# Patient Record
Sex: MALE | Race: WHITE | Employment: OTHER | URBAN - METROPOLITAN AREA
[De-identification: names, ages, dates, MRNs, and addresses within clinical notes are randomized per-mention and may not be internally consistent; named-entity substitution may affect disease eponyms.]

---

## 2017-02-03 RX ORDER — ISOSORBIDE MONONITRATE 30 MG/1
TABLET, EXTENDED RELEASE ORAL
Qty: 90 TABLET | Refills: 0 | Status: SHIPPED | OUTPATIENT
Start: 2017-02-03 | End: 2017-04-03 | Stop reason: SDUPTHER

## 2017-02-28 ENCOUNTER — OFFICE VISIT (OUTPATIENT)
Dept: FAMILY MEDICINE CLINIC | Age: 82
End: 2017-02-28

## 2017-02-28 VITALS
RESPIRATION RATE: 18 BRPM | WEIGHT: 270.2 LBS | OXYGEN SATURATION: 91 % | HEIGHT: 69 IN | SYSTOLIC BLOOD PRESSURE: 130 MMHG | BODY MASS INDEX: 40.02 KG/M2 | TEMPERATURE: 98.3 F | HEART RATE: 115 BPM | DIASTOLIC BLOOD PRESSURE: 80 MMHG

## 2017-02-28 DIAGNOSIS — E11.42 DM TYPE 2 WITH DIABETIC PERIPHERAL NEUROPATHY (HCC): Primary | ICD-10-CM

## 2017-02-28 LAB
ANION GAP SERPL CALCULATED.3IONS-SCNC: 15 MMOL/L (ref 3–16)
BUN BLDV-MCNC: 15 MG/DL (ref 7–20)
CALCIUM SERPL-MCNC: 9.5 MG/DL (ref 8.3–10.6)
CHLORIDE BLD-SCNC: 96 MMOL/L (ref 99–110)
CO2: 29 MMOL/L (ref 21–32)
CREAT SERPL-MCNC: 1.3 MG/DL (ref 0.8–1.3)
GFR AFRICAN AMERICAN: >60
GFR NON-AFRICAN AMERICAN: 53
GLUCOSE BLD-MCNC: 138 MG/DL (ref 70–99)
HBA1C MFR BLD: 7.7 %
POTASSIUM SERPL-SCNC: 4.4 MMOL/L (ref 3.5–5.1)
SODIUM BLD-SCNC: 140 MMOL/L (ref 136–145)

## 2017-02-28 PROCEDURE — 83036 HEMOGLOBIN GLYCOSYLATED A1C: CPT | Performed by: NURSE PRACTITIONER

## 2017-02-28 PROCEDURE — 36415 COLL VENOUS BLD VENIPUNCTURE: CPT | Performed by: NURSE PRACTITIONER

## 2017-02-28 PROCEDURE — 99213 OFFICE O/P EST LOW 20 MIN: CPT | Performed by: NURSE PRACTITIONER

## 2017-02-28 RX ORDER — LABETALOL 300 MG/1
TABLET, FILM COATED ORAL
Qty: 180 TABLET | Refills: 2 | Status: ON HOLD | OUTPATIENT
Start: 2017-02-28 | End: 2018-06-18 | Stop reason: HOSPADM

## 2017-02-28 ASSESSMENT — ENCOUNTER SYMPTOMS
GASTROINTESTINAL NEGATIVE: 1
EYES NEGATIVE: 1
RESPIRATORY NEGATIVE: 1
ALLERGIC/IMMUNOLOGIC NEGATIVE: 1

## 2017-04-04 ENCOUNTER — OFFICE VISIT (OUTPATIENT)
Dept: PULMONOLOGY | Age: 82
End: 2017-04-04

## 2017-04-04 VITALS
OXYGEN SATURATION: 95 % | DIASTOLIC BLOOD PRESSURE: 72 MMHG | SYSTOLIC BLOOD PRESSURE: 128 MMHG | WEIGHT: 271.2 LBS | HEART RATE: 69 BPM | RESPIRATION RATE: 18 BRPM | BODY MASS INDEX: 40.17 KG/M2 | TEMPERATURE: 98.2 F | HEIGHT: 69 IN

## 2017-04-04 DIAGNOSIS — G47.10 HYPERSOMNIA: ICD-10-CM

## 2017-04-04 DIAGNOSIS — J96.11 CHRONIC RESPIRATORY FAILURE WITH HYPOXIA (HCC): ICD-10-CM

## 2017-04-04 DIAGNOSIS — J44.9 COPD, SEVERE (HCC): ICD-10-CM

## 2017-04-04 DIAGNOSIS — R06.83 SNORING: ICD-10-CM

## 2017-04-04 DIAGNOSIS — J98.4 RESTRICTIVE LUNG DISEASE: ICD-10-CM

## 2017-04-04 DIAGNOSIS — G47.33 OSA (OBSTRUCTIVE SLEEP APNEA): ICD-10-CM

## 2017-04-04 DIAGNOSIS — R91.1 PULMONARY NODULE: Primary | ICD-10-CM

## 2017-04-04 PROCEDURE — 99214 OFFICE O/P EST MOD 30 MIN: CPT | Performed by: NURSE PRACTITIONER

## 2017-04-04 ASSESSMENT — SLEEP AND FATIGUE QUESTIONNAIRES
ESS TOTAL SCORE: 6
HOW LIKELY ARE YOU TO NOD OFF OR FALL ASLEEP WHILE SITTING AND READING: 1
HOW LIKELY ARE YOU TO NOD OFF OR FALL ASLEEP IN A CAR, WHILE STOPPED FOR A FEW MINUTES IN TRAFFIC: 0
HOW LIKELY ARE YOU TO NOD OFF OR FALL ASLEEP WHILE SITTING AND TALKING TO SOMEONE: 0
HOW LIKELY ARE YOU TO NOD OFF OR FALL ASLEEP WHILE SITTING INACTIVE IN A PUBLIC PLACE: 0
HOW LIKELY ARE YOU TO NOD OFF OR FALL ASLEEP WHEN YOU ARE A PASSENGER IN A CAR FOR AN HOUR WITHOUT A BREAK: 0
HOW LIKELY ARE YOU TO NOD OFF OR FALL ASLEEP WHILE WATCHING TV: 3
HOW LIKELY ARE YOU TO NOD OFF OR FALL ASLEEP WHILE SITTING QUIETLY AFTER LUNCH WITHOUT ALCOHOL: 1
NECK CIRCUMFERENCE (INCHES): 19.25
HOW LIKELY ARE YOU TO NOD OFF OR FALL ASLEEP WHILE LYING DOWN TO REST IN THE AFTERNOON WHEN CIRCUMSTANCES PERMIT: 1

## 2017-04-05 RX ORDER — ISOSORBIDE MONONITRATE 30 MG/1
TABLET, EXTENDED RELEASE ORAL
Qty: 90 TABLET | Refills: 0 | Status: SHIPPED | OUTPATIENT
Start: 2017-04-05 | End: 2017-06-01 | Stop reason: SDUPTHER

## 2017-05-03 ENCOUNTER — HOSPITAL ENCOUNTER (OUTPATIENT)
Dept: OTHER | Age: 82
Discharge: HOME | End: 2017-05-05
Attending: NURSE PRACTITIONER | Admitting: NURSE PRACTITIONER

## 2017-05-03 DIAGNOSIS — R06.83 SNORING: ICD-10-CM

## 2017-05-03 DIAGNOSIS — G47.10 HYPERSOMNIA: ICD-10-CM

## 2017-05-05 ENCOUNTER — TELEPHONE (OUTPATIENT)
Dept: PULMONOLOGY | Age: 82
End: 2017-05-05

## 2017-05-09 ENCOUNTER — TELEPHONE (OUTPATIENT)
Dept: PULMONOLOGY | Age: 82
End: 2017-05-09

## 2017-05-09 DIAGNOSIS — G47.30 SLEEP APNEA, UNSPECIFIED TYPE: Primary | ICD-10-CM

## 2017-05-17 ENCOUNTER — HOSPITAL ENCOUNTER (OUTPATIENT)
Dept: OTHER | Age: 82
Discharge: HOME | End: 2017-05-19

## 2017-05-17 DIAGNOSIS — G47.30 SLEEP APNEA, UNSPECIFIED TYPE: ICD-10-CM

## 2017-05-22 ENCOUNTER — TELEPHONE (OUTPATIENT)
Dept: PULMONOLOGY | Age: 82
End: 2017-05-22

## 2017-05-30 ENCOUNTER — OFFICE VISIT (OUTPATIENT)
Dept: FAMILY MEDICINE CLINIC | Age: 82
End: 2017-05-30

## 2017-05-30 DIAGNOSIS — L85.3 DRY SKIN DERMATITIS: ICD-10-CM

## 2017-05-30 DIAGNOSIS — E13.9 DIABETES 1.5, MANAGED AS TYPE 2 (HCC): ICD-10-CM

## 2017-05-30 DIAGNOSIS — E11.42 DM TYPE 2 WITH DIABETIC PERIPHERAL NEUROPATHY (HCC): Primary | ICD-10-CM

## 2017-05-30 DIAGNOSIS — Z91.81 RISK FOR FALLS: ICD-10-CM

## 2017-05-30 DIAGNOSIS — I10 ESSENTIAL HYPERTENSION: ICD-10-CM

## 2017-05-30 LAB — HBA1C MFR BLD: 8.8 %

## 2017-05-30 PROCEDURE — 83036 HEMOGLOBIN GLYCOSYLATED A1C: CPT | Performed by: NURSE PRACTITIONER

## 2017-05-30 PROCEDURE — 36415 COLL VENOUS BLD VENIPUNCTURE: CPT | Performed by: NURSE PRACTITIONER

## 2017-05-30 PROCEDURE — G8510 SCR DEP NEG, NO PLAN REQD: HCPCS | Performed by: NURSE PRACTITIONER

## 2017-05-30 PROCEDURE — 99214 OFFICE O/P EST MOD 30 MIN: CPT | Performed by: NURSE PRACTITIONER

## 2017-05-30 PROCEDURE — 3288F FALL RISK ASSESSMENT DOCD: CPT | Performed by: NURSE PRACTITIONER

## 2017-05-30 RX ORDER — AMMONIUM LACTATE 12 G/100G
CREAM TOPICAL
Qty: 385 G | Refills: 1 | Status: SHIPPED | OUTPATIENT
Start: 2017-05-30 | End: 2017-06-29

## 2017-05-30 ASSESSMENT — PATIENT HEALTH QUESTIONNAIRE - PHQ9
SUM OF ALL RESPONSES TO PHQ9 QUESTIONS 1 & 2: 0
1. LITTLE INTEREST OR PLEASURE IN DOING THINGS: 0
SUM OF ALL RESPONSES TO PHQ QUESTIONS 1-9: 0
2. FEELING DOWN, DEPRESSED OR HOPELESS: 0

## 2017-05-31 VITALS
OXYGEN SATURATION: 85 % | RESPIRATION RATE: 14 BRPM | HEIGHT: 69 IN | BODY MASS INDEX: 38.69 KG/M2 | HEART RATE: 91 BPM | WEIGHT: 261.2 LBS | SYSTOLIC BLOOD PRESSURE: 128 MMHG | DIASTOLIC BLOOD PRESSURE: 76 MMHG

## 2017-05-31 LAB
ANION GAP SERPL CALCULATED.3IONS-SCNC: 15 MMOL/L (ref 3–16)
BUN BLDV-MCNC: 16 MG/DL (ref 7–20)
CALCIUM SERPL-MCNC: 9.4 MG/DL (ref 8.3–10.6)
CHLORIDE BLD-SCNC: 94 MMOL/L (ref 99–110)
CO2: 33 MMOL/L (ref 21–32)
CREAT SERPL-MCNC: 1.6 MG/DL (ref 0.8–1.3)
GFR AFRICAN AMERICAN: 50
GFR NON-AFRICAN AMERICAN: 42
GLUCOSE BLD-MCNC: 271 MG/DL (ref 70–99)
POTASSIUM SERPL-SCNC: 4.9 MMOL/L (ref 3.5–5.1)
SODIUM BLD-SCNC: 142 MMOL/L (ref 136–145)

## 2017-05-31 RX ORDER — PIOGLITAZONEHYDROCHLORIDE 30 MG/1
30 TABLET ORAL DAILY
Qty: 30 TABLET | Refills: 3 | Status: SHIPPED | OUTPATIENT
Start: 2017-05-31 | End: 2018-04-26 | Stop reason: SDUPTHER

## 2017-06-01 RX ORDER — ISOSORBIDE MONONITRATE 30 MG/1
TABLET, EXTENDED RELEASE ORAL
Qty: 180 TABLET | Refills: 0 | Status: SHIPPED | OUTPATIENT
Start: 2017-06-01 | End: 2017-08-28 | Stop reason: SDUPTHER

## 2017-06-07 RX ORDER — DOXAZOSIN 8 MG/1
TABLET ORAL
Qty: 90 TABLET | Refills: 3 | Status: SHIPPED | OUTPATIENT
Start: 2017-06-07 | End: 2018-09-01 | Stop reason: SDUPTHER

## 2017-06-19 ENCOUNTER — TELEPHONE (OUTPATIENT)
Dept: FAMILY MEDICINE CLINIC | Age: 82
End: 2017-06-19

## 2017-08-03 ENCOUNTER — TELEPHONE (OUTPATIENT)
Dept: PULMONOLOGY | Age: 82
End: 2017-08-03

## 2017-08-07 ENCOUNTER — CARE COORDINATION (OUTPATIENT)
Dept: CARE COORDINATION | Age: 82
End: 2017-08-07

## 2017-08-07 ENCOUNTER — HOSPITAL ENCOUNTER (OUTPATIENT)
Dept: CT IMAGING | Age: 82
Discharge: HOME | End: 2017-08-07
Attending: NURSE PRACTITIONER | Admitting: NURSE PRACTITIONER

## 2017-08-07 DIAGNOSIS — R91.1 SOLITARY PULMONARY NODULE: ICD-10-CM

## 2017-08-07 DIAGNOSIS — R91.1 PULMONARY NODULE: ICD-10-CM

## 2017-08-14 ENCOUNTER — HOSPITAL ENCOUNTER (OUTPATIENT)
Dept: SURGERY | Age: 82
Discharge: HOME | End: 2017-08-14
Attending: OPHTHALMOLOGY | Admitting: OPHTHALMOLOGY

## 2017-08-14 VITALS — DIASTOLIC BLOOD PRESSURE: 60 MMHG | SYSTOLIC BLOOD PRESSURE: 125 MMHG

## 2017-08-14 RX ORDER — TROPICAMIDE 10 MG/ML
1 SOLUTION/ DROPS OPHTHALMIC ONCE
Status: COMPLETED | OUTPATIENT
Start: 2017-08-14 | End: 2017-08-14

## 2017-08-14 RX ORDER — PHENYLEPHRINE HCL 2.5 %
1 DROPS OPHTHALMIC (EYE) ONCE
Status: COMPLETED | OUTPATIENT
Start: 2017-08-14 | End: 2017-08-14

## 2017-08-14 RX ORDER — SODIUM CHLORIDE 0.9 % (FLUSH) 0.9 %
10 SYRINGE (ML) INJECTION EVERY 12 HOURS SCHEDULED
Status: DISCONTINUED | OUTPATIENT
Start: 2017-08-14 | End: 2017-08-15 | Stop reason: HOSPADM

## 2017-08-14 RX ORDER — SODIUM CHLORIDE 0.9 % (FLUSH) 0.9 %
10 SYRINGE (ML) INJECTION PRN
Status: DISCONTINUED | OUTPATIENT
Start: 2017-08-14 | End: 2017-08-15 | Stop reason: HOSPADM

## 2017-08-14 RX ADMIN — Medication 1 DROP: at 12:18

## 2017-08-14 RX ADMIN — TROPICAMIDE 1 DROP: 10 SOLUTION/ DROPS OPHTHALMIC at 12:18

## 2017-08-16 ENCOUNTER — OFFICE VISIT (OUTPATIENT)
Dept: PULMONOLOGY | Age: 82
End: 2017-08-16

## 2017-08-16 VITALS
HEART RATE: 80 BPM | DIASTOLIC BLOOD PRESSURE: 70 MMHG | HEIGHT: 69 IN | SYSTOLIC BLOOD PRESSURE: 146 MMHG | BODY MASS INDEX: 39.69 KG/M2 | WEIGHT: 268 LBS | RESPIRATION RATE: 16 BRPM | TEMPERATURE: 99.2 F | OXYGEN SATURATION: 93 %

## 2017-08-16 DIAGNOSIS — G47.10 HYPERSOMNIA: ICD-10-CM

## 2017-08-16 DIAGNOSIS — G47.33 OSA (OBSTRUCTIVE SLEEP APNEA): Primary | ICD-10-CM

## 2017-08-16 DIAGNOSIS — J44.9 COPD, SEVERE (HCC): ICD-10-CM

## 2017-08-16 DIAGNOSIS — J96.11 CHRONIC RESPIRATORY FAILURE WITH HYPOXIA (HCC): ICD-10-CM

## 2017-08-16 PROCEDURE — 99214 OFFICE O/P EST MOD 30 MIN: CPT | Performed by: INTERNAL MEDICINE

## 2017-08-16 ASSESSMENT — SLEEP AND FATIGUE QUESTIONNAIRES
ESS TOTAL SCORE: 4
HOW LIKELY ARE YOU TO NOD OFF OR FALL ASLEEP WHILE SITTING AND READING: 0
NECK CIRCUMFERENCE (INCHES): 19.25
HOW LIKELY ARE YOU TO NOD OFF OR FALL ASLEEP WHILE SITTING AND TALKING TO SOMEONE: 0
HOW LIKELY ARE YOU TO NOD OFF OR FALL ASLEEP WHILE SITTING QUIETLY AFTER LUNCH WITHOUT ALCOHOL: 0
HOW LIKELY ARE YOU TO NOD OFF OR FALL ASLEEP WHILE WATCHING TV: 1
HOW LIKELY ARE YOU TO NOD OFF OR FALL ASLEEP WHILE SITTING INACTIVE IN A PUBLIC PLACE: 0
HOW LIKELY ARE YOU TO NOD OFF OR FALL ASLEEP WHEN YOU ARE A PASSENGER IN A CAR FOR AN HOUR WITHOUT A BREAK: 1
HOW LIKELY ARE YOU TO NOD OFF OR FALL ASLEEP IN A CAR, WHILE STOPPED FOR A FEW MINUTES IN TRAFFIC: 0
HOW LIKELY ARE YOU TO NOD OFF OR FALL ASLEEP WHILE LYING DOWN TO REST IN THE AFTERNOON WHEN CIRCUMSTANCES PERMIT: 2

## 2017-08-28 RX ORDER — ISOSORBIDE MONONITRATE 30 MG/1
TABLET, EXTENDED RELEASE ORAL
Qty: 180 TABLET | Refills: 0 | Status: SHIPPED | OUTPATIENT
Start: 2017-08-28 | End: 2017-12-28 | Stop reason: SDUPTHER

## 2017-08-30 ENCOUNTER — OFFICE VISIT (OUTPATIENT)
Dept: FAMILY MEDICINE CLINIC | Age: 82
End: 2017-08-30

## 2017-08-30 VITALS
BODY MASS INDEX: 39.28 KG/M2 | SYSTOLIC BLOOD PRESSURE: 120 MMHG | WEIGHT: 265.2 LBS | DIASTOLIC BLOOD PRESSURE: 74 MMHG | HEIGHT: 69 IN | RESPIRATION RATE: 16 BRPM | OXYGEN SATURATION: 90 % | TEMPERATURE: 98.7 F | HEART RATE: 87 BPM

## 2017-08-30 DIAGNOSIS — E11.42 DM TYPE 2 WITH DIABETIC PERIPHERAL NEUROPATHY (HCC): Primary | ICD-10-CM

## 2017-08-30 DIAGNOSIS — Z23 NEED FOR DIPHTHERIA-TETANUS-PERTUSSIS (TDAP) VACCINE, ADULT/ADOLESCENT: ICD-10-CM

## 2017-08-30 LAB — HBA1C MFR BLD: 7.7 %

## 2017-08-30 PROCEDURE — 83036 HEMOGLOBIN GLYCOSYLATED A1C: CPT | Performed by: NURSE PRACTITIONER

## 2017-08-30 PROCEDURE — 99214 OFFICE O/P EST MOD 30 MIN: CPT | Performed by: NURSE PRACTITIONER

## 2017-08-30 ASSESSMENT — ENCOUNTER SYMPTOMS
ALLERGIC/IMMUNOLOGIC NEGATIVE: 1
EYES NEGATIVE: 1
GASTROINTESTINAL NEGATIVE: 1
RESPIRATORY NEGATIVE: 1

## 2017-08-31 ENCOUNTER — NURSE ONLY (OUTPATIENT)
Dept: FAMILY MEDICINE CLINIC | Age: 82
End: 2017-08-31

## 2017-08-31 DIAGNOSIS — E78.2 MIXED HYPERLIPIDEMIA: Primary | ICD-10-CM

## 2017-08-31 LAB
CHOLESTEROL, TOTAL: 160 MG/DL (ref 0–199)
HDLC SERPL-MCNC: 46 MG/DL (ref 40–60)
LDL CHOLESTEROL CALCULATED: 88 MG/DL
TRIGL SERPL-MCNC: 128 MG/DL (ref 0–150)
VLDLC SERPL CALC-MCNC: 26 MG/DL

## 2017-08-31 PROCEDURE — 36415 COLL VENOUS BLD VENIPUNCTURE: CPT | Performed by: NURSE PRACTITIONER

## 2017-09-08 RX ORDER — SIMVASTATIN 40 MG
TABLET ORAL
Qty: 90 TABLET | Refills: 3 | Status: SHIPPED | OUTPATIENT
Start: 2017-09-08 | End: 2018-03-28 | Stop reason: SDUPTHER

## 2017-09-18 ENCOUNTER — TELEPHONE (OUTPATIENT)
Dept: FAMILY MEDICINE CLINIC | Age: 82
End: 2017-09-18

## 2017-09-25 DIAGNOSIS — J44.9 COPD, SEVERE (HCC): ICD-10-CM

## 2017-10-02 ENCOUNTER — TELEPHONE (OUTPATIENT)
Dept: PULMONOLOGY | Age: 82
End: 2017-10-02

## 2017-10-02 DIAGNOSIS — J44.9 COPD, MODERATE (HCC): Primary | ICD-10-CM

## 2017-10-02 RX ORDER — ALBUTEROL SULFATE 90 UG/1
2 AEROSOL, METERED RESPIRATORY (INHALATION) EVERY 6 HOURS PRN
Qty: 1 INHALER | Refills: 3 | Status: SHIPPED | OUTPATIENT
Start: 2017-10-02 | End: 2018-03-06 | Stop reason: SDUPTHER

## 2017-10-10 ENCOUNTER — TELEPHONE (OUTPATIENT)
Dept: FAMILY MEDICINE CLINIC | Age: 82
End: 2017-10-10

## 2017-10-10 NOTE — TELEPHONE ENCOUNTER
Patient wife walked in and she needs a referral done for her . Dr. Rina Rasmussen    Appointment is this Thursday.

## 2017-11-07 ENCOUNTER — CARE COORDINATION (OUTPATIENT)
Dept: CARE COORDINATION | Age: 82
End: 2017-11-07

## 2017-11-17 NOTE — TELEPHONE ENCOUNTER
CLINICAL PHARMACY: [facility]    Identified care gap 90 day conversion per [employer]: Pioglitazone 30mg    Reached patient to review. Spoke with patients wife and she is interested in switching to 90 day supply for Pioglitazone 30mg. She stated she is going to the pharmacy almost daily to  his prescriptions and she said it would be more convenient to get a 90 day supply        Kateryna SMartina  [address]  Direct: [phone]  Department, toll free: [phone], option 7

## 2017-12-01 ENCOUNTER — OFFICE VISIT (OUTPATIENT)
Dept: FAMILY MEDICINE CLINIC | Age: 82
End: 2017-12-01

## 2017-12-01 VITALS
WEIGHT: 273 LBS | HEIGHT: 69 IN | SYSTOLIC BLOOD PRESSURE: 128 MMHG | BODY MASS INDEX: 40.43 KG/M2 | DIASTOLIC BLOOD PRESSURE: 74 MMHG

## 2017-12-01 DIAGNOSIS — E11.42 DM TYPE 2 WITH DIABETIC PERIPHERAL NEUROPATHY (HCC): Primary | ICD-10-CM

## 2017-12-01 DIAGNOSIS — I70.0 ATHEROSCLEROSIS OF AORTA (HCC): ICD-10-CM

## 2017-12-01 DIAGNOSIS — Z23 NEED FOR INFLUENZA VACCINATION: ICD-10-CM

## 2017-12-01 DIAGNOSIS — E66.01 MORBID OBESITY WITH BMI OF 40.0-44.9, ADULT (HCC): ICD-10-CM

## 2017-12-01 DIAGNOSIS — N18.30 CKD (CHRONIC KIDNEY DISEASE) STAGE 3, GFR 30-59 ML/MIN (HCC): ICD-10-CM

## 2017-12-01 DIAGNOSIS — I50.32 CHRONIC DIASTOLIC HEART FAILURE (HCC): ICD-10-CM

## 2017-12-01 DIAGNOSIS — H92.02 ACUTE OTALGIA, LEFT: ICD-10-CM

## 2017-12-01 LAB — HBA1C MFR BLD: 6 %

## 2017-12-01 PROCEDURE — G0008 ADMIN INFLUENZA VIRUS VAC: HCPCS | Performed by: FAMILY MEDICINE

## 2017-12-01 PROCEDURE — 1036F TOBACCO NON-USER: CPT | Performed by: FAMILY MEDICINE

## 2017-12-01 PROCEDURE — 90662 IIV NO PRSV INCREASED AG IM: CPT | Performed by: FAMILY MEDICINE

## 2017-12-01 PROCEDURE — G8598 ASA/ANTIPLAT THER USED: HCPCS | Performed by: FAMILY MEDICINE

## 2017-12-01 PROCEDURE — G8484 FLU IMMUNIZE NO ADMIN: HCPCS | Performed by: FAMILY MEDICINE

## 2017-12-01 PROCEDURE — 1123F ACP DISCUSS/DSCN MKR DOCD: CPT | Performed by: FAMILY MEDICINE

## 2017-12-01 PROCEDURE — 83036 HEMOGLOBIN GLYCOSYLATED A1C: CPT | Performed by: FAMILY MEDICINE

## 2017-12-01 PROCEDURE — 99214 OFFICE O/P EST MOD 30 MIN: CPT | Performed by: FAMILY MEDICINE

## 2017-12-01 PROCEDURE — 4040F PNEUMOC VAC/ADMIN/RCVD: CPT | Performed by: FAMILY MEDICINE

## 2017-12-01 PROCEDURE — G8417 CALC BMI ABV UP PARAM F/U: HCPCS | Performed by: FAMILY MEDICINE

## 2017-12-01 PROCEDURE — G8427 DOCREV CUR MEDS BY ELIG CLIN: HCPCS | Performed by: FAMILY MEDICINE

## 2017-12-01 NOTE — PROGRESS NOTES
There is no tenderness. Musculoskeletal: He exhibits edema (trace ankles). Neurological: He is alert. Skin: Skin is dry and intact. No rash noted. No erythema. Psychiatric: He has a normal mood and affect. Assessment:      1. DM type 2 with diabetic peripheral neuropathy (HCC)  POCT glycosylated hemoglobin (Hb A1C)   2. Need for influenza vaccination     3. Acute otalgia, left     4. Chronic diastolic heart failure ([facility]     5. CKD (chronic kidney disease) stage 3, GFR 30-59 ml/min     6. Atherosclerosis of aorta (HCC)             Plan:      Flu shot today  Cont actos 30/d  Cut back on levemir to 75 units hs from 80 hs  Cut back on victoza 1.8 to 1.2mg as having low bs and a1c down from 7.7 to 6.0%.   Feet care dw/ pt- f/u podiatry routinely  F/u optho    F/u kidney specialist every 3 months - microalbumin monitoring there  F/u cardio/ pulm routinely  cpm  F/u 6 months/ prn w/ a1c

## 2017-12-07 ENCOUNTER — TELEPHONE (OUTPATIENT)
Dept: FAMILY MEDICINE CLINIC | Age: 82
End: 2017-12-07

## 2017-12-07 NOTE — TELEPHONE ENCOUNTER
[facility] is trying [place]   They have additional questions to complete this. It is for Victoza.   Ref number [id number]

## 2017-12-11 NOTE — TELEPHONE ENCOUNTER
CLINICAL PHARMACY: [facility]    Sarah Crouch MD - Of note - Pioglitazone is contraindicated with NYHA Class III/IV heart failure. It appears this medication was started in May 2017. Please comment if you would like medication to be discontinued and I can alert the patient. Patient is interested in switching to a 90-day supply of Pioglitazone 30 mg in order to increase adherence to medications. I have pended a prescription for your convenience if you choose to continue the medication.       Thank you,    Indigo Mcclendon, PharmD  [address] [facility] Pharmacist  [phone] or [phone] (Option 7)

## 2017-12-19 RX ORDER — PIOGLITAZONEHYDROCHLORIDE 30 MG/1
30 TABLET ORAL DAILY
Qty: 90 TABLET | Refills: 0 | Status: SHIPPED | OUTPATIENT
Start: 2017-12-19 | End: 2018-03-06 | Stop reason: SDUPTHER

## 2017-12-20 NOTE — TELEPHONE ENCOUNTER
Dr. Sudhakar Meneses reordered 90-ds with no refills in order to reassess patient using Actos while having CHF. Will sign off for now.      Aamir Bedolla, PharmD  [address] [place] [address] Pharmacist  [phone] or [phone] (Option 7)

## 2018-01-01 RX ORDER — GABAPENTIN 300 MG/1
CAPSULE ORAL
Qty: 270 CAPSULE | Refills: 1 | Status: SHIPPED | OUTPATIENT
Start: 2018-01-01 | End: 2018-06-26 | Stop reason: SDUPTHER

## 2018-01-01 RX ORDER — ISOSORBIDE MONONITRATE 30 MG/1
TABLET, EXTENDED RELEASE ORAL
Qty: 180 TABLET | Refills: 3 | Status: SHIPPED | OUTPATIENT
Start: 2018-01-01 | End: 2019-03-14 | Stop reason: SDUPTHER

## 2018-01-15 DIAGNOSIS — J44.9 COPD, SEVERE (HCC): ICD-10-CM

## 2018-01-15 RX ORDER — FLUTICASONE FUROATE AND VILANTEROL 200; 25 UG/1; UG/1
1 POWDER RESPIRATORY (INHALATION) DAILY
Qty: 1 EACH | Refills: 5 | Status: SHIPPED | OUTPATIENT
Start: 2018-01-15 | End: 2019-02-02 | Stop reason: SDUPTHER

## 2018-03-06 PROBLEM — R79.89 ELEVATED TROPONIN: Status: ACTIVE | Noted: 2018-03-06

## 2018-03-06 PROBLEM — R07.9 CHEST PAIN: Status: ACTIVE | Noted: 2018-03-06

## 2018-03-06 PROBLEM — R77.8 ELEVATED TROPONIN: Status: ACTIVE | Noted: 2018-03-06

## 2018-03-07 PROBLEM — J96.11 CHRONIC RESPIRATORY FAILURE WITH HYPOXIA (HCC): Status: ACTIVE | Noted: 2018-03-07

## 2018-03-07 PROBLEM — R93.89 ABNORMAL CXR: Status: ACTIVE | Noted: 2018-03-07

## 2018-03-07 PROBLEM — R06.01 ORTHOPNEA: Status: ACTIVE | Noted: 2018-03-07

## 2018-03-09 PROBLEM — R07.9 CHEST PAIN: Status: ACTIVE | Noted: 2018-03-09

## 2018-03-10 ENCOUNTER — CARE COORDINATION (OUTPATIENT)
Dept: CASE MANAGEMENT | Age: 83
End: 2018-03-10

## 2018-03-10 DIAGNOSIS — I50.42 CHRONIC COMBINED SYSTOLIC AND DIASTOLIC CONGESTIVE HEART FAILURE (HCC): Primary | ICD-10-CM

## 2018-03-10 PROCEDURE — 1111F DSCHRG MED/CURRENT MED MERGE: CPT | Performed by: FAMILY MEDICINE

## 2018-03-16 ENCOUNTER — OFFICE VISIT (OUTPATIENT)
Dept: FAMILY MEDICINE CLINIC | Age: 83
End: 2018-03-16

## 2018-03-16 VITALS
BODY MASS INDEX: 38.66 KG/M2 | RESPIRATION RATE: 16 BRPM | WEIGHT: 261 LBS | OXYGEN SATURATION: 86 % | HEIGHT: 69 IN | DIASTOLIC BLOOD PRESSURE: 84 MMHG | HEART RATE: 101 BPM | SYSTOLIC BLOOD PRESSURE: 134 MMHG

## 2018-03-16 DIAGNOSIS — J96.11 HYPOXEMIC RESPIRATORY FAILURE, CHRONIC (HCC): ICD-10-CM

## 2018-03-16 DIAGNOSIS — J44.9 CHRONIC OBSTRUCTIVE PULMONARY DISEASE, UNSPECIFIED COPD TYPE (HCC): ICD-10-CM

## 2018-03-16 DIAGNOSIS — I70.0 ATHEROSCLEROSIS OF AORTA (HCC): ICD-10-CM

## 2018-03-16 DIAGNOSIS — N18.2 STAGE 2 CHRONIC KIDNEY DISEASE: ICD-10-CM

## 2018-03-16 DIAGNOSIS — E11.42 DIABETIC POLYNEUROPATHY ASSOCIATED WITH TYPE 2 DIABETES MELLITUS (HCC): Primary | ICD-10-CM

## 2018-03-16 DIAGNOSIS — I50.32 HEART FAILURE, DIASTOLIC, CHRONIC (HCC): ICD-10-CM

## 2018-03-16 PROCEDURE — G8417 CALC BMI ABV UP PARAM F/U: HCPCS | Performed by: NURSE PRACTITIONER

## 2018-03-16 PROCEDURE — G8598 ASA/ANTIPLAT THER USED: HCPCS | Performed by: NURSE PRACTITIONER

## 2018-03-16 PROCEDURE — G8926 SPIRO NO PERF OR DOC: HCPCS | Performed by: NURSE PRACTITIONER

## 2018-03-16 PROCEDURE — 99214 OFFICE O/P EST MOD 30 MIN: CPT | Performed by: NURSE PRACTITIONER

## 2018-03-16 PROCEDURE — 4040F PNEUMOC VAC/ADMIN/RCVD: CPT | Performed by: NURSE PRACTITIONER

## 2018-03-16 PROCEDURE — 3023F SPIROM DOC REV: CPT | Performed by: NURSE PRACTITIONER

## 2018-03-16 PROCEDURE — 1036F TOBACCO NON-USER: CPT | Performed by: NURSE PRACTITIONER

## 2018-03-16 PROCEDURE — G8482 FLU IMMUNIZE ORDER/ADMIN: HCPCS | Performed by: NURSE PRACTITIONER

## 2018-03-16 PROCEDURE — 1123F ACP DISCUSS/DSCN MKR DOCD: CPT | Performed by: NURSE PRACTITIONER

## 2018-03-16 PROCEDURE — 1111F DSCHRG MED/CURRENT MED MERGE: CPT | Performed by: NURSE PRACTITIONER

## 2018-03-16 PROCEDURE — G8427 DOCREV CUR MEDS BY ELIG CLIN: HCPCS | Performed by: NURSE PRACTITIONER

## 2018-03-16 RX ORDER — AZITHROMYCIN 250 MG/1
TABLET, FILM COATED ORAL
Qty: 1 PACKET | Refills: 0 | Status: SHIPPED | OUTPATIENT
Start: 2018-03-16 | End: 2018-03-26

## 2018-03-16 ASSESSMENT — ENCOUNTER SYMPTOMS
SINUS PRESSURE: 1
SHORTNESS OF BREATH: 1

## 2018-03-16 NOTE — PROGRESS NOTES
well visualized.   Trivial tricuspid regurgitation.      Right Atrium   Right atrium is not well visualized.      Pulmonic Valve   The pulmonic valve is not well visualized.   No evidence of pulmonic valve regurgitation.      Pericardial Effusion   No pericardial effusion noted.      Pleural Effusion   No pleural effusion.      Miscellaneous   Inferior vena cava appears normal.     M-Mode/2D Measurements (cm)      LV Diastolic Dimension: 8.67 cm LV Systolic Dimension: 1.66 cm   LV Septum Diastolic: 6.30 cm   LV PW Diastolic: 7.53 cm        AO Root Dimension: 3.2 cm   RV Diastolic Dimension: 7.10 cm AV Cusp Separation: 1.9 cm                                   LA Dimension: 4.8 cm   LVOT: 2 cm     Doppler Measurements      AV Peak Velocity: 141 cm/s    MV Peak E-Wave: 101 cm/s   AV Peak Gradient: 7.95 mmHg   MV Peak A-Wave: 109 cm/s   AV Mean Gradient: 5 mmHg      MV E/A Ratio: 0.93   LVOT Peak Velocity: 110 cm/s  MV P1/2t: 68 msec                                 MV Mean Gradient: 4 mmHg   TR Velocity:153 cm/s          MV Max P mmHg   TR Gradient:9.36 mmHg         MV Vmax:136 cm/s   Estimated RAP:5 mmHg          MV VTI:43.3 cm/s   Estimated RVSP: 17 mmHg   E' Septal Velocity: 4.06 cm/s   E' Lateral Velocity: 6.8 cm/s MV Area (PHT): 3.24 cm2      Aortic Valve      Peak Velocity: 141 cm/s  Mean Velocity: 107 cm/s   Peak Gradient: 7.95 mmHg Mean Gradient: 5 mmHg   AV VTI: 32.5 cm      Cusp Separation: 1.9 cm     Aorta      Aortic Root: 3.2 cm   LVOT Diameter: 2 cm            Specimen Collected: 18 09:56   Last Resulted: 18 15:58   Order Details View Encounter Lab and Collection Details Routing Result History                      Order Details     Coronary artery calcification  CONTINUE ZOCOR    Hypoxemic respiratory failure, chronic (HCC)

## 2018-03-19 ENCOUNTER — CARE COORDINATION (OUTPATIENT)
Dept: CASE MANAGEMENT | Age: 83
End: 2018-03-19

## 2018-03-19 NOTE — CARE COORDINATION
[facility] Follow Up Call    3/19/2018    Patient: Era Nail  Patient : 1935   MRN: [id number]  Reason for Admission: There are no discharge diagnoses documented for the most recent discharge. Discharge Date: 3/9/18 RARS: Risk Score: 12.25       Spoke with: Terese Steinberg - wife    Care Transitions Subsequent and Final Call    Subsequent and Final Calls  Do you have any ongoing symptoms?:  No  Have your medications changed?:  No  Do you have any questions related to your medications?:  No  Do you currently have any active services?:  No  Do you have any needs or concerns that I can assist you with?:  No  Identified Barriers:  None  Care Transitions Interventions  Other Interventions: Follow Up: Spoke with Carmina Lisa. She states Sriram Schreiber is resting. She states he does not weigh himself daily. Carmina Gonzalez denies Sriram cShreiber having any chest pain. He has chronic edema in his feet and ankles - he sees a podiatrist - Carmina Gonzalez feels this is nothing more than his normal. Sriram Schreiber does not check his b/p at home. Carmina Lisa states Sriram Schreiber is chronically SOB, but she feels he is at his baseline. He is using his home oxygen continuously at 4lpm per nc. Carmina Lisa states he usually does check his blood glucose level daily but has not done so today. Carmina Gonzalez denies any needs or concerns today. Carmina Gonzalez took writer's contact info. Carmina Gonzalez is agreeable to follow up from [address] [place] [address] for Sriram Schreiber.   Future Appointments  Date Time Provider Heriberto Franco   2018 1:00 PM Larry Quiles MD [facility] [id number] [facility]

## 2018-03-28 RX ORDER — AMLODIPINE BESYLATE 5 MG/1
TABLET ORAL
Qty: 90 TABLET | Refills: 2 | Status: ON HOLD | OUTPATIENT
Start: 2018-03-28 | End: 2018-06-18 | Stop reason: HOSPADM

## 2018-03-28 RX ORDER — SIMVASTATIN 40 MG
TABLET ORAL
Qty: 90 TABLET | Refills: 2 | Status: SHIPPED | OUTPATIENT
Start: 2018-03-28 | End: 2019-04-09 | Stop reason: SDUPTHER

## 2018-04-11 PROBLEM — R79.89 ELEVATED TROPONIN: Status: RESOLVED | Noted: 2018-03-06 | Resolved: 2018-04-11

## 2018-04-11 PROBLEM — R77.8 ELEVATED TROPONIN: Status: RESOLVED | Noted: 2018-03-06 | Resolved: 2018-04-11

## 2018-06-01 ENCOUNTER — HOSPITAL ENCOUNTER (OUTPATIENT)
Dept: OTHER | Age: 83
Discharge: HOME | End: 2018-06-30
Attending: INTERNAL MEDICINE | Admitting: INTERNAL MEDICINE

## 2018-06-01 ENCOUNTER — OFFICE VISIT (OUTPATIENT)
Dept: FAMILY MEDICINE CLINIC | Age: 83
End: 2018-06-01

## 2018-06-01 VITALS
SYSTOLIC BLOOD PRESSURE: 122 MMHG | DIASTOLIC BLOOD PRESSURE: 70 MMHG | WEIGHT: 265 LBS | HEIGHT: 69 IN | BODY MASS INDEX: 39.25 KG/M2 | RESPIRATION RATE: 20 BRPM | HEART RATE: 84 BPM

## 2018-06-01 DIAGNOSIS — N18.30 CKD (CHRONIC KIDNEY DISEASE) STAGE 3, GFR 30-59 ML/MIN (HCC): ICD-10-CM

## 2018-06-01 DIAGNOSIS — I50.42 CHRONIC COMBINED SYSTOLIC AND DIASTOLIC CONGESTIVE HEART FAILURE (HCC): ICD-10-CM

## 2018-06-01 DIAGNOSIS — E11.42 DIABETIC POLYNEUROPATHY ASSOCIATED WITH TYPE 2 DIABETES MELLITUS (HCC): Primary | ICD-10-CM

## 2018-06-01 DIAGNOSIS — I70.0 ATHEROSCLEROSIS OF AORTA (HCC): ICD-10-CM

## 2018-06-01 DIAGNOSIS — J44.9 COPD, MODERATE (HCC): ICD-10-CM

## 2018-06-01 DIAGNOSIS — M79.89 LEG SWELLING: ICD-10-CM

## 2018-06-01 PROCEDURE — G8417 CALC BMI ABV UP PARAM F/U: HCPCS | Performed by: FAMILY MEDICINE

## 2018-06-01 PROCEDURE — G8926 SPIRO NO PERF OR DOC: HCPCS | Performed by: FAMILY MEDICINE

## 2018-06-01 PROCEDURE — 1123F ACP DISCUSS/DSCN MKR DOCD: CPT | Performed by: FAMILY MEDICINE

## 2018-06-01 PROCEDURE — 1036F TOBACCO NON-USER: CPT | Performed by: FAMILY MEDICINE

## 2018-06-01 PROCEDURE — G8427 DOCREV CUR MEDS BY ELIG CLIN: HCPCS | Performed by: FAMILY MEDICINE

## 2018-06-01 PROCEDURE — G8598 ASA/ANTIPLAT THER USED: HCPCS | Performed by: FAMILY MEDICINE

## 2018-06-01 PROCEDURE — 3023F SPIROM DOC REV: CPT | Performed by: FAMILY MEDICINE

## 2018-06-01 PROCEDURE — 4040F PNEUMOC VAC/ADMIN/RCVD: CPT | Performed by: FAMILY MEDICINE

## 2018-06-01 PROCEDURE — 99214 OFFICE O/P EST MOD 30 MIN: CPT | Performed by: FAMILY MEDICINE

## 2018-06-01 ASSESSMENT — PATIENT HEALTH QUESTIONNAIRE - PHQ9
1. LITTLE INTEREST OR PLEASURE IN DOING THINGS: 0
SUM OF ALL RESPONSES TO PHQ9 QUESTIONS 1 & 2: 0
SUM OF ALL RESPONSES TO PHQ QUESTIONS 1-9: 0
2. FEELING DOWN, DEPRESSED OR HOPELESS: 0

## 2018-06-08 ENCOUNTER — NURSE ONLY (OUTPATIENT)
Dept: FAMILY MEDICINE CLINIC | Age: 83
End: 2018-06-08

## 2018-06-08 DIAGNOSIS — E11.42 DIABETIC POLYNEUROPATHY ASSOCIATED WITH TYPE 2 DIABETES MELLITUS (HCC): ICD-10-CM

## 2018-06-08 DIAGNOSIS — N18.30 CKD (CHRONIC KIDNEY DISEASE) STAGE 3, GFR 30-59 ML/MIN (HCC): ICD-10-CM

## 2018-06-08 PROCEDURE — 36415 COLL VENOUS BLD VENIPUNCTURE: CPT | Performed by: FAMILY MEDICINE

## 2018-06-09 LAB
ANION GAP SERPL CALCULATED.3IONS-SCNC: 14 MMOL/L (ref 3–16)
BUN BLDV-MCNC: 19 MG/DL (ref 7–20)
CALCIUM SERPL-MCNC: 9.6 MG/DL (ref 8.3–10.6)
CHLORIDE BLD-SCNC: 95 MMOL/L (ref 99–110)
CO2: 34 MMOL/L (ref 21–32)
CREAT SERPL-MCNC: 1.7 MG/DL (ref 0.8–1.3)
ESTIMATED AVERAGE GLUCOSE: 148.5 MG/DL
GFR AFRICAN AMERICAN: 47
GFR NON-AFRICAN AMERICAN: 39
GLUCOSE BLD-MCNC: 119 MG/DL (ref 70–99)
HBA1C MFR BLD: 6.8 %
POTASSIUM SERPL-SCNC: 4.8 MMOL/L (ref 3.5–5.1)
SODIUM BLD-SCNC: 143 MMOL/L (ref 136–145)

## 2018-06-13 ENCOUNTER — TELEPHONE (OUTPATIENT)
Dept: FAMILY MEDICINE CLINIC | Age: 83
End: 2018-06-13

## 2018-06-19 ENCOUNTER — CARE COORDINATION (OUTPATIENT)
Dept: CASE MANAGEMENT | Age: 83
End: 2018-06-19

## 2018-06-20 PROBLEM — G47.33 OSA (OBSTRUCTIVE SLEEP APNEA): Status: ACTIVE | Noted: 2018-06-20

## 2018-06-21 ENCOUNTER — OFFICE VISIT (OUTPATIENT)
Dept: PULMONOLOGY | Age: 83
End: 2018-06-21

## 2018-06-21 VITALS
HEIGHT: 69 IN | RESPIRATION RATE: 16 BRPM | SYSTOLIC BLOOD PRESSURE: 120 MMHG | HEART RATE: 68 BPM | BODY MASS INDEX: 40.29 KG/M2 | OXYGEN SATURATION: 92 % | DIASTOLIC BLOOD PRESSURE: 64 MMHG | TEMPERATURE: 98.5 F | WEIGHT: 272 LBS

## 2018-06-21 DIAGNOSIS — J44.9 COPD, MODERATE (HCC): ICD-10-CM

## 2018-06-21 DIAGNOSIS — J96.11 CHRONIC RESPIRATORY FAILURE WITH HYPOXIA (HCC): ICD-10-CM

## 2018-06-21 DIAGNOSIS — J30.0 CHRONIC VASOMOTOR RHINITIS: ICD-10-CM

## 2018-06-21 DIAGNOSIS — G47.33 OSA (OBSTRUCTIVE SLEEP APNEA): ICD-10-CM

## 2018-06-21 PROCEDURE — G8427 DOCREV CUR MEDS BY ELIG CLIN: HCPCS | Performed by: INTERNAL MEDICINE

## 2018-06-21 PROCEDURE — 99214 OFFICE O/P EST MOD 30 MIN: CPT | Performed by: INTERNAL MEDICINE

## 2018-06-21 PROCEDURE — G8926 SPIRO NO PERF OR DOC: HCPCS | Performed by: INTERNAL MEDICINE

## 2018-06-21 PROCEDURE — 3023F SPIROM DOC REV: CPT | Performed by: INTERNAL MEDICINE

## 2018-06-21 PROCEDURE — 4040F PNEUMOC VAC/ADMIN/RCVD: CPT | Performed by: INTERNAL MEDICINE

## 2018-06-21 PROCEDURE — 1123F ACP DISCUSS/DSCN MKR DOCD: CPT | Performed by: INTERNAL MEDICINE

## 2018-06-21 PROCEDURE — G8417 CALC BMI ABV UP PARAM F/U: HCPCS | Performed by: INTERNAL MEDICINE

## 2018-06-21 PROCEDURE — 1036F TOBACCO NON-USER: CPT | Performed by: INTERNAL MEDICINE

## 2018-06-21 PROCEDURE — G8598 ASA/ANTIPLAT THER USED: HCPCS | Performed by: INTERNAL MEDICINE

## 2018-06-21 PROCEDURE — 1111F DSCHRG MED/CURRENT MED MERGE: CPT | Performed by: INTERNAL MEDICINE

## 2018-06-21 RX ORDER — NITROGLYCERIN 0.4 MG/1
0.4 TABLET SUBLINGUAL EVERY 5 MIN PRN
Qty: 25 TABLET | Refills: 3 | Status: CANCELLED | OUTPATIENT
Start: 2018-06-21

## 2018-06-22 ENCOUNTER — OFFICE VISIT (OUTPATIENT)
Dept: FAMILY MEDICINE CLINIC | Age: 83
End: 2018-06-22

## 2018-06-22 VITALS
HEART RATE: 64 BPM | BODY MASS INDEX: 40.58 KG/M2 | SYSTOLIC BLOOD PRESSURE: 120 MMHG | HEIGHT: 69 IN | WEIGHT: 274 LBS | OXYGEN SATURATION: 89 % | DIASTOLIC BLOOD PRESSURE: 72 MMHG | RESPIRATION RATE: 22 BRPM

## 2018-06-22 DIAGNOSIS — E66.01 MORBID OBESITY (HCC): ICD-10-CM

## 2018-06-22 DIAGNOSIS — J44.9 COPD, MODERATE (HCC): ICD-10-CM

## 2018-06-22 DIAGNOSIS — I50.32 CHRONIC DIASTOLIC CONGESTIVE HEART FAILURE (HCC): ICD-10-CM

## 2018-06-22 DIAGNOSIS — D64.9 CHRONIC ANEMIA: ICD-10-CM

## 2018-06-22 DIAGNOSIS — I25.10 CORONARY ARTERY DISEASE INVOLVING NATIVE CORONARY ARTERY OF NATIVE HEART WITHOUT ANGINA PECTORIS: Primary | ICD-10-CM

## 2018-06-22 DIAGNOSIS — E66.01 MORBID OBESITY WITH BMI OF 40.0-44.9, ADULT (HCC): ICD-10-CM

## 2018-06-22 PROCEDURE — 99495 TRANSJ CARE MGMT MOD F2F 14D: CPT | Performed by: FAMILY MEDICINE

## 2018-06-22 PROCEDURE — 1111F DSCHRG MED/CURRENT MED MERGE: CPT | Performed by: FAMILY MEDICINE

## 2018-06-22 RX ORDER — NITROGLYCERIN 0.4 MG/1
0.4 TABLET SUBLINGUAL EVERY 5 MIN PRN
Qty: 25 TABLET | Refills: 3 | Status: SHIPPED | OUTPATIENT
Start: 2018-06-22

## 2018-06-26 ENCOUNTER — OFFICE VISIT (OUTPATIENT)
Dept: PHARMACY | Facility: CLINIC | Age: 83
End: 2018-06-26

## 2018-06-26 DIAGNOSIS — J44.9 COPD, MODERATE (HCC): Primary | ICD-10-CM

## 2018-06-26 DIAGNOSIS — I25.10 CORONARY ARTERY DISEASE INVOLVING NATIVE CORONARY ARTERY OF NATIVE HEART WITHOUT ANGINA PECTORIS: ICD-10-CM

## 2018-06-26 DIAGNOSIS — D64.9 CHRONIC ANEMIA: ICD-10-CM

## 2018-06-26 DIAGNOSIS — I50.32 CHRONIC DIASTOLIC CONGESTIVE HEART FAILURE (HCC): ICD-10-CM

## 2018-06-26 LAB
ANION GAP SERPL CALCULATED.3IONS-SCNC: 17 MMOL/L (ref 3–16)
BASOPHILS ABSOLUTE: 0 K/UL (ref 0–0.2)
BASOPHILS RELATIVE PERCENT: 0.4 %
BUN BLDV-MCNC: 26 MG/DL (ref 7–20)
CALCIUM SERPL-MCNC: 9.4 MG/DL (ref 8.3–10.6)
CHLORIDE BLD-SCNC: 96 MMOL/L (ref 99–110)
CO2: 30 MMOL/L (ref 21–32)
CREAT SERPL-MCNC: 1.9 MG/DL (ref 0.8–1.3)
EOSINOPHILS ABSOLUTE: 0.2 K/UL (ref 0–0.6)
EOSINOPHILS RELATIVE PERCENT: 3.6 %
GFR AFRICAN AMERICAN: 41
GFR NON-AFRICAN AMERICAN: 34
GLUCOSE BLD-MCNC: 100 MG/DL (ref 70–99)
HCT VFR BLD CALC: 37.6 % (ref 40.5–52.5)
HEMOGLOBIN: 12.6 G/DL (ref 13.5–17.5)
LYMPHOCYTES ABSOLUTE: 1 K/UL (ref 1–5.1)
LYMPHOCYTES RELATIVE PERCENT: 18.3 %
MCH RBC QN AUTO: 29.4 PG (ref 26–34)
MCHC RBC AUTO-ENTMCNC: 33.5 G/DL (ref 31–36)
MCV RBC AUTO: 87.7 FL (ref 80–100)
MONOCYTES ABSOLUTE: 0.5 K/UL (ref 0–1.3)
MONOCYTES RELATIVE PERCENT: 9.3 %
NEUTROPHILS ABSOLUTE: 3.9 K/UL (ref 1.7–7.7)
NEUTROPHILS RELATIVE PERCENT: 68.4 %
PDW BLD-RTO: 14.5 % (ref 12.4–15.4)
PLATELET # BLD: 291 K/UL (ref 135–450)
PMV BLD AUTO: 8.6 FL (ref 5–10.5)
POTASSIUM SERPL-SCNC: 4.5 MMOL/L (ref 3.5–5.1)
PRO-BNP: 933 PG/ML (ref 0–449)
RBC # BLD: 4.29 M/UL (ref 4.2–5.9)
SODIUM BLD-SCNC: 143 MMOL/L (ref 136–145)
WBC # BLD: 5.7 K/UL (ref 4–11)

## 2018-06-26 RX ORDER — GABAPENTIN 300 MG/1
300 CAPSULE ORAL 3 TIMES DAILY
COMMUNITY
End: 2018-08-07 | Stop reason: SDUPTHER

## 2018-06-27 ENCOUNTER — TELEPHONE (OUTPATIENT)
Dept: FAMILY MEDICINE CLINIC | Age: 83
End: 2018-06-27

## 2018-06-27 DIAGNOSIS — E11.42 DM TYPE 2 WITH DIABETIC PERIPHERAL NEUROPATHY (HCC): Primary | ICD-10-CM

## 2018-07-01 ENCOUNTER — HOSPITAL ENCOUNTER (OUTPATIENT)
Dept: OTHER | Age: 83
Discharge: HOME | End: 2018-07-31
Attending: INTERNAL MEDICINE | Admitting: INTERNAL MEDICINE

## 2018-07-05 ENCOUNTER — CARE COORDINATION (OUTPATIENT)
Dept: CASE MANAGEMENT | Age: 83
End: 2018-07-05

## 2018-07-06 DIAGNOSIS — E11.42 DM TYPE 2 WITH DIABETIC PERIPHERAL NEUROPATHY (HCC): ICD-10-CM

## 2018-07-06 LAB
ANION GAP SERPL CALCULATED.3IONS-SCNC: 14 MMOL/L (ref 3–16)
BUN BLDV-MCNC: 18 MG/DL (ref 7–20)
CALCIUM SERPL-MCNC: 9.7 MG/DL (ref 8.3–10.6)
CHLORIDE BLD-SCNC: 95 MMOL/L (ref 99–110)
CO2: 34 MMOL/L (ref 21–32)
CREAT SERPL-MCNC: 1.5 MG/DL (ref 0.8–1.3)
GFR AFRICAN AMERICAN: 54
GFR NON-AFRICAN AMERICAN: 45
GLUCOSE BLD-MCNC: 116 MG/DL (ref 70–99)
POTASSIUM SERPL-SCNC: 4.6 MMOL/L (ref 3.5–5.1)
SODIUM BLD-SCNC: 143 MMOL/L (ref 136–145)

## 2018-07-09 ENCOUNTER — TELEPHONE (OUTPATIENT)
Dept: PHARMACY | Facility: CLINIC | Age: 83
End: 2018-07-09

## 2018-07-09 NOTE — TELEPHONE ENCOUNTER
Mr. Jass Staples was referred to our [facility] for services to include COPD and HF education and medication review, he met with our pharmacist on 6/26 for COPD diagnosis. I discussed scheduling an appointment with his wife for HF review she said he is not wanting to at this time he is following up with many physicians and feels he has a good understanding of his HF. Patient and his wife will call if they would like to schedule any f/u appointments they are familiar with our staff and services and have our number to call.

## 2018-08-07 RX ORDER — GABAPENTIN 300 MG/1
CAPSULE ORAL
Qty: 270 CAPSULE | Refills: 0 | Status: SHIPPED | OUTPATIENT
Start: 2018-08-07 | End: 2018-11-14 | Stop reason: SDUPTHER

## 2018-10-22 ENCOUNTER — TELEPHONE (OUTPATIENT)
Dept: FAMILY MEDICINE CLINIC | Age: 83
End: 2018-10-22

## 2018-10-22 NOTE — TELEPHONE ENCOUNTER
Harlan Rodriges is working with the patient to keep him out of the hospital, patient needs a new glucose - meter - can Dr. Wayne Lux order him a meter , he is not taking his medication VICTOZA and he has + 2 pitting ademia  to his lower legs, he also has increase SOB and weight is up - she would like to  increase water pill for next 2 days to 100 mg - and Dr. Wayne Lux should need to see him. Please give her a call back.

## 2018-10-29 ENCOUNTER — CARE COORDINATION (OUTPATIENT)
Dept: CARE COORDINATION | Age: 83
End: 2018-10-29

## 2018-10-29 ASSESSMENT — ENCOUNTER SYMPTOMS: DYSPNEA ASSOCIATED WITH: EXERTION

## 2018-11-15 RX ORDER — GABAPENTIN 300 MG/1
CAPSULE ORAL
Qty: 270 CAPSULE | Refills: 0 | Status: SHIPPED | OUTPATIENT
Start: 2018-11-15 | End: 2019-02-26 | Stop reason: SDUPTHER

## 2018-11-19 ENCOUNTER — TELEPHONE (OUTPATIENT)
Dept: FAMILY MEDICINE CLINIC | Age: 83
End: 2018-11-19

## 2018-11-19 ENCOUNTER — TELEPHONE (OUTPATIENT)
Dept: PULMONOLOGY | Age: 83
End: 2018-11-19

## 2018-11-19 RX ORDER — ALBUTEROL SULFATE 2.5 MG/3ML
2.5 SOLUTION RESPIRATORY (INHALATION) EVERY 6 HOURS PRN
Qty: 120 VIAL | Refills: 3 | Status: SHIPPED | OUTPATIENT
Start: 2018-11-19

## 2018-11-19 RX ORDER — NEBULIZER ACCESSORIES
1 KIT MISCELLANEOUS DAILY PRN
Qty: 1 KIT | Refills: 0 | Status: SHIPPED | OUTPATIENT
Start: 2018-11-19

## 2018-11-20 ENCOUNTER — CARE COORDINATION (OUTPATIENT)
Dept: CARE COORDINATION | Age: 83
End: 2018-11-20

## 2018-11-20 NOTE — CARE COORDINATION
Complex Care  Referral from Primary Care Provider:  No  Suggested Interventions and Community Resources  Diabetes Education: In Process  Fall Risk Prevention: In Process  Disease Specific Clinic:  In Process (Comment: [facility])  Home Health Services: In Process (Comment: [facility])  Medication Assistance Program:  Declined  Medi Set or Pill Pack:  Declined  Physical Therapy: In Process  Zone Management Tools: In Process         Goals Addressed             Most Recent       Patient Stated     Patient Stated (pt-stated)   On track (11/20/2018)             I will get new batteries for my blood glucose meter to test my blood sugars    Barriers: impairment:  physical: will have to have someone get the batteries for me  Plan for overcoming my barriers: I will request that someone get batteries or notify my CC if a new meter needs to be obtained. Confidence: 7/10  Anticipated Goal Completion Date: 1/31/19         Other     Reduce Falls    On track (11/20/2018)             I will reduce my risk of falls by the following: Use walking aids like cane or walker    Barriers: none  Plan for overcoming my barriers: To aid myself and to prevent falls I will use DME  Confidence: 8/10  Anticipated Goal Completion Date: ongoing            Prior to Admission medications    Medication Sig Start Date End Date Taking?  Authorizing Provider   Respiratory Therapy Supplies (NEBULIZER/TUBING/MOUTHPIECE) KIT 1 kit by Does not apply route daily as needed (sob) 11/19/18  Yes Lilian Cox MD   albuterol (PROVENTIL) (2.5 MG/3ML) 0.083% nebulizer solution Take 3 mLs by nebulization every 6 hours as needed for Wheezing 11/19/18  Yes Lilian Cox MD   gabapentin (NEURONTIN) 300 MG capsule TAKE 1 CAPSULE BY MOUTH THREE TIMES DAILY 11/15/18 2/13/19 Yes Arian Iron, APRN - CNP   labetalol (NORMODYNE) 300 MG tablet TAKE 1 TABLET BY MOUTH TWICE DAILY AS NEEDED FOR BLOOD PRESSURE 9/4/18  Yes Odessa Benson MD   doxazosin

## 2018-11-27 ENCOUNTER — OFFICE VISIT (OUTPATIENT)
Dept: FAMILY MEDICINE CLINIC | Age: 83
End: 2018-11-27
Payer: MEDICARE

## 2018-11-27 VITALS
RESPIRATION RATE: 26 BRPM | SYSTOLIC BLOOD PRESSURE: 126 MMHG | DIASTOLIC BLOOD PRESSURE: 72 MMHG | BODY MASS INDEX: 41.92 KG/M2 | HEART RATE: 70 BPM | WEIGHT: 283 LBS | HEIGHT: 69 IN

## 2018-11-27 DIAGNOSIS — J44.9 COPD, MODERATE (HCC): Primary | ICD-10-CM

## 2018-11-27 DIAGNOSIS — Z23 NEEDS FLU SHOT: ICD-10-CM

## 2018-11-27 DIAGNOSIS — R60.0 BILATERAL LEG EDEMA: ICD-10-CM

## 2018-11-27 DIAGNOSIS — E11.42 DM TYPE 2 WITH DIABETIC PERIPHERAL NEUROPATHY (HCC): ICD-10-CM

## 2018-11-27 DIAGNOSIS — Z23 NEED FOR SHINGLES VACCINE: ICD-10-CM

## 2018-11-27 DIAGNOSIS — I50.9 CONGESTIVE HEART FAILURE, UNSPECIFIED HF CHRONICITY, UNSPECIFIED HEART FAILURE TYPE (HCC): ICD-10-CM

## 2018-11-27 LAB
A/G RATIO: 1.7 (ref 1.1–2.2)
ALBUMIN SERPL-MCNC: 4.3 G/DL (ref 3.4–5)
ALP BLD-CCNC: 56 U/L (ref 40–129)
ALT SERPL-CCNC: 10 U/L (ref 10–40)
ANION GAP SERPL CALCULATED.3IONS-SCNC: 14 MMOL/L (ref 3–16)
AST SERPL-CCNC: 11 U/L (ref 15–37)
BILIRUB SERPL-MCNC: 0.3 MG/DL (ref 0–1)
BUN BLDV-MCNC: 22 MG/DL (ref 7–20)
CALCIUM SERPL-MCNC: 9.7 MG/DL (ref 8.3–10.6)
CHLORIDE BLD-SCNC: 94 MMOL/L (ref 99–110)
CO2: 33 MMOL/L (ref 21–32)
CREAT SERPL-MCNC: 1.5 MG/DL (ref 0.8–1.3)
GFR AFRICAN AMERICAN: 54
GFR NON-AFRICAN AMERICAN: 45
GLOBULIN: 2.5 G/DL
GLUCOSE BLD-MCNC: 125 MG/DL (ref 70–99)
HBA1C MFR BLD: 5.9 %
POTASSIUM SERPL-SCNC: 4.7 MMOL/L (ref 3.5–5.1)
PRO-BNP: 1328 PG/ML (ref 0–449)
SODIUM BLD-SCNC: 141 MMOL/L (ref 136–145)
TOTAL PROTEIN: 6.8 G/DL (ref 6.4–8.2)

## 2018-11-27 PROCEDURE — 1101F PT FALLS ASSESS-DOCD LE1/YR: CPT | Performed by: REGISTERED NURSE

## 2018-11-27 PROCEDURE — 1123F ACP DISCUSS/DSCN MKR DOCD: CPT | Performed by: REGISTERED NURSE

## 2018-11-27 PROCEDURE — G8926 SPIRO NO PERF OR DOC: HCPCS | Performed by: REGISTERED NURSE

## 2018-11-27 PROCEDURE — 90662 IIV NO PRSV INCREASED AG IM: CPT | Performed by: REGISTERED NURSE

## 2018-11-27 PROCEDURE — G0008 ADMIN INFLUENZA VIRUS VAC: HCPCS | Performed by: REGISTERED NURSE

## 2018-11-27 PROCEDURE — 1036F TOBACCO NON-USER: CPT | Performed by: REGISTERED NURSE

## 2018-11-27 PROCEDURE — 4040F PNEUMOC VAC/ADMIN/RCVD: CPT | Performed by: REGISTERED NURSE

## 2018-11-27 PROCEDURE — 36415 COLL VENOUS BLD VENIPUNCTURE: CPT | Performed by: REGISTERED NURSE

## 2018-11-27 PROCEDURE — G8417 CALC BMI ABV UP PARAM F/U: HCPCS | Performed by: REGISTERED NURSE

## 2018-11-27 PROCEDURE — G8482 FLU IMMUNIZE ORDER/ADMIN: HCPCS | Performed by: REGISTERED NURSE

## 2018-11-27 PROCEDURE — G8427 DOCREV CUR MEDS BY ELIG CLIN: HCPCS | Performed by: REGISTERED NURSE

## 2018-11-27 PROCEDURE — 3023F SPIROM DOC REV: CPT | Performed by: REGISTERED NURSE

## 2018-11-27 PROCEDURE — 83036 HEMOGLOBIN GLYCOSYLATED A1C: CPT | Performed by: REGISTERED NURSE

## 2018-11-27 PROCEDURE — G8598 ASA/ANTIPLAT THER USED: HCPCS | Performed by: REGISTERED NURSE

## 2018-11-27 PROCEDURE — 99214 OFFICE O/P EST MOD 30 MIN: CPT | Performed by: REGISTERED NURSE

## 2018-11-27 RX ORDER — PEAK FLOW METER
EACH MISCELLANEOUS
Refills: 0 | COMMUNITY
Start: 2018-11-21

## 2018-11-27 RX ORDER — FLUTICASONE PROPIONATE 50 MCG
SPRAY, SUSPENSION (ML) NASAL
Qty: 3 BOTTLE | Refills: 3 | Status: SHIPPED | OUTPATIENT
Start: 2018-11-27 | End: 2019-05-17

## 2018-11-27 RX ORDER — AMMONIUM LACTATE 12 G/100G
CREAM TOPICAL
Qty: 1 TUBE | Refills: 3 | Status: SHIPPED | OUTPATIENT
Start: 2018-11-27 | End: 2018-12-27

## 2018-11-27 NOTE — PROGRESS NOTES
Priority: Low    Chronic vasomotor rhinitis 06/21/2018    ROSENDO (obstructive sleep apnea) 06/20/2018    Chest pain 03/09/2018    Chronic respiratory failure with hypoxia (HCC) 03/07/2018    Orthopnea 03/07/2018    Abnormal CXR 03/07/2018    SOB (shortness of breath)        Past Medical History:   Diagnosis Date    Atherosclerosis of aorta (HCC)     CHF (congestive heart failure) (HCC)     CKD (chronic kidney disease) stage 3, GFR 30-59 ml/min (HCC)     COPD (chronic obstructive pulmonary disease) ([address]     CVA, old, ataxia 2007    DM type 2 with diabetic peripheral neuropathy ([address] 10/23/2014    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 2008    Fall 9/2015    Fatty liver     Hypertension     Mixed hyperlipidemia     Morbid obesity ([address]     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 ([address] 2008    Obesity, diabetes, and hypertension syndrome ([address] August, 2015    Psoriasis     Risk for falls     Type 2 diabetes mellitus with microalbuminuria or microproteinuria     Vitamin D deficiency 2011       Past Surgical History:   Procedure Laterality Date    CATARACT REMOVAL Right 7/3/13    [place]    TURP  8/14/12    [place]       Orders Only on 11/05/2018   Component Date Value Ref Range Status    Sodium 11/05/2018 143  136 - 145 mmol/L Final    Potassium 11/05/2018 4.4  3.5 - 5.1 mmol/L Final    Chloride 11/05/2018 96* 99 - 110 mmol/L Final    CO2 11/05/2018 33* 21 - 32 mmol/L Final    Anion Gap 11/05/2018 14  3 - 16 Final    Glucose 11/05/2018 118* 70 - 99 mg/dL Final    BUN 11/05/2018 19  7 - 20 mg/dL Final    CREATININE 11/05/2018 1.6* 0.8 - 1.3 mg/dL Final    GFR Non- 11/05/2018 42* >60 Final    Comment: >60 mL/min/1.73m2 EGFR, calc. for ages 25 and older using the  MDRD formula (not corrected for weight), is valid for stable  renal function.  GFR  11/05/2018 50* >60 Final    Comment: Chronic Kidney Disease: less than 60 ml/min/1.73 sq.m.           Kidney Failure: less than 15 ml/min/1.73 sq.m. Results valid for patients 18 years and older.  Calcium 11/05/2018 9.4  8.3 - 10.6 mg/dL Final    Phosphorus 11/05/2018 3.6  2.5 - 4.9 mg/dL Final    Alb 11/05/2018 3.9  3.4 - 5.0 g/dL Final       No family history on file.     Current Outpatient Prescriptions   Medication Sig Dispense Refill    Nebulizers (VIOS LC SPRINT) MISC USE UTD  0    Respiratory Therapy Supplies (NEBULIZER/TUBING/MOUTHPIECE) KIT 1 kit by Does not apply route daily as needed (sob) 1 kit 0    albuterol (PROVENTIL) (2.5 MG/3ML) 0.083% nebulizer solution Take 3 mLs by nebulization every 6 hours as needed for Wheezing 120 vial 3    gabapentin (NEURONTIN) 300 MG capsule TAKE 1 CAPSULE BY MOUTH THREE TIMES DAILY 270 capsule 0    labetalol (NORMODYNE) 300 MG tablet TAKE 1 TABLET BY MOUTH TWICE DAILY AS NEEDED FOR BLOOD PRESSURE 180 tablet 0    doxazosin (CARDURA) 8 MG tablet TAKE 1 TABLET BY MOUTH EVERY NIGHT AT BEDTIME FOR BLOOD PRESSURE AND TO EASE VOIDING 90 tablet 0    nitroGLYCERIN (NITROSTAT) 0.4 MG SL tablet Place 1 tablet under the tongue every 5 minutes as needed for Chest pain (chest pain) 25 tablet 3    tiotropium (SPIRIVA) 18 MCG inhalation capsule One inhalation daily for breathing 90 capsule 5    labetalol (NORMODYNE) 100 MG tablet Take 1 tablet by mouth every 12 hours 60 tablet 0    pioglitazone (ACTOS) 30 MG tablet TAKE 1 TABLET BY MOUTH DAILY FOR DIABETES 90 tablet 1    simvastatin (ZOCOR) 40 MG tablet TAKE 1 TABLET BY MOUTH EVERY EVENING 90 tablet 2    Fluticasone Furoate-Vilanterol (BREO ELLIPTA) 200-25 MCG/INH AEPB Inhale 1 puff into the lungs daily 1 each 5    isosorbide mononitrate (IMDUR) 30 MG extended release tablet TAKE 1 TABLET BY MOUTH TWICE DAILY FOR BLOOD PRESSURE 180 tablet 3    albuterol sulfate HFA (PROAIR HFA) 108 (90 BASE) MCG/ACT inhaler Inhale 2 puffs into the lungs every 6 hours as needed for Wheezing or Shortness of Breath 1 Inhaler 3    Spacer/Aero-Holding pulses. Pulmonary/Chest: Effort normal and breath sounds normal.   Musculoskeletal:        Right ankle: He exhibits swelling. He exhibits normal pulse. Left ankle: He exhibits swelling. He exhibits normal pulse. Neurological: He is alert and oriented to person, place, and time. Skin: Skin is warm, dry and intact. Capillary refill takes less than 2 seconds. Bilateral lower legs dry skin- rough   Psychiatric: He has a normal mood and affect. His behavior is normal. Judgment and thought content normal.   Nursing note and vitals reviewed. Assessment/Plan     1. COPD, moderate ([address]  Symptoms are stable. Continue current regimen. 2. Congestive heart failure, unspecified HF chronicity, unspecified heart failure type (HCC)  Check BNP. Keep follow up with cardiology.  - Brain Natriuretic Peptide; Future  - Brain Natriuretic Peptide    3. Bilateral leg edema  Check BNP and CMP. Given Rx for compression stockings and Ammonium Lactate cream. Educated to elevate and compress.  - Comprehensive Metabolic Panel; Future  - Brain Natriuretic Peptide; Future  - Compression Stockings MISC; by Does not apply route 15-20 mmHg knee high  Dispense: 1 each; Refill: 0  - Comprehensive Metabolic Panel  - Brain Natriuretic Peptide  - ammonium lactate (LAC-HYDRIN) 12 % cream; Apply twice daily to both legs  Dispense: 1 Tube; Refill: 3    4. DM type 2 with diabetic peripheral neuropathy (HCC)  A1c 5.9- stable. - POCT glycosylated hemoglobin (Hb A1C)    5. Need for shingles vaccine  - zoster recombinant adjuvanted vaccine [facility]) 50 MCG/0.5ML SUSR injection; 1 dose now and repeat in 2-6 months  Dispense: 0.5 mL; Refill: 0    6.  Needs flu shot  - INFLUENZA, HIGH DOSE, 65 YRS +, IM, PF, PREFILL SYR, 0.5ML (FLUZONE HD)      Orders Placed This Encounter   Procedures    INFLUENZA, HIGH DOSE, 65 YRS +, IM, PF, PREFILL SYR, 0.5ML (FLUZONE HD)    Comprehensive Metabolic Panel     Standing Status:   Future     Number of Occurrences:   1     Standing Expiration Date:   11/27/2019    Brain Natriuretic Peptide     Standing Status:   Future     Number of Occurrences:   1     Standing Expiration Date:   11/27/2019    POCT glycosylated hemoglobin (Hb A1C)       Return in about 3 months (around 2/27/2019) for COPD, DM, With Dr. Gris Oliveira, HTN, Hypyerlipidemia.     Aaliyah Clayton NP    11/27/2018  12:13 PM

## 2018-12-13 DIAGNOSIS — E13.9 DIABETES 1.5, MANAGED AS TYPE 2 (HCC): ICD-10-CM

## 2018-12-13 RX ORDER — PIOGLITAZONEHYDROCHLORIDE 30 MG/1
TABLET ORAL
Qty: 90 TABLET | Refills: 0 | Status: SHIPPED | OUTPATIENT
Start: 2018-12-13 | End: 2019-04-04 | Stop reason: ALTCHOICE

## 2018-12-13 RX ORDER — DOXAZOSIN 8 MG/1
TABLET ORAL
Qty: 90 TABLET | Refills: 0 | Status: SHIPPED | OUTPATIENT
Start: 2018-12-13 | End: 2019-04-18 | Stop reason: SDUPTHER

## 2018-12-16 ASSESSMENT — ENCOUNTER SYMPTOMS
ROS SKIN COMMENTS: DRY SKIN
COUGH: 0
SHORTNESS OF BREATH: 0
CHEST TIGHTNESS: 0
WHEEZING: 0

## 2018-12-19 ENCOUNTER — OFFICE VISIT (OUTPATIENT)
Dept: PULMONOLOGY | Age: 83
End: 2018-12-19
Payer: MEDICARE

## 2018-12-19 VITALS
BODY MASS INDEX: 42.06 KG/M2 | HEART RATE: 74 BPM | HEIGHT: 69 IN | RESPIRATION RATE: 16 BRPM | WEIGHT: 284 LBS | SYSTOLIC BLOOD PRESSURE: 130 MMHG | DIASTOLIC BLOOD PRESSURE: 62 MMHG | TEMPERATURE: 98.1 F | OXYGEN SATURATION: 94 %

## 2018-12-19 DIAGNOSIS — J96.11 CHRONIC RESPIRATORY FAILURE WITH HYPOXIA (HCC): ICD-10-CM

## 2018-12-19 DIAGNOSIS — J44.9 COPD, MODERATE (HCC): ICD-10-CM

## 2018-12-19 PROCEDURE — 4040F PNEUMOC VAC/ADMIN/RCVD: CPT | Performed by: INTERNAL MEDICINE

## 2018-12-19 PROCEDURE — 1036F TOBACCO NON-USER: CPT | Performed by: INTERNAL MEDICINE

## 2018-12-19 PROCEDURE — G8926 SPIRO NO PERF OR DOC: HCPCS | Performed by: INTERNAL MEDICINE

## 2018-12-19 PROCEDURE — G8417 CALC BMI ABV UP PARAM F/U: HCPCS | Performed by: INTERNAL MEDICINE

## 2018-12-19 PROCEDURE — 1101F PT FALLS ASSESS-DOCD LE1/YR: CPT | Performed by: INTERNAL MEDICINE

## 2018-12-19 PROCEDURE — G8598 ASA/ANTIPLAT THER USED: HCPCS | Performed by: INTERNAL MEDICINE

## 2018-12-19 PROCEDURE — G8427 DOCREV CUR MEDS BY ELIG CLIN: HCPCS | Performed by: INTERNAL MEDICINE

## 2018-12-19 PROCEDURE — 3023F SPIROM DOC REV: CPT | Performed by: INTERNAL MEDICINE

## 2018-12-19 PROCEDURE — 1123F ACP DISCUSS/DSCN MKR DOCD: CPT | Performed by: INTERNAL MEDICINE

## 2018-12-19 PROCEDURE — 99213 OFFICE O/P EST LOW 20 MIN: CPT | Performed by: INTERNAL MEDICINE

## 2018-12-19 PROCEDURE — G8482 FLU IMMUNIZE ORDER/ADMIN: HCPCS | Performed by: INTERNAL MEDICINE

## 2018-12-19 NOTE — PROGRESS NOTES
REASON FOR CONSULTATION/CC: rosendo         PCP: Swetha Jennings MD    HISTORY OF PRESENT ILLNESS: Primo Abad is a 81y.o. year old male with a history of ROSENDO who presents :             Copd  Breo and Spiriva . No issues cost or side effects     chronic hypoxemia    Continues ton 3L nC. 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 SOCIAL HISTORY:   reports that he quit smoking about 12 years ago. His smoking use included Cigarettes. He started smoking about 67 years ago. He has a 100.00 pack-year smoking history. He has never used smokeless tobacco.      ALLERGIES:  Patient has No Known Allergies.     REVIEW OF SYSTEMS:  Constitutional: Negative for fever    HENT: Negative for sore throat  Eyes: Negative for redness   Respiratory: Negative for dyspnea, cough  Cardiovascular: Negative for chest pain  Gastrointestinal: Negative for vomiting, diarrhea   Genitourinary: Negative for hematuria   Musculoskeletal: Negative for arthralgias   Skin: Negative for rash  Neurological: Negative for syncope  Hematological: Negative for adenopathy  Psychiatric/Behavorial: Negative for anxiety    Objective:   PHYSICAL EXAM:  Blood pressure 130/62, pulse 74, temperature 98.1 °F (36.7 °C), temperature source Oral, resp. rate 16, height 5' 9\" (1.753 m), weight 284 lb (128.8 kg), SpO2 94 %.'  Gen: No distress. Neck: Trachea midline. No obvious mass. Resp: No accessory muscle use. No crackles. No wheezes. No rhonchi. CV: Regular rate. Regular rhythm. No murmur or rub. No edema. Skin: Warm, dry, normal texture and turgor. No nodule on exposed extremities. M/S: No cyanosis. No clubbing. No joint deformity. Neuro: Moves all four extremities. Psych: Oriented x 3. No anxiety. Awake. Alert. Intact judgement and insight.     Current Outpatient Prescriptions   Medication Sig Dispense Refill    doxazosin (CARDURA) 8 MG tablet TAKE 1 TABLET BY MOUTH EVERY NIGHT AT BEDTIME FOR BLOOD PRESSURE TO EASE VOIDING 90 tablet 0    pioglitazone (ACTOS) 30 MG tablet TAKE 1 TABLET BY MOUTH DAILY FOR DIABETES 90 tablet 0    fluticasone (FLONASE) 50 MCG/ACT nasal spray 1 spray each nostril daily for allergies 3 Bottle 3    Nebulizers (VIOS LC SPRINT) MISC USE UTD  0    ammonium lactate (LAC-HYDRIN) 12 % cream Apply twice daily to both legs 1 Tube 3    Respiratory Therapy Supplies (NEBULIZER/TUBING/MOUTHPIECE) KIT 1 kit by Does not apply route daily as needed (sob) 1 kit 0    albuterol (PROVENTIL) (2.5 MG/3ML) 0.083% nebulizer solution Take 3 mLs by nebulization every 6 hours as needed for Wheezing 120 vial 3    gabapentin (NEURONTIN) 300 MG capsule TAKE 1 CAPSULE BY MOUTH THREE TIMES DAILY 270 capsule 0    labetalol (NORMODYNE) 300 MG tablet TAKE 1 TABLET BY MOUTH TWICE DAILY AS NEEDED FOR BLOOD PRESSURE 180 tablet 0    nitroGLYCERIN (NITROSTAT) 0.4 MG SL tablet Place 1 tablet under the tongue every 5 minutes as needed for Chest pain (chest pain) 25 tablet 3    tiotropium (SPIRIVA) 18 MCG inhalation capsule One 11/27/2018    CREATININE 1.5 11/27/2018    GLUCOSE 125 11/27/2018    CALCIUM 9.7 11/27/2018       Last ABG  POC Blood Gas: No results found for: POCPH, POCPCO2, POCPO2, POCHCO3, NBEA, UPTK2EER  No results for input(s): PH, PCO2, PO2, HCO3, BE, O2SAT in the last 72 hours. Assessment:     ·  ROSENDO - stopped using   · Hypersomnia  · Chronic resp failure  · COPD, severe    Plan:           Problem List Items Addressed This Visit     COPD, moderate ([address]     Doing well with Breo and Spiriva          Chronic respiratory failure with hypoxia (HCC)     Valeriy Palma continues to need and benefit from supplemental oxygen.  he is using oxygen continuously at 3 L NC.

## 2018-12-27 ENCOUNTER — TELEPHONE (OUTPATIENT)
Dept: FAMILY MEDICINE CLINIC | Age: 83
End: 2018-12-27

## 2018-12-27 NOTE — TELEPHONE ENCOUNTER
CALLED BACK AND CHECKED ON HIM HE IS SITTING ABOVE 90 ON 4L OF O2 SHE IS GOING TO REACH OUT TO [facility] AND CALL US TOMORROW SHE STATED HE IS DOING FINE. Darcy Lam

## 2018-12-28 ENCOUNTER — CARE COORDINATION (OUTPATIENT)
Dept: CARE COORDINATION | Age: 83
End: 2018-12-28

## 2018-12-28 NOTE — CARE COORDINATION
apply route daily. 8/26/14  Yes Laverne Siddiqui MD   Cholecalciferol (VITAMIN D-3) 5000 UNITS TABS Take 10,000 Units by mouth daily    Yes Historical Provider, MD   aspirin 81 MG EC tablet Take 81 mg by mouth daily. Yes Historical Provider, MD   Compression Stockings MISC by Does not apply route 15-20 mmHg knee high 11/27/18   Betsy Farrar, APRN - CNP   Blood Glucose Monitoring Suppl (ACCU-CHEK NEVAEH SMARTVIEW) W/DEVICE KIT CHECK SUGARS THREE TIMES DAILY AS INSTRUCTED 10/15/15   Laverne Siddiqui MD   Blood Glucose Calibration (ACCU-CHEK SMARTVIEW CONTROL) LIQD USE AS DIRECTED 10/15/15   Laverne Siddiqui MD   [facility] LANCETS MISC Use for blood sugar testing 8/6/15   Laverne Siddiqui MD       Future Appointments  Date Time Provider Heriberto Joan   3/7/2019 10:20 AM Nicholas Shrestha MD [address]   5/14/2019 9:00 AM Davonte Wen MD [address] [place] [address] [facility]     ,   Diabetes Assessment    Medic Alert ID:  No  Meal Planning:  None   How often do you test your blood sugar?:  Daily   Do you have barriers with adherence to non-pharmacologic self-management interventions?  (Nutrition/Exercise/Self-Monitoring):  No   Have you ever had to go to the ED for symptoms of low blood sugar?:  No          ,   Congestive Heart Failure Assessment    Are you currently restricting fluids?:  No Restriction  Do you understand a low sodium diet?:  No  Do you understand how to read food labels?:  No  How many restaurant meals do you eat per week?:  0  Do you salt your food before tasting it?:  No         Symptoms:          and   COPD Assessment    Does the patient understand envrionmental exposure?:  Yes  Is the patient able to verbalize Rescue vs. Long Acting medications?:  Yes  Does the patient have a nebulizer?:  No  Does the patient use a space with inhaled medications?:  Yes     Shortness of breath (worse than baseline)         Symptoms:

## 2019-01-24 ENCOUNTER — CARE COORDINATION (OUTPATIENT)
Dept: CARE COORDINATION | Age: 84
End: 2019-01-24

## 2019-02-02 DIAGNOSIS — J44.9 COPD, SEVERE (HCC): ICD-10-CM

## 2019-02-02 DIAGNOSIS — J44.9 COPD, MODERATE (HCC): ICD-10-CM

## 2019-02-04 RX ORDER — LABETALOL 300 MG/1
TABLET, FILM COATED ORAL
Qty: 180 TABLET | Refills: 0 | Status: SHIPPED | OUTPATIENT
Start: 2019-02-04 | End: 2019-04-24

## 2019-02-26 RX ORDER — GABAPENTIN 300 MG/1
CAPSULE ORAL
Qty: 270 CAPSULE | Refills: 0 | Status: ON HOLD | OUTPATIENT
Start: 2019-02-26 | End: 2019-05-15

## 2019-03-08 ENCOUNTER — CARE COORDINATION (OUTPATIENT)
Dept: CARE COORDINATION | Age: 84
End: 2019-03-08

## 2019-03-14 ENCOUNTER — TELEPHONE (OUTPATIENT)
Dept: FAMILY MEDICINE CLINIC | Age: 84
End: 2019-03-14

## 2019-03-14 ENCOUNTER — CARE COORDINATION (OUTPATIENT)
Dept: CARE COORDINATION | Age: 84
End: 2019-03-14

## 2019-03-14 NOTE — TELEPHONE ENCOUNTER
Karlene from [facility] is calling to let us know that Minor Russell has not been taking his medication: he has not been taking either one of his insulin medication - stop taking about 3 months ago, he is still taking his medication ACTOS 30 MG TABLET - 1 TABLET PER DAY. Sugar - finger stick today 164. She is going to give him something for his depression and she will be back in 30 days to check on him. Please give her a call back. Legs look ok, a little swelling but no drainage.

## 2019-03-15 RX ORDER — ISOSORBIDE MONONITRATE 30 MG/1
TABLET, EXTENDED RELEASE ORAL
Qty: 180 TABLET | Refills: 0 | Status: SHIPPED | OUTPATIENT
Start: 2019-03-15 | End: 2019-07-05 | Stop reason: SDUPTHER

## 2019-03-18 NOTE — TELEPHONE ENCOUNTER
Dr Ana Rodriguez,     I called Brian Lee from [facility] back and advised her that he is not on insulin coverage at this time. I advised her that the biggest struggle is for him to have a working glucometer and checking his blood sugars. She asked me to let you know that she has started him on Sertraline 25 mg once a day. She will be returning to his house for another visit in 30 days.

## 2019-03-26 DIAGNOSIS — J44.9 COPD, MODERATE (HCC): ICD-10-CM

## 2019-04-04 ENCOUNTER — OFFICE VISIT (OUTPATIENT)
Dept: FAMILY MEDICINE CLINIC | Age: 84
End: 2019-04-04
Payer: MEDICARE

## 2019-04-04 ENCOUNTER — CARE COORDINATION (OUTPATIENT)
Dept: CARE COORDINATION | Age: 84
End: 2019-04-04

## 2019-04-04 VITALS
HEIGHT: 69 IN | BODY MASS INDEX: 41.92 KG/M2 | SYSTOLIC BLOOD PRESSURE: 110 MMHG | RESPIRATION RATE: 20 BRPM | HEART RATE: 64 BPM | DIASTOLIC BLOOD PRESSURE: 62 MMHG | WEIGHT: 283 LBS | OXYGEN SATURATION: 94 %

## 2019-04-04 DIAGNOSIS — I70.0 ATHEROSCLEROSIS OF AORTA (HCC): ICD-10-CM

## 2019-04-04 DIAGNOSIS — E78.00 HYPERCHOLESTEREMIA: ICD-10-CM

## 2019-04-04 DIAGNOSIS — E11.69 DIABETES MELLITUS TYPE 2 IN OBESE (HCC): Primary | ICD-10-CM

## 2019-04-04 DIAGNOSIS — J44.9 MODERATE COPD (CHRONIC OBSTRUCTIVE PULMONARY DISEASE) (HCC): ICD-10-CM

## 2019-04-04 DIAGNOSIS — I10 ESSENTIAL HYPERTENSION: ICD-10-CM

## 2019-04-04 DIAGNOSIS — I50.9 CONGESTIVE HEART FAILURE, UNSPECIFIED HF CHRONICITY, UNSPECIFIED HEART FAILURE TYPE (HCC): ICD-10-CM

## 2019-04-04 DIAGNOSIS — E66.01 MORBID OBESITY WITH BMI OF 40.0-44.9, ADULT (HCC): ICD-10-CM

## 2019-04-04 DIAGNOSIS — E66.9 DIABETES MELLITUS TYPE 2 IN OBESE (HCC): Primary | ICD-10-CM

## 2019-04-04 DIAGNOSIS — R06.02 SOB (SHORTNESS OF BREATH): ICD-10-CM

## 2019-04-04 DIAGNOSIS — E11.42 DM TYPE 2 WITH DIABETIC PERIPHERAL NEUROPATHY (HCC): ICD-10-CM

## 2019-04-04 DIAGNOSIS — R53.83 FATIGUE, UNSPECIFIED TYPE: ICD-10-CM

## 2019-04-04 DIAGNOSIS — M79.89 LEG SWELLING: ICD-10-CM

## 2019-04-04 DIAGNOSIS — J96.11 CHRONIC HYPOXEMIC RESPIRATORY FAILURE (HCC): ICD-10-CM

## 2019-04-04 LAB
A/G RATIO: 1.5 (ref 1.1–2.2)
ALBUMIN SERPL-MCNC: 3.9 G/DL (ref 3.4–5)
ALP BLD-CCNC: 44 U/L (ref 40–129)
ALT SERPL-CCNC: 9 U/L (ref 10–40)
ANION GAP SERPL CALCULATED.3IONS-SCNC: 11 MMOL/L (ref 3–16)
AST SERPL-CCNC: 12 U/L (ref 15–37)
BASOPHILS ABSOLUTE: 0 K/UL (ref 0–0.2)
BASOPHILS RELATIVE PERCENT: 0.5 %
BILIRUB SERPL-MCNC: 0.3 MG/DL (ref 0–1)
BUN BLDV-MCNC: 24 MG/DL (ref 7–20)
CALCIUM SERPL-MCNC: 9.4 MG/DL (ref 8.3–10.6)
CHLORIDE BLD-SCNC: 93 MMOL/L (ref 99–110)
CO2: 38 MMOL/L (ref 21–32)
CREAT SERPL-MCNC: 1.9 MG/DL (ref 0.8–1.3)
D DIMER: 345 NG/ML DDU (ref 0–229)
EOSINOPHILS ABSOLUTE: 0.1 K/UL (ref 0–0.6)
EOSINOPHILS RELATIVE PERCENT: 2.4 %
GFR AFRICAN AMERICAN: 41
GFR NON-AFRICAN AMERICAN: 34
GLOBULIN: 2.6 G/DL
GLUCOSE BLD-MCNC: 136 MG/DL (ref 70–99)
HBA1C MFR BLD: 5.7 %
HCT VFR BLD CALC: 38.2 % (ref 40.5–52.5)
HEMOGLOBIN: 12.6 G/DL (ref 13.5–17.5)
LYMPHOCYTES ABSOLUTE: 0.8 K/UL (ref 1–5.1)
LYMPHOCYTES RELATIVE PERCENT: 15.5 %
MCH RBC QN AUTO: 29.6 PG (ref 26–34)
MCHC RBC AUTO-ENTMCNC: 32.9 G/DL (ref 31–36)
MCV RBC AUTO: 89.8 FL (ref 80–100)
MONOCYTES ABSOLUTE: 0.6 K/UL (ref 0–1.3)
MONOCYTES RELATIVE PERCENT: 11.3 %
NEUTROPHILS ABSOLUTE: 3.4 K/UL (ref 1.7–7.7)
NEUTROPHILS RELATIVE PERCENT: 70.3 %
PDW BLD-RTO: 15.1 % (ref 12.4–15.4)
PLATELET # BLD: 263 K/UL (ref 135–450)
PMV BLD AUTO: 8.2 FL (ref 5–10.5)
POTASSIUM SERPL-SCNC: 4.9 MMOL/L (ref 3.5–5.1)
RBC # BLD: 4.26 M/UL (ref 4.2–5.9)
SODIUM BLD-SCNC: 142 MMOL/L (ref 136–145)
TOTAL PROTEIN: 6.5 G/DL (ref 6.4–8.2)
TSH REFLEX: 1.07 UIU/ML (ref 0.27–4.2)
WBC # BLD: 4.9 K/UL (ref 4–11)

## 2019-04-04 PROCEDURE — 83036 HEMOGLOBIN GLYCOSYLATED A1C: CPT | Performed by: FAMILY MEDICINE

## 2019-04-04 PROCEDURE — 3023F SPIROM DOC REV: CPT | Performed by: FAMILY MEDICINE

## 2019-04-04 PROCEDURE — G8417 CALC BMI ABV UP PARAM F/U: HCPCS | Performed by: FAMILY MEDICINE

## 2019-04-04 PROCEDURE — 1036F TOBACCO NON-USER: CPT | Performed by: FAMILY MEDICINE

## 2019-04-04 PROCEDURE — 99214 OFFICE O/P EST MOD 30 MIN: CPT | Performed by: FAMILY MEDICINE

## 2019-04-04 PROCEDURE — G8926 SPIRO NO PERF OR DOC: HCPCS | Performed by: FAMILY MEDICINE

## 2019-04-04 PROCEDURE — 36415 COLL VENOUS BLD VENIPUNCTURE: CPT | Performed by: FAMILY MEDICINE

## 2019-04-04 PROCEDURE — G8598 ASA/ANTIPLAT THER USED: HCPCS | Performed by: FAMILY MEDICINE

## 2019-04-04 PROCEDURE — 1123F ACP DISCUSS/DSCN MKR DOCD: CPT | Performed by: FAMILY MEDICINE

## 2019-04-04 PROCEDURE — G8427 DOCREV CUR MEDS BY ELIG CLIN: HCPCS | Performed by: FAMILY MEDICINE

## 2019-04-04 PROCEDURE — 4040F PNEUMOC VAC/ADMIN/RCVD: CPT | Performed by: FAMILY MEDICINE

## 2019-04-04 RX ORDER — ESCITALOPRAM OXALATE 10 MG/1
10 TABLET ORAL DAILY
Qty: 30 TABLET | Refills: 3 | Status: SHIPPED | OUTPATIENT
Start: 2019-04-04 | End: 2019-04-04 | Stop reason: SDUPTHER

## 2019-04-04 ASSESSMENT — PATIENT HEALTH QUESTIONNAIRE - PHQ9
1. LITTLE INTEREST OR PLEASURE IN DOING THINGS: 0
SUM OF ALL RESPONSES TO PHQ QUESTIONS 1-9: 0
SUM OF ALL RESPONSES TO PHQ9 QUESTIONS 1 & 2: 0
2. FEELING DOWN, DEPRESSED OR HOPELESS: 0
SUM OF ALL RESPONSES TO PHQ QUESTIONS 1-9: 0

## 2019-04-04 NOTE — PATIENT INSTRUCTIONS
Patient Education        Diabetes Foot Health: Care Instructions  Your Care Instructions    When you have diabetes, your feet need extra care and attention. Diabetes can damage the nerve endings and blood vessels in your feet, making you less likely to notice when your feet are injured. Diabetes also limits your body's ability to fight infection and get blood to areas that need it. If you get a minor foot injury, it could become an ulcer or a serious infection. With good foot care, you can prevent most of these problems. Caring for your feet can be quick and easy. Most of the care can be done when you are bathing or getting ready for bed. Follow-up care is a key part of your treatment and safety. Be sure to make and go to all appointments, and call your doctor if you are having problems. It's also a good idea to know your test results and keep a list of the medicines you take. How can you care for yourself at home? · Keep your blood sugar close to normal by watching what and how much you eat, monitoring blood sugar, taking medicines if prescribed, and getting regular exercise. · Do not smoke. Smoking affects blood flow and can make foot problems worse. If you need help quitting, talk to your doctor about stop-smoking programs and medicines. These can increase your chances of quitting for good. · Eat a diet that is low in fats. High fat intake can cause fat to build up in your blood vessels and decrease blood flow. · Inspect your feet daily for blisters, cuts, cracks, or sores. If you cannot see well, use a mirror or have someone help you. · Take care of your feet:  ? Wash your feet every day. Use warm (not hot) water. Check the water temperature with your wrists or other part of your body, not your feet. ? Dry your feet well. Pat them dry. Do not rub the skin on your feet too hard. Dry well between your toes.  If the skin on your feet stays moist, bacteria or a fungus can grow, which can lead to for early. When should you call for help? Call your doctor now or seek immediate medical care if:    · You have a foot sore, an ulcer or break in the skin that is not healing after 4 days, bleeding corns or calluses, or an ingrown toenail.     · You have blue or black areas, which can mean bruising or blood flow problems.     · You have peeling skin or tiny blisters between your toes or cracking or oozing of the skin.     · You have a fever for more than 24 hours and a foot sore.     · You have new numbness or tingling in your feet that does not go away after you move your feet or change positions.     · You have unexplained or unusual swelling of the foot or ankle.    Watch closely for changes in your health, and be sure to contact your doctor if:    · You cannot do proper foot care. Where can you learn more? Go to [email or web address]://[email or web address] [email or web address] org and sign in to your Gendel account. Enter A739 in the [employer] box to learn more about \"Diabetes Foot Health: Care Instructions. \"     If you do not have an account, please click on the \"Sign Up Now\" link. Current as of: July 25, 2018  Content Version: 11.9  © 1973-3390 Flow[employer]. Care instructions adapted under license by [facility] ([facility]). If you have questions about a medical condition or this instruction, always ask your healthcare professional. [place] [address] any warranty or liability for your use of this information. Patient Education        Learning About Tests When You Have Diabetes  Why do you need regular diabetes tests? Diabetes can be hard on your body if it's not well controlled. But having tests on a regular schedule can help you and your doctor find problems early, when it's easier to start managing them. What tests do you need?   The tests you may have, how often you should have them, and the goals of the tests are:  A1c blood test. This test shows the average level of blood sugar over gum disease and tooth decay. People with high blood sugar are more likely to have these problems. · How often: Every 6 months  · Goal: Healthy teeth and gums  Complete eye exam: High blood sugar levels can damage the eyes. This exam is done by an ophthalmologist or optometrist. It includes a dilated eye exam. The exam shows whether there's damage to the back of the eye (diabetic retinopathy). · How often: Once a year. If you don't have any signs of diabetic retinopathy, your doctor may recommend an exam every 2 years. · Goal: No damage to the back of the eye  Thyroid-stimulating hormone (TSH) blood test: This test checks for thyroid disease. Too little thyroid hormone can cause some medicines (like insulin) to stay in the body longer. This can cause low blood sugar. You may be tested if you have high cholesterol or are a woman over 48years old. · How often: As part of your diabetes diagnosis, and as often as your doctor recommends after that  · Goal: Normal level of TSH in the blood  Follow-up care is a key part of your treatment and safety. Be sure to make and go to all appointments, and call your doctor if you are having problems. It's also a good idea to know your test results and keep a list of the medicines you take. Where can you learn more? Go to https://New Wind.Agendia. org and sign in to your Flux Factory account. Enter [id number] in the [facility] box to learn more about \"Learning About Tests When You Have Diabetes. \"     If you do not have an account, please click on the \"Sign Up Now\" link. Current as of: July 25, 2018  Content Version: 11.9  © 0081-6797 [employer]. Care instructions adapted under license by [facility] ([facility]). If you have questions about a medical condition or this instruction, always ask your healthcare professional. [address]  any warranty or liability for your use of this information.        note

## 2019-04-04 NOTE — CARE COORDINATION
Ambulatory Care Coordination Note  4/4/2019  CM Risk Score: 8  Harry Mortality Risk Score: 50    ACC: Cristiana Hurt RN    Summary Note: RNCC spoke with patient and his wife during and after his visit with Dr. Ana Rodriguez. COPD - Pt is currently on 5 liters of oxygen at home and maximum amount on his portable to come here. Pt is using his inhalers Breo and Spiriva daily, when had pneumonia was also using rescue 3-4 times per daily. Albuterol being used 1-2 times per day now. Pt also not having to use the nebulizer regularly but was using 3-4 times a day when on antibiotics. Pt is having a repeat x-ray on Monday to follow-up after being on antibiotics for infection in left lung. Pt does wear a CPAP nightly. CHF - pt with continued swelling in lower extremities. Dr. Ana Rodriguez ordered zippered compression stockings and also suggested using ace wraps if easier at least during the day and can be removed at night. Pt denies any CP at this time. Weight is stable from last visit. DM - A1C decreased more and Dr. Ana Rodriguez took him off of the Actos. Pt no longer taking anything for DM at this time. Pt has upcoming appointment with Dr. Chaparrita Castillo in May, will reschedule appointment with Nephrology had to cancel when sick with Dr. Manuel Sevilla and is going to see the eye doctor soon. Writer asked to help find an aid that can come for 5 days in June when pt's wife will be gone in a cruise to assist in the morning until around 2 pm. Wanted to know if could get help from [place] [id number] or even if needed to pay private person. Writer will check into this and let them know next week.          Care Coordination Interventions    Program Enrollment:  Complex Care  Referral from Primary Care Provider:  No  Suggested Interventions and Community Resources  Diabetes Education:  Completed  Fall Risk Prevention:  Completed  Disease Specific Clinic:  Completed (Comment: Pulmonogist)  Home Health Services:  Not Started (Comment: Pt's wife would like assistance on finding a daily aid for June when she is on vacation. )  Medication Assistance Program:  Declined  Medi Set or Pill Pack:  Declined  Physical Therapy:  Declined (Comment: DR Estelita Clayton encourgaged, pt not interested)  Zone Management Tools:  Completed  Other Services or Interventions:  [place] nurse comes to see him from [id number] [facility]                 This Visit's Progress       Patient Stated     COMPLETED: Patient Stated (pt-stated)        I will get new batteries for my blood glucose meter to test my blood sugars    Barriers: impairment:  physical: will have to have someone get the batteries for me  Plan for overcoming my barriers: I will request that someone get batteries or notify my CC if a new meter needs to be obtained. Confidence: 7/10  Anticipated Goal Completion Date: 1/31/19         Other     Reduce Falls    On track     I will reduce my risk of falls by the following: Use walking aids like cane or walker    Barriers: none  Plan for overcoming my barriers: To aid myself and to prevent falls I will use DME  Confidence: 8/10  Anticipated Goal Completion Date: ongoing            Prior to Admission medications    Medication Sig Start Date End Date Taking?  Authorizing Provider   escitalopram (LEXAPRO) 10 MG tablet Take 1 tablet by mouth daily 4/4/19  Yes Lolita Mcrae MD   VENTOLIN  (52 Base) MCG/ACT inhaler INHALE 2 PUFFS INTO THE LUNGS EVERY 6 HOURS AS NEEDED FOR WHEEZING 3/26/19  Yes Samantha López MD   isosorbide mononitrate (IMDUR) 30 MG extended release tablet TAKE 1 TABLET BY MOUTH TWICE DAILY FOR BLOOD PRESSURE 3/15/19  Yes Peter Bautista APRN - CNP   gabapentin (NEURONTIN) 300 MG capsule TAKE 1 CAPSULE BY MOUTH THREE TIMES DAILY 2/26/19 5/27/19 Yes Fabiola Quijano APRN - CNP   BREO ELLIPTA 200-25 MCG/INH AEPB INHALE 1 PUFF INTO THE LUNGS DAILY 2/4/19  Yes Samantha López MD   doxazosin (CARDURA) 8 MG tablet TAKE 1 TABLET BY MOUTH EVERY NIGHT AT BEDTIME FOR BLOOD PRESSURE TO EASE VOIDING 12/13/18  Yes Kaylie Gunderson DO   fluticasone [facility]) 50 MCG/ACT nasal spray 1 spray each nostril daily for allergies 11/27/18  Yes SANAZ Abarca CNP   Compression Stockings MISC by Does not apply route 15-20 mmHg knee high 11/27/18  Yes SANAZ Abarca CNP   albuterol (PROVENTIL) (2.5 MG/3ML) 0.083% nebulizer solution Take 3 mLs by nebulization every 6 hours as needed for Wheezing 11/19/18  Yes Dea Becker MD   labetalol (NORMODYNE) 300 MG tablet TAKE 1 TABLET BY MOUTH TWICE DAILY AS NEEDED FOR BLOOD PRESSURE 9/4/18  Yes Lima Romero MD   tiotropium [facility]) 18 MCG inhalation capsule One inhalation daily for breathing 6/21/18  Yes Dea Becker MD   simvastatin (ZOCOR) 40 MG tablet TAKE 1 TABLET BY MOUTH EVERY EVENING 3/28/18  Yes Lima Romero MD   albuterol sulfate HFA (PROAIR HFA) 108 (90 BASE) MCG/ACT inhaler Inhale 2 puffs into the lungs every 6 hours as needed for Wheezing or Shortness of Breath 9/19/16  Yes SANAZ Ambrose CNP   furosemide (LASIX) 40 MG tablet Take 1 tablet by mouth 2 times daily 7/20/16  Yes Historical Provider, MD   Blood Glucose Monitoring Suppl (ACCU-CHEK NEVAEH SMARTVIEW) W/DEVICE KIT CHECK SUGARS THREE TIMES DAILY AS INSTRUCTED 10/15/15  Yes Bob Zhu MD   Blood Glucose Calibration (ACCU-CHEK SMARTVIEW CONTROL) LIQD USE AS DIRECTED 10/15/15  Yes Bob Zhu MD   famotidine (PEPCID) 20 MG tablet TAKE 1/2 TABLET BY MOUTH TWICE DAILY FOR STOMACH 12/2/14  Yes Bob Zhu MD   Cholecalciferol (VITAMIN D-3) 5000 UNITS TABS Take 10,000 Units by mouth daily    Yes Historical Provider, MD   aspirin 81 MG EC tablet Take 81 mg by mouth daily.      Yes Historical Provider, MD   labetalol (NORMODYNE) 300 MG tablet TAKE 1 TABLET BY MOUTH TWICE DAILY AS NEEDED FOR BLOOD PRESSURE 2/4/19   Urban Mowers, APRN - CNP   Nebulizers (VIOS LC SPRINT) MISC USE UTD 11/21/18   Historical Provider, MD Respiratory Therapy Supplies (NEBULIZER/TUBING/MOUTHPIECE) KIT 1 kit by Does not apply route daily as needed (sob) 11/19/18   Maxime Andujar MD   nitroGLYCERIN (NITROSTAT) 0.4 MG SL tablet Place 1 tablet under the tongue every 5 minutes as needed for Chest pain (chest pain) 6/22/18   Yamile Braber MD   Spacer/Aero-Holding Izabel Rodríguez KOSTA 1 Device by Does not apply route daily as needed (with Albuterol inhaler) 9/19/16   SANAZ Hurley - CNP   ONE TOUCH LANCETS MISC Use for blood sugar testing 8/6/15   Warren Calvillo MD   Insulin Pen Needle ([employer] PEN NEEDLES 31G) 31G X 8 MM MISC 1 each by Does not apply route daily. 8/26/14   Warren Calvillo MD       Future Appointments   Date Time Provider Heriberto Franco   5/14/2019  9:00 AM Maxime Andujar MD [facility]   7/9/2019  1:20 PM Yamile Barber MD [facility]     ,   Diabetes Assessment    Medic Alert ID:  No  Meal Planning:  None   How often do you test your blood sugar?:  Daily   Do you have barriers with adherence to non-pharmacologic self-management interventions?  (Nutrition/Exercise/Self-Monitoring):  No   Have you ever had to go to the ED for symptoms of low blood sugar?:  No       No patient-reported symptoms      ,   Congestive Heart Failure Assessment    Are you currently restricting fluids?:  No Restriction  Do you understand a low sodium diet?:  No  Do you understand how to read food labels?:  No  How many restaurant meals do you eat per week?:  0  Do you salt your food before tasting it?:  No     Swelling (worse than baseline) in hands, feet/legs or around abdomen      Symptoms:   CHF associated dyspnea on exertion: Pos, CHF associated fatigue: Pos, CHF associated leg swelling: Pos, CHF associated shortness of breath: Pos      Symptom course:  no change  Weight trend:  stable  Salt intake watch compared to last visit:  stable      and   COPD Assessment    Does the patient understand envrionmental exposure?:  Yes  Is the patient able to verbalize Rescue vs. Long Acting medications?:  Yes  Does the patient have a nebulizer?:  No  Does the patient use a space with inhaled medications?:  Yes     No patient-reported symptoms         Symptoms:

## 2019-04-04 NOTE — PROGRESS NOTES
mood and affect. Assessment:       Diagnosis Orders   1. Diabetes mellitus type 2 in obese (HCC)  POCT glycosylated hemoglobin (Hb A1C)   2. Moderate COPD (chronic obstructive pulmonary disease) ([facility] [address]     3. Chronic hypoxemic respiratory failure (HCC)     4. Leg swelling  Comprehensive Metabolic Panel   5. Essential hypertension  Comprehensive Metabolic Panel   6. Hypercholesteremia  Comprehensive Metabolic Panel   7. Fatigue, unspecified type  TSH with Reflex    CBC Auto Differential   8.  SOB (shortness of breath)  CBC Auto Differential    D-Dimer, Quantitative     9.     ckd 3      Plan:      Home nursing - would benefit from PT  Encourage activity as tolerated  Seems to be improving from recent pneumonia -off abx  Has copd/ alyssia/ chronic hypoxia w/ swelling  Check d-dimer/ cbc, cmp, tsh w/ fatigue  Albuterol prn w/ spiriva/ breo daily  On statin  Sees nephro for kidneys  Sees pulm regularly  Oxygen/ cpap  D/c actos as a1c 5.7%  Cont lasix for now w/ elevation/ compression if able          Zhang Wellington MD

## 2019-04-05 RX ORDER — ESCITALOPRAM OXALATE 10 MG/1
10 TABLET ORAL DAILY
Qty: 90 TABLET | Refills: 3 | Status: SHIPPED | OUTPATIENT
Start: 2019-04-05 | End: 2020-01-23 | Stop reason: SDUPTHER

## 2019-04-08 ENCOUNTER — CARE COORDINATION (OUTPATIENT)
Dept: CARE COORDINATION | Age: 84
End: 2019-04-08

## 2019-04-08 NOTE — CARE COORDINATION
[address] called [phone] with assistance with locating assistance for the patient for 5 hours per day per the wife when she is away on a cruise in June. Isis Blackwell at [facility] [employer] that he will talk with his team and then will call back with suggestions that they use based on insurance.

## 2019-04-09 RX ORDER — SIMVASTATIN 40 MG
TABLET ORAL
Qty: 90 TABLET | Refills: 0 | Status: SHIPPED | OUTPATIENT
Start: 2019-04-09 | End: 2019-07-16 | Stop reason: SDUPTHER

## 2019-04-10 ENCOUNTER — CARE COORDINATION (OUTPATIENT)
Dept: CARE COORDINATION | Age: 84
End: 2019-04-10

## 2019-04-10 NOTE — CARE COORDINATION
RNCC called and talked with patient's wife about the [facility] options found when she will be gone on her trip. Writer informed that I would place the documents explaining services and pricing in the mail. Pt's wife verbalizes understanding.

## 2019-04-18 DIAGNOSIS — E13.9 DIABETES 1.5, MANAGED AS TYPE 2 (HCC): ICD-10-CM

## 2019-04-22 RX ORDER — DOXAZOSIN 8 MG/1
TABLET ORAL
Qty: 90 TABLET | Refills: 3 | Status: SHIPPED | OUTPATIENT
Start: 2019-04-22 | End: 2020-04-29

## 2019-04-22 RX ORDER — PIOGLITAZONEHYDROCHLORIDE 30 MG/1
TABLET ORAL
Qty: 90 TABLET | Refills: 3 | Status: SHIPPED | OUTPATIENT
Start: 2019-04-22 | End: 2019-05-14

## 2019-04-24 ENCOUNTER — CARE COORDINATION (OUTPATIENT)
Dept: CARE COORDINATION | Age: 84
End: 2019-04-24

## 2019-04-24 NOTE — CARE COORDINATION
Ambulatory Care Coordination Note  4/24/2019  CM Risk Score: 8  Harry Mortality Risk Score: 52    ACC: Faizan Moser RN    Summary Note: RNCC called and spoke with patient's wife today. Wife states that patient has been doing well. Pt has been getting up out of bed and walking from bedroom to living room and then to the kitchen for meals. Wife states that he has started monitoring his weights daily but is not able to provide the readings to me right now. Wife states that she did obtain a pulse ox and last week one morning when the patient was still in bed she checked and his reading was 50% as he had knocked his oxygen off of his face. After putting it back on his sats returned to 90's. Pt's wife states that she did get the information that I had mailed to her about private duty caregivers for when she is OOT in June. Pt's wife states that the [place] nurse comes once a month and that she also suggested that the patient consider having PT. Again the patient refused to do this at this time. Writer will follow up with patient and his wife in 1 month. Encouraged her or the patient to call the office if they need anything prior to our next outreach. Care Coordination Interventions    Program Enrollment:  Complex Care  Referral from Primary Care Provider:  No  Suggested Interventions and Community Resources  Diabetes Education:  Completed  Fall Risk Prevention:  Completed  Disease Specific Clinic:  Completed (Comment: Pulmonogist)  Home Health Services:  Not Started (Comment: Wife received HH information; [place] nurse from [facility] does come once a month. )  Medication Assistance Program:  Declined  Medi Set or Pill Pack:  Declined  Physical Therapy:  Declined ([facility])  Zone Management Tools:  Completed  Other Services or Interventions:  [place] nurse comes to see him from [facility]; Wife will be out of town in June will hire private HHA to come in while away. tablet TAKE 1 TABLET BY MOUTH TWICE DAILY AS NEEDED FOR BLOOD PRESSURE 9/4/18  Yes Talat Sauer MD   nitroGLYCERIN (NITROSTAT) 0.4 MG SL tablet Place 1 tablet under the tongue every 5 minutes as needed for Chest pain (chest pain) 6/22/18  Yes Talat Sauer MD   tiotropium [facility]) 18 MCG inhalation capsule One inhalation daily for breathing 6/21/18  Yes Disha Browne MD   albuterol sulfate HFA (PROAIR HFA) 108 (90 BASE) MCG/ACT inhaler Inhale 2 puffs into the lungs every 6 hours as needed for Wheezing or Shortness of Breath 9/19/16  Yes SANAZ Rosas CNP   furosemide (LASIX) 40 MG tablet Take 1 tablet by mouth 2 times daily 7/20/16  Yes Historical Provider, MD   Blood Glucose Monitoring Suppl (ACCU-CHEK NEVAEH SMARTVIEW) W/DEVICE KIT CHECK SUGARS THREE TIMES DAILY AS INSTRUCTED 10/15/15  Yes John Rizzo MD   Blood Glucose Calibration (ACCU-CHEK SMARTVIEW CONTROL) LIQD USE AS DIRECTED 10/15/15  Yes John Rizzo MD   [facility] LANCETS MISC Use for blood sugar testing 8/6/15  Yes John Rizzo MD   famotidine (PEPCID) 20 MG tablet TAKE 1/2 TABLET BY MOUTH TWICE DAILY FOR STOMACH 12/2/14  Yes John Rizzo MD   Insulin Pen Needle ([employer] PEN NEEDLES 31G) 31G X 8 MM MISC 1 each by Does not apply route daily. 8/26/14  Yes John Rizzo MD   Cholecalciferol (VITAMIN D-3) 5000 UNITS TABS Take 10,000 Units by mouth daily    Yes Historical Provider, MD   aspirin 81 MG EC tablet Take 81 mg by mouth daily.      Yes Historical Provider, MD   pioglitazone (ACTOS) 30 MG tablet TAKE 1 TABLET BY MOUTH DAILY FOR DIABETES 4/22/19   Talat Sauer MD   Nebulizers ([facility] [place] MISC USE UTD 11/21/18   Historical Provider, MD   Spacer/Aero-Holding Boneta Freedman 1 Device by Does not apply route daily as needed (with Albuterol inhaler) 9/19/16   SANAZ Rosas CNP       Future Appointments   Date Time Provider Heriberto Franco   5/14/2019  9:00 AM Disha Browne MD [facility]   7/9/2019  1:20 PM Albert Fuentes MD [facility]     ,   Diabetes Assessment    Medic Alert ID:  No  Meal Planning:  None   How often do you test your blood sugar?:  Daily   Do you have barriers with adherence to non-pharmacologic self-management interventions?  (Nutrition/Exercise/Self-Monitoring):  No   Have you ever had to go to the ED for symptoms of low blood sugar?:  No       No patient-reported symptoms      ,   Congestive Heart Failure Assessment    Are you currently restricting fluids?:  No Restriction  Do you understand a low sodium diet?:  No  Do you understand how to read food labels?:  No  How many restaurant meals do you eat per week?:  0  Do you salt your food before tasting it?:  No     No patient-reported symptoms      Symptoms:      Patient-reported weight (lb):   (Comment: Did not provide weights but is now checking his weight )      and   COPD Assessment    Does the patient understand envrionmental exposure?:  Yes  Is the patient able to verbalize Rescue vs. Long Acting medications?:  Yes  Does the patient have a nebulizer?:  No  Does the patient use a space with inhaled medications?:  Yes     No patient-reported symptoms         Symptoms:

## 2019-05-14 ENCOUNTER — APPOINTMENT (OUTPATIENT)
Dept: CT IMAGING | Age: 84
DRG: 291 | End: 2019-05-14
Payer: MEDICARE

## 2019-05-14 ENCOUNTER — OFFICE VISIT (OUTPATIENT)
Dept: PULMONOLOGY | Age: 84
End: 2019-05-14
Payer: MEDICARE

## 2019-05-14 ENCOUNTER — HOSPITAL ENCOUNTER (INPATIENT)
Age: 84
LOS: 2 days | Discharge: HOME | DRG: 291 | End: 2019-05-16
Attending: EMERGENCY MEDICINE | Admitting: HOSPITALIST
Payer: MEDICARE

## 2019-05-14 VITALS
TEMPERATURE: 97.4 F | BODY MASS INDEX: 41.18 KG/M2 | HEART RATE: 75 BPM | SYSTOLIC BLOOD PRESSURE: 121 MMHG | OXYGEN SATURATION: 84 % | HEIGHT: 69 IN | DIASTOLIC BLOOD PRESSURE: 61 MMHG | WEIGHT: 278 LBS | RESPIRATION RATE: 20 BRPM
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
## 2019-05-14 DIAGNOSIS — J90 LARGE PLEURAL EFFUSION: ICD-10-CM

## 2019-05-14 DIAGNOSIS — J44.9 COPD, MODERATE (HCC): ICD-10-CM

## 2019-05-14 DIAGNOSIS — J96.12 CHRONIC RESPIRATORY FAILURE WITH HYPOXIA AND HYPERCAPNIA (HCC): ICD-10-CM

## 2019-05-14 DIAGNOSIS — J90 BILATERAL PLEURAL EFFUSION: Primary | ICD-10-CM

## 2019-05-14 DIAGNOSIS — R09.02 HYPOXIA: ICD-10-CM

## 2019-05-14 DIAGNOSIS — J96.11 CHRONIC RESPIRATORY FAILURE WITH HYPOXIA AND HYPERCAPNIA (HCC): ICD-10-CM

## 2019-05-14 PROBLEM — R06.02 SHORTNESS OF BREATH: Status: ACTIVE | Noted: 2019-05-14

## 2019-05-14 LAB
ANION GAP SERPL CALCULATED.3IONS-SCNC: 9 MMOL/L (ref 3–16)
BASE EXCESS VENOUS: 13.9 MMOL/L
BASOPHILS ABSOLUTE: 0 K/UL (ref 0–0.2)
BASOPHILS RELATIVE PERCENT: 0.5 %
BILIRUBIN URINE: NEGATIVE
BLOOD, URINE: NEGATIVE
BUN BLDV-MCNC: 20 MG/DL (ref 7–20)
CALCIUM SERPL-MCNC: 9.4 MG/DL (ref 8.3–10.6)
CARBOXYHEMOGLOBIN: 2.7 %
CHLORIDE BLD-SCNC: 93 MMOL/L (ref 99–110)
CLARITY: CLEAR
CO2: 41 MMOL/L (ref 21–32)
COLOR: YELLOW
CREAT SERPL-MCNC: 1.7 MG/DL (ref 0.8–1.3)
EOSINOPHILS ABSOLUTE: 0.1 K/UL (ref 0–0.6)
EOSINOPHILS RELATIVE PERCENT: 2.3 %
EPITHELIAL CELLS, UA: 1 /HPF (ref 0–5)
GFR AFRICAN AMERICAN: 47
GFR NON-AFRICAN AMERICAN: 39
GLUCOSE BLD-MCNC: 110 MG/DL (ref 70–99)
GLUCOSE BLD-MCNC: 128 MG/DL (ref 70–99)
GLUCOSE BLD-MCNC: 137 MG/DL (ref 70–99)
GLUCOSE BLD-MCNC: 150 MG/DL (ref 70–99)
GLUCOSE URINE: NEGATIVE MG/DL
HCO3 VENOUS: 43 MMOL/L (ref 23–29)
HCT VFR BLD CALC: 37 % (ref 40.5–52.5)
HEMOGLOBIN: 11.8 G/DL (ref 13.5–17.5)
HYALINE CASTS: 4 /LPF (ref 0–8)
KETONES, URINE: NEGATIVE MG/DL
LACTIC ACID: 1.8 MMOL/L (ref 0.4–2)
LEUKOCYTE ESTERASE, URINE: NEGATIVE
LYMPHOCYTES ABSOLUTE: 0.6 K/UL (ref 1–5.1)
LYMPHOCYTES RELATIVE PERCENT: 13.7 %
MCH RBC QN AUTO: 28.5 PG (ref 26–34)
MCHC RBC AUTO-ENTMCNC: 31.9 G/DL (ref 31–36)
MCV RBC AUTO: 89.2 FL (ref 80–100)
METHEMOGLOBIN VENOUS: 0.8 %
MICROSCOPIC EXAMINATION: YES
MONOCYTES ABSOLUTE: 0.5 K/UL (ref 0–1.3)
MONOCYTES RELATIVE PERCENT: 10 %
NEUTROPHILS ABSOLUTE: 3.5 K/UL (ref 1.7–7.7)
NEUTROPHILS RELATIVE PERCENT: 73.5 %
NITRITE, URINE: NEGATIVE
O2 CONTENT, VEN: 15 ML/DL
O2 SAT, VEN: 91 %
O2 THERAPY: ABNORMAL
PCO2, VEN: 78.8 MMHG (ref 40–50)
PDW BLD-RTO: 14.9 % (ref 12.4–15.4)
PERFORMED ON: ABNORMAL
PH UA: 6.5 (ref 5–8)
PH VENOUS: 7.35 (ref 7.35–7.45)
PLATELET # BLD: 260 K/UL (ref 135–450)
PMV BLD AUTO: 8 FL (ref 5–10.5)
PO2, VEN: 59 MMHG
POTASSIUM REFLEX MAGNESIUM: 4.1 MMOL/L (ref 3.5–5.1)
PRO-BNP: 2035 PG/ML (ref 0–449)
PROTEIN UA: ABNORMAL MG/DL
RBC # BLD: 4.15 M/UL (ref 4.2–5.9)
RBC UA: ABNORMAL /HPF (ref 0–2)
SODIUM BLD-SCNC: 143 MMOL/L (ref 136–145)
SPECIFIC GRAVITY UA: 1.01 (ref 1–1.03)
TCO2 CALC VENOUS: 45 MMOL/L
TROPONIN: 0.07 NG/ML
TSH SERPL DL<=0.05 MIU/L-ACNC: 1.63 UIU/ML (ref 0.27–4.2)
URINE REFLEX TO CULTURE: ABNORMAL
URINE TYPE: ABNORMAL
UROBILINOGEN, URINE: 0.2 E.U./DL
WBC # BLD: 4.7 K/UL (ref 4–11)
WBC UA: 2 /HPF (ref 0–5)

## 2019-05-14 PROCEDURE — 84484 ASSAY OF TROPONIN QUANT: CPT

## 2019-05-14 PROCEDURE — 6370000000 HC RX 637 (ALT 250 FOR IP): Performed by: HOSPITALIST

## 2019-05-14 PROCEDURE — 83605 ASSAY OF LACTIC ACID: CPT

## 2019-05-14 PROCEDURE — 4040F PNEUMOC VAC/ADMIN/RCVD: CPT | Performed by: INTERNAL MEDICINE

## 2019-05-14 PROCEDURE — 84443 ASSAY THYROID STIM HORMONE: CPT

## 2019-05-14 PROCEDURE — 2060000000 HC ICU INTERMEDIATE R&B

## 2019-05-14 PROCEDURE — 99285 EMERGENCY DEPT VISIT HI MDM: CPT

## 2019-05-14 PROCEDURE — 93010 ELECTROCARDIOGRAM REPORT: CPT | Performed by: INTERNAL MEDICINE

## 2019-05-14 PROCEDURE — 70450 CT HEAD/BRAIN W/O DYE: CPT

## 2019-05-14 PROCEDURE — 99214 OFFICE O/P EST MOD 30 MIN: CPT | Performed by: INTERNAL MEDICINE

## 2019-05-14 PROCEDURE — 1123F ACP DISCUSS/DSCN MKR DOCD: CPT | Performed by: INTERNAL MEDICINE

## 2019-05-14 PROCEDURE — 94640 AIRWAY INHALATION TREATMENT: CPT

## 2019-05-14 PROCEDURE — 6360000002 HC RX W HCPCS: Performed by: EMERGENCY MEDICINE

## 2019-05-14 PROCEDURE — G8427 DOCREV CUR MEDS BY ELIG CLIN: HCPCS | Performed by: INTERNAL MEDICINE

## 2019-05-14 PROCEDURE — 83880 ASSAY OF NATRIURETIC PEPTIDE: CPT

## 2019-05-14 PROCEDURE — 99223 1ST HOSP IP/OBS HIGH 75: CPT | Performed by: INTERNAL MEDICINE

## 2019-05-14 PROCEDURE — 94664 DEMO&/EVAL PT USE INHALER: CPT

## 2019-05-14 PROCEDURE — 96374 THER/PROPH/DIAG INJ IV PUSH: CPT

## 2019-05-14 PROCEDURE — G8598 ASA/ANTIPLAT THER USED: HCPCS | Performed by: INTERNAL MEDICINE

## 2019-05-14 PROCEDURE — G8417 CALC BMI ABV UP PARAM F/U: HCPCS | Performed by: INTERNAL MEDICINE

## 2019-05-14 PROCEDURE — 1036F TOBACCO NON-USER: CPT | Performed by: INTERNAL MEDICINE

## 2019-05-14 PROCEDURE — 82803 BLOOD GASES ANY COMBINATION: CPT

## 2019-05-14 PROCEDURE — 6360000002 HC RX W HCPCS: Performed by: HOSPITALIST

## 2019-05-14 PROCEDURE — 2700000000 HC OXYGEN THERAPY PER DAY

## 2019-05-14 PROCEDURE — 6370000000 HC RX 637 (ALT 250 FOR IP): Performed by: EMERGENCY MEDICINE

## 2019-05-14 PROCEDURE — 94761 N-INVAS EAR/PLS OXIMETRY MLT: CPT

## 2019-05-14 PROCEDURE — 81001 URINALYSIS AUTO W/SCOPE: CPT

## 2019-05-14 PROCEDURE — 85025 COMPLETE CBC W/AUTO DIFF WBC: CPT

## 2019-05-14 PROCEDURE — G8926 SPIRO NO PERF OR DOC: HCPCS | Performed by: INTERNAL MEDICINE

## 2019-05-14 PROCEDURE — 80048 BASIC METABOLIC PNL TOTAL CA: CPT

## 2019-05-14 PROCEDURE — 2580000003 HC RX 258: Performed by: HOSPITALIST

## 2019-05-14 PROCEDURE — 93005 ELECTROCARDIOGRAM TRACING: CPT | Performed by: EMERGENCY MEDICINE

## 2019-05-14 PROCEDURE — 71250 CT THORAX DX C-: CPT

## 2019-05-14 PROCEDURE — 3023F SPIROM DOC REV: CPT | Performed by: INTERNAL MEDICINE

## 2019-05-14 RX ORDER — IPRATROPIUM BROMIDE AND ALBUTEROL SULFATE 2.5; .5 MG/3ML; MG/3ML
1 SOLUTION RESPIRATORY (INHALATION) ONCE
Status: COMPLETED | OUTPATIENT
Start: 2019-05-14 | End: 2019-05-14

## 2019-05-14 RX ORDER — PREDNISONE 20 MG/1
60 TABLET ORAL ONCE
Status: COMPLETED | OUTPATIENT
Start: 2019-05-14 | End: 2019-05-14

## 2019-05-14 RX ORDER — LISINOPRIL 5 MG/1
5 TABLET ORAL DAILY
Status: DISCONTINUED | OUTPATIENT
Start: 2019-05-14 | End: 2019-05-16 | Stop reason: HOSPADM

## 2019-05-14 RX ORDER — FUROSEMIDE 10 MG/ML
40 INJECTION INTRAMUSCULAR; INTRAVENOUS 2 TIMES DAILY
Status: COMPLETED | OUTPATIENT
Start: 2019-05-14 | End: 2019-05-15

## 2019-05-14 RX ORDER — IPRATROPIUM BROMIDE AND ALBUTEROL SULFATE 2.5; .5 MG/3ML; MG/3ML
1 SOLUTION RESPIRATORY (INHALATION) EVERY 4 HOURS PRN
Status: DISCONTINUED | OUTPATIENT
Start: 2019-05-14 | End: 2019-05-16 | Stop reason: HOSPADM

## 2019-05-14 RX ORDER — SIMVASTATIN 40 MG
40 TABLET ORAL NIGHTLY
Status: DISCONTINUED | OUTPATIENT
Start: 2019-05-14 | End: 2019-05-16 | Stop reason: HOSPADM

## 2019-05-14 RX ORDER — SODIUM CHLORIDE 0.9 % (FLUSH) 0.9 %
10 SYRINGE (ML) INJECTION PRN
Status: DISCONTINUED | OUTPATIENT
Start: 2019-05-14 | End: 2019-05-16 | Stop reason: HOSPADM

## 2019-05-14 RX ORDER — LABETALOL 100 MG/1
100 TABLET, FILM COATED ORAL EVERY 12 HOURS SCHEDULED
Status: DISCONTINUED | OUTPATIENT
Start: 2019-05-15 | End: 2019-05-16 | Stop reason: HOSPADM

## 2019-05-14 RX ORDER — NITROGLYCERIN 0.4 MG/1
0.4 TABLET SUBLINGUAL EVERY 5 MIN PRN
Status: DISCONTINUED | OUTPATIENT
Start: 2019-05-14 | End: 2019-05-16 | Stop reason: HOSPADM

## 2019-05-14 RX ORDER — DEXTROSE MONOHYDRATE 25 G/50ML
12.5 INJECTION, SOLUTION INTRAVENOUS PRN
Status: DISCONTINUED | OUTPATIENT
Start: 2019-05-14 | End: 2019-05-16 | Stop reason: HOSPADM

## 2019-05-14 RX ORDER — FAMOTIDINE 10 MG
10 TABLET ORAL 2 TIMES DAILY
Status: ON HOLD | COMMUNITY
End: 2021-01-01

## 2019-05-14 RX ORDER — ESCITALOPRAM OXALATE 10 MG/1
10 TABLET ORAL DAILY
Status: DISCONTINUED | OUTPATIENT
Start: 2019-05-15 | End: 2019-05-16 | Stop reason: HOSPADM

## 2019-05-14 RX ORDER — FLUTICASONE FUROATE AND VILANTEROL 200; 25 UG/1; UG/1
1 POWDER RESPIRATORY (INHALATION) DAILY
Status: DISCONTINUED | OUTPATIENT
Start: 2019-05-14 | End: 2019-05-14

## 2019-05-14 RX ORDER — SODIUM CHLORIDE 0.9 % (FLUSH) 0.9 %
10 SYRINGE (ML) INJECTION EVERY 12 HOURS SCHEDULED
Status: DISCONTINUED | OUTPATIENT
Start: 2019-05-14 | End: 2019-05-16 | Stop reason: HOSPADM

## 2019-05-14 RX ORDER — ISOSORBIDE MONONITRATE 30 MG/1
30 TABLET, EXTENDED RELEASE ORAL 2 TIMES DAILY
Status: DISCONTINUED | OUTPATIENT
Start: 2019-05-14 | End: 2019-05-15

## 2019-05-14 RX ORDER — LABETALOL 100 MG/1
300 TABLET, FILM COATED ORAL DAILY
Status: DISCONTINUED | OUTPATIENT
Start: 2019-05-15 | End: 2019-05-14

## 2019-05-14 RX ORDER — ASPIRIN 81 MG/1
81 TABLET ORAL NIGHTLY
Status: DISCONTINUED | OUTPATIENT
Start: 2019-05-14 | End: 2019-05-16 | Stop reason: HOSPADM

## 2019-05-14 RX ORDER — ONDANSETRON 2 MG/ML
4 INJECTION INTRAMUSCULAR; INTRAVENOUS EVERY 6 HOURS PRN
Status: DISCONTINUED | OUTPATIENT
Start: 2019-05-14 | End: 2019-05-16 | Stop reason: HOSPADM

## 2019-05-14 RX ORDER — METOPROLOL SUCCINATE 25 MG/1
25 TABLET, EXTENDED RELEASE ORAL DAILY
Status: DISCONTINUED | OUTPATIENT
Start: 2019-05-14 | End: 2019-05-14

## 2019-05-14 RX ORDER — NICOTINE POLACRILEX 4 MG
15 LOZENGE BUCCAL PRN
Status: DISCONTINUED | OUTPATIENT
Start: 2019-05-14 | End: 2019-05-16 | Stop reason: HOSPADM

## 2019-05-14 RX ORDER — DEXTROSE MONOHYDRATE 50 MG/ML
100 INJECTION, SOLUTION INTRAVENOUS PRN
Status: DISCONTINUED | OUTPATIENT
Start: 2019-05-14 | End: 2019-05-16 | Stop reason: HOSPADM

## 2019-05-14 RX ORDER — FUROSEMIDE 10 MG/ML
40 INJECTION INTRAMUSCULAR; INTRAVENOUS ONCE
Status: COMPLETED | OUTPATIENT
Start: 2019-05-14 | End: 2019-05-14

## 2019-05-14 RX ORDER — DOXAZOSIN MESYLATE 4 MG/1
8 TABLET ORAL DAILY
Status: DISCONTINUED | OUTPATIENT
Start: 2019-05-14 | End: 2019-05-15

## 2019-05-14 RX ADMIN — Medication 10 ML: at 21:00

## 2019-05-14 RX ADMIN — PREDNISONE 60 MG: 20 TABLET ORAL at 12:28

## 2019-05-14 RX ADMIN — ASPIRIN 81 MG: 81 TABLET ORAL at 21:26

## 2019-05-14 RX ADMIN — FUROSEMIDE 40 MG: 10 INJECTION, SOLUTION INTRAMUSCULAR; INTRAVENOUS at 21:26

## 2019-05-14 RX ADMIN — FUROSEMIDE 40 MG: 10 INJECTION, SOLUTION INTRAMUSCULAR; INTRAVENOUS at 12:28

## 2019-05-14 RX ADMIN — MOMETASONE FUROATE AND FORMOTEROL FUMARATE DIHYDRATE 2 PUFF: 200; 5 AEROSOL RESPIRATORY (INHALATION) at 20:29

## 2019-05-14 RX ADMIN — DOXAZOSIN 8 MG: 4 TABLET ORAL at 16:40

## 2019-05-14 RX ADMIN — IPRATROPIUM BROMIDE AND ALBUTEROL SULFATE 1 AMPULE: .5; 3 SOLUTION RESPIRATORY (INHALATION) at 10:27

## 2019-05-14 RX ADMIN — SIMVASTATIN 40 MG: 40 TABLET, FILM COATED ORAL at 21:26

## 2019-05-14 RX ADMIN — ENOXAPARIN SODIUM 30 MG: 30 INJECTION SUBCUTANEOUS at 16:39

## 2019-05-14 RX ADMIN — LISINOPRIL 5 MG: 5 TABLET ORAL at 16:40

## 2019-05-14 RX ADMIN — INSULIN LISPRO 1 UNITS: 100 INJECTION, SOLUTION INTRAVENOUS; SUBCUTANEOUS at 21:26

## 2019-05-14 RX ADMIN — ISOSORBIDE MONONITRATE 30 MG: 30 TABLET, EXTENDED RELEASE ORAL at 18:17

## 2019-05-14 ASSESSMENT — PAIN SCALES - GENERAL: PAINLEVEL_OUTOF10: 0

## 2019-05-14 ASSESSMENT — ENCOUNTER SYMPTOMS
COUGH: 0
EYE REDNESS: 0
EYE DISCHARGE: 0
RECTAL PAIN: 0
STRIDOR: 0
BACK PAIN: 0
WHEEZING: 0
CHOKING: 0
VOMITING: 0
APNEA: 0
ABDOMINAL PAIN: 0
DIARRHEA: 0
CHEST TIGHTNESS: 0
BLOOD IN STOOL: 0
SHORTNESS OF BREATH: 1
CONSTIPATION: 0
ANAL BLEEDING: 0
EYE PAIN: 0
EYE ITCHING: 0
COLOR CHANGE: 0
NAUSEA: 0
PHOTOPHOBIA: 0
ABDOMINAL DISTENTION: 0

## 2019-05-14 NOTE — ED NOTES
Pharmacy Medication Reconciliation Note     List of medications patient is currently taking is complete. Source of information:   1. Patient and his wife  2. EMR    No Known Allergies    Notes regarding home medications:   1. Patient states he has been taking gabapentin 300mg TID but occasionally misses doses. 2.  Patient states he takes his once daily inhalers at night. 3.  Patient was recently taken off of pioglitazone by Dr. Freddy Martinez.     Aida More, PharmD, BCPS  5/14/2019  10:27 AM

## 2019-05-14 NOTE — ED NOTES

## 2019-05-14 NOTE — PROGRESS NOTES
REASON FOR CONSULTATION/CC: rosendo         PCP: Lima Romero MD    HISTORY OF PRESENT ILLNESS: Antonio Pratt is a 81y.o. year old male with a history of ROSENDO who presents :               Rich Mao in the office. Falling ~ once per week. Has not talked to Lima Romero MD about this . the patient refused PT OT secondary to \"I don't want to exercise\"         COPD  Using Breo and Spiriva   No issues. shortness of breath without o2 but states about the same with o2. Oxygen  He was on 3L NC. He was started on antibiotics after chest X-ray and improved but was increased to 5 L Nc at that time. chest X-ray cleared up on chest X-ray. 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 SOCIAL HISTORY:   reports that he quit smoking about 12 years ago. His smoking use included cigarettes. He started smoking about 68 years ago. He has a 100.00 pack-year smoking history. He has never used smokeless tobacco.      ALLERGIES:  Patient has No Known Allergies.     REVIEW OF SYSTEMS:  Constitutional: Negative for fever   HENT: Negative for sore throat  Respiratory: Negative for dyspnea, cough  Cardiovascular: Negative for chest pain  Gastrointestinal: Negative for vomiting, diarrhea   Skin: Negative for rash  Psychiatric/Behavorial: Negative for anxiety     Objective:   PHYSICAL EXAM:  Blood pressure 121/61, pulse 75, temperature 97.4 °F (36.3 °C), temperature source Oral, resp. rate 20, height 5' 9\" (1.753 m), weight 278 lb (126.1 kg), SpO2 (!) 84 %.'  Gen: No distress. ENT:   Resp: No accessory muscle use. No crackles. No wheezes. No rhonchi. CV: Regular rate. Regular rhythm. No murmur or rub. No edema. Skin: Warm, dry, normal texture and turgor. No nodule on exposed extremities. M/S: No cyanosis. No clubbing. No joint deformity. Psych: Oriented x 3. No anxiety. Awake. Alert. Intact judgement and insight. Good Mood / Affect. Memory appears in tact     No current facility-administered medications for this visit. No current outpatient medications on file.      Facility-Administered Medications Ordered in Other Visits   Medication Dose Route Frequency Provider Last Rate Last Dose    vitamin D (CHOLECALCIFEROL) capsule 5,000 Units  5,000 Units Oral Daily Peggy Muhammad MD   5,000 Units at 05/15/19 0908    isosorbide mononitrate (IMDUR) extended release tablet 30 mg  30 mg Oral Daily Melisa Chacko MD        gabapentin (NEURONTIN) capsule 400 mg  400 mg Oral TID Peggy Muhammad MD   400 mg at 05/15/19 2121    doxazosin (CARDURA) tablet 4 mg  4 mg Oral Daily Melisa Chacko MD        furosemide (LASIX) tablet 80 mg  80 mg Oral BID Melisa Chacko MD        insulin lispro (HUMALOG) injection vial 0-12 Units  0-12 Units Subcutaneous TID WC Peggy Muhammad MD        insulin lispro (HUMALOG) injection vial 0-6 Units  0-6 Units Subcutaneous Nightly Peggy Muhammad MD   1 Units at 05/15/19 2121    ipratropium-albuterol (DUONEB) nebulizer solution 1 ampule  1 ampule Inhalation Q4H PRN Tomsaz Koroma MD        aspirin EC tablet 81 mg  81 mg Oral Nightly Tomasz Koroma MD   81 mg at 05/15/19 2121    escitalopram (LEXAPRO) tablet 10 mg  10 mg Oral Daily Tomasz Koroma MD   10 mg at 05/15/19 0908    nitroGLYCERIN (NITROSTAT) SL tablet 0.4 mg  0.4 mg Sublingual Q5 Min PRN Tomasz Koroma MD        simvastatin (ZOCOR) tablet 40 mg  40 mg Oral Nightly Tomasz Koroma MD   40 mg at 05/15/19 2121    sodium chloride flush 0.9 % injection 10 mL  10 mL Intravenous 2 times per day Tomasz Koroma MD   10 mL at 05/15/19 2126    sodium chloride flush 0.9 % injection 10 mL  10 mL Intravenous PRN Tomasz Koroma MD        ondansetron [facility]) injection 4 mg  4 mg Intravenous Q6H PRN Tomasz Koroma MD        lisinopril (PRINIVIL;ZESTRIL) tablet 5 mg  5 mg Oral Daily Tomasz Koroma MD   5 mg at 05/15/19 0908    mometasone-formoterol (DULERA) 200-5 MCG/ACT inhaler 2 puff  2 puff Inhalation BID Tomasz Koroma MD   2 puff at 05/15/19 2051    enoxaparin (LOVENOX) injection 30 mg  30 mg Subcutaneous Daily Tomasz Koroma MD   30 mg at 05/15/19 1501    glucose (GLUTOSE) 40 % oral gel 15 g  15 g Oral PRN Tomasz Koroma MD        dextrose 50 % solution 12.5 g  12.5 g Intravenous PRN Tomasz Koroma MD        glucagon (rDNA) injection 1 mg  1 mg Intramuscular PRN Tomasz Koroma MD        dextrose 5 % solution  100 mL/hr Intravenous PRN Tomasz Koroma MD        labetalol (NORMODYNE) tablet 100 mg  100 mg Oral 2 times per day Brendon Larson MD   100 mg at 05/15/19 2121       Data Reviewed:   CBC and Renal reviewed  Last CBC  Lab Results   Component Value Date    WBC 4.4 05/16/2019    RBC 4.04 05/16/2019    HGB 11.7 05/16/2019    MCV 89.0 05/16/2019     05/16/2019     Last Renal  Lab Results   Component Value Date     05/16/2019    K 3.8 05/16/2019    K 4.1 05/14/2019    CL 90 05/16/2019    CO2 40 05/16/2019    CO2 40 05/15/2019    CO2 41 05/14/2019    BUN 30 05/16/2019    CREATININE 1.8 05/16/2019    GLUCOSE 95 05/16/2019    CALCIUM 9.2 05/16/2019       Last ABG  POC Blood Gas: No results found for: POCPH, POCPCO2, POCPO2, POCHCO3, NBEA, [id number]  No results for input(s): PH, PCO2, PO2, HCO3, BE, O2SAT in the last 72 hours. Assessment:     ·  ROSENDO - stopped using   · Hypersomnia  · Chronic resp failure  · COPD, severe    Plan:           Problem List Items Addressed This Visit     Fall     Patient fell in the office. He was < 50% saturation on the floor but not this was the etiology of the fall since his wife states he has been falling at least weekly at home. PT was order for home by Adrian Cohen MD but he refused stating \"I don't want to exercise\". After falling, he is too weak to get up. We had a truthful conversation about this. I told him that if he does not change, he will end up in a NH and die from complications of not doing anything. He understood this and beat me to the point before I stated the death part. While I don't think he is suicidal, it is clear he as very little to no motivation to get better. Since he is > 280#, for me to get him off the floor was a chore, impossible for his wife. He will be assessed with admission. COPD, moderate ([address]     On Breo Spiriva . Worsening shortness of breath and hypoxemia. No wheezing on exam.  BS distant secondary to obesity. He was sent to the ER secondary to fall hitting head and hypoxemia. Chronic respiratory failure with hypoxia and hypercapnia (HCC)     Worsening hypoxemia. Last I saw him, he was on 3L and controlled. Now requiring > 6 but was using 5L at home. I advised him to got the ER and will likely be admitted.      He states this started after dx of pneumonia

## 2019-05-14 NOTE — CONSULTS
Referring Physician: Dr. Derek Shelton. Leona Alvarez  Reason for Consultation: CHF  Chief Complaint: \"I fell\"    Subjective:   History of Present Illness:  Suni Antonio is a 81 y.o. patient who presented to the emergency department after sustaining a fall at his outpatient pulmonary appointment. PFTs suggested severe COPD in 2016. He follows with pulmonary and has a chronic supplemental O2 requirement. He has chronic shortness of breath but denies recent changes. He says he frequently loses his balance and ambulates with a walker. He denies lightheadedness or syncope contributing to his fall. The hospitalist consulted cardiology for a possible CHF exacerbation but it does not appear that his pulmonologist has been consulted regarding the bilateral pleural effusions. He denies changes to chronic LE edema. Patient denies exertional chest pain/pressure, PND, orthopnea, palpitations, lightheadedness, and syncope. 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 Surgical History:   has a past surgical history that includes TURP (8/14/12) and Cataract removal (Right, 7/3/13). Social History:   reports that he quit smoking about 12 years ago. His smoking use included cigarettes. He started smoking about 68 years ago. He has a 100.00 pack-year smoking history.  He has never used smokeless tobacco. He reports that he drinks about 4.2 oz of alcohol per week. He reports that he does not use drugs. Family History:  family history is not on file. Home Medications:  Were reviewed and are listed in nursing record and/or below  Prior to Admission medications    Medication Sig Start Date End Date Taking?  Authorizing Provider   famotidine (PEPCID) 10 MG tablet Take 10 mg by mouth 2 times daily   Yes Historical Provider, MD   doxazosin (CARDURA) 8 MG tablet TAKE 1 TABLET BY MOUTH EVERY NIGHT AT BEDTIME FOR BLOOD PRESSURE 4/22/19  Yes Albert Fuentes MD   simvastatin (ZOCOR) 40 MG tablet TAKE 1 TABLET BY MOUTH EVERY EVENING 4/9/19  Yes SANAZ Fisher CNP   escitalopram (LEXAPRO) 10 MG tablet TAKE 1 TABLET BY MOUTH DAILY 4/5/19  Yes SANAZ Mosley CNP   VENTOLIN  (90 Base) MCG/ACT inhaler INHALE 2 PUFFS INTO THE LUNGS EVERY 6 HOURS AS NEEDED FOR WHEEZING 3/26/19  Yes Betzy Kahn MD   isosorbide mononitrate (IMDUR) 30 MG extended release tablet TAKE 1 TABLET BY MOUTH TWICE DAILY FOR BLOOD PRESSURE 3/15/19  Yes SANAZ Fisher CNP   gabapentin (NEURONTIN) 300 MG capsule TAKE 1 CAPSULE BY MOUTH THREE TIMES DAILY 2/26/19 5/27/19 Yes SANAZ Mosley CNP   BREO ELLIPTA 200-25 MCG/INH AEPB INHALE 1 PUFF INTO THE LUNGS DAILY  Patient taking differently: Inhale 1 puff into the lungs nightly  2/4/19  Yes Betzy Kahn MD   fluticasone [place]) 50 MCG/ACT nasal spray 1 spray each nostril daily for allergies 11/27/18  Yes SANAZ Mosley CNP   albuterol (PROVENTIL) (2.5 MG/3ML) 0.083% nebulizer solution Take 3 mLs by nebulization every 6 hours as needed for Wheezing 11/19/18  Yes Betzy Kahn MD   labetalol (NORMODYNE) 300 MG tablet TAKE 1 TABLET BY MOUTH TWICE DAILY AS NEEDED FOR BLOOD PRESSURE 9/4/18  Yes Albert Fuentes MD   tiotropium (SPIRIVA) 18 MCG inhalation capsule One inhalation daily for breathing  Patient taking differently: nightly One inhalation daily for breathing 6/21/18  Yes Madeleine Baker 5/15/2019] labetalol (NORMODYNE) tablet 300 mg Daily   nitroGLYCERIN (NITROSTAT) SL tablet 0.4 mg Q5 Min PRN   simvastatin (ZOCOR) tablet 40 mg Nightly   sodium chloride flush 0.9 % injection 10 mL 2 times per day   sodium chloride flush 0.9 % injection 10 mL PRN   ondansetron (ZOFRAN) injection 4 mg Q6H PRN   lisinopril (PRINIVIL;ZESTRIL) tablet 5 mg Daily   furosemide (LASIX) injection 40 mg BID   mometasone-formoterol (DULERA) 200-5 MCG/ACT inhaler 2 puff BID   enoxaparin (LOVENOX) injection 30 mg Daily       Allergies:  Patient has no known allergies. Review of Systems:   · Constitutional: no unanticipated weight loss. There's been no change in energy level, sleep pattern, or activity level. No fevers, chills. · Eyes: No visual changes or diplopia. No scleral icterus. · ENT: No Headaches, hearing loss or vertigo. No mouth sores or sore throat. · Cardiovascular: as reviewed in HPI  · Respiratory: No cough or wheezing, no sputum production. No hematemesis. · Gastrointestinal: No abdominal pain, appetite loss, blood in stools. No change in bowel or bladder habits. · Genitourinary: No dysuria, trouble voiding, or hematuria. · Musculoskeletal:  No gait disturbance, no joint complaints. · Integumentary: No rash or pruritis. · Neurological: No headache, diplopia, change in muscle strength, numbness or tingling. · Psychiatric: No anxiety or depression. · Endocrine: No temperature intolerance. No excessive thirst, fluid intake, or urination. No tremor. · Hematologic/Lymphatic: No abnormal bruising or bleeding, blood clots or swollen lymph nodes. · Allergic/Immunologic: No nasal congestion or hives. Objective:   PHYSICAL EXAM:    Vitals:    05/14/19 1447   BP: 125/74   Pulse: 77   Resp: 22   Temp: 97.8   SpO2: (!) 88%    Weight: 279 lb 5.2 oz (126.7 kg)       General Appearance:  Alert, cooperative, no distress, elderly. Wearing O2. Head:  Normocephalic, without obvious abnormality, atraumatic. Eyes:  Pupils equal and round. No scleral icterus. Mouth: Moist mucosa, no pharyngeal erythema. Nose: Nares normal. No drainage or sinus tenderness. Neck: Supple, symmetrical, trachea midline. No adenopathy. No tenderness/mass/nodules. No carotid bruit or elevated JVD. Lungs:   Respirations unlabored. Diminished breath sounds to bilateral lung fields. Chest Wall:  No tenderness or deformity. Heart:  Regular rate. S1/S2 normal. No murmur, rub, or gallop. Abdomen:   Soft, non-tender, bowel sounds active. Musculoskeletal: No muscle wasting or digital clubbing. Extremities: Extremities normal, atraumatic. No cyanosis. 1+ BLE edema. Pulses: 2+ radial and carotid pulses, symmetric. Skin: No rashes or lesions. Pysch: Normal mood and affect. Alert and oriented x 4. Neurologic: Moves all extremities. Follows commands. Labs     CBC: Lab Results   Component Value Date    WBC 4.7 2019    RBC 4.15 2019    HGB 11.8 2019    HCT 37.0 2019    MCV 89.2 2019    RDW 14.9 2019     2019     CMP:  Lab Results   Component Value Date     2019    K 4.1 2019    CL 93 2019    CO2 41 2019    BUN 20 2019    CREATININE 1.7 2019    GFRAA 47 2019    GFRAA >60 04/15/2013    AGRATIO 1.5 2019    LABGLOM 39 2019    GLUCOSE 110 2019    PROT 6.5 2019    PROT 7.1 01/15/2013    CALCIUM 9.4 2019    BILITOT 0.3 2019    ALKPHOS 44 2019    AST 12 2019    ALT 9 2019     PT/INR:  No results found for: PTINR  HgBA1c:  Lab Results   Component Value Date    LABA1C 5.7 2019     Lab Results   Component Value Date    CKTOTAL 176 2016    TROPONINI 0.07 (H) 2019       Cardiac Data     EK19   Sinus rhythm with 1st degree A-V block. Left axis deviation. Low voltage QRS in precordial leads. Right bundle branch block.     Echo: 3/2018   Normal left ventricle size, wall thickness, and systolic function with an estimated ejection fraction of 55-60%. No regional wall motion abnormalities are seen. The right ventricle is not well visualized. Tele: sinus     Assessment and Plan   1) Chronic hypoxic and hypercarbic respiratory failure/bilateral pleural effusions. Will consult his pulmonologist who referred patient to the hospital. Will defer need to thoracentesis to pulmonary. 2) Elevated BNP. Some degree of diastolic dysfunction is possible in an octogenarian with HTN and CKD but BNP can also just be elevated from CKD. He is on Lasix chronically as an outpatient. Could trial a higher dose of po diuretic to see if helps with pleural effusions. 3) Elevated troponin. History is not consistent with ACS. Likely related to CKD and hypoxia. No plans for stress testing or angiography. 4) Essential hypertension. Controlled. Goal BP <130/80. Continue medical therapy. Will change labetalol to BID dosing. 5) CKD Stage III. Continue to monitor chronically with diuretics and ACE-I. Thank you for allowing us to participate in the care of Maria C Aden. If echo shows normal LV function, will sign off. Yessenia Car.  Janny Lentz, [address] [place] [address]  5/14/2019 3:06 PM

## 2019-05-14 NOTE — H&P
EVERY NIGHT AT BEDTIME FOR BLOOD PRESSURE 4/22/19  Yes Yamile Barber MD   simvastatin (ZOCOR) 40 MG tablet TAKE 1 TABLET BY MOUTH EVERY EVENING 4/9/19  Yes SANAZ Snow CNP   escitalopram (LEXAPRO) 10 MG tablet TAKE 1 TABLET BY MOUTH DAILY 4/5/19  Yes SANAZ Nur CNP   VENTOLIN  (90 Base) MCG/ACT inhaler INHALE 2 PUFFS INTO THE LUNGS EVERY 6 HOURS AS NEEDED FOR WHEEZING 3/26/19  Yes Maxime Andujar MD   isosorbide mononitrate (IMDUR) 30 MG extended release tablet TAKE 1 TABLET BY MOUTH TWICE DAILY FOR BLOOD PRESSURE 3/15/19  Yes SANAZ Snow CNP   gabapentin (NEURONTIN) 300 MG capsule TAKE 1 CAPSULE BY MOUTH THREE TIMES DAILY 2/26/19 5/27/19 Yes SANAZ Nur CNP   BREO ELLIPTA 200-25 MCG/INH AEPB INHALE 1 PUFF INTO THE LUNGS DAILY  Patient taking differently: Inhale 1 puff into the lungs nightly  2/4/19  Yes Maxime Andujar MD   fluticasone [facility] [place]) 50 MCG/ACT nasal spray 1 spray each nostril daily for allergies 11/27/18  Yes SANAZ Nur CNP   albuterol (PROVENTIL) (2.5 MG/3ML) 0.083% nebulizer solution Take 3 mLs by nebulization every 6 hours as needed for Wheezing 11/19/18  Yes Maxime Andujar MD   labetalol (NORMODYNE) 300 MG tablet TAKE 1 TABLET BY MOUTH TWICE DAILY AS NEEDED FOR BLOOD PRESSURE 9/4/18  Yes Yamile Barber MD   tiotropium ([place]) 18 MCG inhalation capsule One inhalation daily for breathing  Patient taking differently: nightly One inhalation daily for breathing 6/21/18  Yes Maxime Andujar MD   furosemide (LASIX) 40 MG tablet Take 1 tablet by mouth 2 times daily 7/20/16  Yes Historical Provider, MD   Cholecalciferol (VITAMIN D-3) 5000 UNITS TABS Take 5,000 Units by mouth daily    Yes Historical Provider, MD   aspirin 81 MG EC tablet Take 81 mg by mouth nightly    Yes Historical Provider, MD   Nebulizers (VIOS LC SPRINT) MISC USE UTD 11/21/18   Historical Provider, MD   Compression Stockings MISC by Does not apply route 15-20 mmHg knee high 11/27/18   Nicole Meter, APRN - CNP   Respiratory Therapy Supplies (NEBULIZER/TUBING/MOUTHPIECE) KIT 1 kit by Does not apply route daily as needed (sob) 11/19/18   Pinky Basilio MD   nitroGLYCERIN (NITROSTAT) 0.4 MG SL tablet Place 1 tablet under the tongue every 5 minutes as needed for Chest pain (chest pain) 6/22/18   Barbara Kaur MD   Spacer/Aero-Holding Darinel Mariais 1 Device by Does not apply route daily as needed (with Albuterol inhaler) 9/19/16   SANAZ Real CNP   Blood Glucose Monitoring Suppl (ACCU-CHEK NEVAEH SMARTVIEW) W/DEVICE KIT CHECK SUGARS THREE TIMES DAILY AS INSTRUCTED 10/15/15   Zulay Bahena MD   Blood Glucose Calibration (ACCU-CHEK SMARTVIEW CONTROL) LIQD USE AS DIRECTED 10/15/15   Zulay Bahena MD   ONE TOUCH LANCETS MISC Use for blood sugar testing 8/6/15   Zulay Bahena MD   Insulin Pen Needle ([employer] PEN NEEDLES 31G) 31G X 8 MM MISC 1 each by Does not apply route daily. 8/26/14   Zulay Bahena MD       Allergies:  Patient has no known allergies. Social History:   TOBACCO:   reports that he quit smoking about 12 years ago. His smoking use included cigarettes. He started smoking about 68 years ago. He has a 100.00 pack-year smoking history. He has never used smokeless tobacco.  ETOH:   reports that he drinks about 4.2 oz of alcohol per week. Family History:   Patient denies family history of COPD CHF. REVIEW OF SYSTEMS:     patients reported symptoms are in BOLD all other symptoms are negative. CONSTITUTIONAL:      fatigue, fever, chills or night sweats, recent weight gain, recent wt loss, insomnia,  General weakness, poor appetite, muscle aches and pains    HEAD: headache, dizziness    EYES:      blurriness,  double vision, dryness,  discharge, irritation,diplopia    EARS:      hearing loss, vertigo, ear discharge,  Earache. Ringing in the ears. NOSE:      Rhinorrhea, sneezing, epistaxis.  Discharge, sinusitis, MOUTH/THROAT:         sore throat, mouth ulcers, Hoarseness    RESPIRATORY:        Shortness of breath, wheezing,  cough, sputum, hemoptysis, obstructive sleep apnea,    CARDIOVASCULAR :      chest pain, palpitations, dyspnea on exercise, Lower extrimity edema (swelling),     GASTROINTESTINAL:       Dysphagia, Poor appetite,  Nausea, Vomiting, diarrhea, heartburn, abdominal pain. Blood in the stools, hematemesis. Pain with swallowing, constipation    GENITOURINARY:       Urinary frequency, hesitancy,  urgency, Dysuria, hematuria,  Urinary Incontinence. Urinary Retention. GYNECOLOGICAL: vaginal bleeding , vaginal discharge, menopause    MUSCULOSKELETAL:       joint swelling or stiffness, joint pain, muscle pain, balance problems, low back pain.leg swelling    NEUROLOGICAL:      Gait problems. Tremor. Dizziness. Pain and paresthesias, weakness in extremities. Seizures, memory loss    PSYCHLOGICAL:        Anxiety, depression    SKIN :      Rashes ulcers, skin color changes, easy bruisability, lymphadenopathy      Physical Exam:      Vitals: BP (!) 113/48   Pulse 72   Temp 98.2 °F (36.8 °C) (Oral)   Resp 15   Wt 279 lb 5.2 oz (126.7 kg)   SpO2 98%   BMI 41.25 kg/m²     Gen:          Alert and oriented x 3  Eyes: PERRL. No sclera icterus. No conjunctival injection. ENT: No discharge. Pharynx clear. External appearance of ears and nose normal.  Neck: Trachea midline. No obvious mass. Resp: decreased breath sounds bilaterally  CV: Regular rate. Regular rhythm. No murmur or rub. No edema. GI: Non-tender. Non-distended. No hernia. Skin: Warm, dry, normal texture and turgor. Lymph: No cervical LAD. No supraclavicular LAD. M/S: / Ext. No cyanosis. No clubbing. No joint deformity. Neuro: Moves all four extremities. CN 2-12 tested, no deficits noted. Peripheral pulses and capillary refill is intact.       CBC:   Recent Labs     05/14/19  1039   WBC 4.7   HGB 11.8*        BMP:    Recent Labs prophylaxis      Full Code      Louise Fleming M.D    This note was transcribed using [id number] Please disregard any translational errors.

## 2019-05-14 NOTE — ED NOTES
Pt is from  [address] office, pt was there for a routine visit, fell and hit his head, pt denies pain, pt denies LOC, pt is alert and orient, pt is currently on a non rebreather nmask at this time      Elizabeth Turner, RN  05/14/19 1013

## 2019-05-14 NOTE — ED PROVIDER NOTES
[facility]  eMERGENCY dEPARTMENT eNCOUnter      Pt Name: Renu Meneses  MRN: [id number]  [place] 1935  Date of evaluation: 5/14/2019  Provider: Maryellen Chavarria MD    CHIEF COMPLAINT       Chief Complaint   Patient presents with    Shortness of Breath    Fall       HISTORY OF PRESENT ILLNESS    Renu Meneses is a 81 y.o. male who presents to the emergency department with SOB and fall. Patient was at Dr Genie [facility] office for routine care when he fell and hit his head. Denies pain. + for SOB. Hx COPD and CHF on 5L NC chronically. No other associated symptoms. Nursing Notes were reviewed. REVIEW OF SYSTEMS       Review of Systems   Constitutional: Negative for activity change, appetite change, chills, diaphoresis, fatigue, fever and unexpected weight change. HENT: Negative for congestion, dental problem, drooling, ear discharge and ear pain. Eyes: Negative for photophobia, pain, discharge, redness, itching and visual disturbance. Respiratory: Positive for shortness of breath. Negative for apnea, cough, choking, chest tightness, wheezing and stridor. Cardiovascular: Positive for leg swelling. Negative for chest pain and palpitations. Gastrointestinal: Negative for abdominal distention, abdominal pain, anal bleeding, blood in stool, constipation, diarrhea, nausea, rectal pain and vomiting. Endocrine: Negative for cold intolerance and heat intolerance. Genitourinary: Negative for decreased urine volume and urgency. Musculoskeletal: Negative for arthralgias and back pain. Skin: Negative for color change and pallor. Neurological: Negative for dizziness and facial asymmetry. Hematological: Negative for adenopathy. Does not bruise/bleed easily. Psychiatric/Behavioral: Negative for agitation, behavioral problems, confusion and decreased concentration.        Except as noted above the remainder of the review of systems was reviewed and negative. PAST MEDICAL HISTORY     Past Medical History:   Diagnosis Date    [facility]     [facility]congestive [facility] failure) ([facility])     CKD (chronic kidney disease) stage 3, GFR 30-59 ml/min ([facility])     COPD (chronic obstructive pulmonary disease) ([address]     CVA, old, ataxia 2007    DM type 2 with diabetic peripheral neuropathy ([facility] 10/23/2014    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 2008    Fall 9/2015    Fatty liver     Hypertension     Mixed hyperlipidemia     Morbid obesity ([address]     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 ([address] 2008    Obesity, diabetes, and hypertension syndrome ([facility] August, 2015    Psoriasis     Risk for falls     Type 2 diabetes mellitus with microalbuminuria or microproteinuria     Vitamin D deficiency 2011       SURGICAL HISTORY       Past Surgical History:   Procedure Laterality Date    CATARACT REMOVAL Right 7/3/13    Ardena Kand    TURP  8/14/12    Teja       CURRENT MEDICATIONS       Previous Medications    ALBUTEROL (PROVENTIL) (2.5 MG/3ML) 0.083% NEBULIZER SOLUTION    Take 3 mLs by nebulization every 6 hours as needed for Wheezing    ALBUTEROL SULFATE HFA (PROAIR HFA) 108 (90 BASE) MCG/ACT INHALER    Inhale 2 puffs into the lungs every 6 hours as needed for Wheezing or Shortness of Breath    ASPIRIN 81 MG EC TABLET    Take 81 mg by mouth daily.       BLOOD GLUCOSE CALIBRATION (ACCU-CHEK SMARTVIEW CONTROL) LIQD    USE AS DIRECTED    BLOOD GLUCOSE MONITORING SUPPL (ACCU-CHEK NEVAEH SMARTVIEW) W/DEVICE KIT    CHECK SUGARS THREE TIMES DAILY AS INSTRUCTED    BREO ELLIPTA 200-25 MCG/INH AEPB    INHALE 1 PUFF INTO THE LUNGS DAILY    CHOLECALCIFEROL (VITAMIN D-3) 5000 UNITS TABS    Take 10,000 Units by mouth daily     COMPRESSION STOCKINGS MISC    by Does not apply route 15-20 mmHg knee high    DOXAZOSIN (CARDURA) 8 MG TABLET    TAKE 1 TABLET BY MOUTH EVERY NIGHT AT BEDTIME FOR BLOOD PRESSURE    ESCITALOPRAM (LEXAPRO) 10 MG TABLET    TAKE 1 TABLET BY MOUTH DAILY FAMOTIDINE (PEPCID) 20 MG TABLET    TAKE 1/2 TABLET BY MOUTH TWICE DAILY FOR STOMACH    FLUTICASONE (FLONASE) 50 MCG/ACT NASAL SPRAY    1 spray each nostril daily for allergies    FUROSEMIDE (LASIX) 40 MG TABLET    Take 1 tablet by mouth 2 times daily    GABAPENTIN (NEURONTIN) 300 MG CAPSULE    TAKE 1 CAPSULE BY MOUTH THREE TIMES DAILY    INSULIN PEN NEEDLE ([employer] PEN NEEDLES 31G) 31G X 8 MM MISC    1 each by Does not apply route daily. ISOSORBIDE MONONITRATE (IMDUR) 30 MG EXTENDED RELEASE TABLET    TAKE 1 TABLET BY MOUTH TWICE DAILY FOR BLOOD PRESSURE    LABETALOL (NORMODYNE) 300 MG TABLET    TAKE 1 TABLET BY MOUTH TWICE DAILY AS NEEDED FOR BLOOD PRESSURE    NEBULIZERS (VIOS LC SPRINT) MISC    USE UTD    NITROGLYCERIN (NITROSTAT) 0.4 MG SL TABLET    Place 1 tablet under the tongue every 5 minutes as needed for Chest pain (chest pain)    ONE TOUCH LANCETS MISC    Use for blood sugar testing    PIOGLITAZONE (ACTOS) 30 MG TABLET    TAKE 1 TABLET BY MOUTH DAILY FOR DIABETES    RESPIRATORY THERAPY SUPPLIES (NEBULIZER/TUBING/MOUTHPIECE) KIT    1 kit by Does not apply route daily as needed (sob)    SIMVASTATIN (ZOCOR) 40 MG TABLET    TAKE 1 TABLET BY MOUTH EVERY EVENING    SPACER/AERO-HOLDING CHAMBERS KOSTA    1 Device by Does not apply route daily as needed (with Albuterol inhaler)    TIOTROPIUM (SPIRIVA) 18 MCG INHALATION CAPSULE    One inhalation daily for breathing    VENTOLIN  (90 BASE) MCG/ACT INHALER    INHALE 2 PUFFS INTO THE LUNGS EVERY 6 HOURS AS NEEDED FOR WHEEZING       ALLERGIES     Patient has no known allergies. FAMILY HISTORY      History reviewed. No pertinent family history.     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 He exhibits no distension and no mass. There is no tenderness. There is no rebound and no guarding. Musculoskeletal: Normal range of motion. He exhibits edema. He exhibits no tenderness or deformity. Neurological: He is alert. No cranial nerve deficit. He exhibits normal muscle tone. Coordination normal.   Skin: Skin is warm. No rash noted. He is not diaphoretic. No erythema. No pallor.        DIAGNOSTIC RESULTS     EKG: All EKG's are interpreted by the Emergency Department Physician who either signs or Co-signs this chart in the absence of acardiologist.    EKG shows NSR 1st degree AV block RBBB LAD    RADIOLOGY:   Non-plain film images such as CT, Ultrasoundand MRI are read by the radiologist. Plain radiographic images are visualized and preliminarily interpreted by the emergency physician with the below findings:    CTS shows large effusions     ED BEDSIDE ULTRASOUND:   Performed by ED Physician - none    LABS:  Labs Reviewed   CBC WITH AUTO DIFFERENTIAL - Abnormal; Notable for the following components:       Result Value    RBC 4.15 (*)     Hemoglobin 11.8 (*)     Hematocrit 37.0 (*)     Lymphocytes # 0.6 (*)     All other components within normal limits    Narrative:     Performed at:  [facility]  [address] [facility] [address]   Phone [phone]   BASIC METABOLIC PANEL W/ REFLEX TO MG FOR LOW K - Abnormal; Notable for the following components:    Chloride 93 (*)     CO2 41 (*)     Glucose 110 (*)     CREATININE 1.7 (*)     GFR Non- 39 (*)     GFR  47 (*)     All other components within normal limits    Narrative:     Performed at:  [facility]  [address] [facility] [employer] [address]   Phone [phone]   TROPONIN - Abnormal; Notable for the following components:    Troponin 0.07 (*)     All other components within normal limits    Narrative:     Performed at:  [facility]  [address] [place] [address]   Phone [phone]   BRAIN NATRIURETIC PEPTIDE - Abnormal; Notable for the following components:    Pro-BNP 2,035 (*)     All other components within normal limits    Narrative:     Performed at:  [facility]  [address] [place], [facility] [place] [address]   Phone [phone]   BLOOD GAS, VENOUS - Abnormal; Notable for the following components:    pCO2, Gopal 78.8 (*)     HCO3, Venous 43 (*)     All other components within normal limits    Narrative:     Performed at:  [facility]  [address], [facility] [place] [address]   Phone [phone]   LACTIC ACID, PLASMA    Narrative:     Performed at:  [facility]  [address] [facility] [place] [address]   Phone [phone]   URINE RT REFLEX TO CULTURE       All other labs were withinnormal range or not returned as of this dictation. EMERGENCY DEPARTMENT COURSE and DIFFERENTIAL DIAGNOSIS/MDM:     Large pleural effusion most likely 2/2 underlying CHF, BNP 2000 and weight gain. Lasix given. CO2 at baseline. Nebs and steroids given. Admission for further in patient evaluation. CRITICAL CARE TIME   Total Critical Caretime was 39 minutes, excluding separately reportable procedures. There was a high probability of clinically significant/life threatening deterioration in the patient's condition which required my urgent intervention. PROCEDURES:  Unlessotherwise noted below, none    FINAL IMPRESSION      1. Hypoxia    2.  Large pleural effusion          DISPOSITION/PLAN   DISPOSITION      Admission    (Please note that portions ofthis note were completed with a voice recognition program.  Efforts were made to edit the dictations but occasionally words are mis-transcribed.)    Dannie Cote MD(electronically signed)  Attending Emergency Physician          Dannie Cote MD  05/14/19 6491

## 2019-05-14 NOTE — LETTER
[facility]  [address]  Phone: [phone]  Fax: [phone]     5/16/2019    Dr. Aleksandra Larkin MD    I have seen your patient, Cici Suero on follow up. Here is the assessment and plan for your patient. Any question, please do not hesitate to call. Problem List Items Addressed This Visit     Fall     Patient fell in the office. He was < 50% saturation on the floor but not this was the etiology of the fall since his wife states he has been falling at least weekly at home. PT was order for home by Aleksandra Larkin MD but he refused stating \"I don't want to exercise\". After falling, he is too weak to get up. We had a truthful conversation about this. I told him that if he does not change, he will end up in a NH and die from complications of not doing anything. He understood this and beat me to the point before I stated the death part. While I don't think he is suicidal, it is clear he as very little to no motivation to get better. Since he is > 280#, for me to get him off the floor was a chore, impossible for his wife. He will be assessed with admission. COPD, moderate ([address]     On [address] . Worsening shortness of breath and hypoxemia. No wheezing on exam.  BS distant secondary to obesity. He was sent to the ER secondary to fall hitting head and hypoxemia. Chronic respiratory failure with hypoxia and hypercapnia (HCC)     Worsening hypoxemia. Last I saw him, he was on 3L and controlled. Now requiring > 6 but was using 5L at home. I advised him to got the ER and will likely be admitted.      He states this started after dx of pneumonia                  Sincerely,    Karuna Rodriguez Pul[facility], Sleep and Critical Care  [phone]

## 2019-05-15 ENCOUNTER — APPOINTMENT (OUTPATIENT)
Dept: GENERAL RADIOLOGY | Age: 84
DRG: 291 | End: 2019-05-15
Payer: MEDICARE

## 2019-05-15 LAB
ANION GAP SERPL CALCULATED.3IONS-SCNC: 10 MMOL/L (ref 3–16)
APPEARANCE FLUID: NORMAL
BUN BLDV-MCNC: 24 MG/DL (ref 7–20)
CALCIUM SERPL-MCNC: 9.4 MG/DL (ref 8.3–10.6)
CELL COUNT FLUID TYPE: NORMAL
CHLORIDE BLD-SCNC: 92 MMOL/L (ref 99–110)
CHOLESTEROL, TOTAL: 99 MG/DL (ref 0–199)
CLOT EVALUATION: NORMAL
CO2: 40 MMOL/L (ref 21–32)
COLOR FLUID: YELLOW
CREAT SERPL-MCNC: 1.8 MG/DL (ref 0.8–1.3)
EOSINOPHIL FLUID: 1 %
ESTIMATED AVERAGE GLUCOSE: 125.5 MG/DL
FLUID TYPE: NORMAL
GFR AFRICAN AMERICAN: 44
GFR NON-AFRICAN AMERICAN: 36
GLUCOSE BLD-MCNC: 101 MG/DL (ref 70–99)
GLUCOSE BLD-MCNC: 111 MG/DL (ref 70–99)
GLUCOSE BLD-MCNC: 119 MG/DL (ref 70–99)
GLUCOSE BLD-MCNC: 120 MG/DL (ref 70–99)
GLUCOSE BLD-MCNC: 143 MG/DL (ref 70–99)
HBA1C MFR BLD: 6 %
HCT VFR BLD CALC: 35.6 % (ref 40.5–52.5)
HDLC SERPL-MCNC: 39 MG/DL (ref 40–60)
HEMOGLOBIN: 11.8 G/DL (ref 13.5–17.5)
LD, FLUID: 86 U/L
LDL CHOLESTEROL CALCULATED: 48 MG/DL
LV EF: 58 %
LVEF MODALITY: NORMAL
LYMPHOCYTES, BODY FLUID: 74 %
MACROPHAGE FLUID: 10 %
MAGNESIUM: 2 MG/DL (ref 1.8–2.4)
MCH RBC QN AUTO: 29.1 PG (ref 26–34)
MCHC RBC AUTO-ENTMCNC: 33 G/DL (ref 31–36)
MCV RBC AUTO: 88.3 FL (ref 80–100)
MESOTHELIAL FLUID: 2 %
MONOCYTE, FLUID: 13 %
NUCLEATED CELLS FLUID: 615 /CUMM
NUMBER OF CELLS COUNTED FLUID: 100
PDW BLD-RTO: 14.9 % (ref 12.4–15.4)
PERFORMED ON: ABNORMAL
PLATELET # BLD: 257 K/UL (ref 135–450)
PMV BLD AUTO: 7.9 FL (ref 5–10.5)
POTASSIUM SERPL-SCNC: 4.3 MMOL/L (ref 3.5–5.1)
PROTEIN FLUID: 3.1 G/DL
RBC # BLD: 4.04 M/UL (ref 4.2–5.9)
RBC FLUID: 215 /CUMM
SODIUM BLD-SCNC: 142 MMOL/L (ref 136–145)
TRIGL SERPL-MCNC: 59 MG/DL (ref 0–150)
VLDLC SERPL CALC-MCNC: 12 MG/DL
WBC # BLD: 4.9 K/UL (ref 4–11)

## 2019-05-15 PROCEDURE — 80061 LIPID PANEL: CPT

## 2019-05-15 PROCEDURE — 36415 COLL VENOUS BLD VENIPUNCTURE: CPT

## 2019-05-15 PROCEDURE — 93306 TTE W/DOPPLER COMPLETE: CPT

## 2019-05-15 PROCEDURE — 71045 X-RAY EXAM CHEST 1 VIEW: CPT

## 2019-05-15 PROCEDURE — 6360000002 HC RX W HCPCS: Performed by: HOSPITALIST

## 2019-05-15 PROCEDURE — 6370000000 HC RX 637 (ALT 250 FOR IP): Performed by: INTERNAL MEDICINE

## 2019-05-15 PROCEDURE — 6360000002 HC RX W HCPCS: Performed by: INTERNAL MEDICINE

## 2019-05-15 PROCEDURE — 2580000003 HC RX 258: Performed by: HOSPITALIST

## 2019-05-15 PROCEDURE — 6370000000 HC RX 637 (ALT 250 FOR IP): Performed by: HOSPITALIST

## 2019-05-15 PROCEDURE — 32555 ASPIRATE PLEURA W/ IMAGING: CPT | Performed by: INTERNAL MEDICINE

## 2019-05-15 PROCEDURE — 83615 LACTATE (LD) (LDH) ENZYME: CPT

## 2019-05-15 PROCEDURE — 94760 N-INVAS EAR/PLS OXIMETRY 1: CPT

## 2019-05-15 PROCEDURE — 83036 HEMOGLOBIN GLYCOSYLATED A1C: CPT

## 2019-05-15 PROCEDURE — 94640 AIRWAY INHALATION TREATMENT: CPT

## 2019-05-15 PROCEDURE — 2060000000 HC ICU INTERMEDIATE R&B

## 2019-05-15 PROCEDURE — 89051 BODY FLUID CELL COUNT: CPT

## 2019-05-15 PROCEDURE — 99233 SBSQ HOSP IP/OBS HIGH 50: CPT | Performed by: INTERNAL MEDICINE

## 2019-05-15 PROCEDURE — 80048 BASIC METABOLIC PNL TOTAL CA: CPT

## 2019-05-15 PROCEDURE — 87205 SMEAR GRAM STAIN: CPT

## 2019-05-15 PROCEDURE — 84157 ASSAY OF PROTEIN OTHER: CPT

## 2019-05-15 PROCEDURE — 83735 ASSAY OF MAGNESIUM: CPT

## 2019-05-15 PROCEDURE — 85027 COMPLETE CBC AUTOMATED: CPT

## 2019-05-15 PROCEDURE — 87070 CULTURE OTHR SPECIMN AEROBIC: CPT

## 2019-05-15 PROCEDURE — 2700000000 HC OXYGEN THERAPY PER DAY

## 2019-05-15 PROCEDURE — 99223 1ST HOSP IP/OBS HIGH 75: CPT | Performed by: INTERNAL MEDICINE

## 2019-05-15 PROCEDURE — 0W9B3ZZ DRAINAGE OF LEFT PLEURAL CAVITY, PERCUTANEOUS APPROACH: ICD-10-PCS | Performed by: INTERNAL MEDICINE

## 2019-05-15 RX ORDER — ISOSORBIDE MONONITRATE 30 MG/1
30 TABLET, EXTENDED RELEASE ORAL DAILY
Status: DISCONTINUED | OUTPATIENT
Start: 2019-05-16 | End: 2019-05-16 | Stop reason: HOSPADM

## 2019-05-15 RX ORDER — DOXAZOSIN MESYLATE 4 MG/1
4 TABLET ORAL DAILY
Status: DISCONTINUED | OUTPATIENT
Start: 2019-05-16 | End: 2019-05-16 | Stop reason: HOSPADM

## 2019-05-15 RX ORDER — GABAPENTIN 400 MG/1
400 CAPSULE ORAL 3 TIMES DAILY
COMMUNITY
End: 2019-05-17

## 2019-05-15 RX ORDER — GABAPENTIN 400 MG/1
400 CAPSULE ORAL 3 TIMES DAILY
Status: DISCONTINUED | OUTPATIENT
Start: 2019-05-15 | End: 2019-05-16 | Stop reason: HOSPADM

## 2019-05-15 RX ORDER — FUROSEMIDE 40 MG/1
80 TABLET ORAL 2 TIMES DAILY
Status: DISCONTINUED | OUTPATIENT
Start: 2019-05-16 | End: 2019-05-16 | Stop reason: HOSPADM

## 2019-05-15 RX ADMIN — GABAPENTIN 400 MG: 400 CAPSULE ORAL at 16:11

## 2019-05-15 RX ADMIN — DOXAZOSIN 8 MG: 4 TABLET ORAL at 09:08

## 2019-05-15 RX ADMIN — ISOSORBIDE MONONITRATE 30 MG: 30 TABLET, EXTENDED RELEASE ORAL at 06:24

## 2019-05-15 RX ADMIN — ASPIRIN 81 MG: 81 TABLET ORAL at 21:21

## 2019-05-15 RX ADMIN — CHOLECALCIFEROL CAP 125 MCG (5000 UNIT) 5000 UNITS: 125 CAP at 09:08

## 2019-05-15 RX ADMIN — GABAPENTIN 400 MG: 400 CAPSULE ORAL at 21:21

## 2019-05-15 RX ADMIN — Medication 10 ML: at 21:26

## 2019-05-15 RX ADMIN — LISINOPRIL 5 MG: 5 TABLET ORAL at 09:08

## 2019-05-15 RX ADMIN — FUROSEMIDE 40 MG: 10 INJECTION, SOLUTION INTRAMUSCULAR; INTRAVENOUS at 21:21

## 2019-05-15 RX ADMIN — INSULIN LISPRO 1 UNITS: 100 INJECTION, SOLUTION INTRAVENOUS; SUBCUTANEOUS at 21:21

## 2019-05-15 RX ADMIN — LABETALOL HYDROCHLORIDE 100 MG: 100 TABLET, FILM COATED ORAL at 09:08

## 2019-05-15 RX ADMIN — Medication 10 ML: at 09:09

## 2019-05-15 RX ADMIN — ENOXAPARIN SODIUM 30 MG: 30 INJECTION SUBCUTANEOUS at 15:01

## 2019-05-15 RX ADMIN — SIMVASTATIN 40 MG: 40 TABLET, FILM COATED ORAL at 21:21

## 2019-05-15 RX ADMIN — MOMETASONE FUROATE AND FORMOTEROL FUMARATE DIHYDRATE 2 PUFF: 200; 5 AEROSOL RESPIRATORY (INHALATION) at 08:52

## 2019-05-15 RX ADMIN — LABETALOL HYDROCHLORIDE 100 MG: 100 TABLET, FILM COATED ORAL at 21:21

## 2019-05-15 RX ADMIN — FUROSEMIDE 40 MG: 10 INJECTION, SOLUTION INTRAMUSCULAR; INTRAVENOUS at 09:08

## 2019-05-15 RX ADMIN — ESCITALOPRAM OXALATE 10 MG: 10 TABLET ORAL at 09:08

## 2019-05-15 RX ADMIN — MOMETASONE FUROATE AND FORMOTEROL FUMARATE DIHYDRATE 2 PUFF: 200; 5 AEROSOL RESPIRATORY (INHALATION) at 20:51

## 2019-05-15 ASSESSMENT — PAIN SCALES - GENERAL
PAINLEVEL_OUTOF10: 0
PAINLEVEL_OUTOF10: 0

## 2019-05-15 NOTE — PLAN OF CARE
Problem: Fluid Volume:  Goal: Risk for excess fluid volume will decrease  Description  Risk for excess fluid volume will decrease  5/14/2019 2349 by Frandy Kahn RN  Outcome: Ongoing  5/14/2019 1841 by Margarita Lomax RN  Outcome: Ongoing  Goal: Maintenance of adequate hydration will improve  Description  Maintenance of adequate hydration will improve  5/14/2019 2349 by Frandy Kahn RN  Outcome: Ongoing  5/14/2019 1841 by Margarita Lomax RN  Outcome: Ongoing  Goal: Will show no signs and symptoms of electrolyte imbalance  Description  Will show no signs and symptoms of electrolyte imbalance  5/14/2019 2349 by Frandy Kahn RN  Outcome: Ongoing  5/14/2019 1841 by Margarita Lomax RN  Outcome: Ongoing     Problem: Health Behavior:  Goal: Ability to manage health-related needs will improve  Description  Ability to manage health-related needs will improve  5/14/2019 1841 by Margarita Lomax RN  Outcome: Ongoing  Goal: Ability to seek appropriate health care will improve  Description  Ability to seek appropriate health care will improve  5/14/2019 1841 by Margarita Lomax RN  Outcome: Ongoing     Problem: Nutritional:  Goal: Maintenance of adequate nutrition will improve  Description  Maintenance of adequate nutrition will improve  5/14/2019 1841 by Margarita Lomax RN  Outcome: Ongoing     Problem: Physical Regulation:  Goal: Complications related to the disease process, condition or treatment will be avoided or minimized  Description  Complications related to the disease process, condition or treatment will be avoided or minimized  5/14/2019 1841 by Margarita Lomax RN  Outcome: Ongoing     Problem: Respiratory:  Goal: Ability to maintain adequate ventilation will improve  Description  Ability to maintain adequate ventilation will improve  5/14/2019 2349 by Frandy Kahn RN  Outcome: Ongoing  5/14/2019 1841 by Margarita Lomax RN  Outcome: Ongoing  Goal: Respiratory status will improve  Description  Respiratory status will improve  5/14/2019 2349 by Deon Blackmon RN  Outcome: Ongoing  5/14/2019 1841 by Mita Martel RN  Outcome: Ongoing

## 2019-05-15 NOTE — CARE COORDINATION
INITIAL CASE MANAGEMENT ASSESSMENT    Reviewed chart, met with patient to assess possible discharge needs. Explained Case Management role/services. Living Situation: Lives with his wife in a condo, 2 steps to enter. ADLs: He states that he needs assist with his ADLS which his wife assists with. DME: [id number]    PT/OT Recs: Not ordered     Active Services: [facility] RN through [employer] chart), patient was not able to state which agency     Transportation: He does not drive, wife provides all transportation. Medications: He is able to obtain all medications with no difficulty. PCP: Dr. Deepti Ordonez      HD/PD: N/a    PLAN/COMMENTS: Patient states that he plans onr returning home at discharge. He feels safe enough to return home with his wife's assistance. He does not think that he will need any therapy services at home. No needs expressed at this time. SW/CM provided contact information for patient or family to call with any questions. SW/CM will follow and assist as needed.     Disha Lynn, [address] [facility]  [phone]  5/15/19 at 11:54 AM

## 2019-05-15 NOTE — PROCEDURES
Mary Cunha is a 81 y.o. male patient. 1. Hypoxia    2. Large pleural effusion      Past Medical History:   Diagnosis Date    Atherosclerosis of aorta (HCC)     CHF (congestive heart failure) (HCC)     CKD (chronic kidney disease) stage 3, GFR 30-59 ml/min (HCC)     [facility]) [facility]     CVA, old, ataxia 2007    DM type 2 with diabetic peripheral neuropathy ([facility] 10/23/2014    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 2008    Fall 9/2015    Fatty liver     Hypertension     Mixed hyperlipidemia     Morbid obesity ([address]     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 rate 22, height 5' 9\" (1.753 m), weight 273 lb 9.5 oz (124.1 kg), SpO2 92 %. Thoracentesis  Date/Time: 5/15/2019 10:53 AM  Performed by: Isaac Moreno MD  Authorized by: Isaac Moreno MD   Consent: Verbal consent obtained. Written consent obtained.   Risks and benefits: risks, benefits and alternatives were discussed  Consent given by: patient  Patient understanding: patient states understanding of the procedure being performed  Patient consent: the patient's understanding of the procedure matches consent given  Procedure consent: procedure consent matches procedure scheduled  Relevant documents: relevant documents present and verified  Test results: test results available and properly labeled  Site marked: the operative site was marked  Imaging studies: imaging studies available  Required items: required blood products, implants, devices, and special equipment available  Patient identity confirmed: verbally with patient, arm band and hospital-assigned identification number  Time out: Immediately prior to procedure a \"time out\" was called to verify the correct patient, procedure, equipment, support staff and site/side marked as required. Procedure purpose: diagnostic and therapeutic  Indications: pleural effusion  Preparation: Patient was prepped and draped in the usual sterile fashion.   Local anesthesia used: yes  Anesthesia: local infiltration    Anesthesia:  Local anesthesia used: yes  Local Anesthetic: lidocaine 1% without epinephrine  Anesthetic total: 9 mL    Sedation:  Patient sedated: no    Preparation: skin prepped with ChloraPrep  Patient position: sitting  Ultrasound guidance: yes  Location: left posterior  Puncture method: over-the-needle catheter  Number of attempts: 1  Drainage amount: 1200 ml  Drainage characteristics: clear and serous  Patient tolerance: Patient tolerated the procedure well with no immediate complications  Chest x-ray performed: yes          Rob Inman MD  5/15/2019

## 2019-05-15 NOTE — PROGRESS NOTES
[address]   Daily Progress Note      Admit Date:  5/14/2019    Reason for initial consultation: Diastolic heart failure    CC: \"feel much better\"    Subjective:  Pt with no acute overnight events. Denies chest pain, palpitations, and lightheadedness. Had thoracentesis today and feels much better. Review of Systems:   · Constitutional: No unanticipated weight loss. There's been no change in energy level, sleep pattern, or activity level. No fevers, chills. · Eyes: No visual changes or diplopia. No scleral icterus. · ENT: No Headaches, hearing loss or vertigo. No mouth sores or sore throat. · Cardiovascular: as reviewed in HPI  · Respiratory: No cough or wheezing, no sputum production. No hematemesis. · Gastrointestinal: No abdominal pain, appetite loss, blood in stools. No change in bowel or bladder habits. · Genitourinary: No dysuria, trouble voiding, or hematuria. · Musculoskeletal:  No gait disturbance, no joint complaints. · Integumentary: No rash or pruritis. · Neurological: No headache, diplopia, change in muscle strength, numbness or tingling. · Psychiatric: No anxiety or depression. · Endocrine: No temperature intolerance. No excessive thirst, fluid intake, or urination. No tremor. · Hematologic/Lymphatic: No abnormal bruising or bleeding, blood clots or swollen lymph nodes. · Allergic/Immunologic: No nasal congestion or hives. Objective:   BP (!) 106/56   Pulse 74   Temp 97.7 °F (36.5 °C) (Oral)   Resp 22   Ht 5' 9\" (1.753 m)   Wt 273 lb 9.5 oz (124.1 kg)   SpO2 94%   BMI 40.40 kg/m²       Intake/Output Summary (Last 24 hours) at 5/15/2019 1423  Last data filed at 5/15/2019 0932  Gross per 24 hour   Intake 930 ml   Output 1400 ml   Net -470 ml     Wt Readings from Last 3 Encounters:   05/15/19 273 lb 9.5 oz (124.1 kg)   05/14/19 278 lb (126.1 kg)   04/04/19 283 lb (128.4 kg)       Physical Exam:  General:  NAD. Awake, alert, and oriented X4. Obese. Elderly. Skin:  Warm and dry. No new appearing rashes or lesions. Neck:  Supple. No JVP or bruit appreciated. Chest:  No distress. Diminished breath sounds at bases. Cardiovascular:  Regular rate. S1S2. No M/R/G. Abdomen:  Soft, nontender, +bowel sounds. Extremities:  1+ BLE edema. No clubbing or cyanosis. Medications:    vitamin D  5,000 Units Oral Daily    insulin lispro  0-12 Units Subcutaneous TID WC    insulin lispro  0-6 Units Subcutaneous Nightly    aspirin  81 mg Oral Nightly    doxazosin  8 mg Oral Daily    escitalopram  10 mg Oral Daily    isosorbide mononitrate  30 mg Oral BID    simvastatin  40 mg Oral Nightly    sodium chloride flush  10 mL Intravenous 2 times per day    lisinopril  5 mg Oral Daily    furosemide  40 mg Intravenous BID    mometasone-formoterol  2 puff Inhalation BID    enoxaparin  30 mg Subcutaneous Daily    labetalol  100 mg Oral 2 times per day      dextrose       ipratropium-albuterol, nitroGLYCERIN, sodium chloride flush, ondansetron, glucose, dextrose, glucagon (rDNA), dextrose    Lab Data:  CBC:   Recent Labs     19  1039 05/15/19  0511   WBC 4.7 4.9   HGB 11.8* 11.8*    257     BMP:    Recent Labs     19  1039 05/15/19  0511    142   K 4.1 4.3   CO2 41* 40*   BUN 20 24*   CREATININE 1.7* 1.8*     LIVR: No results for input(s): AST, ALT in the last 72 hours. INR:  No results for input(s): INR in the last 72 hours. APTT: No results for input(s): APTT in the last 72 hours. BNP:  No results for input(s): BNP in the last 72 hours. Cardiac Data      [id number]   Sinus rhythm with 1st degree A-V block. Left axis deviation. Low voltage QRS in precordial leads. Right bundle branch block.     Echo: 2019   Normal left ventricle size and systolic function with an estimated ejection   fraction of 55-60%. No regional wall motion abnormalities are seen.  There is   mildly increased left ventricular wall thickness.   The right ventricle appears mildly dilated with normal systolic function.   The left and right atria appear moderately dilated.   Mild mitral regurgitation.   Trivial tricuspid regurgitation.   The ascending aorta is mildly dilated measuring 3.6 cm.     Tele: sinus      Assessment and Plan   1) Chronic hypoxic and hypercarbic respiratory failure/bilateral pleural effusions. Pulmonary following and thoracentesis completed. Subjectively dyspnea improved.    2) Chronic diastolic heart failure. Continue attempts at diuresis and blood pressure control. Will continue IV Lasix today and transition to po tomorrow.    3) Elevated troponin. History is not consistent with ACS. Likely related to CKD and hypoxia. No plans for stress testing or angiography.    4) Essential hypertension. Controlled. Goal BP <130/80. Continue medical therapy. Changed home medications to labetalol BID (from Qday) and Imdur to daily (from BID).    5) CKD Stage III. Continue to monitor chronically with diuretics and ACE-I. If no acute overnight events, patient could be discharged tomorrow from cardiology perspective.      Electronically signed by Rafa Gonzales MD on 5/15/2019 at 2:23 PM

## 2019-05-15 NOTE — DISCHARGE INSTR - COC
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 D75.1    Elevated troponin R74.8    SOB (shortness of breath) R06.02    Chronic respiratory failure with hypoxia and hypercapnia (HCC) J96.11, J96.12    Orthopnea R06.01    Abnormal CXR R93.89    Chest pain R07.9    ROSENDO (obstructive sleep apnea) G47.33    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    Shortness of breath R06.02    Bilateral pleural effusion X07    Diastolic dysfunction L66.3    Elevated brain natriuretic peptide (BNP) level R79.89    Essential hypertension I10       Isolation/Infection:   Isolation          No Isolation            Nurse Assessment:  Last Vital Signs: BP (!) 106/56   Pulse 74   Temp 97.7 °F (36.5 °C) (Oral)   Resp 22   Ht 5' 9\" (1.753 m)   Wt 273 lb 9.5 oz (124.1 kg)   SpO2 94%   BMI 40.40 kg/m²     Last documented pain score (0-10 scale): Pain Level: 0  Last Weight:   Wt Readings from Last 1 Encounters:   05/15/19 273 lb 9.5 oz (124.1 kg)     Mental Status:  oriented and alert    IV Access:  - None    Nursing Mobility/ADLs:  Walking   Assisted  Transfer  Assisted  Bathing  Assisted  Dressing  Assisted  Toileting  Assisted  Feeding  Independent  Med Admin  Assisted  Med Delivery   whole    Wound Care Documentation and Therapy:        Elimination:  Continence:   · Bowel:  Yes  · Bladder: Yes  Urinary Catheter: None   Colostomy/Ileostomy/Ileal Conduit: No       Date of Last BM: 5/16    Intake/Output Summary (Last 24 hours) at 5/15/2019 1152  Last data filed at 5/15/2019 0932  Gross per 24 hour   Intake 930 ml   Output 1400 ml   Net -470 ml     I/O last 3 completed shifts: In: 450 [P.O.:450]  Out: 1200 [Urine:1200]    Safety Concerns: At Risk for Falls    Impairments/Disabilities:      [address] Impairments/Disabilities:[id number]}    Nutrition Therapy:  Current Nutrition Therapy:   LOW SALT DIET, FLUID RESTRICTION OF NO MORE THAN 64 OUNCES A DAY. - Oral Diet:  Cardiac    Routes of Feeding: Oral  Liquids: Thin Liquids  Daily Fluid Restriction: yes - amount 1500  Last Modified Barium Swallow with Video (Video Swallowing Test): not done    Treatments at the Time of Hospital Discharge:   Respiratory Treatments:   Oxygen Therapy:  is on oxygen at 5 L/min per nasal cannula. Ventilator:    - No ventilator support    Rehab Therapies: Physical Therapy and Occupational Therapy  Weight Bearing Status/Restrictions: No weight bearing restirctions  Other Medical Equipment (for information only, NOT a DME order):  walker  Other Treatments: ***    HEART FAILURE:  Pt admitted from pulmonary office visit. He fell and was SOB. Pt with persistent pleural effusions. He was tapped for 1200 cc. He will need continued HF education please. Please weigh pt DAILY (same scale, same time of day). Please report weight gain of 3 lbs overnight or 5 lbs in a week. Pt follows with Dr Jesse Cohen for [place], [address] and Dr Melissa Reyna for PCP. Please use hospital scale as reference for dry weight. Please monitor for S&S of HF: Sudden wt gain, SOB, LE edema, abdominal distention, inability to lie flat, cough (especially at night). Report these symptoms. Continue HF education and chronic disease management. Follow low salt diet, do not force fluids. Keep total fluids < 48 to 64 ounces a day. See AVS for follow up appts. [place] HF Resource line # [phone] (VM checked Mon-Fri during business hours. Thank you.         Patient's personal belongings (please select all that are sent with patient):  Simon    RN SIGNATURE:  Electronically signed by Wander Cabello RN on 5/16/19 at 3:13 PM    CASE MANAGEMENT/SOCIAL WORK SECTION    Inpatient Status Date: ***    Readmission Risk Assessment Score:  Readmission Risk              Risk of Unplanned Readmission:        19           Discharging to Facility/ Agency   · [facility]  · Address:  · Phone:  · Fax:    Dialysis Facility (if applicable)   · Name:  · Address:  · Dialysis Schedule:  · Phone:  · Fax:    / signature:  Electronically signed by KAYLYNN Crews, JHONNYW on 5/15/19 at 11:52 AM      PHYSICIAN SECTION    Prognosis: Good    Condition at Discharge: Stable    Rehab Potential (if transferring to Rehab): Good    Recommended Labs or Other Treatments After Discharge: OTPT/nurse visits    Physician Certification: I certify the above information and transfer of Antonio Pratt  is necessary for the continuing treatment of the diagnosis listed and that he requires Home Care for less 30 days.      Update Admission H&P: {CHP DME Changes in NSDU:[id number]}    PHYSICIAN SIGNATURE:  Electronically signed by Louise Fleming MD on 5/16/19 at 2:53 PM

## 2019-05-15 NOTE — CONSULTS
[address] [facility], [place]  HEART FAILURE PROGRAM      NAME:  Chepe Friends  MEDICAL RECORD NUMBER:  [id number]  AGE: 81 y.o. GENDER: male  : 1935  TODAY'S DATE:  5/15/2019    Subjective:     VISIT TYPE: evaluation     ADMITTING PHYSICIAN:  Milagros Vazquez MD    PAST MEDICAL HISTORY        Diagnosis Date    [facility] of [facility]     [facility]est[facility]     CKD (chronic kidney disease) stage 3, GFR 30-59 ml/min ([facility])     COPD (chronic obstructive pulmonary disease) [facility]     CVA, old, ataxia     DM type 2 with diabetic peripheral neuropathy ([facility] [facility] 10/23/2014    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     Fall 2015    Fatty liver     Hypertension     Mixed hyperlipidemia     Morbid obesity ([facility]     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 ([facility]     Obesity, diabetes, and hypertension syndrome ([facility]     Psoriasis     Risk for falls     Type 2 diabetes mellitus with microalbuminuria or microproteinuria     Vitamin D deficiency        SOCIAL HISTORY    Social History     Tobacco Use    Smoking status: Former Smoker     Packs/day: 2.00     Years: 50.00     Pack years: 100.00     Types: Cigarettes     Start date: 1950     Last attempt to quit: 2006     Years since quittin.4    Smokeless tobacco: Never Used    Tobacco comment: don't resume cigs   Substance Use Topics    Alcohol use:  Yes     Alcohol/week: 4.2 oz     Types: 7 Standard drinks or equivalent per week     Comment: occas    Drug use: No       ALLERGIES    No Known Allergies    MEDICATIONS  Scheduled Meds:   vitamin D  5,000 Units Oral Daily    [START ON 2019] isosorbide mononitrate  30 mg Oral Daily    gabapentin  400 mg Oral TID    [START ON 2019] doxazosin  4 mg Oral Daily    [START ON 2019] furosemide  80 mg Oral BID    insulin lispro  0-12 Units Subcutaneous TID WC    insulin lispro  0-6 Units Subcutaneous Nightly    aspirin  81 mg Oral Nightly    escitalopram  10 mg Oral Daily    simvastatin  40 mg Oral Nightly    sodium chloride flush  10 mL Intravenous 2 times per day    lisinopril  5 mg Oral Daily    furosemide  40 mg Intravenous BID    mometasone-formoterol  2 puff Inhalation BID    enoxaparin  30 mg Subcutaneous Daily    labetalol  100 mg Oral 2 times per day       ADMIT DATE: 5/14/2019      Objective:     ADMISSION DIAGNOSIS:   Shortness of breath [R06.02]     /63   Pulse 73   Temp 98.2 °F (36.8 °C) (Oral)   Resp 18   Ht 5' 9\" (1.753 m)   Wt 273 lb 9.5 oz (124.1 kg)   SpO2 93%   BMI 40.40 kg/m²     ADMIT:  Weight: 279 lb 5.2 oz (126.7 kg)    TODAY: Weight: 273 lb 9.5 oz (124.1 kg)    Wt Readings from Last 25 Encounters:   05/15/19 273 lb 9.5 oz (124.1 kg)   05/14/19 278 lb (126.1 kg)   04/04/19 283 lb (128.4 kg)   12/19/18 284 lb (128.8 kg)   11/27/18 283 lb (128.4 kg)   06/22/18 274 lb (124.3 kg)   06/21/18 272 lb (123.4 kg)   06/18/18 277 lb 1.9 oz (125.7 kg)   06/01/18 265 lb (120.2 kg)   03/16/18 261 lb (118.4 kg)   03/09/18 266 lb 1.5 oz (120.7 kg)   12/01/17 273 lb (123.8 kg)   08/30/17 265 lb 3.2 oz (120.3 kg)   08/16/17 268 lb (121.6 kg)   05/30/17 261 lb 3.2 oz (118.5 kg)   04/04/17 271 lb 3.2 oz (123 kg)   02/28/17 270 lb 3.2 oz (122.6 kg)   11/30/16 267 lb 6.4 oz (121.3 kg)   11/22/16 271 lb 6.4 oz (123.1 kg)   09/19/16 259 lb (117.5 kg)   09/01/16 257 lb 15 oz (117 kg)   08/09/16 262 lb 12.8 oz (119.2 kg)   05/09/16 263 lb 6.4 oz (119.5 kg)   02/19/16 258 lb (117 kg)   02/09/16 258 lb (117 kg)          Intake/Output Summary (Last 24 hours) at 5/15/2019 1652  Last data filed at 5/15/2019 0932  Gross per 24 hour   Intake 930 ml   Output 1175 ml   Net -245 ml       LABS  BMP:   Lab Results   Component Value Date     05/15/2019    K 4.3 05/15/2019    K 4.1 05/14/2019    CL 92 05/15/2019    CO2 40 05/15/2019    BUN 24 05/15/2019    LABALBU 3.9 04/04/2019    CREATININE 1.8 05/15/2019    CALCIUM 9.4 05/15/2019    GFRAA 44 05/15/2019    GFRAA >60 04/15/2013    LABGLOM 36 05/15/2019    GLUCOSE 119 05/15/2019     CBC:   Recent Labs     05/14/19  1039 05/15/19  0511   WBC 4.7 4.9   HGB 11.8* 11.8*   HCT 37.0* 35.6*   MCV 89.2 88.3    257     BNP:   Lab Results   Component Value Date    .0 04/15/2013       ECHOCARDIOGRAM: 05/15/2019   Summary   Normal left ventricle size and systolic function with an estimated ejection   fraction of 55-60%. No regional wall motion abnormalities are seen. There is   mildly increased left ventricular wall thickness.   The right ventricle appears mildly dilated with normal systolic function.   The left and right atria appear moderately dilated.   Mild mitral regurgitation.   Trivial tricuspid regurgitation.   The ascending aorta is mildly dilated measuring 3.6 cm. Assessment:     CONSULTS:   IP CONSULT TO CARDIOLOGY  IP CONSULT TO HEART FAILURE NURSE/COORDINATOR  IP CONSULT TO DIETITIAN  IP CONSULT TO PULMONOLOGY  IP CONSULT TO Sb    Patient has a CARDIOLOGY CONSULT: Yes      Patient taking an ACEI/ARB:  Yes       Patient taking a BETA BLOCKER:  Yes    SCALE AVAILABLE:  Yes     EDUCATION STATUS: Patient -- no one else present at this time  [x]  Provided both written and verbal education on Heart Failure signs/symptoms. [x]  Provided instructions on daily medications. [x]  Provided instructions to monitor and record weight daily. [x]  Provided instructions to call if weight increases 3 lbs in one day or 5 lbs in one week. [x]  Received verbal acknowledgment/understanding of Heart Failure related causes. [x]  Provided instructions on how to maintain a low sodium diet. []  Provided recommendations for smoking cessation programs  [x]  Provided recommendations on activity and exercise    [x]  Other: HF RN consult received from Dr Lizbet Zuniga as part of HF order set. Chart reviewed. Patient is known to HF RN services from prior encounters.   This writer right\".          Electronically signed by Gaby Delaney RN, BSN CHFN  on 5/15/2019 at 4:52 PM

## 2019-05-15 NOTE — CONSULTS
Nutrition Education    Type and Reason for Visit: Consult, Patient Education    Consult for diet education \"heart failure diet guidelines\". Pt lives with wife who does all of the cooking and shopping. She does not cook very much at home, and they mostly utilize convenience foods. Pt does not add salt to foods but they are mostly eating sandwiches, frozen meals, and fast food. Provided verbal and written instruction on heart failure nutrition therapy. Topics included amount of sodium/fluid recommended, high sodium foods, restaurant dining. Pt was polite but did not seem very interested in education. Wife was not at bedside during visit, left contact information and encouraged her to call with any questions. · Verbally reviewed following information with Patient: CHF Nutrition Therapy  · Written educational materials provided. · Contact name and number provided. · Refer to Patient Education activity for more details.     Electronically signed by Samantha Mcmahan RD, PETER on 5/15/19 at 2:56 PM    Contact Number: [phone]

## 2019-05-15 NOTE — CONSULTS
REASON FOR CONSULTATION/CC: hypoxia, pleural effusion      Consult at request of Gideon Hardwick MD     PCP: Negrito Michel MD  Established Pulmonologist: Evan Rascon    Chief Complaint   Patient presents with    Shortness of Breath    Fall       HISTORY OF PRESENT ILLNESS: Yadira Kaur is a 81y.o. year old male with a history of Copd, ROSENDO, Chronic hypoxemia who presents with acute hypoxic event in Pulmonary office yesterday. HE had syncopal episode found to have low o2 sats and sent to ER for further evaluation. He was admitted and CT chest showed b/l effusions L>R. He reports baseline dypsnea and LE edema that are largely stable. He has had multiple falls. During my visit pt states he feels is breathign is improved from yesterday. Symptoms started suddenly. Denies associated chest pain or cough. 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 Social Hx   reports that he quit smoking about 12 years ago. His smoking use included cigarettes. He started smoking about 68 years ago. He has a 100.00 pack-year smoking history. He has never used smokeless tobacco.    Scheduled Meds:   vitamin D  5,000 Units Oral Daily    insulin lispro  0-12 Units Subcutaneous TID WC    insulin lispro  0-6 Units Subcutaneous Nightly    aspirin  81 mg Oral Nightly    doxazosin  8 mg Oral Daily    escitalopram  10 mg Oral Daily    isosorbide mononitrate  30 mg Oral BID    simvastatin  40 mg Oral Nightly    sodium chloride flush  10 mL Intravenous 2 times per day    lisinopril  5 mg Oral Daily    furosemide  40 mg Intravenous BID    mometasone-formoterol  2 puff Inhalation BID    enoxaparin  30 mg Subcutaneous Daily    labetalol  100 mg Oral 2 times per day       Continuous Infusions:   dextrose         PRN Meds:  ipratropium-albuterol, nitroGLYCERIN, sodium chloride flush, ondansetron, glucose, dextrose, glucagon (rDNA), dextrose    ALLERGIES:  Patient has No Known Allergies. REVIEW OF SYSTEMS:  Constitutional: Negative for fever  HENT: +sore throat  Eyes: Negative for redness   Respiratory: +dyspnea, +cough  Cardiovascular: Negative for chest pain  Gastrointestinal: Negative for vomiting, diarrhea   Genitourinary: Negative for hematuria   Musculoskeletal: Negative for arthralgias   Skin: Negative for rash  Neurological: Negative for syncope  Hematological: Negative for adenopathy  Psychiatric/Behavorial: Negative for anxiety    Objective:   PHYSICAL EXAM:  Blood pressure 138/67, pulse 78, temperature 98.2 °F (36.8 °C), temperature source Oral, resp. rate 22, height 5' 9\" (1.753 m), weight 273 lb 9.5 oz (124.1 kg), SpO2 92 %.'  Gen:  No acute distress. Eyes: PERRL. Anicteric sclera. No conjunctival injection. ENT: No discharge. Posterior oropharynx clear. External appearance of ears and nose normal.  Neck: Trachea midline. No mass   Resp:  diminshed bs at bases. No wheezes. No rhonchi. No crackles. CV: Regular rate. Regular rhythm. No murmur or rub. No edema. GI: Soft, Non-tender. Non-distended. +BS  Skin: Warm, dry, w/o erythema. Lymph: No cervical or supraclavicular LAD. M/S: No cyanosis. No clubbing. Neuro: no focal neurologic deficit. Unsteady gait. Moves all extremities  Psych: Awake and alert, Oriented x 3. Judgement and insight appropriate. Mood stable. Data Reviewed:   LABS:  CBC:   Recent Labs     05/14/19  1039 05/15/19  0511   WBC 4.7 4.9   HGB 11.8* 11.8*   HCT 37.0* 35.6*   MCV 89.2 88.3    257     BMP:   Recent Labs     05/14/19  1039 05/15/19  0511    142   K 4.1 4.3   CL 93* 92*   CO2 41* 40*   BUN 20 24*   CREATININE 1.7* 1.8*     LIVER PROFILE: No results for input(s): AST, ALT, LIPASE, BILIDIR, BILITOT, ALKPHOS in the last 72 hours. Invalid input(s): AMYLASE,  ALB  PT/INR: No results for input(s): PROTIME, INR in the last 72 hours. APTT: No results for input(s): APTT in the last 72 hours. UA:  Recent Labs     05/14/19  1606   COLORU YELLOW   PHUR 6.5   WBCUA 2   RBCUA 3-5*   CLARITYU Clear   SPECGRAV 1.010   LEUKOCYTESUR Negative   UROBILINOGEN 0.2   BILIRUBINUR Negative   BLOODU Negative   GLUCOSEU Negative     No results for input(s): PHART, OZV4FWG, PO2ART in the last 72 hours. Vent Information  Skin Assessment: Clean, dry, & intact    Radiology Review:  Pertinent images / reports were reviewed as a part of this visit. CT Chest w/ contrast: No results found for this or any previous visit. CT Chest w/o contrast:   Results for orders placed during the hospital encounter of 05/14/19   CT CHEST WO CONTRAST    Narrative EXAMINATION:  CT OF THE CHEST WITHOUT CONTRAST 5/14/2019 10:52 am    TECHNIQUE:  CT of the chest was performed without the administration of intravenous  contrast. Multiplanar reformatted images are provided for review. Dose  modulation, iterative reconstruction, and/or weight based adjustment of the  mA/kV was utilized to reduce the radiation dose to as low as reasonably  achievable.     COMPARISON:  08/07/2017 CT    HISTORY:  ORDERING SYSTEM PROVIDED HISTORY: SOB  TECHNOLOGIST PROVIDED HISTORY:  Ordering Physician Provided Reason for Exam: sob-hx copd ,resp failure & chf  Acuity: Acute  Type of Exam: Initial    FINDINGS:  Mediastinum: The heart is enlarged. Mild atherosclerotic calcification of  the coronary arteries and aorta. The aorta and pulmonary arteries are normal  in caliber. Stable enlargement and heterogeneity of the thyroid gland. No  discrete nodule. No mediastinal or hilar lymph node enlargement. The  esophagus demonstrates a small hiatal hernia. Lungs/pleura: Large left and moderate right pleural effusions have developed. There is near complete atelectasis of the left lower lobe with moderate  atelectasis of the lingula and right lower lobe. The lungs demonstrate  underlying centrilobular emphysema. Diffuse mild ground-glass opacities in  the remaining aerated lungs. No pulmonary mass or nodule. Upper Abdomen: Adrenal glands are normal.  No significant findings in the  visualized upper abdomen. Soft Tissues/Bones: No skeletal abnormalities are appreciated within the  chest.      Impression 1. Large left and moderate right pleural effusions with associated  atelectasis bilaterally, most notable in the left lower lobe. Pattern may  represent pulmonary edema in this patient with CHF and cardiomegaly. 2. Mild ground-glass attenuation in the remaining aerated lungs suggests  pulmonary edema as well. 3. Recommend follow-up imaging after a course of therapy to ensure  improvement. A central obstructing process would be considered less likely  but can not be excluded. 4. Enlarged, heterogeneous thyroid. Outpatient follow-up ultrasound is  recommended for further evaluation. CTPA: No results found for this or any previous visit.     CXR PA/LAT:   Results for orders placed during the hospital encounter of 03/09/18   XR CHEST STANDARD (2 VW)    Narrative EXAMINATION:  TWO VIEWS OF THE CHEST    3/6/2018 8:06 translational errors.     Thank you for the consult    [address]  Pulmonary, Sleep and Critical Care Medicine

## 2019-05-15 NOTE — PROGRESS NOTES
Hospitalist Progress Note  5/15/2019 3:02 PM    PCP: Shireen Barrientos MD    [phone]     Date of Admission: 5/14/2019                                                                                                                     HOSPITAL COURSE    Patient demographics:  The patient  Janene Jay is a 81 y.o. male     Significant past medical history:   Patient Active Problem List   Diagnosis    COPD, moderate ([address]    CHF (congestive heart failure) ([address]    Psoriasis    Mixed hyperlipidemia    Vitamin D deficiency    Urinary frequency    Fatty liver    DM type 2 with diabetic peripheral neuropathy ([address]    Stage 3 chronic kidney disease ([address]    CVA, old, ataxia    Atherosclerosis of aorta ([address]    Type 2 diabetes mellitus with microalbuminuria or microproteinuria    Morbid obesity ([address]    Obesity, diabetes, and hypertension syndrome ([address]    Hypoxemia    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    Elevated troponin    SOB (shortness of breath)    Chronic respiratory failure with hypoxia and hypercapnia (HCC)    Orthopnea    Abnormal CXR    Chest pain    ROSENDO (obstructive sleep apnea)    Chronic vasomotor rhinitis    Shortness of breath    Bilateral pleural effusion    Diastolic dysfunction    Elevated brain natriuretic peptide (BNP) level    Essential hypertension         Presenting symptoms:  SOB and fall        Diagnostic workup:      CONSULTS DURING ADMISSION :   IP CONSULT TO CARDIOLOGY  IP CONSULT TO HEART FAILURE NURSE/COORDINATOR  IP CONSULT TO DIETITIAN  IP CONSULT TO PULMONOLOGY  IP CONSULT TO SPIRITUAL SERVICES      Patient was diagnosed with:        Treatment while inpatient:  Patient presented to the emergency room with shortness of breath. Patient was diagnosed with acute on chronic diastolic CHF with pleural effusion. Pulmonary was consulted and patient underwent a 1200 cc bedside thoracentesis. ----------------------------------------------------------      SUBJECTIVE COMPLAINTS- follow-up for shortness of breath    Diet: DIET CARDIAC;      OBJECTIVE:   Patient Active Problem List   Diagnosis    COPD, moderate ([facility]    [facility]) [facility]    Psoriasis    Mixed hyperlipidemia    Vitamin D deficiency    Urinary frequency    Fatty liver    DM type 2 with diabetic peripheral neuropathy ([facility])    Stage 3 chronic kidney disease ([facility]    CVA, old, ataxia    Atherosclerosis of aorta ([facility])    Type 2 diabetes mellitus with microalbuminuria or microproteinuria    Morbid obesity ([facility]    Obesity, diabetes, and hypertension syndrome ([facility])    Hypoxemia    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     Medications    Scheduled Meds:   vitamin D  5,000 Units Oral Daily    [START ON 5/16/2019] isosorbide mononitrate  30 mg Oral Daily    insulin lispro  0-12 Units Subcutaneous TID WC    insulin lispro  0-6 Units Subcutaneous Nightly    aspirin  81 mg Oral Nightly    doxazosin  8 mg Oral Daily    escitalopram  10 mg Oral Daily    simvastatin  40 mg Oral Nightly    sodium chloride flush  10 mL Intravenous 2 times per day    lisinopril  5 mg Oral Daily    furosemide  40 mg Intravenous BID    mometasone-formoterol  2 puff Inhalation BID    enoxaparin  30 mg Subcutaneous Daily    labetalol  100 mg Oral 2 times per day     Continuous Infusions:   dextrose       PRN Meds:  ipratropium-albuterol, nitroGLYCERIN, sodium chloride flush, ondansetron, glucose, dextrose, glucagon (rDNA), dextrose    Vitals   Vitals /wt   Patient Vitals for the past 8 hrs:   BP Temp Temp src Pulse Resp SpO2   05/15/19 1147 (!) 106/56 97.7 °F (36.5 °C) Oral 74 22 94 %   05/15/19 0853 -- -- -- -- -- 92 %   05/15/19 0732 138/67 98.2 °F (36.8 °C) Oral 78 22 92 %        72HR INTAKE/OUTPUT:      Intake/Output Summary (Last 24 hours) at 5/15/2019 1502  Last data filed at 5/15/2019 0932  Gross per 24 hour   Intake 930 ml   Output 1400 ml   Net -470 ml       Exam:    Gen:   Alert and oriented ×3   Eyes: PERRL. No sclera icterus. No conjunctival injection. ENT: No discharge. Pharynx clear. External appearance of ears and nose normal.  Neck: Trachea midline. No obvious mass. Resp: decreased breath sounds bilaterally  CV: Regular rate. Regular rhythm. No murmur or rub. No edema. GI: Non-tender. Non-distended. No hernia. Skin: Warm, dry, normal texture and turgor. Lymph: No cervical LAD. No supraclavicular LAD. M/S: / Ext. No cyanosis. No clubbing. No joint deformity. Neuro: CN 2-12 are intact,  no neurologic deficits noted. PT/INR: No results for input(s): PROTIME, INR in the last 72 hours. APTT: No results for input(s): APTT in the last 72 hours. CBC:   Recent Labs     05/14/19  1039 05/15/19  0511   WBC 4.7 4.9   HGB 11.8* 11.8*   HCT 37.0* 35.6*   MCV 89.2 88.3    257       BMP:   Recent Labs     05/14/19  1039 05/15/19  0511    142   K 4.1 4.3   CL 93* 92*   CO2 41* 40*   BUN 20 24*   CREATININE 1.7* 1.8*       LIVER PROFILE: No results for input(s): ALKPHOS, AST, ALT, ALB, BILIDIR, BILITOT, ALKPHOS in the last 72 hours. No results for input(s): AMYLASE in the last 72 hours. No results for input(s): LIPASE in the last 72 hours.     UA:  Recent Labs     05/14/19  1606   WBCUA 2   RBCUA 3-5*       TROPONIN:   Recent Labs     05/14/19  1039   TROPONINI 0.07*       Lab Results   Component Value Date/Time    URRFLXCULT Not Indicated 05/14/2019 04:06 PM       No results for input(s): TSHREFLEX in the last 72 hours. No components found for: [id number]  POC GLUCOSE:    Recent Labs     05/14/19  1349 05/14/19  1620 05/14/19  2036 05/15/19  0720 05/15/19  1129   POCGLU 137* 128* 150* 101* 111*     Recent Labs     05/15/19  0511   LABA1C 6.0      Lab Results   Component Value Date    LABA1C 6.0 05/15/2019         ASSESSMENT AND PLAN    Acute on chronic diastolic CHF  Continue on diuretics     COPD exacerbation  Likely due to CHF and pleural effusion   continue on bronchodilators      Pleural effusion  Bilateral pleural effusion left more than the right  Likely due to CHF  Pulmonary consult is appreciated  Status post bedside thoracentesis of 1200 cc clear fluid     Essential primary hypertension I10  Continue patient on home medication     Type 2 diabetes mellitus E11.9  Medium sliding scale      Code Status: Full Code        Dispo - continue care        The patient and / or the family were informed of the results of any tests, a time was given to answer questions, a plan was proposed and they agreed with plan. Jade Berry MD    This note was transcribed using [id number] [employer]. Please disregard any translational errors.

## 2019-05-16 VITALS
OXYGEN SATURATION: 93 % | RESPIRATION RATE: 18 BRPM | BODY MASS INDEX: 40.65 KG/M2 | DIASTOLIC BLOOD PRESSURE: 64 MMHG | WEIGHT: 274.47 LBS | TEMPERATURE: 98.4 F | HEIGHT: 69 IN | SYSTOLIC BLOOD PRESSURE: 124 MMHG | HEART RATE: 85 BPM

## 2019-05-16 LAB
ANION GAP SERPL CALCULATED.3IONS-SCNC: 11 MMOL/L (ref 3–16)
BUN BLDV-MCNC: 30 MG/DL (ref 7–20)
CALCIUM SERPL-MCNC: 9.2 MG/DL (ref 8.3–10.6)
CHLORIDE BLD-SCNC: 90 MMOL/L (ref 99–110)
CO2: 40 MMOL/L (ref 21–32)
CREAT SERPL-MCNC: 1.8 MG/DL (ref 0.8–1.3)
EKG ATRIAL RATE: 71 BPM
EKG DIAGNOSIS: NORMAL
EKG P AXIS: 111 DEGREES
EKG P-R INTERVAL: 216 MS
EKG Q-T INTERVAL: 468 MS
EKG QRS DURATION: 142 MS
EKG QTC CALCULATION (BAZETT): 508 MS
EKG R AXIS: -39 DEGREES
EKG T AXIS: -3 DEGREES
EKG VENTRICULAR RATE: 71 BPM
GFR AFRICAN AMERICAN: 44
GFR NON-AFRICAN AMERICAN: 36
GLUCOSE BLD-MCNC: 118 MG/DL (ref 70–99)
GLUCOSE BLD-MCNC: 86 MG/DL (ref 70–99)
GLUCOSE BLD-MCNC: 95 MG/DL (ref 70–99)
HCT VFR BLD CALC: 35.9 % (ref 40.5–52.5)
HEMOGLOBIN: 11.7 G/DL (ref 13.5–17.5)
MCH RBC QN AUTO: 28.9 PG (ref 26–34)
MCHC RBC AUTO-ENTMCNC: 32.5 G/DL (ref 31–36)
MCV RBC AUTO: 89 FL (ref 80–100)
PDW BLD-RTO: 15 % (ref 12.4–15.4)
PERFORMED ON: ABNORMAL
PERFORMED ON: NORMAL
PLATELET # BLD: 224 K/UL (ref 135–450)
PMV BLD AUTO: 7.8 FL (ref 5–10.5)
POTASSIUM SERPL-SCNC: 3.8 MMOL/L (ref 3.5–5.1)
RBC # BLD: 4.04 M/UL (ref 4.2–5.9)
SODIUM BLD-SCNC: 141 MMOL/L (ref 136–145)
WBC # BLD: 4.4 K/UL (ref 4–11)

## 2019-05-16 PROCEDURE — 6370000000 HC RX 637 (ALT 250 FOR IP): Performed by: INTERNAL MEDICINE

## 2019-05-16 PROCEDURE — 97116 GAIT TRAINING THERAPY: CPT | Performed by: PHYSICAL THERAPIST

## 2019-05-16 PROCEDURE — 97165 OT EVAL LOW COMPLEX 30 MIN: CPT

## 2019-05-16 PROCEDURE — 97162 PT EVAL MOD COMPLEX 30 MIN: CPT | Performed by: PHYSICAL THERAPIST

## 2019-05-16 PROCEDURE — 94640 AIRWAY INHALATION TREATMENT: CPT

## 2019-05-16 PROCEDURE — 94761 N-INVAS EAR/PLS OXIMETRY MLT: CPT

## 2019-05-16 PROCEDURE — 2700000000 HC OXYGEN THERAPY PER DAY

## 2019-05-16 PROCEDURE — 80048 BASIC METABOLIC PNL TOTAL CA: CPT

## 2019-05-16 PROCEDURE — 97530 THERAPEUTIC ACTIVITIES: CPT | Performed by: PHYSICAL THERAPIST

## 2019-05-16 PROCEDURE — 85027 COMPLETE CBC AUTOMATED: CPT

## 2019-05-16 PROCEDURE — 99232 SBSQ HOSP IP/OBS MODERATE 35: CPT | Performed by: INTERNAL MEDICINE

## 2019-05-16 PROCEDURE — 97530 THERAPEUTIC ACTIVITIES: CPT

## 2019-05-16 PROCEDURE — 6370000000 HC RX 637 (ALT 250 FOR IP): Performed by: HOSPITALIST

## 2019-05-16 PROCEDURE — 36415 COLL VENOUS BLD VENIPUNCTURE: CPT

## 2019-05-16 RX ORDER — FUROSEMIDE 80 MG
80 TABLET ORAL 2 TIMES DAILY
Qty: 60 TABLET | Refills: 3 | Status: SHIPPED | OUTPATIENT
Start: 2019-05-16 | End: 2019-12-06 | Stop reason: SDUPTHER

## 2019-05-16 RX ORDER — LABETALOL 100 MG/1
100 TABLET, FILM COATED ORAL EVERY 12 HOURS SCHEDULED
Qty: 60 TABLET | Refills: 3 | Status: SHIPPED | OUTPATIENT
Start: 2019-05-16 | End: 2019-12-06 | Stop reason: SDUPTHER

## 2019-05-16 RX ADMIN — LISINOPRIL 5 MG: 5 TABLET ORAL at 08:01

## 2019-05-16 RX ADMIN — ISOSORBIDE MONONITRATE 30 MG: 30 TABLET, EXTENDED RELEASE ORAL at 08:01

## 2019-05-16 RX ADMIN — GABAPENTIN 400 MG: 400 CAPSULE ORAL at 08:01

## 2019-05-16 RX ADMIN — GABAPENTIN 400 MG: 400 CAPSULE ORAL at 13:13

## 2019-05-16 RX ADMIN — ESCITALOPRAM OXALATE 10 MG: 10 TABLET ORAL at 08:01

## 2019-05-16 RX ADMIN — LABETALOL HYDROCHLORIDE 100 MG: 100 TABLET, FILM COATED ORAL at 08:02

## 2019-05-16 RX ADMIN — CHOLECALCIFEROL CAP 125 MCG (5000 UNIT) 5000 UNITS: 125 CAP at 08:02

## 2019-05-16 RX ADMIN — FUROSEMIDE 80 MG: 40 TABLET ORAL at 08:01

## 2019-05-16 RX ADMIN — MOMETASONE FUROATE AND FORMOTEROL FUMARATE DIHYDRATE 2 PUFF: 200; 5 AEROSOL RESPIRATORY (INHALATION) at 07:51

## 2019-05-16 RX ADMIN — DOXAZOSIN 4 MG: 4 TABLET ORAL at 08:02

## 2019-05-16 ASSESSMENT — PAIN SCALES - GENERAL: PAINLEVEL_OUTOF10: 0

## 2019-05-16 NOTE — ASSESSMENT & PLAN NOTE
Worsening hypoxemia. Last I saw him, he was on 3L and controlled. Now requiring > 6 but was using 5L at home. I advised him to got the ER and will likely be admitted.      He states this started after dx of pneumonia

## 2019-05-16 NOTE — CARE COORDINATION
Dc order acknowledged. Sw met with patient/wife. Plan is for patient to return home with 24hr assist/supervision from his wife. Patient does not want any home therapy services. Patient has a RN who visits 1x a month or more if needed, this is through his insurance and does not need order. Patient's wife to take him home. They expressed no needs at this time.      Maria Alejandra Winn, [address] [facility]  [phone]  5/16/19 at 4:03 PM

## 2019-05-16 NOTE — ASSESSMENT & PLAN NOTE
On [facility] . Worsening shortness of breath and hypoxemia. No wheezing on exam.  BS distant secondary to obesity. He was sent to the ER secondary to fall hitting head and hypoxemia.

## 2019-05-16 NOTE — PROGRESS NOTES
Occupational Therapy  Attempt Note    Name: Elena Canales  : 1935  MRN: [id number]  Room: [facility]  Date: 2019     OT orders received and chart reviewed. Pt eating lunch at this time and receiving education from nursing. Will re-attempt as schedule allows.     Electronically signed by JARET Lacy/L #[id number] on 2019 at 1:07 PM

## 2019-05-16 NOTE — PROGRESS NOTES
Occupational Therapy   Occupational Therapy Initial Assessment  Date: 2019   Patient Name: Disha Chavez  MRN: [id number]     : 1935    Date of Service: 2019    Discharge Recommendations:  24 hour supervision or assist, Home with Home health OT, S Level 1     Disha Chavez scored a 17/24 on the AM-PAC ADL Inpatient form. Based on the patients AM-PAC score and their current ADL deficits, it is recommended that the patient have 2-3 sessions per week of Occupational Therapy at d/c to increase the patients independence. HOME HEALTH CARE: LEVEL 1 STANDARD     -Initial home health evaluation to occur within 24-48 hours, in patient home    -PT/OT to evaluate with goal of regaining prior level of functioning    -OT to evaluate if patient has [address] needs for personal care     Assessment   Performance deficits / Impairments: Decreased functional mobility ; Decreased endurance;Decreased ADL status; Decreased safe awareness;Decreased balance  Assessment: Pt is a 81 y.o. M admitted with SOB and fall at home. PTA, pt was living with his wife, recieving some assistance for ADLs and wife completes most IADLs. Pt ambulates with a RW, reports ~1 fall per week at home as of late. Pt functioning below baseline this date, requiring close SBA for grooming standing at sink, CGA for fxl transfers. Anticipate pt to require overall min/mod a for self-care tasks. Pt limited this date by fatigue and unsteadiness during fxl transfers. Pt would benefit from continued OT services while on acute to increase endurance to complete daily tasks. Recommend pt return home with 24/7 supervision and [facility] OT.     Prognosis: Fair  Decision Making: Low Complexity  Exam: see above  Assistance / Modification: Anticipate pt to require overall min/mod a for self-care tasks  Patient Education: role of OT, POC, safe transfers  REQUIRES OT FOLLOW UP: Yes  Activity Tolerance  Activity Tolerance: Patient limited by fatigue  Safety but pt does not go to basement)  Home Access: Stairs to enter without rails(1+1)  Bathroom Shower/Tub: Shower chair with back, Walk-in shower, Doors  Bathroom Equipment: Grab bars in shower  Bathroom Accessibility: Walker accessible  Home Equipment: [facility], 4 wheeled walker, [employer] chair and standard w/c)  ADL Assistance: Needs assistance  Homemaking Responsibilities: No  Ambulation Assistance: Independent(with RW)  Transfer Assistance: Independent  Active : No  Additional Comments: above information per pt report. Pt wears 5L O2 NC at all times. Pt reports ~ 1 fall per week       Objective   Vision: Impaired  Vision Exceptions: Wears glasses at all times  Hearing: Within functional limits(slightly [place])    Orientation  Overall Orientation Status: Within Normal Limits     Balance  Sitting Balance: Stand by assistance(EOB)  Standing Balance: Stand by assistance(SBA)  Functional Mobility  Functional - Mobility Device: Rolling Walker  Activity: To/from bathroom  Assist Level: Contact guard assistance  Functional Mobility Comments: CGA/close SBA for ambulation to/from bathroom with RW.  Pt with slight unsteadiness but no LOB, fatigued easily  ADL  Grooming: Stand by assistance(close SBA standing at sink)  Additional Comments: Anticipate pt to require set up for feeding, min A for bathing, min A for dressing, and min A for toileting based on strength, endurance, ROM this session        Bed mobility  Supine to Sit: Unable to assess  Sit to Supine: Stand by assistance(slow and effortful)  Transfers  Sit to stand: Contact guard assistance(CGA and 2 trials to stand with RW)  Stand to sit: Contact guard assistance  Transfer Comments: Pt with some unsteadiness during transfers     Cognition  Overall Cognitive Status: WFL  Cognition Comment: some decreased insight and lack of safety awareness        Sensation  Overall Sensation Status: Impaired(reports tingling in distal R fingers, states this is normal)        LUE PROM (degrees)  LUE PROM: WFL  LUE AROM (degrees)  LUE AROM : Exceptions  LUE General AROM: grossly 50 degrees active shoulder flexion (pt reports this is from a fall ~4 years ago but states doctors did not find a fracture or make any repair)  Left Hand AROM (degrees)  Left Hand AROM: WFL  RUE AROM (degrees)  RUE AROM : WFL  Right Hand AROM (degrees)  Right Hand AROM: WFL  LUE Strength  Gross LUE Strength: WFL  RUE Strength  Gross RUE Strength: WFL              Plan   Plan  Times per week: 3-5  Times per day: Daily  Current Treatment Recommendations: Strengthening, Functional Mobility Training, Endurance Training, Self-Care / ADL, Balance Training, Safety Education & Training    AM-PAC Score        AM-[facility] Inpatient Daily Activity Raw Score: 17  AM-PAC Inpatient ADL T-Scale Score : 37.26  ADL Inpatient CMS 0-100% Score: 50.11  ADL Inpatient CMS G-Code Modifier : CK    Goals  Short term goals  Time Frame for Short term goals: prior to d/c  Short term goal 1: Pt will bathe with SBA  Short term goal 2: Pt will toilet with SBA  Short term goal 3: Pt will dress with SBA and AE as needed  Short term goal 4: Pt will complete fxl transfers with supervision  Long term goals  Time Frame for Long term goals : LTG = STG  Patient Goals   Patient goals : to return home       Therapy Time   Individual Concurrent Group Co-treatment   Time In 1340         Time Out 1410         Minutes 30         Timed Code Treatment Minutes: 30 Minutes     This note to serve as OT d/c summary if pt is d/c-ed prior to next therapy session.     Flavia Rosales, OTR/L [id number]

## 2019-05-16 NOTE — PROGRESS NOTES
Physical Therapy    Facility/Department: [facility] 1Y PROGRESSIVE CARE  Initial Assessment    NAME: Celina Smith  : 1935  MRN: [id number]    Date of Service: 2019    Discharge Recommendations:  24 hour supervision or assist, 2-3 sessions per week, S Level 1   PT Equipment Recommendations  Equipment Needed: No  Celina Smith scored a 18/24 on the AM-PAC short mobility form. Current research shows that an AM-PAC score of 18 or greater is typically associated with a discharge to the patient's home setting. Based on the patients AM-PAC score and their current functional mobility deficits, it is recommended that the patient have 2-3 sessions per week of Physical Therapy at d/c to increase the patients independence.       HOME HEALTH CARE: LEVEL 1 STANDARD     -Initial home health evaluation to occur within 24-48 hours, in patient home    -Home health agency to establish plan of care for patient over 60 day period    -Medication Reconciliation    -PCP Visit scheduled within seven days of discharge    -PT/OT to evaluate with goal of regaining prior level of functioning    -OT to evaluate if patient has [address] needs for personal care     Assessment   Body structures, Functions, Activity limitations: Decreased functional mobility   Assessment: pt is an 82 yo male who was adm to hosp with SOB and pleural effusion s/p thoracentisis; pt currently is below his baseline of amb Ind with walker at home however feel as long as his wife is able to provide some assist for walking at home and be followed by home PT  Prognosis: Good  Decision Making: Medium Complexity  Patient Education: role and purpose of PT; reviewed call light and not getting up without assist; discussed safety issues and need for further therapy  Barriers to Learning: slightly [place]; decreased insight into deficits  REQUIRES PT FOLLOW UP: Yes  Activity Tolerance  Activity Tolerance: Patient limited by endurance  Activity Tolerance: O2 sats Home: (condo)  Home Layout: One level(there is a basement but pt does not go to basement)  Home Access: Stairs to enter without rails(1+1)  Bathroom Shower/Tub: Shower chair with back, Walk-in shower, Doors  Bathroom Equipment: Grab bars in shower  Bathroom Accessibility: Walker accessible  Home Equipment: Cane, 4 wheeled walker, [employer] chair and standard w/c)  ADL Assistance: Needs assistance  Homemaking Responsibilities: No  Ambulation Assistance: Independent(with RW)  Transfer Assistance: Independent  Active : No  Additional Comments: above information per pt report  Cognition    some decreased insight into safety and deficits    Objective          AROM RLE (degrees)  RLE AROM: WFL  AROM LLE (degrees)  LLE AROM : WFL  Strength RLE  Strength RLE: WFL  Strength LLE  Strength LLE: WFL     Sensation  Overall Sensation Status: (N/T)  Bed mobility  Supine to Sit: Minimal assistance  Transfers  Sit to Stand: Contact guard assistance;Minimal Assistance(verbal cues for hand placement; vaired initially needed min A for EOB with pt needing 2 attempts due to being unsteady but then was able to stand from [facility] with CGA/SBA)  Stand to sit: Contact guard assistance  Ambulation  Ambulation?: Yes  Ambulation 1  Surface: level tile  Device: Rolling Walker  Other Apparatus: O2  Assistance: Contact guard assistance;Minimal assistance  Quality of Gait: on 1st gait attempt pt needed CGA/Min A but on second gait attempt was CGA/close SBA; minimal unsteadiness but takes very small shuffling steps  Distance: 20' and 30'  Comments: therapist initially managing O2 line but then pt attempting to attend to line     Balance  Comments: Initially CGA for sitting balance then SBA/sup; standing balance with walker CGA/close SBA        Plan   Plan  Times per week: 3-5  Current Treatment Recommendations: Functional Mobility Training  Safety Devices  Type of devices: Call light within reach, Chair alarm in place, Left in

## 2019-05-16 NOTE — PROGRESS NOTES
Pulmonary Progress Note    Date of Admission: 5/14/2019   LOS: 2 days     Chief Complaint   Patient presents with    Shortness of Breath    Fall       Assessment:     Chronic Hypoxemic Respiratory Failure with SpO2 <90% on Room Air  Pleural Effusions, Bilateral  CHF Exacerbation  Moderate COPD  CKD    Plan:      -CXR with persistent effusions, but 1200cc removed yesterday and subjectively better  -Transudative effusion on PFA  -at home o2 level  -recommend continued diuresis, effusions will improve with euvolemia  -dulera, PRN albuterol    From pulmonary standpoint okay for discharge today. Future Appointments   Date Time Provider Heriberto Franco   6/3/2019 11:20 AM Ole Alcantara MD [facility]   7/9/2019  1:20 PM Colleen Valverde MD [facility]         24 Hour Events/Subjective  No events o/n. Remains on 5lpm o2. Subjective dyspnea improved, slight soreness with thora site    ROS:   No nausea  No Vomiting  No chest pain      Intake/Output Summary (Last 24 hours) at 5/16/2019 0724  Last data filed at 5/16/2019 0600  Gross per 24 hour   Intake 540 ml   Output 950 ml   Net -410 ml         PHYSICAL EXAM:   Blood pressure 131/66, pulse 80, temperature 98.4 °F (36.9 °C), temperature source Oral, resp. rate 18, height 5' 9\" (1.753 m), weight 274 lb 7.6 oz (124.5 kg), SpO2 94 %.'  Gen:  No acute distress. Eyes: PERRL. Anicteric sclera. No conjunctival injection. ENT: No discharge. Posterior oropharynx clear. External appearance of ears and nose normal.  Neck: Trachea midline. No mass   Resp:  Faint bibasilar crackles. No wheezes. No rhonchi. No dullness on percussion. CV: Regular rate. Regular rhythm. No murmur or rub. + b/l LE edema. GI: Soft, Non-tender. Non-distended. +BS  Skin: Warm, dry, w/o erythema. Lymph: No cervical or supraclavicular LAD. M/S: No cyanosis. No clubbing. Neuro:   no focal neurologic deficit. Moves all extremities  Psych: Awake and alert, Oriented x 3.   Judgement and insight appropriate. Mood stable. Medications:    Scheduled Meds:   vitamin D  5,000 Units Oral Daily    isosorbide mononitrate  30 mg Oral Daily    gabapentin  400 mg Oral TID    doxazosin  4 mg Oral Daily    furosemide  80 mg Oral BID    insulin lispro  0-12 Units Subcutaneous TID WC    insulin lispro  0-6 Units Subcutaneous Nightly    aspirin  81 mg Oral Nightly    escitalopram  10 mg Oral Daily    simvastatin  40 mg Oral Nightly    sodium chloride flush  10 mL Intravenous 2 times per day    lisinopril  5 mg Oral Daily    mometasone-formoterol  2 puff Inhalation BID    enoxaparin  30 mg Subcutaneous Daily    labetalol  100 mg Oral 2 times per day       Continuous Infusions:   dextrose         PRN Meds:  ipratropium-albuterol, nitroGLYCERIN, sodium chloride flush, ondansetron, glucose, dextrose, glucagon (rDNA), dextrose    Labs reviewed:  CBC:   Recent Labs     05/14/19  1039 05/15/19  0511 05/16/19  0455   WBC 4.7 4.9 4.4   HGB 11.8* 11.8* 11.7*   HCT 37.0* 35.6* 35.9*   MCV 89.2 88.3 89.0    257 224     BMP:   Recent Labs     05/14/19  1039 05/15/19  0511 05/16/19  0455    142 141   K 4.1 4.3 3.8   CL 93* 92* 90*   CO2 41* 40* 40*   BUN 20 24* 30*   CREATININE 1.7* 1.8* 1.8*     LIVER PROFILE: No results for input(s): AST, ALT, LIPASE, BILIDIR, BILITOT, ALKPHOS in the last 72 hours. Invalid input(s): AMYLASE,  ALB  PT/INR: No results for input(s): PROTIME, INR in the last 72 hours. APTT: No results for input(s): APTT in the last 72 hours. UA:  Recent Labs     05/14/19  1606   COLORU YELLOW   PHUR 6.5   WBCUA 2   RBCUA 3-5*   CLARITYU Clear   SPECGRAV 1.010   LEUKOCYTESUR Negative   UROBILINOGEN 0.2   BILIRUBINUR Negative   BLOODU Negative   GLUCOSEU Negative     No results for input(s): PH, PCO2, PO2 in the last 72 hours. Films:  Radiology Review:  Pertinent images / reports were reviewed as a part of this visit.     CT Chest w/ contrast: No results found for this or any previous visit. CT Chest w/o contrast:   Results for orders placed during the hospital encounter of 05/14/19   CT CHEST WO CONTRAST    Narrative EXAMINATION:  CT OF THE CHEST WITHOUT CONTRAST 5/14/2019 10:52 am    TECHNIQUE:  CT of the chest was performed without the administration of intravenous  contrast. Multiplanar reformatted images are provided for review. Dose  modulation, iterative reconstruction, and/or weight based adjustment of the  mA/kV was utilized to reduce the radiation dose to as low as reasonably  achievable. COMPARISON:  08/07/2017 CT    HISTORY:  ORDERING SYSTEM PROVIDED HISTORY: SOB  TECHNOLOGIST PROVIDED HISTORY:  Ordering Physician Provided Reason for Exam: sob-hx copd ,resp failure & chf  Acuity: Acute  Type of Exam: Initial    FINDINGS:  Mediastinum: The heart is enlarged. Mild atherosclerotic calcification of  the coronary arteries and aorta. The aorta and pulmonary arteries are normal  in caliber. Stable enlargement and heterogeneity of the thyroid gland. No  discrete nodule. No mediastinal or hilar lymph node enlargement. The  esophagus demonstrates a small hiatal hernia. Lungs/pleura: Large left and moderate right pleural effusions have developed. There is near complete atelectasis of the left lower lobe with moderate  atelectasis of the lingula and right lower lobe. The lungs demonstrate  underlying centrilobular emphysema. Diffuse mild ground-glass opacities in  the remaining aerated lungs. No pulmonary mass or nodule. Upper Abdomen: Adrenal glands are normal.  No significant findings in the  visualized upper abdomen. Soft Tissues/Bones: No skeletal abnormalities are appreciated within the  chest.      Impression 1. Large left and moderate right pleural effusions with associated  atelectasis bilaterally, most notable in the left lower lobe. Pattern may  represent pulmonary edema in this patient with CHF and cardiomegaly.   2. Mild ground-glass attenuation in the remaining aerated lungs suggests  pulmonary edema as well. 3. Recommend follow-up imaging after a course of therapy to ensure  improvement. A central obstructing process would be considered less likely  but can not be excluded. 4. Enlarged, heterogeneous thyroid. Outpatient follow-up ultrasound is  recommended for further evaluation. CTPA: No results found for this or any previous visit. CXR PA/LAT:   Results for orders placed during the hospital encounter of 03/09/18   XR CHEST STANDARD (2 VW)    Narrative EXAMINATION:  TWO VIEWS OF THE CHEST    3/6/2018 8:06 am    COMPARISON:  08/07/2017, 08/31/2016    HISTORY:  ORDERING PHYSICIAN PROVIDED HISTORY: chest pain  TECHNOLOGIST PROVIDED HISTORY:  Technologist Provided Reason for Exam: chest pain  Acuity: Acute  Type of Encounter: Initial    FINDINGS:  Cardiac silhouette is at the upper limits of normal.  There is mild haziness  suggesting congestion. Findings may also be related to large body habitus. Lungs are well inflated. Degenerative changes of the bones are seen. Impression Possible mild congestion versus artifact related to overlying soft tissues. CXR portable:   Results for orders placed during the hospital encounter of 05/14/19   XR CHEST PORTABLE    Narrative EXAMINATION:  ONE XRAY VIEW OF THE CHEST    5/15/2019 11:48 am    COMPARISON:  06/13/2018,  from chest CT dated 05/14/2019    HISTORY:  ORDERING SYSTEM PROVIDED HISTORY: pleural effusion  TECHNOLOGIST PROVIDED HISTORY:  Reason for exam:->pleural effusion  Ordering Physician Provided Reason for Exam: pleural effussion  Acuity: Unknown  Type of Exam: Ongoing    FINDINGS:  Cardiac leads project over the chest.  Hazy opacity at the base of the left  hemithorax is again seen. Mild blunting of the right costophrenic angle is  again noted. Interstitial opacity is again seen bilaterally. Cardiac and  mediastinal silhouettes are similar to prior.       Impression Persistent left greater than right pleural effusions and findings suggestive  of mild pulmonary edema. ECHO: 5/2019   Normal left ventricle size and systolic function with an estimated ejection   fraction of 55-60%. No regional wall motion abnormalities are seen.  There is   mildly increased left ventricular wall thickness.   The right ventricle appears mildly dilated with normal systolic function.   The left and right atria appear moderately dilated.   Mild mitral regurgitation.   Trivial tricuspid regurgitation.   The ascending aorta is mildly dilated measuring 3.6 cm      Thank you for this consult,    Mert MONGE luz  [facility] Pulmonary, Critical Care, and Sleep Medicine

## 2019-05-16 NOTE — PROGRESS NOTES
[address] 82   Daily Progress Note      Admit Date:  5/14/2019    Reason for initial consultation: Diastolic heart failure    CC: \"ready to go home\"    Subjective:  Pt with no acute overnight events. Denies chest pain, palpitations, and lightheadedness. Had thoracentesis yesterday. Review of Systems:   · Constitutional: No unanticipated weight loss. There's been no change in energy level, sleep pattern, or activity level. No fevers, chills. · Eyes: No visual changes or diplopia. No scleral icterus. · ENT: No Headaches, hearing loss or vertigo. No mouth sores or sore throat. · Cardiovascular: as reviewed in HPI  · Respiratory: No cough or wheezing, no sputum production. No hematemesis. · Gastrointestinal: No abdominal pain, appetite loss, blood in stools. No change in bowel or bladder habits. · Genitourinary: No dysuria, trouble voiding, or hematuria. · Musculoskeletal:  No gait disturbance, no joint complaints. · Integumentary: No rash or pruritis. · Neurological: No headache, diplopia, change in muscle strength, numbness or tingling. · Psychiatric: No anxiety or depression. · Endocrine: No temperature intolerance. No excessive thirst, fluid intake, or urination. No tremor. · Hematologic/Lymphatic: No abnormal bruising or bleeding, blood clots or swollen lymph nodes. · Allergic/Immunologic: No nasal congestion or hives. Objective:   /79   Pulse 69   Temp 97.9 °F (36.6 °C) (Oral)   Resp 18   Ht 5' 9\" (1.753 m)   Wt 274 lb 7.6 oz (124.5 kg)   SpO2 95%   BMI 40.53 kg/m²       Intake/Output Summary (Last 24 hours) at 5/16/2019 1447  Last data filed at 5/16/2019 1404  Gross per 24 hour   Intake 480 ml   Output 1600 ml   Net -1120 ml     Wt Readings from Last 3 Encounters:   05/16/19 274 lb 7.6 oz (124.5 kg)   05/14/19 278 lb (126.1 kg)   04/04/19 283 lb (128.4 kg)       Physical Exam:  General:  NAD. Awake, alert, and oriented X4. Obese. Elderly. Skin:  Warm and dry.  No new appearing rashes or lesions. Neck:  Supple. No JVP or bruit appreciated. Chest:  No distress. Diminished breath sounds at bases. Cardiovascular:  Regular rate. S1S2. No M/R/G. Abdomen:  Soft, nontender, +bowel sounds. Extremities:  1+ BLE edema. No clubbing or cyanosis. Medications:    vitamin D  5,000 Units Oral Daily    isosorbide mononitrate  30 mg Oral Daily    gabapentin  400 mg Oral TID    doxazosin  4 mg Oral Daily    furosemide  80 mg Oral BID    insulin lispro  0-12 Units Subcutaneous TID WC    insulin lispro  0-6 Units Subcutaneous Nightly    aspirin  81 mg Oral Nightly    escitalopram  10 mg Oral Daily    simvastatin  40 mg Oral Nightly    sodium chloride flush  10 mL Intravenous 2 times per day    lisinopril  5 mg Oral Daily    mometasone-formoterol  2 puff Inhalation BID    enoxaparin  30 mg Subcutaneous Daily    labetalol  100 mg Oral 2 times per day      dextrose       ipratropium-albuterol, nitroGLYCERIN, sodium chloride flush, ondansetron, glucose, dextrose, glucagon (rDNA), dextrose    Lab Data:  CBC:   Recent Labs     19  1039 05/15/19  0511 19  0455   WBC 4.7 4.9 4.4   HGB 11.8* 11.8* 11.7*    257 224     BMP:    Recent Labs     19  1039 05/15/19  0511 19  0455    142 141   K 4.1 4.3 3.8   CO2 41* 40* 40*   BUN 20 24* 30*   CREATININE 1.7* 1.8* 1.8*     LIVR: No results for input(s): AST, ALT in the last 72 hours. INR:  No results for input(s): INR in the last 72 hours. APTT: No results for input(s): APTT in the last 72 hours. BNP:  No results for input(s): BNP in the last 72 hours. Cardiac Data      EK19   Sinus rhythm with 1st degree A-V block. Left axis deviation. Low voltage QRS in precordial leads. Right bundle branch block.     Echo: 2019   Normal left ventricle size and systolic function with an estimated ejection   fraction of 55-60%. No regional wall motion abnormalities are seen.  There is  [employer] increased left ventricular wall thickness.   The right ventricle appears mildly dilated with normal systolic function.   The left and right atria appear moderately dilated.   Mild mitral regurgitation.   Trivial tricuspid regurgitation.   The ascending aorta is mildly dilated measuring 3.6 cm.     Tele: sinus      Assessment and Plan   1) Chronic hypoxic and hypercarbic respiratory failure/bilateral pleural effusions. Pulmonary following and thoracentesis completed. Subjectively dyspnea improved.    2) Chronic diastolic heart failure. Continue attempts at diuresis and blood pressure control. Transitioned to po Lasix today.    3) Elevated troponin. History is not consistent with ACS. Likely related to CKD and hypoxia. No plans for stress testing or angiography.    4) Essential hypertension. Controlled. Goal BP <130/80. Continue medical therapy. Changed home medications to labetalol BID (from Qday) and Imdur to daily (from BID).    5) CKD Stage III. Continue to monitor chronically with diuretics and ACE-I. Will sign off. Call with questions. Will arrange outpatient follow-up.        Electronically signed by Sergio Mayo MD on 5/16/2019 at 2:47 PM

## 2019-05-16 NOTE — PLAN OF CARE
Problem: Falls - Risk of:  Goal: Will remain free from falls  Description  Will remain free from falls  Outcome: Ongoing  Goal: Absence of physical injury  Description  Absence of physical injury  Outcome: Ongoing     Problem:  Activity:  Goal: Capacity to carry out activities will improve  Description  Capacity to carry out activities will improve  Outcome: Ongoing  Goal: Will verbalize the importance of balancing activity with adequate rest periods  Description  Will verbalize the importance of balancing activity with adequate rest periods  Outcome: Ongoing     Problem: Fluid Volume:  Goal: Risk for excess fluid volume will decrease  Description  Risk for excess fluid volume will decrease  Outcome: Ongoing  Goal: Maintenance of adequate hydration will improve  Description  Maintenance of adequate hydration will improve  Outcome: Ongoing  Goal: Will show no signs and symptoms of electrolyte imbalance  Description  Will show no signs and symptoms of electrolyte imbalance  Outcome: Ongoing     Problem: Health Behavior:  Goal: Ability to manage health-related needs will improve  Description  Ability to manage health-related needs will improve  Outcome: Ongoing  Goal: Ability to seek appropriate health care will improve  Description  Ability to seek appropriate health care will improve  Outcome: Ongoing     Problem: Respiratory:  Goal: Ability to maintain adequate ventilation will improve  Description  Ability to maintain adequate ventilation will improve  Outcome: Ongoing  Goal: Respiratory status will improve  Description  Respiratory status will improve  Outcome: Ongoing

## 2019-05-16 NOTE — ASSESSMENT & PLAN NOTE
Patient fell in the office. He was < 50% saturation on the floor but not this was the etiology of the fall since his wife states he has been falling at least weekly at home. PT was order for home by Reid Mendez MD but he refused stating \"I don't want to exercise\". After falling, he is too weak to get up. We had a truthful conversation about this. I told him that if he does not change, he will end up in a NH and die from complications of not doing anything. He understood this and beat me to the point before I stated the death part. While I don't think he is suicidal, it is clear he as very little to no motivation to get better. Since he is > 280#, for me to get him off the floor was a chore, impossible for his wife. He will be assessed with admission.

## 2019-05-17 ENCOUNTER — CARE COORDINATION (OUTPATIENT)
Dept: CASE MANAGEMENT | Age: 84
End: 2019-05-17

## 2019-05-17 DIAGNOSIS — J90 BILATERAL PLEURAL EFFUSION: Primary | ICD-10-CM

## 2019-05-17 PROCEDURE — 1111F DSCHRG MED/CURRENT MED MERGE: CPT | Performed by: FAMILY MEDICINE

## 2019-05-17 RX ORDER — GABAPENTIN 300 MG/1
300 CAPSULE ORAL 3 TIMES DAILY
COMMUNITY
End: 2019-06-03 | Stop reason: SDUPTHER

## 2019-05-17 RX ORDER — FLUTICASONE PROPIONATE 50 MCG
1 SPRAY, SUSPENSION (ML) NASAL DAILY PRN
Status: ON HOLD | COMMUNITY
End: 2021-01-01

## 2019-05-17 NOTE — CARE COORDINATION
[facility] Transitions Initial Follow Up Call    Call within 2 business days of discharge: Yes    Patient: Kitty Perez Patient : 1935   MRN: <[id number]>  Reason for Admission: Bilateral pleural effusion/hypoxia  Discharge Date: 19 RARS: Readmission Risk Score: 21      Last Discharge [address] [id number]       Complaint Diagnosis Description Type Department Provider    19 Shortness of Breath; Fall Bilateral pleural effusion . .. ED to Hosp-Admission (Discharged) (ADMITTED) Manisha Starr MD; Leonela Casey Leg. .. Spoke with: [address]: [facility]    Was able to contact Sydnee Nazario for initial transitional outreach. He said he was doing \"fine\"  He denied chest pain, N/V and F/C. His breathing is \"pretty good\", he is not coughing very often and his swelling is \"good\". He is not wearing his compression stocking and he does not weigh himself daily. Explained daily weight as a way to monitor water retention. He did not verbalized understanding. He said he had a scale but did not weight himself. Medications reviewed and all medications in home and he is taking as prescribed. [id number] order completed. No services at discharge. Explained CTC role and he was receptive to further outreach. Attempted to give contact information and he stated he was unable to take done the number. Informed him we would call again next week. Non-face-to-face services provided:  Scheduled appointment with PCP-  Scheduled appointment with Miriam Page pulmonology,  cardio  Obtained and reviewed discharge summary and/or continuity of care documents  Assessment and support for treatment adherence and medication management-reviewed and educated.     Care Transitions 24 Hour Call    Do you have all of your prescriptions and are they filled?:  Yes  Do you have support at home?:  Partner/Spouse/SO  Care Transitions Interventions         Follow Up  Future Appointments   Date Time Provider Heriberto Franco   5/21/2019 11:30 AM Betina Blackwell, APRN - CNP [facility]   5/24/2019  1:20 PM Nory Mao MD [facility]   6/3/2019 11:20 AM Jenny Bravo MD [facility]   7/9/2019  1:20 PM Nory Mao MD Ada Gia Jose RN

## 2019-05-17 NOTE — DISCHARGE SUMMARY
Hospital Medicine Discharge Summary      Patient ID: Ezequiel Purcell [id number]     Patient's PCP: Anny Barnhart MD    Admit Date: 5/14/2019     Discharge Date: 5/16/2019      Admitting Physician: Jaden Recio MD    Discharge Physician: Luh Sanchez MD     Discharge Diagnoses: Active Hospital Problems    Diagnosis Date Noted    Stage 3 chronic kidney disease ([address] [N18.3]      Priority: High    Chronic diastolic (congestive) heart failure (HCC) [I50.32]     Shortness of breath [R06.02] 05/14/2019    Bilateral pleural effusion [Z90]     Diastolic dysfunction [X45.0]     Elevated brain natriuretic peptide (BNP) level [R79.89]     Essential hypertension [I10]     Chronic respiratory failure with hypoxia and hypercapnia (HCC) [J96.11, J96.12] 03/07/2018    Elevated troponin [R74.8] 03/06/2018         The patient was seen and examined on the day of discharge and this discharge summary is in conjunction with any daily progress note from day of discharge.     Hospital Course:         Patient demographics:  The patient  Mac Files is a 81 y.o. male      Significant past medical history:       Patient Active Problem List   Diagnosis    COPD, moderate ([address]    CHF (congestive heart failure) ([address]    Psoriasis    Mixed hyperlipidemia    Vitamin D deficiency    Urinary frequency    Fatty liver    DM type 2 with diabetic peripheral neuropathy ([address]    Stage 3 chronic kidney disease ([address]    CVA, old, ataxia    Atherosclerosis of aorta ([address]    Type 2 diabetes mellitus with microalbuminuria or microproteinuria    Morbid obesity ([address]    Obesity, diabetes, and hypertension syndrome ([address]    Hypoxemia    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    Fall    Elevated troponin    SOB (shortness of breath)    Chronic respiratory failure with hypoxia and hypercapnia (HCC)    Orthopnea    Abnormal CXR    Chest pain    ROSENDO (obstructive sleep apnea)    Chronic vasomotor rhinitis  Shortness of breath    Bilateral pleural effusion    Diastolic dysfunction    Elevated brain natriuretic peptide (BNP) level    Essential hypertension    Chronic diastolic (congestive) heart failure (HCC)            Presenting symptoms:  SOB and fall           Diagnostic workup:  CT CHEST WO CONTRAST   CT Head WO Contrast               CONSULTS DURING ADMISSION :   IP CONSULT TO CARDIOLOGY  IP CONSULT TO HEART FAILURE NURSE/COORDINATOR  IP CONSULT TO DIETITIAN  IP CONSULT TO PULMONOLOGY  IP CONSULT TO SPIRITUAL SERVICES        Patient was diagnosed with:  Acute on chronic diastolic CHF  COPD exacerbation                 Treatment while inpatient:  Patient presented to the emergency room with shortness of breath. Patient was diagnosed with acute on chronic diastolic CHF with pleural effusion. Pulmonary was consulted and patient underwent a 1200 cc bedside thoracentesis. Patient will be discharged home on Lasix. Patient's pleural effusion is expected to further improve with diuresis             Patient has chronic respiratory failure with the 5 L nasal cannula oxygen that patient will continue  Patient will be discharged home on Judieth Doom and albuterol as needed for COPD. Chronic renal failure remained stable       Discharge Condition:  stable:     Discharged to:  home     Activity:   as tolerated:     Follow Up: Follow-up with cardiology in 1 week          Labs:  For convenience and continuity at follow-up the following most recent labs are provided:      CBC:   Lab Results   Component Value Date    WBC 4.4 05/16/2019    HGB 11.7 05/16/2019    HCT 35.9 05/16/2019     05/16/2019       RENAL:   Lab Results   Component Value Date     05/16/2019    K 3.8 05/16/2019    K 4.1 05/14/2019    CL 90 05/16/2019    CO2 40 05/16/2019    BUN 30 05/16/2019    CREATININE 1.8 05/16/2019           Discharge Medications:    Darwin Flores Medication Instructions [id number]    Printed on: 05/17/19 1107   Medication Information                      albuterol (PROVENTIL) (2.5 MG/3ML) 0.083% nebulizer solution  Take 3 mLs by nebulization every 6 hours as needed for Wheezing             aspirin 81 MG EC tablet  Take 81 mg by mouth nightly              Blood Glucose Calibration (ACCU-CHEK SMARTVIEW CONTROL) LIQD  USE AS DIRECTED             Blood Glucose Monitoring Suppl (ACCU-CHEK NEVAEH SMARTVIEW) W/DEVICE KIT  CHECK SUGARS THREE TIMES DAILY AS INSTRUCTED             BREO ELLIPTA 200-25 MCG/INH AEPB  INHALE 1 PUFF INTO THE LUNGS DAILY             Cholecalciferol (VITAMIN D-3) 5000 UNITS TABS  Take 5,000 Units by mouth daily              Compression Stockings MISC  by Does not apply route 15-20 mmHg knee high             doxazosin (CARDURA) 8 MG tablet  TAKE 1 TABLET BY MOUTH EVERY NIGHT AT BEDTIME FOR BLOOD PRESSURE             escitalopram (LEXAPRO) 10 MG tablet  TAKE 1 TABLET BY MOUTH DAILY             famotidine (PEPCID) 10 MG tablet  Take 10 mg by mouth 2 times daily             fluticasone (FLONASE) 50 MCG/ACT nasal spray  1 spray each nostril daily for allergies             furosemide (LASIX) 80 MG tablet  Take 1 tablet by mouth 2 times daily             gabapentin (NEURONTIN) 400 MG capsule  Take 400 mg by mouth 3 times daily. Insulin Pen Needle ([employer] PEN NEEDLES 31G) 31G X 8 MM MISC  1 each by Does not apply route daily.              isosorbide mononitrate (IMDUR) 30 MG extended release tablet  TAKE 1 TABLET BY MOUTH TWICE DAILY FOR BLOOD PRESSURE             labetalol (NORMODYNE) 100 MG tablet  Take 1 tablet by mouth every 12 hours             Nebulizers (VIOS LC SPRINT) MISC  USE UTD             nitroGLYCERIN (NITROSTAT) 0.4 MG SL tablet  Place 1 tablet under the tongue every 5 minutes as needed for Chest pain (chest pain)             ONE TOUCH LANCETS MISC  Use for blood sugar testing             Respiratory Therapy Supplies (NEBULIZER/TUBING/MOUTHPIECE) KIT  1 kit by Does not apply route daily as needed (sob)             simvastatin (ZOCOR) 40 MG tablet  TAKE 1 TABLET BY MOUTH EVERY EVENING             Spacer/Aero-Holding Chambers KOSTA  1 Device by Does not apply route daily as needed (with Albuterol inhaler)             tiotropium (SPIRIVA) 18 MCG inhalation capsule  One inhalation daily for breathing             VENTOLIN  (90 Base) MCG/ACT inhaler  INHALE 2 PUFFS INTO THE LUNGS EVERY 6 HOURS AS NEEDED FOR WHEEZING                    Time Spent on discharge is more than 30 min in the examination, evaluation, counseling and review of medications and discharge plan. Signed:  Julio Jack MD   5/17/2019      Thank you Basim Da Silva MD for the opportunity to be involved in this patient's care. If you have any questions or concerns please feel free to contact me at [phone]. This note was transcribed using [id number] [employer]. Please disregard any translational errors.

## 2019-05-18 LAB
BODY FLUID CULTURE, STERILE: NORMAL
GRAM STAIN RESULT: NORMAL

## 2019-05-24 ENCOUNTER — OFFICE VISIT (OUTPATIENT)
Dept: FAMILY MEDICINE CLINIC | Age: 84
End: 2019-05-24
Payer: MEDICARE

## 2019-05-24 VITALS
DIASTOLIC BLOOD PRESSURE: 72 MMHG | OXYGEN SATURATION: 84 % | SYSTOLIC BLOOD PRESSURE: 120 MMHG | WEIGHT: 260 LBS | HEART RATE: 78 BPM | BODY MASS INDEX: 38.51 KG/M2 | HEIGHT: 69 IN | RESPIRATION RATE: 22 BRPM

## 2019-05-24 DIAGNOSIS — J90 PLEURAL EFFUSION: ICD-10-CM

## 2019-05-24 DIAGNOSIS — I50.33 ACUTE ON CHRONIC DIASTOLIC (CONGESTIVE) HEART FAILURE (HCC): ICD-10-CM

## 2019-05-24 DIAGNOSIS — N18.30 CHRONIC KIDNEY DISEASE, STAGE III (MODERATE) (HCC): ICD-10-CM

## 2019-05-24 DIAGNOSIS — K59.00 CONSTIPATION, UNSPECIFIED CONSTIPATION TYPE: ICD-10-CM

## 2019-05-24 DIAGNOSIS — L83 ACANTHOSIS NIGRICANS: Primary | ICD-10-CM

## 2019-05-24 PROCEDURE — 1111F DSCHRG MED/CURRENT MED MERGE: CPT | Performed by: FAMILY MEDICINE

## 2019-05-24 PROCEDURE — 99495 TRANSJ CARE MGMT MOD F2F 14D: CPT | Performed by: FAMILY MEDICINE

## 2019-05-24 RX ORDER — AMMONIUM LACTATE 12 G/100G
LOTION TOPICAL
Qty: 225 ML | Refills: 2 | Status: SHIPPED | OUTPATIENT
Start: 2019-05-24 | End: 2019-08-08 | Stop reason: ALTCHOICE

## 2019-05-24 NOTE — PROGRESS NOTES
Post-Discharge Transitional Care Management Services or Hospital Follow Up      Yadira Kaur   YOB: 1935    Date of Office Visit:  5/24/2019  Date of Hospital Admission: 5/14/19  Date of Hospital Discharge: 5/16/19  Risk of hospital readmission (high >=14%. Medium >=10%) :Readmission Risk Score: 21      Care management risk score Rising risk (score 2-5) and Complex Care (Scores >=6): 12     Non face to face  following discharge, date last encounter closed (first attempt may have been earlier): 5/17/2019  3:58 PM  [facility] 1200[facility] FLUID LEFT CHEST - THORACENTESIS  ACUTE ON CHRONIC CHF - ADMITTED 5/14-5/16  ON LOW SALT - LOW SIMPLE CARB DIET  WEIGHT IS DOWN  [address]  FLUID INTAKE LIMITED 64OZ     Chief Complaint   Patient presents with    Follow-Up from [facility] CHF AND BILATERAL PLEURAL EFFUSION HE FEEL AT THE DOCTORS OFFICE HE TRIPPED OVER HIS FEET TRYING TO CHANGE CHAIRS      BP Readings from Last 3 Encounters:   05/24/19 120/72   05/16/19 124/64   05/14/19 121/61     Pulse Readings from Last 3 Encounters:   05/24/19 78   05/16/19 85   05/14/19 75     Wt Readings from Last 3 Encounters:   05/24/19 260 lb (117.9 kg)   05/16/19 274 lb 7.6 oz (124.5 kg)   05/14/19 278 lb (126.1 kg)     Current Outpatient Medications   Medication Sig Dispense Refill    fluticasone (FLONASE) 50 MCG/ACT nasal spray 1 spray by Each Nare route daily as needed for Rhinitis      gabapentin (NEURONTIN) 300 MG capsule Take 300 mg by mouth 3 times daily.       labetalol (NORMODYNE) 100 MG tablet Take 1 tablet by mouth every 12 hours 60 tablet 3    furosemide (LASIX) 80 MG tablet Take 1 tablet by mouth 2 times daily 60 tablet 3    famotidine (PEPCID) 10 MG tablet Take 10 mg by mouth 2 times daily      doxazosin (CARDURA) 8 MG tablet TAKE 1 TABLET BY MOUTH EVERY NIGHT AT BEDTIME FOR BLOOD PRESSURE 90 tablet 3    simvastatin (ZOCOR) 40 MG tablet TAKE 1 TABLET BY MOUTH EVERY EVENING 90 ([facility]    Psoriasis    Mixed hyperlipidemia    Vitamin D deficiency    Urinary frequency    Fatty liver    DM type 2 with diabetic peripheral neuropathy (HCC)    Stage 3 chronic kidney disease ([facility]    CVA, old, ataxia    Atherosclerosis of aorta (HCC)    Type 2 diabetes mellitus with microalbuminuria or microproteinuria    Morbid obesity ([facility]    Obesity, diabetes, and hypertension syndrome (HCC)    Hypoxemia    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    Fall    Elevated troponin    SOB (shortness of breath)    Chronic respiratory failure with hypoxia and hypercapnia (HCC)    Orthopnea    Abnormal CXR    Chest pain    ROSENDO (obstructive sleep apnea)    Chronic vasomotor rhinitis    Shortness of breath    Bilateral pleural effusion    Diastolic dysfunction    Elevated brain natriuretic peptide (BNP) level    Essential hypertension    Chronic diastolic (congestive) heart failure (HCC)       No Known Allergies    Medications listed as ordered at the time of discharge from [facility]   Ghislaine Hancock   [place] Medication Instructions MILLIE:    Printed on:05/24/19 0977   Medication Information                      albuterol (PROVENTIL) (2.5 MG/3ML) 0.083% nebulizer solution  Take 3 mLs by nebulization every 6 hours as needed for Wheezing             aspirin 81 MG EC tablet  Take 81 mg by mouth nightly              BREO ELLIPTA 200-25 MCG/INH AEPB  INHALE 1 PUFF INTO THE LUNGS DAILY             Cholecalciferol (VITAMIN D-3) 5000 UNITS TABS  Take 5,000 Units by mouth daily              Compression Stockings MISC  by Does not apply route 15-20 mmHg knee high             doxazosin (CARDURA) 8 MG tablet  TAKE 1 TABLET BY MOUTH EVERY NIGHT AT BEDTIME FOR BLOOD PRESSURE             escitalopram (LEXAPRO) 10 MG tablet  TAKE 1 TABLET BY MOUTH DAILY             famotidine (PEPCID) 10 MG tablet  Take 10 mg by mouth 2 times daily             fluticasone (FLONASE) 50 MCG/ACT nasal spray  1 spray by Each Nare route daily as needed for Rhinitis             furosemide (LASIX) 80 MG tablet  Take 1 tablet by mouth 2 times daily             gabapentin (NEURONTIN) 300 MG capsule  Take 300 mg by mouth 3 times daily. isosorbide mononitrate (IMDUR) 30 MG extended release tablet  TAKE 1 TABLET BY MOUTH TWICE DAILY FOR BLOOD PRESSURE             labetalol (NORMODYNE) 100 MG tablet  Take 1 tablet by mouth every 12 hours             Nebulizers (VIOS LC SPRINT) MISC  USE UTD             nitroGLYCERIN (NITROSTAT) 0.4 MG SL tablet  Place 1 tablet under the tongue every 5 minutes as needed for Chest pain (chest pain)             Respiratory Therapy Supplies (NEBULIZER/TUBING/MOUTHPIECE) KIT  1 kit by Does not apply route daily as needed (sob)             simvastatin (ZOCOR) 40 MG tablet  TAKE 1 TABLET BY MOUTH EVERY EVENING             Spacer/Aero-Holding Chambers KOSTA  1 Device by Does not apply route daily as needed (with Albuterol inhaler)             tiotropium (SPIRIVA) 18 MCG inhalation capsule  One inhalation daily for breathing             VENTOLIN  (90 Base) MCG/ACT inhaler  INHALE 2 PUFFS INTO THE LUNGS EVERY 6 HOURS AS NEEDED FOR WHEEZING               Off actos  a1c 6.0  Medications marked \"taking\" at this time  Outpatient Medications Marked as Taking for the 5/24/19 encounter (Office Visit) with Ada Squires MD   Medication Sig Dispense Refill    fluticasone (FLONASE) 50 MCG/ACT nasal spray 1 spray by Each Nare route daily as needed for Rhinitis      gabapentin (NEURONTIN) 300 MG capsule Take 300 mg by mouth 3 times daily.       labetalol (NORMODYNE) 100 MG tablet Take 1 tablet by mouth every 12 hours 60 tablet 3    furosemide (LASIX) 80 MG tablet Take 1 tablet by mouth 2 times daily 60 tablet 3    famotidine (PEPCID) 10 MG tablet Take 10 mg by mouth 2 times daily      doxazosin (CARDURA) 8 MG tablet TAKE 1 TABLET BY MOUTH EVERY NIGHT AT BEDTIME FOR BLOOD PRESSURE 90 tablet 3    simvastatin (ZOCOR) 40 MG tablet TAKE 1 TABLET BY MOUTH EVERY EVENING 90 tablet 0    escitalopram (LEXAPRO) 10 MG tablet TAKE 1 TABLET BY MOUTH DAILY 90 tablet 3    VENTOLIN  (90 Base) MCG/ACT inhaler INHALE 2 PUFFS INTO THE LUNGS EVERY 6 HOURS AS NEEDED FOR WHEEZING 1 Inhaler 5    isosorbide mononitrate (IMDUR) 30 MG extended release tablet TAKE 1 TABLET BY MOUTH TWICE DAILY FOR BLOOD PRESSURE 180 tablet 0    Nebulizers (VIOS LC SPRINT) MISC USE UTD  0    Compression Stockings MISC by Does not apply route 15-20 mmHg knee high 1 each 0    Respiratory Therapy Supplies (NEBULIZER/TUBING/MOUTHPIECE) KIT 1 kit by Does not apply route daily as needed (sob) 1 kit 0    albuterol (PROVENTIL) (2.5 MG/3ML) 0.083% nebulizer solution Take 3 mLs by nebulization every 6 hours as needed for Wheezing 120 vial 3    tiotropium (SPIRIVA) 18 MCG inhalation capsule One inhalation daily for breathing (Patient taking differently: nightly One inhalation daily for breathing) 90 capsule 5    Spacer/Aero-Holding Chambers KOSTA 1 Device by Does not apply route daily as needed (with Albuterol inhaler) 1 Device 0    Cholecalciferol (VITAMIN D-3) 5000 UNITS TABS Take 5,000 Units by mouth daily       aspirin 81 MG EC tablet Take 81 mg by mouth nightly           Medications patient taking as of now reconciled against medications ordered at time of hospital discharge: Yes    Chief Complaint   Patient presents with    Follow-Up from [facility] CHF AND BILATERAL PLEURAL EFFUSION HE FEEL AT THE DOCTORS OFFICE HE TRIPPED OVER HIS FEET TRYING TO CHANGE CHAIRS        History of Present illness - Follow up of Hospital diagnosis(es): pleural effusion/ acute on chronic HFpEF    Inpatient course: Discharge summary reviewed- see chart.     Interval history/Current status: improved        Vitals:    05/24/19 1331   BP: 120/72   Site: Left Upper Arm   Position: Sitting   Cuff Size: Medium Adult   Pulse: 78   Resp: 22   SpO2: (!) 84%   Weight: 260 lb (117.9 kg)   Height: 5' 9\" (1.753 m)     Body mass index is 38.4 kg/m². Wt Readings from Last 3 Encounters:   05/24/19 260 lb (117.9 kg)   05/16/19 274 lb 7.6 oz (124.5 kg)   05/14/19 278 lb (126.1 kg)     BP Readings from Last 3 Encounters:   05/24/19 120/72   05/16/19 124/64   05/14/19 121/61        Physical Exam:  nad - alert / responsive - breathing okay on oxygen  No edema  Thickened rough skin on feet  abd soft nd/nt  cv rrr  Lungs decreased bs mya bases bilat o/w clear    Assessment/Plan:  There are no diagnoses linked to this encounter. Medical Decision Making: moderate complexity   Diagnosis Orders   1. Acanthosis nigricans  ammonium lactate (LAC-HYDRIN) 12 % lotion   2. Acute on chronic diastolic (congestive) heart failure (HCC)  Comprehensive Metabolic Panel    Brain Natriuretic Peptide   3. Constipation, unspecified constipation type     4.  Pleural effusion       Lac hydrin lotion prn  Senna for bm  Fluid restrict  Lasix 80 bid for now  Consider repeat cxr in next 1-2 months  Has appt w/ pulm soon  Bmp/ bnp today  bp control stressed  Reviewed hospital testing w/ pt/ wife

## 2019-05-25 LAB
A/G RATIO: 1.8 (ref 1.1–2.2)
ALBUMIN SERPL-MCNC: 4.3 G/DL (ref 3.4–5)
ALP BLD-CCNC: 44 U/L (ref 40–129)
ALT SERPL-CCNC: 10 U/L (ref 10–40)
ANION GAP SERPL CALCULATED.3IONS-SCNC: 14 MMOL/L (ref 3–16)
AST SERPL-CCNC: 12 U/L (ref 15–37)
BILIRUB SERPL-MCNC: 0.4 MG/DL (ref 0–1)
BUN BLDV-MCNC: 35 MG/DL (ref 7–20)
CALCIUM SERPL-MCNC: 9.9 MG/DL (ref 8.3–10.6)
CHLORIDE BLD-SCNC: 89 MMOL/L (ref 99–110)
CO2: 40 MMOL/L (ref 21–32)
CREAT SERPL-MCNC: 1.8 MG/DL (ref 0.8–1.3)
GFR AFRICAN AMERICAN: 44
GFR NON-AFRICAN AMERICAN: 36
GLOBULIN: 2.4 G/DL
GLUCOSE BLD-MCNC: 92 MG/DL (ref 70–99)
POTASSIUM SERPL-SCNC: 5.1 MMOL/L (ref 3.5–5.1)
PRO-BNP: 1045 PG/ML (ref 0–449)
SODIUM BLD-SCNC: 143 MMOL/L (ref 136–145)
TOTAL PROTEIN: 6.7 G/DL (ref 6.4–8.2)

## 2019-05-29 ENCOUNTER — SCHEDULED TELEPHONE ENCOUNTER (OUTPATIENT)
Dept: PHARMACY | Age: 84
End: 2019-05-29

## 2019-05-30 ENCOUNTER — TELEPHONE (OUTPATIENT)
Dept: FAMILY MEDICINE CLINIC | Age: 84
End: 2019-05-30

## 2019-05-30 NOTE — TELEPHONE ENCOUNTER
[facility]  COPD Service  [phone]      Follow up phone call:    NAME: Kate Cristina RECORD NUMBER:  [id number] was referred to our [address] [facility] by [facility] ED    I called and spoke  Brain Chi wife and she declined to scheduled an appointment. He has seen his pcp for f/u and also has upcoming appointment with Dr. Ofelia Mcdonald. Please call any time with questions or concerns, especially with worsening symptoms. Do not wait to call. Appropriate staff has been notified with regards to any concerns noted above     [facility]  COPD Service  Phone: [phone]  Fax [phone]    We also have outpatient services in Heart Failure, Diabetes, Anticoagulation and Infusion.

## 2019-06-03 ENCOUNTER — OFFICE VISIT (OUTPATIENT)
Dept: PULMONOLOGY | Age: 84
End: 2019-06-03
Payer: MEDICARE

## 2019-06-03 ENCOUNTER — TELEPHONE (OUTPATIENT)
Dept: FAMILY MEDICINE CLINIC | Age: 84
End: 2019-06-03

## 2019-06-03 VITALS
WEIGHT: 259.2 LBS | SYSTOLIC BLOOD PRESSURE: 123 MMHG | OXYGEN SATURATION: 91 % | TEMPERATURE: 98.7 F | DIASTOLIC BLOOD PRESSURE: 69 MMHG | HEART RATE: 87 BPM | HEIGHT: 69 IN | RESPIRATION RATE: 20 BRPM | BODY MASS INDEX: 38.39 KG/M2

## 2019-06-03 DIAGNOSIS — J44.9 COPD, MODERATE (HCC): ICD-10-CM

## 2019-06-03 DIAGNOSIS — J90 BILATERAL PLEURAL EFFUSION: ICD-10-CM

## 2019-06-03 DIAGNOSIS — J96.12 CHRONIC RESPIRATORY FAILURE WITH HYPOXIA AND HYPERCAPNIA (HCC): ICD-10-CM

## 2019-06-03 DIAGNOSIS — J96.11 CHRONIC RESPIRATORY FAILURE WITH HYPOXIA AND HYPERCAPNIA (HCC): ICD-10-CM

## 2019-06-03 PROCEDURE — G8926 SPIRO NO PERF OR DOC: HCPCS | Performed by: INTERNAL MEDICINE

## 2019-06-03 PROCEDURE — 1111F DSCHRG MED/CURRENT MED MERGE: CPT | Performed by: INTERNAL MEDICINE

## 2019-06-03 PROCEDURE — 4040F PNEUMOC VAC/ADMIN/RCVD: CPT | Performed by: INTERNAL MEDICINE

## 2019-06-03 PROCEDURE — G8417 CALC BMI ABV UP PARAM F/U: HCPCS | Performed by: INTERNAL MEDICINE

## 2019-06-03 PROCEDURE — G8598 ASA/ANTIPLAT THER USED: HCPCS | Performed by: INTERNAL MEDICINE

## 2019-06-03 PROCEDURE — 3023F SPIROM DOC REV: CPT | Performed by: INTERNAL MEDICINE

## 2019-06-03 PROCEDURE — G8427 DOCREV CUR MEDS BY ELIG CLIN: HCPCS | Performed by: INTERNAL MEDICINE

## 2019-06-03 PROCEDURE — 1123F ACP DISCUSS/DSCN MKR DOCD: CPT | Performed by: INTERNAL MEDICINE

## 2019-06-03 PROCEDURE — 99213 OFFICE O/P EST LOW 20 MIN: CPT | Performed by: INTERNAL MEDICINE

## 2019-06-03 PROCEDURE — 1036F TOBACCO NON-USER: CPT | Performed by: INTERNAL MEDICINE

## 2019-06-03 RX ORDER — GABAPENTIN 300 MG/1
300 CAPSULE ORAL 3 TIMES DAILY
Qty: 90 CAPSULE | Refills: 0 | Status: SHIPPED | OUTPATIENT
Start: 2019-06-03 | End: 2019-07-08 | Stop reason: SDUPTHER

## 2019-06-03 NOTE — TELEPHONE ENCOUNTER
Patient wife is calling because [address] [place] [address] told her she needs to call us to have a refill for: GABAPENTIN 300 MG CAPSULE - 1 CAPSULE 3 TIMES A DAY. Please give her a call back after 2pm. Just saw Dr. Sandra Mittal last week.        [address] [place] [address]

## 2019-06-03 NOTE — PROGRESS NOTES
REASON FOR CONSULTATION/CC: rosendo         PCP: Ramírez Mariee MD    HISTORY OF PRESENT ILLNESS: Jb Griffith is a 81y.o. year old male with a history of ROSENDO who presents :                He was in the hospital for shortness of breath with thoracentesis. Dx with chf. On lasix and improving a lot. Weighing himself daily. Following lower extremities       chronic hypoxemia    Back to baseline 3-4 of oxygen         COPD  Breo / Spiriva   No changes . No wheezing. PAST MEDICAL HISTORY:  Past Medical History:   Diagnosis Date    Atherosclerosis of aorta ([address]     CHF (congestive heart failure) (HCC)     CKD (chronic kidney disease) stage 3, GFR 30-59 ml/min (HCC)     COPD (chronic obstructive pulmonary disease) ([address]     CVA, old, ataxia 2007    DM type 2 with diabetic peripheral neuropathy ([address] 10/23/2014    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 2008    Fall 9/2015    Fatty liver     Hypertension     Mixed hyperlipidemia     Morbid obesity ([address]     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 ([address] 2008    Obesity, diabetes, and hypertension syndrome ([address] August, 2015    Psoriasis     Risk for falls     Type 2 diabetes mellitus with microalbuminuria or microproteinuria     Vitamin D deficiency 2011       PAST SURGICAL HISTORY:  Past Surgical History:   Procedure Laterality Date    CATARACT REMOVAL Right 7/3/13    Antoine Gang    TURP  8/14/12    Teja       FAMILY HISTORY:  family history is not on file. SOCIAL HISTORY:   reports that he quit smoking about 12 years ago. His smoking use included cigarettes. He started smoking about 68 years ago. He has a 100.00 pack-year smoking history. He has never used smokeless tobacco.      ALLERGIES:  Patient has No Known Allergies.     REVIEW OF SYSTEMS:  Constitutional: Negative for fever   HENT: Negative for sore throat  Respiratory: Negative for dyspnea, cough  Cardiovascular: Negative for chest pain  Gastrointestinal: Negative for vomiting, diarrhea   Skin: Negative for rash  Psychiatric/Behavorial: Negative for anxiety     Objective:   PHYSICAL EXAM:  Blood pressure 123/69, pulse 87, temperature 98.7 °F (37.1 °C), temperature source Oral, resp. rate 20, height 5' 9\" (1.753 m), weight 259 lb 3.2 oz (117.6 kg), SpO2 91 %.'  Gen: No distress. ENT:   Resp: No accessory muscle use. No crackles. No wheezes. No rhonchi. CV: Regular rate. Regular rhythm. No murmur or rub. No edema. Skin: Warm, dry, normal texture and turgor. No nodule on exposed extremities. M/S: No cyanosis. No clubbing. No joint deformity. Psych: Oriented x 3. No anxiety. Awake. Alert. Intact judgement and insight. Good Mood / Affect. Memory appears in tact     Current Outpatient Medications   Medication Sig Dispense Refill    gabapentin (NEURONTIN) 300 MG capsule Take 1 capsule by mouth 3 times daily for 30 days. 90 capsule 0    ammonium lactate (LAC-HYDRIN) 12 % lotion Apply topically daily.  225 mL 2    fluticasone (FLONASE) 50 MCG/ACT nasal spray 1 spray by Each Nare route daily as needed for Rhinitis      labetalol (NORMODYNE) 100 MG tablet Take 1 tablet by mouth every 12 hours 60 tablet 3    furosemide (LASIX) 80 MG tablet Take 1 tablet by mouth 2 times daily 60 tablet 3    famotidine (PEPCID) 10 MG tablet Take 10 mg by mouth 2 times daily      doxazosin (CARDURA) 8 MG tablet TAKE 1 TABLET BY MOUTH EVERY NIGHT AT BEDTIME FOR BLOOD PRESSURE 90 tablet 3    simvastatin (ZOCOR) 40 MG tablet TAKE 1 TABLET BY MOUTH EVERY EVENING 90 tablet 0    escitalopram (LEXAPRO) 10 MG tablet TAKE 1 TABLET BY MOUTH DAILY 90 tablet 3    VENTOLIN  (90 Base) MCG/ACT inhaler INHALE 2 PUFFS INTO THE LUNGS EVERY 6 HOURS AS NEEDED FOR WHEEZING 1 Inhaler 5    isosorbide mononitrate (IMDUR) 30 MG extended release tablet TAKE 1 TABLET BY MOUTH TWICE DAILY FOR BLOOD PRESSURE 180 tablet 0    BREO ELLIPTA 200-25 MCG/INH AEPB INHALE 1 PUFF INTO THE LUNGS DAILY (Patient taking differently: Inhale 1 puff into the lungs nightly ) 1 each 5    Nebulizers (VIOS LC SPRINT) MISC USE UTD  0    Compression Stockings MISC by Does not apply route 15-20 mmHg knee high 1 each 0    Respiratory Therapy Supplies (NEBULIZER/TUBING/MOUTHPIECE) KIT 1 kit by Does not apply route daily as needed (sob) 1 kit 0    albuterol (PROVENTIL) (2.5 MG/3ML) 0.083% nebulizer solution Take 3 mLs by nebulization every 6 hours as needed for Wheezing 120 vial 3    nitroGLYCERIN (NITROSTAT) 0.4 MG SL tablet Place 1 tablet under the tongue every 5 minutes as needed for Chest pain (chest pain) 25 tablet 3    tiotropium (SPIRIVA) 18 MCG inhalation capsule One inhalation daily for breathing (Patient taking differently: nightly One inhalation daily for breathing) 90 capsule 5    Spacer/Aero-Holding Chambers KOSTA 1 Device by Does not apply route daily as needed (with Albuterol inhaler) 1 Device 0    Cholecalciferol (VITAMIN D-3) 5000 UNITS TABS Take 5,000 Units by mouth daily       aspirin 81 MG EC tablet Take 81 mg by mouth nightly        No current facility-administered medications for this visit. Data Reviewed:   CBC and Renal reviewed  Last CBC  Lab Results   Component Value Date    WBC 4.4 05/16/2019    RBC 4.04 05/16/2019    HGB 11.7 05/16/2019    MCV 89.0 05/16/2019     05/16/2019     Last Renal  Lab Results   Component Value Date     05/24/2019    K 5.1 05/24/2019    K 4.1 05/14/2019    CL 89 05/24/2019    CO2 40 05/24/2019    CO2 40 05/16/2019    CO2 40 05/15/2019    BUN 35 05/24/2019    CREATININE 1.8 05/24/2019    GLUCOSE 92 05/24/2019    CALCIUM 9.9 05/24/2019       Last ABG  POC Blood Gas: No results found for: POCPH, POCPCO2, POCPO2, POCHCO3, NBEA, PHGH4BYI  No results for input(s): PH, PCO2, PO2, HCO3, BE, O2SAT in the last 72 hours.          Assessment:     ·  ROSENDO - stopped using   · Hypersomnia  · Chronic resp failure  · COPD, severe    Plan:           Problem List Items Addressed This Visit     COPD, moderate ([address]     Continue Breo and Spiriva          Chronic respiratory failure with hypoxia and hypercapnia (HCC)     Improved and close to baseline 3-4 L NC. Likely improve further with diuresis. Bilateral pleural effusion     status post thoracentesis consistent with CHF. Fluid status improving with lasix.

## 2019-06-11 ENCOUNTER — CARE COORDINATION (OUTPATIENT)
Dept: INTERNAL MEDICINE CLINIC | Age: 84
End: 2019-06-11

## 2019-06-13 PROBLEM — R77.8 ELEVATED TROPONIN: Status: RESOLVED | Noted: 2018-03-06 | Resolved: 2019-06-13

## 2019-06-13 PROBLEM — R79.89 ELEVATED TROPONIN: Status: RESOLVED | Noted: 2018-03-06 | Resolved: 2019-06-13

## 2019-06-24 DIAGNOSIS — J44.9 COPD, MODERATE (HCC): ICD-10-CM

## 2019-07-05 RX ORDER — ISOSORBIDE MONONITRATE 30 MG/1
TABLET, EXTENDED RELEASE ORAL
Qty: 180 TABLET | Refills: 0 | Status: SHIPPED | OUTPATIENT
Start: 2019-07-05 | End: 2019-10-12 | Stop reason: SDUPTHER

## 2019-07-08 RX ORDER — GABAPENTIN 300 MG/1
CAPSULE ORAL
Qty: 90 CAPSULE | Refills: 0 | Status: SHIPPED | OUTPATIENT
Start: 2019-07-08 | End: 2019-08-04 | Stop reason: SDUPTHER

## 2019-07-09 ENCOUNTER — OFFICE VISIT (OUTPATIENT)
Dept: FAMILY MEDICINE CLINIC | Age: 84
End: 2019-07-09
Payer: MEDICARE

## 2019-07-09 VITALS
HEART RATE: 88 BPM | BODY MASS INDEX: 38.8 KG/M2 | HEIGHT: 69 IN | OXYGEN SATURATION: 92 % | SYSTOLIC BLOOD PRESSURE: 128 MMHG | RESPIRATION RATE: 22 BRPM | WEIGHT: 262 LBS | DIASTOLIC BLOOD PRESSURE: 72 MMHG

## 2019-07-09 DIAGNOSIS — E11.69 DIABETES MELLITUS TYPE 2 IN OBESE (HCC): ICD-10-CM

## 2019-07-09 DIAGNOSIS — J43.2 CENTRILOBULAR EMPHYSEMA (HCC): ICD-10-CM

## 2019-07-09 DIAGNOSIS — E66.9 DIABETES MELLITUS TYPE 2 IN OBESE (HCC): ICD-10-CM

## 2019-07-09 DIAGNOSIS — N18.30 STAGE 3 CHRONIC KIDNEY DISEASE (HCC): Primary | ICD-10-CM

## 2019-07-09 DIAGNOSIS — I50.9 CONGESTIVE HEART FAILURE, UNSPECIFIED HF CHRONICITY, UNSPECIFIED HEART FAILURE TYPE (HCC): ICD-10-CM

## 2019-07-09 PROCEDURE — 1123F ACP DISCUSS/DSCN MKR DOCD: CPT | Performed by: FAMILY MEDICINE

## 2019-07-09 PROCEDURE — G8926 SPIRO NO PERF OR DOC: HCPCS | Performed by: FAMILY MEDICINE

## 2019-07-09 PROCEDURE — G8417 CALC BMI ABV UP PARAM F/U: HCPCS | Performed by: FAMILY MEDICINE

## 2019-07-09 PROCEDURE — G8598 ASA/ANTIPLAT THER USED: HCPCS | Performed by: FAMILY MEDICINE

## 2019-07-09 PROCEDURE — 99214 OFFICE O/P EST MOD 30 MIN: CPT | Performed by: FAMILY MEDICINE

## 2019-07-09 PROCEDURE — G8427 DOCREV CUR MEDS BY ELIG CLIN: HCPCS | Performed by: FAMILY MEDICINE

## 2019-07-09 PROCEDURE — 1036F TOBACCO NON-USER: CPT | Performed by: FAMILY MEDICINE

## 2019-07-09 PROCEDURE — 4040F PNEUMOC VAC/ADMIN/RCVD: CPT | Performed by: FAMILY MEDICINE

## 2019-07-09 PROCEDURE — 3023F SPIROM DOC REV: CPT | Performed by: FAMILY MEDICINE

## 2019-07-09 NOTE — PROGRESS NOTES
Subjective:      Patient ID: Carolyne Lamar is a 81 y.o. male. HPI  Chief Complaint   Patient presents with    Diabetes     DM ROUTINE FOLLOW UP TOO SOON FOR AIC    COPD     COPD ROUTINE FOLLOW UP      BP Readings from Last 3 Encounters:   07/09/19 128/72   06/03/19 123/69   05/24/19 120/72     Pulse Readings from Last 3 Encounters:   07/09/19 88   06/03/19 87   05/24/19 78     Wt Readings from Last 3 Encounters:   07/09/19 262 lb (118.8 kg)   06/03/19 259 lb 3.2 oz (117.6 kg)   05/24/19 260 lb (117.9 kg)   5/15 6.0 a1c  In hospital w/ sob/ had thoracentesis in may - on lasix - seen by pulm after hospital stay  Chronic hypoxemia on 3-4L oxygen daily - on breo/ spiriva  Current Outpatient Medications   Medication Sig Dispense Refill    gabapentin (NEURONTIN) 300 MG capsule TAKE 1 CAPSULE BY MOUTH THREE TIMES DAILY 90 capsule 0    isosorbide mononitrate (IMDUR) 30 MG extended release tablet TAKE 1 TABLET BY MOUTH TWICE DAILY FOR BLOOD PRESSURE 180 tablet 0    tiotropium (SPIRIVA HANDIHALER) 18 MCG inhalation capsule INHALE THE CONTENTS OF 1 CAPSULE VIA INHALATION DEVICE DAILY FOR BREATHING 90 capsule 3    ammonium lactate (LAC-HYDRIN) 12 % lotion Apply topically daily.  225 mL 2    fluticasone (FLONASE) 50 MCG/ACT nasal spray 1 spray by Each Nare route daily as needed for Rhinitis      labetalol (NORMODYNE) 100 MG tablet Take 1 tablet by mouth every 12 hours 60 tablet 3    furosemide (LASIX) 80 MG tablet Take 1 tablet by mouth 2 times daily 60 tablet 3    famotidine (PEPCID) 10 MG tablet Take 10 mg by mouth 2 times daily      doxazosin (CARDURA) 8 MG tablet TAKE 1 TABLET BY MOUTH EVERY NIGHT AT BEDTIME FOR BLOOD PRESSURE 90 tablet 3    simvastatin (ZOCOR) 40 MG tablet TAKE 1 TABLET BY MOUTH EVERY EVENING 90 tablet 0    escitalopram (LEXAPRO) 10 MG tablet TAKE 1 TABLET BY MOUTH DAILY 90 tablet 3    VENTOLIN  (90 Base) MCG/ACT inhaler INHALE 2 PUFFS INTO THE LUNGS EVERY 6 HOURS AS NEEDED FOR

## 2019-07-11 ENCOUNTER — CARE COORDINATION (OUTPATIENT)
Dept: CARE COORDINATION | Age: 84
End: 2019-07-11

## 2019-07-11 NOTE — CARE COORDINATION
,   COPD Assessment    Does the patient understand envrionmental exposure?:  Yes  Is the patient able to verbalize Rescue vs. Long Acting medications?:  Yes  Does the patient have a nebulizer?:  No  Does the patient use a space with inhaled medications?:  Yes     No patient-reported symptoms         Symptoms:          and   General Assessment    Do you have any symptoms that are causing concern?:  No

## 2019-07-17 RX ORDER — SIMVASTATIN 40 MG
TABLET ORAL
Qty: 90 TABLET | Refills: 0 | Status: SHIPPED | OUTPATIENT
Start: 2019-07-17 | End: 2019-11-12 | Stop reason: SDUPTHER

## 2019-08-05 RX ORDER — GABAPENTIN 300 MG/1
CAPSULE ORAL
Qty: 90 CAPSULE | Refills: 0 | Status: SHIPPED | OUTPATIENT
Start: 2019-08-05 | End: 2019-09-09 | Stop reason: SDUPTHER

## 2019-08-08 ENCOUNTER — OFFICE VISIT (OUTPATIENT)
Dept: PULMONOLOGY | Age: 84
End: 2019-08-08
Payer: MEDICARE

## 2019-08-08 VITALS
OXYGEN SATURATION: 95 % | RESPIRATION RATE: 16 BRPM | DIASTOLIC BLOOD PRESSURE: 66 MMHG | BODY MASS INDEX: 40.14 KG/M2 | WEIGHT: 271 LBS | SYSTOLIC BLOOD PRESSURE: 130 MMHG | TEMPERATURE: 98.3 F | HEART RATE: 80 BPM | HEIGHT: 69 IN

## 2019-08-08 DIAGNOSIS — J44.9 COPD, SEVERE (HCC): Primary | ICD-10-CM

## 2019-08-08 DIAGNOSIS — J96.11 CHRONIC RESPIRATORY FAILURE WITH HYPOXIA AND HYPERCAPNIA (HCC): ICD-10-CM

## 2019-08-08 DIAGNOSIS — J44.9 COPD, MODERATE (HCC): ICD-10-CM

## 2019-08-08 DIAGNOSIS — J96.12 CHRONIC RESPIRATORY FAILURE WITH HYPOXIA AND HYPERCAPNIA (HCC): ICD-10-CM

## 2019-08-08 PROCEDURE — 4040F PNEUMOC VAC/ADMIN/RCVD: CPT | Performed by: INTERNAL MEDICINE

## 2019-08-08 PROCEDURE — G8417 CALC BMI ABV UP PARAM F/U: HCPCS | Performed by: INTERNAL MEDICINE

## 2019-08-08 PROCEDURE — 99213 OFFICE O/P EST LOW 20 MIN: CPT | Performed by: INTERNAL MEDICINE

## 2019-08-08 PROCEDURE — 3023F SPIROM DOC REV: CPT | Performed by: INTERNAL MEDICINE

## 2019-08-08 PROCEDURE — G8598 ASA/ANTIPLAT THER USED: HCPCS | Performed by: INTERNAL MEDICINE

## 2019-08-08 PROCEDURE — 1036F TOBACCO NON-USER: CPT | Performed by: INTERNAL MEDICINE

## 2019-08-08 PROCEDURE — G8926 SPIRO NO PERF OR DOC: HCPCS | Performed by: INTERNAL MEDICINE

## 2019-08-08 PROCEDURE — 1123F ACP DISCUSS/DSCN MKR DOCD: CPT | Performed by: INTERNAL MEDICINE

## 2019-08-08 PROCEDURE — G8427 DOCREV CUR MEDS BY ELIG CLIN: HCPCS | Performed by: INTERNAL MEDICINE

## 2019-08-08 RX ORDER — PREDNISONE 10 MG/1
TABLET ORAL
Qty: 30 TABLET | Refills: 0 | Status: SHIPPED | OUTPATIENT
Start: 2019-08-08 | End: 2019-08-18

## 2019-08-08 NOTE — PROGRESS NOTES
REASON FOR CONSULTATION/CC: rosendo         PCP: Galileo Beatty MD    HISTORY OF PRESENT ILLNESS: Gurvinder Freeman is a 81y.o. year old male with a history of ROSENDO who presents :                    Copd  Breo and Spiriva  Doing well . Going on vacation to [place]. chronic hypoxemia    Continue to need and benefit from 3-4 L NC. PAST MEDICAL HISTORY:  Past Medical History:   Diagnosis Date    Atherosclerosis of aorta ([address]     CHF (congestive heart failure) (HCC)     CKD (chronic kidney disease) stage 3, GFR 30-59 ml/min (HCC)     COPD (chronic obstructive pulmonary disease) ([address]     CVA, old, ataxia 2007    DM type 2 with diabetic peripheral neuropathy ([address] 10/23/2014    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 2008    Fall 9/2015    Fatty liver     Hypertension     Mixed hyperlipidemia     Morbid obesity ([address]     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 ([address] 2008    Obesity, diabetes, and hypertension syndrome ([address] August, 2015    Psoriasis     Risk for falls     Type 2 diabetes mellitus with microalbuminuria or microproteinuria     Vitamin D deficiency 2011       PAST SURGICAL HISTORY:  Past Surgical History:   Procedure Laterality Date    CATARACT REMOVAL Right 7/3/13    Kailyn Sizer    TURP  8/14/12    Teja       FAMILY HISTORY:  family history is not on file. SOCIAL HISTORY:   reports that he quit smoking about 12 years ago. His smoking use included cigarettes. He started smoking about 68 years ago. He has a 100.00 pack-year smoking history. He has never used smokeless tobacco.      ALLERGIES:  Patient has No Known Allergies.     REVIEW OF SYSTEMS:  Constitutional: Negative for fever   HENT: Negative for sore throat  Respiratory: Negative for dyspnea, cough  Cardiovascular: Negative for chest pain  Gastrointestinal: Negative for vomiting, diarrhea   Skin: Negative for rash  Psychiatric/Behavorial: Negative for anxiety     Objective:   PHYSICAL EXAM:  Blood pressure 130/66, pulse 80, temperature 98.3 °F (36.8 °C), temperature source Oral, resp. rate 16, height 5' 9\" (1.753 m), weight 271 lb (122.9 kg), SpO2 95 %.'  Gen: No distress. ENT:   Resp: No accessory muscle use. No crackles. No wheezes. No rhonchi. CV: Regular rate. Regular rhythm. No murmur or rub. No edema. Skin: Warm, dry, normal texture and turgor. No nodule on exposed extremities. M/S: No cyanosis. No clubbing. No joint deformity. Psych: Oriented x 3. No anxiety. Awake. Alert. Intact judgement and insight. Good Mood / Affect. Memory appears in tact     Current Outpatient Medications   Medication Sig Dispense Refill    predniSONE (DELTASONE) 10 MG tablet Take 40 mg by mouth for 3 days 30 mg for 3 days 20 mg for 3 days 10 mg for 3 days.  30 tablet 0    gabapentin (NEURONTIN) 300 MG capsule TAKE 1 CAPSULE BY MOUTH THREE TIMES DAILY 90 capsule 0    simvastatin (ZOCOR) 40 MG tablet TAKE 1 TABLET BY MOUTH EVERY EVENING 90 tablet 0    isosorbide mononitrate (IMDUR) 30 MG extended release tablet TAKE 1 TABLET BY MOUTH TWICE DAILY FOR BLOOD PRESSURE 180 tablet 0    tiotropium (SPIRIVA HANDIHALER) 18 MCG inhalation capsule INHALE THE CONTENTS OF 1 CAPSULE VIA INHALATION DEVICE DAILY FOR BREATHING 90 capsule 3    fluticasone (FLONASE) 50 MCG/ACT nasal spray 1 spray by Each Nare route daily as needed for Rhinitis      labetalol (NORMODYNE) 100 MG tablet Take 1 tablet by mouth every 12 hours 60 tablet 3    furosemide (LASIX) 80 MG tablet Take 1 tablet by mouth 2 times daily 60 tablet 3    famotidine (PEPCID) 10 MG tablet Take 10 mg by mouth 2 times daily      doxazosin (CARDURA) 8 MG tablet TAKE 1 TABLET BY MOUTH EVERY NIGHT AT BEDTIME FOR BLOOD PRESSURE 90 tablet 3    escitalopram (LEXAPRO) 10 MG tablet TAKE 1 TABLET BY MOUTH DAILY 90 tablet 3    VENTOLIN  (90 Base) MCG/ACT inhaler INHALE 2 PUFFS INTO THE LUNGS EVERY 6 HOURS AS NEEDED FOR WHEEZING 1 Inhaler 5    BREO ELLIPTA 200-25

## 2019-08-12 ENCOUNTER — CARE COORDINATION (OUTPATIENT)
Dept: CARE COORDINATION | Age: 84
End: 2019-08-12

## 2019-08-12 NOTE — CARE COORDINATION
Care coordination follow up call attempt to patient. No answer or voice mail, unable to leave a message.

## 2019-08-19 ENCOUNTER — CARE COORDINATION (OUTPATIENT)
Dept: CARE COORDINATION | Age: 84
End: 2019-08-19

## 2019-10-14 RX ORDER — ISOSORBIDE MONONITRATE 30 MG/1
TABLET, EXTENDED RELEASE ORAL
Qty: 180 TABLET | Refills: 0 | Status: SHIPPED | OUTPATIENT
Start: 2019-10-14 | End: 2019-11-01 | Stop reason: SDUPTHER

## 2019-10-16 DIAGNOSIS — J44.9 COPD, SEVERE (HCC): ICD-10-CM

## 2019-11-01 RX ORDER — ISOSORBIDE MONONITRATE 30 MG/1
TABLET, EXTENDED RELEASE ORAL
Qty: 180 TABLET | Refills: 0 | Status: SHIPPED | OUTPATIENT
Start: 2019-11-01 | End: 2020-04-23 | Stop reason: SDUPTHER

## 2019-11-07 ENCOUNTER — OFFICE VISIT (OUTPATIENT)
Dept: PULMONOLOGY | Age: 84
End: 2019-11-07
Payer: MEDICARE

## 2019-11-07 VITALS
WEIGHT: 277 LBS | TEMPERATURE: 97.9 F | SYSTOLIC BLOOD PRESSURE: 134 MMHG | DIASTOLIC BLOOD PRESSURE: 82 MMHG | HEIGHT: 69 IN | BODY MASS INDEX: 41.03 KG/M2 | OXYGEN SATURATION: 96 % | HEART RATE: 94 BPM | RESPIRATION RATE: 16 BRPM

## 2019-11-07 DIAGNOSIS — J96.11 CHRONIC RESPIRATORY FAILURE WITH HYPOXIA AND HYPERCAPNIA (HCC): ICD-10-CM

## 2019-11-07 DIAGNOSIS — J44.9 COPD, MODERATE (HCC): Primary | ICD-10-CM

## 2019-11-07 DIAGNOSIS — J96.12 CHRONIC RESPIRATORY FAILURE WITH HYPOXIA AND HYPERCAPNIA (HCC): ICD-10-CM

## 2019-11-07 DIAGNOSIS — Z23 NEEDS FLU SHOT: ICD-10-CM

## 2019-11-07 PROCEDURE — G8926 SPIRO NO PERF OR DOC: HCPCS | Performed by: INTERNAL MEDICINE

## 2019-11-07 PROCEDURE — G8482 FLU IMMUNIZE ORDER/ADMIN: HCPCS | Performed by: INTERNAL MEDICINE

## 2019-11-07 PROCEDURE — 99213 OFFICE O/P EST LOW 20 MIN: CPT | Performed by: INTERNAL MEDICINE

## 2019-11-07 PROCEDURE — 1036F TOBACCO NON-USER: CPT | Performed by: INTERNAL MEDICINE

## 2019-11-07 PROCEDURE — 90653 IIV ADJUVANT VACCINE IM: CPT | Performed by: INTERNAL MEDICINE

## 2019-11-07 PROCEDURE — G8427 DOCREV CUR MEDS BY ELIG CLIN: HCPCS | Performed by: INTERNAL MEDICINE

## 2019-11-07 PROCEDURE — G0008 ADMIN INFLUENZA VIRUS VAC: HCPCS | Performed by: INTERNAL MEDICINE

## 2019-11-07 PROCEDURE — G8417 CALC BMI ABV UP PARAM F/U: HCPCS | Performed by: INTERNAL MEDICINE

## 2019-11-07 PROCEDURE — 4040F PNEUMOC VAC/ADMIN/RCVD: CPT | Performed by: INTERNAL MEDICINE

## 2019-11-07 PROCEDURE — 1123F ACP DISCUSS/DSCN MKR DOCD: CPT | Performed by: INTERNAL MEDICINE

## 2019-11-07 PROCEDURE — 3023F SPIROM DOC REV: CPT | Performed by: INTERNAL MEDICINE

## 2019-11-07 PROCEDURE — G8598 ASA/ANTIPLAT THER USED: HCPCS | Performed by: INTERNAL MEDICINE

## 2019-11-12 RX ORDER — SIMVASTATIN 40 MG
TABLET ORAL
Qty: 90 TABLET | Refills: 1 | Status: SHIPPED | OUTPATIENT
Start: 2019-11-12 | End: 2020-04-29

## 2019-11-29 ENCOUNTER — APPOINTMENT (OUTPATIENT)
Dept: GENERAL RADIOLOGY | Age: 84
DRG: 190 | End: 2019-11-29
Payer: MEDICARE

## 2019-11-29 ENCOUNTER — HOSPITAL ENCOUNTER (INPATIENT)
Age: 84
LOS: 4 days | Discharge: HOME | DRG: 190 | End: 2019-12-03
Attending: EMERGENCY MEDICINE | Admitting: HOSPITALIST
Payer: MEDICARE

## 2019-11-29 DIAGNOSIS — J18.9 PNEUMONIA DUE TO ORGANISM: Primary | ICD-10-CM

## 2019-11-29 DIAGNOSIS — I48.91 ATRIAL FIBRILLATION, UNSPECIFIED TYPE (HCC): ICD-10-CM

## 2019-11-29 DIAGNOSIS — S93.402A SPRAIN OF LEFT ANKLE, UNSPECIFIED LIGAMENT, INITIAL ENCOUNTER: ICD-10-CM

## 2019-11-29 LAB
A/G RATIO: 1.2 (ref 1.1–2.2)
ALBUMIN SERPL-MCNC: 3.9 G/DL (ref 3.4–5)
ALP BLD-CCNC: 54 U/L (ref 40–129)
ALT SERPL-CCNC: 12 U/L (ref 10–40)
ANION GAP SERPL CALCULATED.3IONS-SCNC: 15 MMOL/L (ref 3–16)
AST SERPL-CCNC: 23 U/L (ref 15–37)
BASOPHILS ABSOLUTE: 0 K/UL (ref 0–0.2)
BASOPHILS RELATIVE PERCENT: 0.5 %
BILIRUB SERPL-MCNC: 0.5 MG/DL (ref 0–1)
BUN BLDV-MCNC: 15 MG/DL (ref 7–20)
CALCIUM SERPL-MCNC: 9.2 MG/DL (ref 8.3–10.6)
CHLORIDE BLD-SCNC: 93 MMOL/L (ref 99–110)
CO2: 31 MMOL/L (ref 21–32)
CREAT SERPL-MCNC: 1.5 MG/DL (ref 0.8–1.3)
EOSINOPHILS ABSOLUTE: 0.2 K/UL (ref 0–0.6)
EOSINOPHILS RELATIVE PERCENT: 2.5 %
GFR AFRICAN AMERICAN: 54
GFR NON-AFRICAN AMERICAN: 45
GLOBULIN: 3.3 G/DL
GLUCOSE BLD-MCNC: 170 MG/DL (ref 70–99)
HCT VFR BLD CALC: 39 % (ref 40.5–52.5)
HEMOGLOBIN: 13.1 G/DL (ref 13.5–17.5)
LACTIC ACID, SEPSIS: 1.4 MMOL/L (ref 0.4–1.9)
LACTIC ACID, SEPSIS: 2.1 MMOL/L (ref 0.4–1.9)
LYMPHOCYTES ABSOLUTE: 0.8 K/UL (ref 1–5.1)
LYMPHOCYTES RELATIVE PERCENT: 12.2 %
MCH RBC QN AUTO: 29.9 PG (ref 26–34)
MCHC RBC AUTO-ENTMCNC: 33.6 G/DL (ref 31–36)
MCV RBC AUTO: 88.7 FL (ref 80–100)
MONOCYTES ABSOLUTE: 0.9 K/UL (ref 0–1.3)
MONOCYTES RELATIVE PERCENT: 13.5 %
NEUTROPHILS ABSOLUTE: 4.9 K/UL (ref 1.7–7.7)
NEUTROPHILS RELATIVE PERCENT: 71.3 %
PDW BLD-RTO: 14.2 % (ref 12.4–15.4)
PLATELET # BLD: 343 K/UL (ref 135–450)
PMV BLD AUTO: 7.1 FL (ref 5–10.5)
POTASSIUM REFLEX MAGNESIUM: 4.6 MMOL/L (ref 3.5–5.1)
PRO-BNP: 1541 PG/ML (ref 0–449)
RAPID INFLUENZA  B AGN: NEGATIVE
RAPID INFLUENZA A AGN: NEGATIVE
RBC # BLD: 4.39 M/UL (ref 4.2–5.9)
SODIUM BLD-SCNC: 139 MMOL/L (ref 136–145)
TOTAL PROTEIN: 7.2 G/DL (ref 6.4–8.2)
TROPONIN: 0.05 NG/ML
WBC # BLD: 6.9 K/UL (ref 4–11)

## 2019-11-29 PROCEDURE — 94760 N-INVAS EAR/PLS OXIMETRY 1: CPT

## 2019-11-29 PROCEDURE — 96365 THER/PROPH/DIAG IV INF INIT: CPT

## 2019-11-29 PROCEDURE — 6370000000 HC RX 637 (ALT 250 FOR IP): Performed by: EMERGENCY MEDICINE

## 2019-11-29 PROCEDURE — 73610 X-RAY EXAM OF ANKLE: CPT

## 2019-11-29 PROCEDURE — 94640 AIRWAY INHALATION TREATMENT: CPT

## 2019-11-29 PROCEDURE — 36415 COLL VENOUS BLD VENIPUNCTURE: CPT

## 2019-11-29 PROCEDURE — 6360000002 HC RX W HCPCS: Performed by: EMERGENCY MEDICINE

## 2019-11-29 PROCEDURE — 2580000003 HC RX 258: Performed by: EMERGENCY MEDICINE

## 2019-11-29 PROCEDURE — 2060000000 HC ICU INTERMEDIATE R&B

## 2019-11-29 PROCEDURE — 6370000000 HC RX 637 (ALT 250 FOR IP): Performed by: HOSPITALIST

## 2019-11-29 PROCEDURE — 99285 EMERGENCY DEPT VISIT HI MDM: CPT

## 2019-11-29 PROCEDURE — 84484 ASSAY OF TROPONIN QUANT: CPT

## 2019-11-29 PROCEDURE — 96367 TX/PROPH/DG ADDL SEQ IV INF: CPT

## 2019-11-29 PROCEDURE — 85025 COMPLETE CBC W/AUTO DIFF WBC: CPT

## 2019-11-29 PROCEDURE — 80053 COMPREHEN METABOLIC PANEL: CPT

## 2019-11-29 PROCEDURE — 83880 ASSAY OF NATRIURETIC PEPTIDE: CPT

## 2019-11-29 PROCEDURE — 93005 ELECTROCARDIOGRAM TRACING: CPT | Performed by: EMERGENCY MEDICINE

## 2019-11-29 PROCEDURE — 71046 X-RAY EXAM CHEST 2 VIEWS: CPT

## 2019-11-29 PROCEDURE — 83605 ASSAY OF LACTIC ACID: CPT

## 2019-11-29 PROCEDURE — 87040 BLOOD CULTURE FOR BACTERIA: CPT

## 2019-11-29 PROCEDURE — 2580000003 HC RX 258: Performed by: HOSPITALIST

## 2019-11-29 PROCEDURE — 6360000002 HC RX W HCPCS: Performed by: HOSPITALIST

## 2019-11-29 PROCEDURE — 87804 INFLUENZA ASSAY W/OPTIC: CPT

## 2019-11-29 RX ORDER — ALBUTEROL SULFATE 2.5 MG/3ML
5 SOLUTION RESPIRATORY (INHALATION) ONCE
Status: COMPLETED | OUTPATIENT
Start: 2019-11-29 | End: 2019-11-29

## 2019-11-29 RX ORDER — METHYLPREDNISOLONE SODIUM SUCCINATE 125 MG/2ML
125 INJECTION, POWDER, LYOPHILIZED, FOR SOLUTION INTRAMUSCULAR; INTRAVENOUS ONCE
Status: COMPLETED | OUTPATIENT
Start: 2019-11-29 | End: 2019-11-29

## 2019-11-29 RX ORDER — ALBUTEROL SULFATE 2.5 MG/3ML
2.5 SOLUTION RESPIRATORY (INHALATION)
Status: CANCELLED | OUTPATIENT
Start: 2019-11-29

## 2019-11-29 RX ORDER — FUROSEMIDE 40 MG/1
80 TABLET ORAL 2 TIMES DAILY
Status: DISCONTINUED | OUTPATIENT
Start: 2019-11-29 | End: 2019-11-29

## 2019-11-29 RX ORDER — IPRATROPIUM BROMIDE AND ALBUTEROL SULFATE 2.5; .5 MG/3ML; MG/3ML
1 SOLUTION RESPIRATORY (INHALATION)
Status: DISCONTINUED | OUTPATIENT
Start: 2019-11-30 | End: 2019-12-03 | Stop reason: HOSPADM

## 2019-11-29 RX ORDER — ISOSORBIDE MONONITRATE 30 MG/1
30 TABLET, EXTENDED RELEASE ORAL 2 TIMES DAILY
Status: DISCONTINUED | OUTPATIENT
Start: 2019-11-29 | End: 2019-12-03 | Stop reason: HOSPADM

## 2019-11-29 RX ORDER — ASPIRIN 81 MG/1
81 TABLET ORAL NIGHTLY
Status: DISCONTINUED | OUTPATIENT
Start: 2019-11-29 | End: 2019-12-03 | Stop reason: HOSPADM

## 2019-11-29 RX ORDER — SIMVASTATIN 40 MG
40 TABLET ORAL NIGHTLY
Status: DISCONTINUED | OUTPATIENT
Start: 2019-11-29 | End: 2019-12-03 | Stop reason: HOSPADM

## 2019-11-29 RX ORDER — ACETAMINOPHEN 325 MG/1
650 TABLET ORAL ONCE
Status: COMPLETED | OUTPATIENT
Start: 2019-11-29 | End: 2019-11-29

## 2019-11-29 RX ORDER — DOXAZOSIN MESYLATE 4 MG/1
8 TABLET ORAL NIGHTLY
Status: DISCONTINUED | OUTPATIENT
Start: 2019-11-29 | End: 2019-12-03 | Stop reason: HOSPADM

## 2019-11-29 RX ORDER — SODIUM CHLORIDE 0.9 % (FLUSH) 0.9 %
10 SYRINGE (ML) INJECTION PRN
Status: DISCONTINUED | OUTPATIENT
Start: 2019-11-29 | End: 2019-12-03 | Stop reason: HOSPADM

## 2019-11-29 RX ORDER — FAMOTIDINE 20 MG/1
20 TABLET, FILM COATED ORAL 2 TIMES DAILY
Status: CANCELLED | OUTPATIENT
Start: 2019-11-29

## 2019-11-29 RX ORDER — FLUTICASONE PROPIONATE 50 MCG
1 SPRAY, SUSPENSION (ML) NASAL DAILY PRN
Status: DISCONTINUED | OUTPATIENT
Start: 2019-11-29 | End: 2019-12-03 | Stop reason: HOSPADM

## 2019-11-29 RX ORDER — NITROGLYCERIN 0.4 MG/1
0.4 TABLET SUBLINGUAL EVERY 5 MIN PRN
Status: DISCONTINUED | OUTPATIENT
Start: 2019-11-29 | End: 2019-12-03 | Stop reason: HOSPADM

## 2019-11-29 RX ORDER — ALBUTEROL SULFATE 2.5 MG/3ML
2.5 SOLUTION RESPIRATORY (INHALATION) EVERY 6 HOURS PRN
Status: CANCELLED | OUTPATIENT
Start: 2019-11-29

## 2019-11-29 RX ORDER — ESCITALOPRAM OXALATE 10 MG/1
10 TABLET ORAL NIGHTLY
Status: DISCONTINUED | OUTPATIENT
Start: 2019-11-29 | End: 2019-12-03 | Stop reason: HOSPADM

## 2019-11-29 RX ORDER — SODIUM CHLORIDE 9 MG/ML
INJECTION, SOLUTION INTRAVENOUS CONTINUOUS
Status: DISCONTINUED | OUTPATIENT
Start: 2019-11-29 | End: 2019-11-30

## 2019-11-29 RX ORDER — FAMOTIDINE 20 MG/1
10 TABLET, FILM COATED ORAL 2 TIMES DAILY
Status: DISCONTINUED | OUTPATIENT
Start: 2019-11-29 | End: 2019-12-03 | Stop reason: HOSPADM

## 2019-11-29 RX ORDER — ISOSORBIDE MONONITRATE 30 MG/1
30 TABLET, EXTENDED RELEASE ORAL DAILY
Status: DISCONTINUED | OUTPATIENT
Start: 2019-11-29 | End: 2019-11-29

## 2019-11-29 RX ORDER — METHYLPREDNISOLONE SODIUM SUCCINATE 40 MG/ML
40 INJECTION, POWDER, LYOPHILIZED, FOR SOLUTION INTRAMUSCULAR; INTRAVENOUS EVERY 8 HOURS
Status: DISCONTINUED | OUTPATIENT
Start: 2019-11-29 | End: 2019-12-01

## 2019-11-29 RX ORDER — LABETALOL 100 MG/1
100 TABLET, FILM COATED ORAL EVERY 12 HOURS SCHEDULED
Status: DISCONTINUED | OUTPATIENT
Start: 2019-11-29 | End: 2019-12-03 | Stop reason: HOSPADM

## 2019-11-29 RX ORDER — ONDANSETRON 2 MG/ML
4 INJECTION INTRAMUSCULAR; INTRAVENOUS EVERY 6 HOURS PRN
Status: DISCONTINUED | OUTPATIENT
Start: 2019-11-29 | End: 2019-12-03 | Stop reason: HOSPADM

## 2019-11-29 RX ORDER — IPRATROPIUM BROMIDE AND ALBUTEROL SULFATE 2.5; .5 MG/3ML; MG/3ML
1 SOLUTION RESPIRATORY (INHALATION) ONCE
Status: COMPLETED | OUTPATIENT
Start: 2019-11-29 | End: 2019-11-29

## 2019-11-29 RX ORDER — SODIUM CHLORIDE 0.9 % (FLUSH) 0.9 %
10 SYRINGE (ML) INJECTION EVERY 12 HOURS SCHEDULED
Status: DISCONTINUED | OUTPATIENT
Start: 2019-11-29 | End: 2019-12-03 | Stop reason: HOSPADM

## 2019-11-29 RX ADMIN — CHOLECALCIFEROL CAP 125 MCG (5000 UNIT) 5000 UNITS: 125 CAP at 19:39

## 2019-11-29 RX ADMIN — MOMETASONE FUROATE AND FORMOTEROL FUMARATE DIHYDRATE 2 PUFF: 200; 5 AEROSOL RESPIRATORY (INHALATION) at 19:49

## 2019-11-29 RX ADMIN — ENOXAPARIN SODIUM 120 MG: 120 INJECTION SUBCUTANEOUS at 19:39

## 2019-11-29 RX ADMIN — DOXAZOSIN 8 MG: 4 TABLET ORAL at 22:27

## 2019-11-29 RX ADMIN — ACETAMINOPHEN 650 MG: 325 TABLET ORAL at 16:59

## 2019-11-29 RX ADMIN — FAMOTIDINE 10 MG: 20 TABLET, FILM COATED ORAL at 22:27

## 2019-11-29 RX ADMIN — CEFTRIAXONE 1 G: 1 INJECTION, POWDER, FOR SOLUTION INTRAMUSCULAR; INTRAVENOUS at 16:00

## 2019-11-29 RX ADMIN — IPRATROPIUM BROMIDE AND ALBUTEROL SULFATE 1 AMPULE: 2.5; .5 SOLUTION RESPIRATORY (INHALATION) at 15:08

## 2019-11-29 RX ADMIN — METHYLPREDNISOLONE SODIUM SUCCINATE 125 MG: 125 INJECTION, POWDER, FOR SOLUTION INTRAMUSCULAR; INTRAVENOUS at 16:00

## 2019-11-29 RX ADMIN — LABETALOL HYDROCHLORIDE 100 MG: 100 TABLET, FILM COATED ORAL at 22:29

## 2019-11-29 RX ADMIN — ALBUTEROL SULFATE 5 MG: 2.5 SOLUTION RESPIRATORY (INHALATION) at 15:08

## 2019-11-29 RX ADMIN — SIMVASTATIN 40 MG: 40 TABLET, FILM COATED ORAL at 22:27

## 2019-11-29 RX ADMIN — ISOSORBIDE MONONITRATE 30 MG: 30 TABLET, EXTENDED RELEASE ORAL at 22:29

## 2019-11-29 RX ADMIN — SODIUM CHLORIDE: 9 INJECTION, SOLUTION INTRAVENOUS at 19:39

## 2019-11-29 RX ADMIN — ESCITALOPRAM OXALATE 10 MG: 10 TABLET ORAL at 22:29

## 2019-11-29 RX ADMIN — ASPIRIN 81 MG: 81 TABLET, COATED ORAL at 22:29

## 2019-11-29 RX ADMIN — AZITHROMYCIN MONOHYDRATE 500 MG: 500 INJECTION, POWDER, LYOPHILIZED, FOR SOLUTION INTRAVENOUS at 16:40

## 2019-11-29 ASSESSMENT — PAIN SCALES - GENERAL
PAINLEVEL_OUTOF10: 0

## 2019-11-30 ENCOUNTER — APPOINTMENT (OUTPATIENT)
Dept: GENERAL RADIOLOGY | Age: 84
DRG: 190 | End: 2019-11-30
Payer: MEDICARE

## 2019-11-30 LAB
ANION GAP SERPL CALCULATED.3IONS-SCNC: 17 MMOL/L (ref 3–16)
BUN BLDV-MCNC: 23 MG/DL (ref 7–20)
CALCIUM SERPL-MCNC: 8.8 MG/DL (ref 8.3–10.6)
CHLORIDE BLD-SCNC: 93 MMOL/L (ref 99–110)
CO2: 28 MMOL/L (ref 21–32)
CREAT SERPL-MCNC: 1.7 MG/DL (ref 0.8–1.3)
EKG ATRIAL RATE: 141 BPM
EKG DIAGNOSIS: NORMAL
EKG Q-T INTERVAL: 350 MS
EKG QRS DURATION: 114 MS
EKG QTC CALCULATION (BAZETT): 525 MS
EKG R AXIS: -65 DEGREES
EKG T AXIS: 32 DEGREES
EKG VENTRICULAR RATE: 135 BPM
GFR AFRICAN AMERICAN: 47
GFR NON-AFRICAN AMERICAN: 39
GLUCOSE BLD-MCNC: 172 MG/DL (ref 70–99)
GLUCOSE BLD-MCNC: 182 MG/DL (ref 70–99)
GLUCOSE BLD-MCNC: 208 MG/DL (ref 70–99)
GLUCOSE BLD-MCNC: 232 MG/DL (ref 70–99)
GLUCOSE BLD-MCNC: 303 MG/DL (ref 70–99)
HCT VFR BLD CALC: 35.8 % (ref 40.5–52.5)
HEMOGLOBIN: 12.2 G/DL (ref 13.5–17.5)
L. PNEUMOPHILA SEROGP 1 UR AG: NORMAL
MCH RBC QN AUTO: 30 PG (ref 26–34)
MCHC RBC AUTO-ENTMCNC: 34.1 G/DL (ref 31–36)
MCV RBC AUTO: 87.9 FL (ref 80–100)
PDW BLD-RTO: 14.2 % (ref 12.4–15.4)
PERFORMED ON: ABNORMAL
PLATELET # BLD: 383 K/UL (ref 135–450)
PMV BLD AUTO: 7.7 FL (ref 5–10.5)
POTASSIUM REFLEX MAGNESIUM: 4 MMOL/L (ref 3.5–5.1)
PROCALCITONIN: 0.16 NG/ML (ref 0–0.15)
RBC # BLD: 4.08 M/UL (ref 4.2–5.9)
SODIUM BLD-SCNC: 138 MMOL/L (ref 136–145)
STREP PNEUMONIAE ANTIGEN, URINE: ABNORMAL
URIC ACID, SERUM: 11 MG/DL (ref 3.5–7.2)
WBC # BLD: 6.9 K/UL (ref 4–11)

## 2019-11-30 PROCEDURE — 87280 RESPIRATORY SYNCYTIAL AG IF: CPT

## 2019-11-30 PROCEDURE — 2580000003 HC RX 258: Performed by: HOSPITALIST

## 2019-11-30 PROCEDURE — 2060000000 HC ICU INTERMEDIATE R&B

## 2019-11-30 PROCEDURE — 87299 ANTIBODY DETECTION NOS IF: CPT

## 2019-11-30 PROCEDURE — 93010 ELECTROCARDIOGRAM REPORT: CPT | Performed by: INTERNAL MEDICINE

## 2019-11-30 PROCEDURE — 87449 NOS EACH ORGANISM AG IA: CPT

## 2019-11-30 PROCEDURE — 2500000003 HC RX 250 WO HCPCS: Performed by: FAMILY MEDICINE

## 2019-11-30 PROCEDURE — 80048 BASIC METABOLIC PNL TOTAL CA: CPT

## 2019-11-30 PROCEDURE — 2700000000 HC OXYGEN THERAPY PER DAY

## 2019-11-30 PROCEDURE — 36415 COLL VENOUS BLD VENIPUNCTURE: CPT

## 2019-11-30 PROCEDURE — 94761 N-INVAS EAR/PLS OXIMETRY MLT: CPT

## 2019-11-30 PROCEDURE — 87276 INFLUENZA A AG IF: CPT

## 2019-11-30 PROCEDURE — 71045 X-RAY EXAM CHEST 1 VIEW: CPT

## 2019-11-30 PROCEDURE — 87275 INFLUENZA B AG IF: CPT

## 2019-11-30 PROCEDURE — 6370000000 HC RX 637 (ALT 250 FOR IP): Performed by: HOSPITALIST

## 2019-11-30 PROCEDURE — 85027 COMPLETE CBC AUTOMATED: CPT

## 2019-11-30 PROCEDURE — 87260 ADENOVIRUS AG IF: CPT

## 2019-11-30 PROCEDURE — 94640 AIRWAY INHALATION TREATMENT: CPT

## 2019-11-30 PROCEDURE — 87070 CULTURE OTHR SPECIMN AEROBIC: CPT

## 2019-11-30 PROCEDURE — 87205 SMEAR GRAM STAIN: CPT

## 2019-11-30 PROCEDURE — 99223 1ST HOSP IP/OBS HIGH 75: CPT | Performed by: INTERNAL MEDICINE

## 2019-11-30 PROCEDURE — 84145 PROCALCITONIN (PCT): CPT

## 2019-11-30 PROCEDURE — 84550 ASSAY OF BLOOD/URIC ACID: CPT

## 2019-11-30 PROCEDURE — 6360000002 HC RX W HCPCS: Performed by: HOSPITALIST

## 2019-11-30 PROCEDURE — 87279 PARAINFLUENZA AG IF: CPT

## 2019-11-30 PROCEDURE — 2580000003 HC RX 258: Performed by: FAMILY MEDICINE

## 2019-11-30 RX ORDER — SODIUM CHLORIDE 9 MG/ML
INJECTION, SOLUTION INTRAVENOUS CONTINUOUS
Status: DISCONTINUED | OUTPATIENT
Start: 2019-11-30 | End: 2019-11-30

## 2019-11-30 RX ADMIN — ESCITALOPRAM OXALATE 10 MG: 10 TABLET ORAL at 21:55

## 2019-11-30 RX ADMIN — MOMETASONE FUROATE AND FORMOTEROL FUMARATE DIHYDRATE 2 PUFF: 200; 5 AEROSOL RESPIRATORY (INHALATION) at 20:45

## 2019-11-30 RX ADMIN — METHYLPREDNISOLONE SODIUM SUCCINATE 40 MG: 40 INJECTION, POWDER, FOR SOLUTION INTRAMUSCULAR; INTRAVENOUS at 06:46

## 2019-11-30 RX ADMIN — DOXAZOSIN 8 MG: 4 TABLET ORAL at 21:56

## 2019-11-30 RX ADMIN — FAMOTIDINE 10 MG: 20 TABLET, FILM COATED ORAL at 21:55

## 2019-11-30 RX ADMIN — IPRATROPIUM BROMIDE AND ALBUTEROL SULFATE 1 AMPULE: 2.5; .5 SOLUTION RESPIRATORY (INHALATION) at 12:24

## 2019-11-30 RX ADMIN — ISOSORBIDE MONONITRATE 30 MG: 30 TABLET, EXTENDED RELEASE ORAL at 09:44

## 2019-11-30 RX ADMIN — IPRATROPIUM BROMIDE AND ALBUTEROL SULFATE 1 AMPULE: 2.5; .5 SOLUTION RESPIRATORY (INHALATION) at 20:33

## 2019-11-30 RX ADMIN — IPRATROPIUM BROMIDE AND ALBUTEROL SULFATE 1 AMPULE: 2.5; .5 SOLUTION RESPIRATORY (INHALATION) at 08:52

## 2019-11-30 RX ADMIN — ASPIRIN 81 MG: 81 TABLET, COATED ORAL at 21:57

## 2019-11-30 RX ADMIN — CHOLECALCIFEROL CAP 125 MCG (5000 UNIT) 5000 UNITS: 125 CAP at 09:44

## 2019-11-30 RX ADMIN — SODIUM CHLORIDE: 9 INJECTION, SOLUTION INTRAVENOUS at 09:44

## 2019-11-30 RX ADMIN — SODIUM CHLORIDE, PRESERVATIVE FREE 10 ML: 5 INJECTION INTRAVENOUS at 09:45

## 2019-11-30 RX ADMIN — FAMOTIDINE 10 MG: 20 TABLET, FILM COATED ORAL at 09:44

## 2019-11-30 RX ADMIN — MOMETASONE FUROATE AND FORMOTEROL FUMARATE DIHYDRATE 2 PUFF: 200; 5 AEROSOL RESPIRATORY (INHALATION) at 08:52

## 2019-11-30 RX ADMIN — LABETALOL HYDROCHLORIDE 100 MG: 100 TABLET, FILM COATED ORAL at 09:44

## 2019-11-30 RX ADMIN — SIMVASTATIN 40 MG: 40 TABLET, FILM COATED ORAL at 21:55

## 2019-11-30 RX ADMIN — ENOXAPARIN SODIUM 120 MG: 120 INJECTION SUBCUTANEOUS at 18:49

## 2019-11-30 RX ADMIN — CEFTRIAXONE 1 G: 1 INJECTION, POWDER, FOR SOLUTION INTRAMUSCULAR; INTRAVENOUS at 15:13

## 2019-11-30 RX ADMIN — DOXYCYCLINE 100 MG: 100 INJECTION, POWDER, LYOPHILIZED, FOR SOLUTION INTRAVENOUS at 09:45

## 2019-11-30 RX ADMIN — METHYLPREDNISOLONE SODIUM SUCCINATE 40 MG: 40 INJECTION, POWDER, FOR SOLUTION INTRAMUSCULAR; INTRAVENOUS at 22:02

## 2019-11-30 RX ADMIN — SODIUM CHLORIDE, PRESERVATIVE FREE 10 ML: 5 INJECTION INTRAVENOUS at 22:02

## 2019-11-30 RX ADMIN — METHYLPREDNISOLONE SODIUM SUCCINATE 40 MG: 40 INJECTION, POWDER, FOR SOLUTION INTRAMUSCULAR; INTRAVENOUS at 15:13

## 2019-11-30 RX ADMIN — IPRATROPIUM BROMIDE AND ALBUTEROL SULFATE 1 AMPULE: 2.5; .5 SOLUTION RESPIRATORY (INHALATION) at 16:46

## 2019-11-30 RX ADMIN — LABETALOL HYDROCHLORIDE 100 MG: 100 TABLET, FILM COATED ORAL at 21:55

## 2019-11-30 RX ADMIN — DOXYCYCLINE 100 MG: 100 INJECTION, POWDER, LYOPHILIZED, FOR SOLUTION INTRAVENOUS at 21:55

## 2019-11-30 RX ADMIN — METHYLPREDNISOLONE SODIUM SUCCINATE 40 MG: 40 INJECTION, POWDER, FOR SOLUTION INTRAMUSCULAR; INTRAVENOUS at 00:27

## 2019-11-30 RX ADMIN — ISOSORBIDE MONONITRATE 30 MG: 30 TABLET, EXTENDED RELEASE ORAL at 21:57

## 2019-11-30 RX ADMIN — ENOXAPARIN SODIUM 120 MG: 120 INJECTION SUBCUTANEOUS at 06:46

## 2019-11-30 ASSESSMENT — PAIN SCALES - GENERAL
PAINLEVEL_OUTOF10: 0

## 2019-12-01 LAB
ANION GAP SERPL CALCULATED.3IONS-SCNC: 16 MMOL/L (ref 3–16)
BUN BLDV-MCNC: 36 MG/DL (ref 7–20)
CALCIUM SERPL-MCNC: 8.9 MG/DL (ref 8.3–10.6)
CHLORIDE BLD-SCNC: 98 MMOL/L (ref 99–110)
CO2: 27 MMOL/L (ref 21–32)
CREAT SERPL-MCNC: 1.5 MG/DL (ref 0.8–1.3)
ESTIMATED AVERAGE GLUCOSE: 137 MG/DL
GFR AFRICAN AMERICAN: 54
GFR NON-AFRICAN AMERICAN: 45
GLUCOSE BLD-MCNC: 189 MG/DL (ref 70–99)
GLUCOSE BLD-MCNC: 237 MG/DL (ref 70–99)
GLUCOSE BLD-MCNC: 253 MG/DL (ref 70–99)
GLUCOSE BLD-MCNC: 271 MG/DL (ref 70–99)
HBA1C MFR BLD: 6.4 %
HCT VFR BLD CALC: 38.1 % (ref 40.5–52.5)
HEMOGLOBIN: 12.7 G/DL (ref 13.5–17.5)
MCH RBC QN AUTO: 29.4 PG (ref 26–34)
MCHC RBC AUTO-ENTMCNC: 33.2 G/DL (ref 31–36)
MCV RBC AUTO: 88.4 FL (ref 80–100)
PDW BLD-RTO: 14.4 % (ref 12.4–15.4)
PERFORMED ON: ABNORMAL
PLATELET # BLD: 444 K/UL (ref 135–450)
PMV BLD AUTO: 7.7 FL (ref 5–10.5)
POTASSIUM SERPL-SCNC: 4 MMOL/L (ref 3.5–5.1)
RBC # BLD: 4.31 M/UL (ref 4.2–5.9)
SODIUM BLD-SCNC: 141 MMOL/L (ref 136–145)
WBC # BLD: 11.6 K/UL (ref 4–11)

## 2019-12-01 PROCEDURE — 99232 SBSQ HOSP IP/OBS MODERATE 35: CPT | Performed by: INTERNAL MEDICINE

## 2019-12-01 PROCEDURE — 99232 SBSQ HOSP IP/OBS MODERATE 35: CPT | Performed by: NURSE PRACTITIONER

## 2019-12-01 PROCEDURE — 6370000000 HC RX 637 (ALT 250 FOR IP): Performed by: HOSPITALIST

## 2019-12-01 PROCEDURE — 2700000000 HC OXYGEN THERAPY PER DAY

## 2019-12-01 PROCEDURE — 85027 COMPLETE CBC AUTOMATED: CPT

## 2019-12-01 PROCEDURE — 36415 COLL VENOUS BLD VENIPUNCTURE: CPT

## 2019-12-01 PROCEDURE — 6370000000 HC RX 637 (ALT 250 FOR IP): Performed by: FAMILY MEDICINE

## 2019-12-01 PROCEDURE — 6360000002 HC RX W HCPCS: Performed by: NURSE PRACTITIONER

## 2019-12-01 PROCEDURE — 6360000002 HC RX W HCPCS: Performed by: HOSPITALIST

## 2019-12-01 PROCEDURE — 83036 HEMOGLOBIN GLYCOSYLATED A1C: CPT

## 2019-12-01 PROCEDURE — 94640 AIRWAY INHALATION TREATMENT: CPT

## 2019-12-01 PROCEDURE — 94761 N-INVAS EAR/PLS OXIMETRY MLT: CPT

## 2019-12-01 PROCEDURE — 2060000000 HC ICU INTERMEDIATE R&B

## 2019-12-01 PROCEDURE — 80048 BASIC METABOLIC PNL TOTAL CA: CPT

## 2019-12-01 PROCEDURE — 2580000003 HC RX 258: Performed by: FAMILY MEDICINE

## 2019-12-01 PROCEDURE — 2580000003 HC RX 258: Performed by: HOSPITALIST

## 2019-12-01 PROCEDURE — 2500000003 HC RX 250 WO HCPCS: Performed by: FAMILY MEDICINE

## 2019-12-01 RX ORDER — DEXTROSE MONOHYDRATE 50 MG/ML
100 INJECTION, SOLUTION INTRAVENOUS PRN
Status: DISCONTINUED | OUTPATIENT
Start: 2019-12-01 | End: 2019-12-03 | Stop reason: HOSPADM

## 2019-12-01 RX ORDER — NICOTINE POLACRILEX 4 MG
15 LOZENGE BUCCAL PRN
Status: DISCONTINUED | OUTPATIENT
Start: 2019-12-01 | End: 2019-12-03 | Stop reason: HOSPADM

## 2019-12-01 RX ORDER — DEXTROSE MONOHYDRATE 25 G/50ML
12.5 INJECTION, SOLUTION INTRAVENOUS PRN
Status: DISCONTINUED | OUTPATIENT
Start: 2019-12-01 | End: 2019-12-03 | Stop reason: HOSPADM

## 2019-12-01 RX ORDER — METHYLPREDNISOLONE SODIUM SUCCINATE 40 MG/ML
40 INJECTION, POWDER, LYOPHILIZED, FOR SOLUTION INTRAMUSCULAR; INTRAVENOUS EVERY 12 HOURS
Status: DISCONTINUED | OUTPATIENT
Start: 2019-12-01 | End: 2019-12-02

## 2019-12-01 RX ADMIN — ISOSORBIDE MONONITRATE 30 MG: 30 TABLET, EXTENDED RELEASE ORAL at 22:01

## 2019-12-01 RX ADMIN — FAMOTIDINE 10 MG: 20 TABLET, FILM COATED ORAL at 09:24

## 2019-12-01 RX ADMIN — FAMOTIDINE 10 MG: 20 TABLET, FILM COATED ORAL at 22:00

## 2019-12-01 RX ADMIN — DOXYCYCLINE 100 MG: 100 INJECTION, POWDER, LYOPHILIZED, FOR SOLUTION INTRAVENOUS at 09:24

## 2019-12-01 RX ADMIN — MOMETASONE FUROATE AND FORMOTEROL FUMARATE DIHYDRATE 2 PUFF: 200; 5 AEROSOL RESPIRATORY (INHALATION) at 19:41

## 2019-12-01 RX ADMIN — LABETALOL HYDROCHLORIDE 100 MG: 100 TABLET, FILM COATED ORAL at 09:25

## 2019-12-01 RX ADMIN — CHOLECALCIFEROL CAP 125 MCG (5000 UNIT) 5000 UNITS: 125 CAP at 09:25

## 2019-12-01 RX ADMIN — DOXYCYCLINE 100 MG: 100 INJECTION, POWDER, LYOPHILIZED, FOR SOLUTION INTRAVENOUS at 22:06

## 2019-12-01 RX ADMIN — INSULIN LISPRO 1 UNITS: 100 INJECTION, SOLUTION INTRAVENOUS; SUBCUTANEOUS at 22:09

## 2019-12-01 RX ADMIN — LABETALOL HYDROCHLORIDE 100 MG: 100 TABLET, FILM COATED ORAL at 22:07

## 2019-12-01 RX ADMIN — ASPIRIN 81 MG: 81 TABLET, COATED ORAL at 22:01

## 2019-12-01 RX ADMIN — INSULIN LISPRO 6 UNITS: 100 INJECTION, SOLUTION INTRAVENOUS; SUBCUTANEOUS at 18:02

## 2019-12-01 RX ADMIN — IPRATROPIUM BROMIDE AND ALBUTEROL SULFATE 1 AMPULE: 2.5; .5 SOLUTION RESPIRATORY (INHALATION) at 19:41

## 2019-12-01 RX ADMIN — SODIUM CHLORIDE, PRESERVATIVE FREE 10 ML: 5 INJECTION INTRAVENOUS at 22:06

## 2019-12-01 RX ADMIN — ESCITALOPRAM OXALATE 10 MG: 10 TABLET ORAL at 22:01

## 2019-12-01 RX ADMIN — METHYLPREDNISOLONE SODIUM SUCCINATE 40 MG: 40 INJECTION, POWDER, FOR SOLUTION INTRAMUSCULAR; INTRAVENOUS at 18:02

## 2019-12-01 RX ADMIN — ISOSORBIDE MONONITRATE 30 MG: 30 TABLET, EXTENDED RELEASE ORAL at 09:25

## 2019-12-01 RX ADMIN — MOMETASONE FUROATE AND FORMOTEROL FUMARATE DIHYDRATE 2 PUFF: 200; 5 AEROSOL RESPIRATORY (INHALATION) at 08:34

## 2019-12-01 RX ADMIN — SODIUM CHLORIDE, PRESERVATIVE FREE 10 ML: 5 INJECTION INTRAVENOUS at 09:25

## 2019-12-01 RX ADMIN — METHYLPREDNISOLONE SODIUM SUCCINATE 40 MG: 40 INJECTION, POWDER, FOR SOLUTION INTRAMUSCULAR; INTRAVENOUS at 06:32

## 2019-12-01 RX ADMIN — IPRATROPIUM BROMIDE AND ALBUTEROL SULFATE 1 AMPULE: 2.5; .5 SOLUTION RESPIRATORY (INHALATION) at 08:34

## 2019-12-01 RX ADMIN — IPRATROPIUM BROMIDE AND ALBUTEROL SULFATE 1 AMPULE: 2.5; .5 SOLUTION RESPIRATORY (INHALATION) at 15:33

## 2019-12-01 RX ADMIN — ENOXAPARIN SODIUM 120 MG: 120 INJECTION SUBCUTANEOUS at 06:32

## 2019-12-01 RX ADMIN — DOXAZOSIN 8 MG: 4 TABLET ORAL at 22:01

## 2019-12-01 RX ADMIN — IPRATROPIUM BROMIDE AND ALBUTEROL SULFATE 1 AMPULE: 2.5; .5 SOLUTION RESPIRATORY (INHALATION) at 11:51

## 2019-12-01 RX ADMIN — INSULIN LISPRO 6 UNITS: 100 INJECTION, SOLUTION INTRAVENOUS; SUBCUTANEOUS at 13:07

## 2019-12-01 RX ADMIN — SIMVASTATIN 40 MG: 40 TABLET, FILM COATED ORAL at 22:01

## 2019-12-01 RX ADMIN — CEFTRIAXONE 1 G: 1 INJECTION, POWDER, FOR SOLUTION INTRAMUSCULAR; INTRAVENOUS at 15:25

## 2019-12-01 ASSESSMENT — PAIN SCALES - GENERAL
PAINLEVEL_OUTOF10: 0

## 2019-12-02 LAB
ADENOVIRUS, DFA: NORMAL
ANION GAP SERPL CALCULATED.3IONS-SCNC: 15 MMOL/L (ref 3–16)
BUN BLDV-MCNC: 31 MG/DL (ref 7–20)
CALCIUM SERPL-MCNC: 8.6 MG/DL (ref 8.3–10.6)
CHLORIDE BLD-SCNC: 98 MMOL/L (ref 99–110)
CO2: 28 MMOL/L (ref 21–32)
CREAT SERPL-MCNC: 1.4 MG/DL (ref 0.8–1.3)
CULTURE, RESPIRATORY: NORMAL
GFR AFRICAN AMERICAN: 58
GFR NON-AFRICAN AMERICAN: 48
GLUCOSE BLD-MCNC: 175 MG/DL (ref 70–99)
GLUCOSE BLD-MCNC: 223 MG/DL (ref 70–99)
GLUCOSE BLD-MCNC: 233 MG/DL (ref 70–99)
GLUCOSE BLD-MCNC: 244 MG/DL (ref 70–99)
GLUCOSE BLD-MCNC: 266 MG/DL (ref 70–99)
GLUCOSE BLD-MCNC: 316 MG/DL (ref 70–99)
GRAM STAIN RESULT: NORMAL
HCT VFR BLD CALC: 36.2 % (ref 40.5–52.5)
HEMOGLOBIN: 12 G/DL (ref 13.5–17.5)
INFLUENZA A,DFA: NORMAL
INFLUENZA B,DFA: NORMAL
MCH RBC QN AUTO: 29.4 PG (ref 26–34)
MCHC RBC AUTO-ENTMCNC: 33 G/DL (ref 31–36)
MCV RBC AUTO: 89.1 FL (ref 80–100)
METAPNEUMOVIRUS, DFA: NORMAL
PARAINFLUENZA 1 DFA STAIN: NORMAL
PARAINFLUENZA 2 DFA STAIN: NORMAL
PARAINFLUENZA 3: NORMAL
PDW BLD-RTO: 14.3 % (ref 12.4–15.4)
PERFORMED ON: ABNORMAL
PLATELET # BLD: 419 K/UL (ref 135–450)
PMV BLD AUTO: 8.1 FL (ref 5–10.5)
POTASSIUM SERPL-SCNC: 4.2 MMOL/L (ref 3.5–5.1)
PROCALCITONIN: 0.1 NG/ML (ref 0–0.15)
RBC # BLD: 4.07 M/UL (ref 4.2–5.9)
RESPIRATORY SYNCYTIAL VIRUS  (RSV) DFA: NORMAL
RSPFA SOURCE: NORMAL
SODIUM BLD-SCNC: 141 MMOL/L (ref 136–145)
WBC # BLD: 10.2 K/UL (ref 4–11)

## 2019-12-02 PROCEDURE — 2580000003 HC RX 258: Performed by: FAMILY MEDICINE

## 2019-12-02 PROCEDURE — 80048 BASIC METABOLIC PNL TOTAL CA: CPT

## 2019-12-02 PROCEDURE — 6370000000 HC RX 637 (ALT 250 FOR IP): Performed by: HOSPITALIST

## 2019-12-02 PROCEDURE — 97166 OT EVAL MOD COMPLEX 45 MIN: CPT

## 2019-12-02 PROCEDURE — 6370000000 HC RX 637 (ALT 250 FOR IP): Performed by: INTERNAL MEDICINE

## 2019-12-02 PROCEDURE — 97162 PT EVAL MOD COMPLEX 30 MIN: CPT

## 2019-12-02 PROCEDURE — 85027 COMPLETE CBC AUTOMATED: CPT

## 2019-12-02 PROCEDURE — 99232 SBSQ HOSP IP/OBS MODERATE 35: CPT | Performed by: NURSE PRACTITIONER

## 2019-12-02 PROCEDURE — 6360000002 HC RX W HCPCS: Performed by: FAMILY MEDICINE

## 2019-12-02 PROCEDURE — 99232 SBSQ HOSP IP/OBS MODERATE 35: CPT | Performed by: INTERNAL MEDICINE

## 2019-12-02 PROCEDURE — 2580000003 HC RX 258: Performed by: HOSPITALIST

## 2019-12-02 PROCEDURE — 6370000000 HC RX 637 (ALT 250 FOR IP): Performed by: FAMILY MEDICINE

## 2019-12-02 PROCEDURE — 36415 COLL VENOUS BLD VENIPUNCTURE: CPT

## 2019-12-02 PROCEDURE — 6360000002 HC RX W HCPCS: Performed by: NURSE PRACTITIONER

## 2019-12-02 PROCEDURE — 2060000000 HC ICU INTERMEDIATE R&B

## 2019-12-02 PROCEDURE — 84145 PROCALCITONIN (PCT): CPT

## 2019-12-02 PROCEDURE — 2500000003 HC RX 250 WO HCPCS: Performed by: FAMILY MEDICINE

## 2019-12-02 PROCEDURE — 97530 THERAPEUTIC ACTIVITIES: CPT

## 2019-12-02 PROCEDURE — 94640 AIRWAY INHALATION TREATMENT: CPT

## 2019-12-02 PROCEDURE — 2700000000 HC OXYGEN THERAPY PER DAY

## 2019-12-02 RX ORDER — FUROSEMIDE 40 MG/1
80 TABLET ORAL 2 TIMES DAILY
Status: DISCONTINUED | OUTPATIENT
Start: 2019-12-02 | End: 2019-12-03 | Stop reason: HOSPADM

## 2019-12-02 RX ORDER — PREDNISONE 20 MG/1
40 TABLET ORAL DAILY
Status: DISCONTINUED | OUTPATIENT
Start: 2019-12-02 | End: 2019-12-03 | Stop reason: HOSPADM

## 2019-12-02 RX ORDER — AMOXICILLIN AND CLAVULANATE POTASSIUM 875; 125 MG/1; MG/1
1 TABLET, FILM COATED ORAL EVERY 12 HOURS SCHEDULED
Status: DISCONTINUED | OUTPATIENT
Start: 2019-12-02 | End: 2019-12-03 | Stop reason: HOSPADM

## 2019-12-02 RX ADMIN — METHYLPREDNISOLONE SODIUM SUCCINATE 40 MG: 40 INJECTION, POWDER, FOR SOLUTION INTRAMUSCULAR; INTRAVENOUS at 05:18

## 2019-12-02 RX ADMIN — INSULIN LISPRO 8 UNITS: 100 INJECTION, SOLUTION INTRAVENOUS; SUBCUTANEOUS at 12:59

## 2019-12-02 RX ADMIN — IPRATROPIUM BROMIDE AND ALBUTEROL SULFATE 1 AMPULE: 2.5; .5 SOLUTION RESPIRATORY (INHALATION) at 20:54

## 2019-12-02 RX ADMIN — CHOLECALCIFEROL CAP 125 MCG (5000 UNIT) 5000 UNITS: 125 CAP at 09:53

## 2019-12-02 RX ADMIN — AMOXICILLIN AND CLAVULANATE POTASSIUM 1 TABLET: 875; 125 TABLET, FILM COATED ORAL at 20:49

## 2019-12-02 RX ADMIN — DOXAZOSIN 8 MG: 4 TABLET ORAL at 20:49

## 2019-12-02 RX ADMIN — INSULIN LISPRO 1 UNITS: 100 INJECTION, SOLUTION INTRAVENOUS; SUBCUTANEOUS at 20:49

## 2019-12-02 RX ADMIN — IPRATROPIUM BROMIDE AND ALBUTEROL SULFATE 1 AMPULE: 2.5; .5 SOLUTION RESPIRATORY (INHALATION) at 16:07

## 2019-12-02 RX ADMIN — LABETALOL HYDROCHLORIDE 100 MG: 100 TABLET, FILM COATED ORAL at 20:48

## 2019-12-02 RX ADMIN — ENOXAPARIN SODIUM 40 MG: 40 INJECTION SUBCUTANEOUS at 09:52

## 2019-12-02 RX ADMIN — ISOSORBIDE MONONITRATE 30 MG: 30 TABLET, EXTENDED RELEASE ORAL at 09:53

## 2019-12-02 RX ADMIN — MOMETASONE FUROATE AND FORMOTEROL FUMARATE DIHYDRATE 2 PUFF: 200; 5 AEROSOL RESPIRATORY (INHALATION) at 10:26

## 2019-12-02 RX ADMIN — ISOSORBIDE MONONITRATE 30 MG: 30 TABLET, EXTENDED RELEASE ORAL at 20:48

## 2019-12-02 RX ADMIN — FAMOTIDINE 10 MG: 20 TABLET, FILM COATED ORAL at 20:48

## 2019-12-02 RX ADMIN — SODIUM CHLORIDE, PRESERVATIVE FREE 10 ML: 5 INJECTION INTRAVENOUS at 20:48

## 2019-12-02 RX ADMIN — MOMETASONE FUROATE AND FORMOTEROL FUMARATE DIHYDRATE 2 PUFF: 200; 5 AEROSOL RESPIRATORY (INHALATION) at 20:54

## 2019-12-02 RX ADMIN — LABETALOL HYDROCHLORIDE 100 MG: 100 TABLET, FILM COATED ORAL at 09:53

## 2019-12-02 RX ADMIN — INSULIN LISPRO 4 UNITS: 100 INJECTION, SOLUTION INTRAVENOUS; SUBCUTANEOUS at 09:52

## 2019-12-02 RX ADMIN — FLUTICASONE PROPIONATE 1 SPRAY: 50 SPRAY, METERED NASAL at 11:21

## 2019-12-02 RX ADMIN — SIMVASTATIN 40 MG: 40 TABLET, FILM COATED ORAL at 20:49

## 2019-12-02 RX ADMIN — INSULIN LISPRO 4 UNITS: 100 INJECTION, SOLUTION INTRAVENOUS; SUBCUTANEOUS at 16:49

## 2019-12-02 RX ADMIN — ASPIRIN 81 MG: 81 TABLET, COATED ORAL at 20:49

## 2019-12-02 RX ADMIN — ESCITALOPRAM OXALATE 10 MG: 10 TABLET ORAL at 20:48

## 2019-12-02 RX ADMIN — FUROSEMIDE 80 MG: 40 TABLET ORAL at 16:49

## 2019-12-02 RX ADMIN — SODIUM CHLORIDE, PRESERVATIVE FREE 10 ML: 5 INJECTION INTRAVENOUS at 10:01

## 2019-12-02 RX ADMIN — IPRATROPIUM BROMIDE AND ALBUTEROL SULFATE 1 AMPULE: 2.5; .5 SOLUTION RESPIRATORY (INHALATION) at 10:26

## 2019-12-02 RX ADMIN — FAMOTIDINE 10 MG: 20 TABLET, FILM COATED ORAL at 09:53

## 2019-12-02 RX ADMIN — DOXYCYCLINE 100 MG: 100 INJECTION, POWDER, LYOPHILIZED, FOR SOLUTION INTRAVENOUS at 09:53

## 2019-12-02 RX ADMIN — IPRATROPIUM BROMIDE AND ALBUTEROL SULFATE 1 AMPULE: 2.5; .5 SOLUTION RESPIRATORY (INHALATION) at 12:22

## 2019-12-02 RX ADMIN — PREDNISONE 40 MG: 20 TABLET ORAL at 14:33

## 2019-12-02 ASSESSMENT — PAIN SCALES - GENERAL: PAINLEVEL_OUTOF10: 0

## 2019-12-03 VITALS
HEART RATE: 61 BPM | RESPIRATION RATE: 16 BRPM | BODY MASS INDEX: 40.23 KG/M2 | TEMPERATURE: 98 F | DIASTOLIC BLOOD PRESSURE: 82 MMHG | OXYGEN SATURATION: 99 % | SYSTOLIC BLOOD PRESSURE: 176 MMHG | WEIGHT: 271.61 LBS | HEIGHT: 69 IN

## 2019-12-03 LAB
ALBUMIN SERPL-MCNC: 3.5 G/DL (ref 3.4–5)
ANION GAP SERPL CALCULATED.3IONS-SCNC: 14 MMOL/L (ref 3–16)
BLOOD CULTURE, ROUTINE: NORMAL
BUN BLDV-MCNC: 37 MG/DL (ref 7–20)
CALCIUM SERPL-MCNC: 8.3 MG/DL (ref 8.3–10.6)
CHLORIDE BLD-SCNC: 97 MMOL/L (ref 99–110)
CO2: 28 MMOL/L (ref 21–32)
CREAT SERPL-MCNC: 1.3 MG/DL (ref 0.8–1.3)
CULTURE, BLOOD 2: NORMAL
GFR AFRICAN AMERICAN: >60
GFR NON-AFRICAN AMERICAN: 53
GLUCOSE BLD-MCNC: 194 MG/DL (ref 70–99)
GLUCOSE BLD-MCNC: 232 MG/DL (ref 70–99)
GLUCOSE BLD-MCNC: 245 MG/DL (ref 70–99)
MAGNESIUM: 2 MG/DL (ref 1.8–2.4)
PERFORMED ON: ABNORMAL
PERFORMED ON: ABNORMAL
PHOSPHORUS: 3.4 MG/DL (ref 2.5–4.9)
POTASSIUM SERPL-SCNC: 3.8 MMOL/L (ref 3.5–5.1)
SODIUM BLD-SCNC: 139 MMOL/L (ref 136–145)

## 2019-12-03 PROCEDURE — 6370000000 HC RX 637 (ALT 250 FOR IP): Performed by: FAMILY MEDICINE

## 2019-12-03 PROCEDURE — 83735 ASSAY OF MAGNESIUM: CPT

## 2019-12-03 PROCEDURE — 6370000000 HC RX 637 (ALT 250 FOR IP): Performed by: INTERNAL MEDICINE

## 2019-12-03 PROCEDURE — 6360000002 HC RX W HCPCS: Performed by: FAMILY MEDICINE

## 2019-12-03 PROCEDURE — 2700000000 HC OXYGEN THERAPY PER DAY

## 2019-12-03 PROCEDURE — 94640 AIRWAY INHALATION TREATMENT: CPT

## 2019-12-03 PROCEDURE — 6370000000 HC RX 637 (ALT 250 FOR IP): Performed by: HOSPITALIST

## 2019-12-03 PROCEDURE — 36415 COLL VENOUS BLD VENIPUNCTURE: CPT

## 2019-12-03 PROCEDURE — 94761 N-INVAS EAR/PLS OXIMETRY MLT: CPT

## 2019-12-03 PROCEDURE — 2580000003 HC RX 258: Performed by: HOSPITALIST

## 2019-12-03 PROCEDURE — 80069 RENAL FUNCTION PANEL: CPT

## 2019-12-03 RX ORDER — FLUTICASONE FUROATE AND VILANTEROL 200; 25 UG/1; UG/1
1 POWDER RESPIRATORY (INHALATION) DAILY
Qty: 1 EACH | Refills: 0
Start: 2019-12-03 | End: 2021-01-01 | Stop reason: SDUPTHER

## 2019-12-03 RX ORDER — AMOXICILLIN AND CLAVULANATE POTASSIUM 875; 125 MG/1; MG/1
1 TABLET, FILM COATED ORAL EVERY 12 HOURS SCHEDULED
Qty: 14 TABLET | Refills: 0 | Status: SHIPPED | OUTPATIENT
Start: 2019-12-03 | End: 2019-12-10

## 2019-12-03 RX ORDER — PREDNISONE 20 MG/1
40 TABLET ORAL DAILY
Qty: 8 TABLET | Refills: 0 | Status: SHIPPED | OUTPATIENT
Start: 2019-12-04 | End: 2019-12-08

## 2019-12-03 RX ADMIN — FAMOTIDINE 10 MG: 20 TABLET, FILM COATED ORAL at 09:44

## 2019-12-03 RX ADMIN — ISOSORBIDE MONONITRATE 30 MG: 30 TABLET, EXTENDED RELEASE ORAL at 09:44

## 2019-12-03 RX ADMIN — SODIUM CHLORIDE, PRESERVATIVE FREE 10 ML: 5 INJECTION INTRAVENOUS at 09:44

## 2019-12-03 RX ADMIN — PREDNISONE 40 MG: 20 TABLET ORAL at 09:43

## 2019-12-03 RX ADMIN — MOMETASONE FUROATE AND FORMOTEROL FUMARATE DIHYDRATE 2 PUFF: 200; 5 AEROSOL RESPIRATORY (INHALATION) at 08:17

## 2019-12-03 RX ADMIN — LABETALOL HYDROCHLORIDE 100 MG: 100 TABLET, FILM COATED ORAL at 09:44

## 2019-12-03 RX ADMIN — IPRATROPIUM BROMIDE AND ALBUTEROL SULFATE 1 AMPULE: 2.5; .5 SOLUTION RESPIRATORY (INHALATION) at 12:00

## 2019-12-03 RX ADMIN — ENOXAPARIN SODIUM 40 MG: 40 INJECTION SUBCUTANEOUS at 09:44

## 2019-12-03 RX ADMIN — IPRATROPIUM BROMIDE AND ALBUTEROL SULFATE 1 AMPULE: 2.5; .5 SOLUTION RESPIRATORY (INHALATION) at 08:17

## 2019-12-03 RX ADMIN — CHOLECALCIFEROL CAP 125 MCG (5000 UNIT) 5000 UNITS: 125 CAP at 09:44

## 2019-12-03 RX ADMIN — INSULIN LISPRO 4 UNITS: 100 INJECTION, SOLUTION INTRAVENOUS; SUBCUTANEOUS at 09:44

## 2019-12-03 RX ADMIN — AMOXICILLIN AND CLAVULANATE POTASSIUM 1 TABLET: 875; 125 TABLET, FILM COATED ORAL at 09:43

## 2019-12-03 RX ADMIN — FUROSEMIDE 80 MG: 40 TABLET ORAL at 09:44

## 2019-12-03 ASSESSMENT — PAIN SCALES - GENERAL: PAINLEVEL_OUTOF10: 0

## 2019-12-04 ENCOUNTER — CARE COORDINATION (OUTPATIENT)
Dept: CASE MANAGEMENT | Age: 84
End: 2019-12-04

## 2019-12-05 ENCOUNTER — TELEPHONE (OUTPATIENT)
Dept: FAMILY MEDICINE CLINIC | Age: 84
End: 2019-12-05

## 2019-12-06 RX ORDER — LABETALOL 100 MG/1
100 TABLET, FILM COATED ORAL 2 TIMES DAILY
Qty: 60 TABLET | Refills: 5 | Status: SHIPPED | OUTPATIENT
Start: 2019-12-06 | End: 2019-12-06 | Stop reason: SDUPTHER

## 2019-12-06 RX ORDER — POTASSIUM CHLORIDE 750 MG/1
10 TABLET, EXTENDED RELEASE ORAL 2 TIMES DAILY
Qty: 60 TABLET | Refills: 5 | Status: SHIPPED | OUTPATIENT
Start: 2019-12-06 | End: 2019-12-06 | Stop reason: SDUPTHER

## 2019-12-06 RX ORDER — FUROSEMIDE 80 MG
80 TABLET ORAL 2 TIMES DAILY
Qty: 60 TABLET | Refills: 5 | Status: SHIPPED | OUTPATIENT
Start: 2019-12-06 | End: 2019-12-06 | Stop reason: SDUPTHER

## 2019-12-09 ENCOUNTER — CARE COORDINATION (OUTPATIENT)
Dept: CASE MANAGEMENT | Age: 84
End: 2019-12-09

## 2020-01-01 ENCOUNTER — APPOINTMENT (OUTPATIENT)
Dept: CT IMAGING | Age: 85
End: 2020-01-01
Payer: MEDICARE

## 2020-01-01 ENCOUNTER — HOSPITAL ENCOUNTER (EMERGENCY)
Age: 85
Discharge: HOME | End: 2020-12-26
Payer: MEDICARE

## 2020-01-01 ENCOUNTER — HOSPITAL ENCOUNTER (EMERGENCY)
Age: 85
Discharge: HOME | End: 2020-10-30
Payer: MEDICARE

## 2020-01-01 ENCOUNTER — HOSPITAL ENCOUNTER (EMERGENCY)
Age: 85
Discharge: HOME | End: 2020-10-29
Attending: EMERGENCY MEDICINE
Payer: MEDICARE

## 2020-01-01 ENCOUNTER — TELEPHONE (OUTPATIENT)
Dept: FAMILY MEDICINE CLINIC | Age: 85
End: 2020-01-01

## 2020-01-01 VITALS
BODY MASS INDEX: 38.69 KG/M2 | SYSTOLIC BLOOD PRESSURE: 178 MMHG | DIASTOLIC BLOOD PRESSURE: 68 MMHG | RESPIRATION RATE: 17 BRPM | HEIGHT: 69 IN | HEART RATE: 80 BPM | WEIGHT: 261.25 LBS | OXYGEN SATURATION: 100 % | TEMPERATURE: 99 F

## 2020-01-01 VITALS
TEMPERATURE: 97.7 F | DIASTOLIC BLOOD PRESSURE: 55 MMHG | OXYGEN SATURATION: 100 % | RESPIRATION RATE: 16 BRPM | WEIGHT: 271.83 LBS | HEIGHT: 69 IN | HEART RATE: 71 BPM | BODY MASS INDEX: 40.26 KG/M2 | SYSTOLIC BLOOD PRESSURE: 128 MMHG

## 2020-01-01 VITALS
HEART RATE: 72 BPM | OXYGEN SATURATION: 98 % | DIASTOLIC BLOOD PRESSURE: 74 MMHG | HEIGHT: 69 IN | RESPIRATION RATE: 14 BRPM | WEIGHT: 261 LBS | TEMPERATURE: 98 F | BODY MASS INDEX: 38.66 KG/M2 | SYSTOLIC BLOOD PRESSURE: 145 MMHG

## 2020-01-01 LAB
A/G RATIO: 1.3 (ref 1.1–2.2)
ALBUMIN SERPL-MCNC: 3.9 G/DL (ref 3.4–5)
ALP BLD-CCNC: 56 U/L (ref 40–129)
ALT SERPL-CCNC: 11 U/L (ref 10–40)
ANION GAP SERPL CALCULATED.3IONS-SCNC: 9 MMOL/L (ref 3–16)
AST SERPL-CCNC: 22 U/L (ref 15–37)
BASOPHILS ABSOLUTE: 0.1 K/UL (ref 0–0.2)
BASOPHILS RELATIVE PERCENT: 0.7 %
BILIRUB SERPL-MCNC: 0.4 MG/DL (ref 0–1)
BUN BLDV-MCNC: 22 MG/DL (ref 7–20)
CALCIUM SERPL-MCNC: 9 MG/DL (ref 8.3–10.6)
CHLORIDE BLD-SCNC: 92 MMOL/L (ref 99–110)
CO2: 33 MMOL/L (ref 21–32)
CREAT SERPL-MCNC: 1.8 MG/DL (ref 0.8–1.3)
EOSINOPHILS ABSOLUTE: 0.2 K/UL (ref 0–0.6)
EOSINOPHILS RELATIVE PERCENT: 2.1 %
GFR AFRICAN AMERICAN: 44
GFR NON-AFRICAN AMERICAN: 36
GLOBULIN: 2.9 G/DL
GLUCOSE BLD-MCNC: 423 MG/DL (ref 70–99)
HCT VFR BLD CALC: 33.7 % (ref 40.5–52.5)
HEMOGLOBIN: 11.2 G/DL (ref 13.5–17.5)
LIPASE: 47 U/L (ref 13–60)
LYMPHOCYTES ABSOLUTE: 0.9 K/UL (ref 1–5.1)
LYMPHOCYTES RELATIVE PERCENT: 11.9 %
MCH RBC QN AUTO: 28.1 PG (ref 26–34)
MCHC RBC AUTO-ENTMCNC: 33.2 G/DL (ref 31–36)
MCV RBC AUTO: 84.7 FL (ref 80–100)
MONOCYTES ABSOLUTE: 0.6 K/UL (ref 0–1.3)
MONOCYTES RELATIVE PERCENT: 7.7 %
NEUTROPHILS ABSOLUTE: 6.1 K/UL (ref 1.7–7.7)
NEUTROPHILS RELATIVE PERCENT: 77.6 %
PDW BLD-RTO: 14.1 % (ref 12.4–15.4)
PLATELET # BLD: 496 K/UL (ref 135–450)
PMV BLD AUTO: 8 FL (ref 5–10.5)
POTASSIUM SERPL-SCNC: 5.4 MMOL/L (ref 3.5–5.1)
RBC # BLD: 3.98 M/UL (ref 4.2–5.9)
SODIUM BLD-SCNC: 134 MMOL/L (ref 136–145)
TOTAL PROTEIN: 6.8 G/DL (ref 6.4–8.2)
WBC # BLD: 7.9 K/UL (ref 4–11)

## 2020-01-01 PROCEDURE — 99284 EMERGENCY DEPT VISIT MOD MDM: CPT

## 2020-01-01 PROCEDURE — 85025 COMPLETE CBC W/AUTO DIFF WBC: CPT

## 2020-01-01 PROCEDURE — 83690 ASSAY OF LIPASE: CPT

## 2020-01-01 PROCEDURE — 72125 CT NECK SPINE W/O DYE: CPT

## 2020-01-01 PROCEDURE — 70450 CT HEAD/BRAIN W/O DYE: CPT

## 2020-01-01 PROCEDURE — 99285 EMERGENCY DEPT VISIT HI MDM: CPT

## 2020-01-01 PROCEDURE — 71250 CT THORAX DX C-: CPT

## 2020-01-01 PROCEDURE — 80053 COMPREHEN METABOLIC PANEL: CPT

## 2020-01-01 PROCEDURE — 6370000000 HC RX 637 (ALT 250 FOR IP): Performed by: PHYSICIAN ASSISTANT

## 2020-01-01 RX ORDER — DOXAZOSIN 8 MG/1
TABLET ORAL
Qty: 90 TABLET | Refills: 1 | Status: SHIPPED | OUTPATIENT
Start: 2020-01-01 | End: 2021-01-01

## 2020-01-01 RX ORDER — ACETAMINOPHEN 325 MG/1
650 TABLET ORAL ONCE
Status: COMPLETED | OUTPATIENT
Start: 2020-01-01 | End: 2020-01-01

## 2020-01-01 RX ORDER — LIDOCAINE 50 MG/G
1 PATCH TOPICAL EVERY 24 HOURS
Qty: 14 PATCH | Refills: 0 | Status: SHIPPED | OUTPATIENT
Start: 2020-01-01 | End: 2020-01-01

## 2020-01-01 RX ORDER — FUROSEMIDE 80 MG
TABLET ORAL
Qty: 180 TABLET | Refills: 1 | Status: SHIPPED | OUTPATIENT
Start: 2020-01-01 | End: 2021-01-01

## 2020-01-01 RX ORDER — LIDOCAINE 4 G/G
1 PATCH TOPICAL ONCE
Status: COMPLETED | OUTPATIENT
Start: 2020-01-01 | End: 2020-01-01

## 2020-01-01 RX ORDER — AZITHROMYCIN 250 MG/1
TABLET, FILM COATED ORAL
Qty: 1 PACKET | Refills: 0 | Status: SHIPPED | OUTPATIENT
Start: 2020-01-01 | End: 2021-01-01 | Stop reason: ALTCHOICE

## 2020-01-01 RX ORDER — SIMVASTATIN 40 MG
TABLET ORAL
Qty: 90 TABLET | Refills: 1 | Status: SHIPPED | OUTPATIENT
Start: 2020-01-01 | End: 2021-01-01

## 2020-01-01 RX ORDER — LABETALOL 100 MG/1
TABLET, FILM COATED ORAL
Qty: 180 TABLET | Refills: 1 | Status: SHIPPED | OUTPATIENT
Start: 2020-01-01 | End: 2021-01-01

## 2020-01-01 RX ADMIN — ACETAMINOPHEN 650 MG: 325 TABLET ORAL at 16:46

## 2020-01-01 ASSESSMENT — ENCOUNTER SYMPTOMS
COLOR CHANGE: 0
VOMITING: 0
ABDOMINAL PAIN: 0
NAUSEA: 0
BACK PAIN: 0
SHORTNESS OF BREATH: 0
EYE DISCHARGE: 0
NAUSEA: 0
COUGH: 1
SHORTNESS OF BREATH: 0
APNEA: 0
FACIAL SWELLING: 0
CHOKING: 0
BACK PAIN: 0
EYE REDNESS: 0
DIARRHEA: 0
VOMITING: 0
SHORTNESS OF BREATH: 0
ABDOMINAL PAIN: 0
ABDOMINAL PAIN: 0
VOMITING: 0

## 2020-01-01 ASSESSMENT — PAIN - FUNCTIONAL ASSESSMENT
PAIN_FUNCTIONAL_ASSESSMENT: ACTIVITIES ARE NOT PREVENTED
PAIN_FUNCTIONAL_ASSESSMENT: PREVENTS OR INTERFERES SOME ACTIVE ACTIVITIES AND ADLS

## 2020-01-01 ASSESSMENT — PAIN DESCRIPTION - FREQUENCY
FREQUENCY: CONTINUOUS
FREQUENCY: CONTINUOUS

## 2020-01-01 ASSESSMENT — PAIN SCALES - GENERAL
PAINLEVEL_OUTOF10: 3
PAINLEVEL_OUTOF10: 8
PAINLEVEL_OUTOF10: 8
PAINLEVEL_OUTOF10: 3
PAINLEVEL_OUTOF10: 3

## 2020-01-01 ASSESSMENT — PAIN DESCRIPTION - ORIENTATION
ORIENTATION: LEFT
ORIENTATION: POSTERIOR

## 2020-01-01 ASSESSMENT — PAIN DESCRIPTION - LOCATION
LOCATION: RIB CAGE
LOCATION: HEAD

## 2020-01-01 ASSESSMENT — PAIN DESCRIPTION - ONSET
ONSET: SUDDEN
ONSET: ON-GOING

## 2020-01-01 ASSESSMENT — PAIN DESCRIPTION - DESCRIPTORS
DESCRIPTORS: ACHING
DESCRIPTORS: ACHING

## 2020-01-01 ASSESSMENT — PAIN DESCRIPTION - PAIN TYPE
TYPE: ACUTE PAIN
TYPE: ACUTE PAIN

## 2020-01-01 ASSESSMENT — PAIN DESCRIPTION - PROGRESSION
CLINICAL_PROGRESSION: NOT CHANGED
CLINICAL_PROGRESSION: NOT CHANGED

## 2020-01-02 ENCOUNTER — TELEPHONE (OUTPATIENT)
Dept: FAMILY MEDICINE CLINIC | Age: 85
End: 2020-01-02

## 2020-01-02 NOTE — TELEPHONE ENCOUNTER
Yes. If having any swelling at all or any increase from baseline in shortness of breath, double up on lasix for 2-3 days.   Check bmp/ bnp next nurse visit

## 2020-01-09 LAB
ANION GAP SERPL CALCULATED.3IONS-SCNC: 13 MMOL/L (ref 3–16)
BUN BLDV-MCNC: 15 MG/DL (ref 7–20)
CALCIUM SERPL-MCNC: 9.7 MG/DL (ref 8.3–10.6)
CHLORIDE BLD-SCNC: 97 MMOL/L (ref 99–110)
CO2: 32 MMOL/L (ref 21–32)
CREAT SERPL-MCNC: 1.6 MG/DL (ref 0.8–1.3)
GFR AFRICAN AMERICAN: 50
GFR NON-AFRICAN AMERICAN: 41
GLUCOSE BLD-MCNC: 199 MG/DL (ref 70–99)
POTASSIUM SERPL-SCNC: 5.6 MMOL/L (ref 3.5–5.1)
PRO-BNP: 1263 PG/ML (ref 0–449)
SODIUM BLD-SCNC: 142 MMOL/L (ref 136–145)

## 2020-01-15 ENCOUNTER — TELEPHONE (OUTPATIENT)
Dept: FAMILY MEDICINE CLINIC | Age: 85
End: 2020-01-15

## 2020-01-23 ENCOUNTER — OFFICE VISIT (OUTPATIENT)
Dept: FAMILY MEDICINE CLINIC | Age: 85
End: 2020-01-23
Payer: MEDICARE

## 2020-01-23 VITALS
RESPIRATION RATE: 20 BRPM | OXYGEN SATURATION: 95 % | HEART RATE: 82 BPM | WEIGHT: 269 LBS | DIASTOLIC BLOOD PRESSURE: 76 MMHG | SYSTOLIC BLOOD PRESSURE: 122 MMHG | HEIGHT: 69 IN | BODY MASS INDEX: 39.84 KG/M2

## 2020-01-23 LAB
A/G RATIO: 1.7 (ref 1.1–2.2)
ALBUMIN SERPL-MCNC: 4.3 G/DL (ref 3.4–5)
ALP BLD-CCNC: 53 U/L (ref 40–129)
ALT SERPL-CCNC: 10 U/L (ref 10–40)
ANION GAP SERPL CALCULATED.3IONS-SCNC: 16 MMOL/L (ref 3–16)
AST SERPL-CCNC: 11 U/L (ref 15–37)
BILIRUB SERPL-MCNC: 0.3 MG/DL (ref 0–1)
BUN BLDV-MCNC: 21 MG/DL (ref 7–20)
CALCIUM SERPL-MCNC: 10.2 MG/DL (ref 8.3–10.6)
CHLORIDE BLD-SCNC: 91 MMOL/L (ref 99–110)
CO2: 33 MMOL/L (ref 21–32)
CREAT SERPL-MCNC: 1.7 MG/DL (ref 0.8–1.3)
GFR AFRICAN AMERICAN: 47
GFR NON-AFRICAN AMERICAN: 39
GLOBULIN: 2.5 G/DL
GLUCOSE BLD-MCNC: 173 MG/DL (ref 70–99)
POTASSIUM SERPL-SCNC: 5.5 MMOL/L (ref 3.5–5.1)
SODIUM BLD-SCNC: 140 MMOL/L (ref 136–145)
TOTAL PROTEIN: 6.8 G/DL (ref 6.4–8.2)

## 2020-01-23 PROCEDURE — G8482 FLU IMMUNIZE ORDER/ADMIN: HCPCS | Performed by: FAMILY MEDICINE

## 2020-01-23 PROCEDURE — G8417 CALC BMI ABV UP PARAM F/U: HCPCS | Performed by: FAMILY MEDICINE

## 2020-01-23 PROCEDURE — 99214 OFFICE O/P EST MOD 30 MIN: CPT | Performed by: FAMILY MEDICINE

## 2020-01-23 PROCEDURE — G8926 SPIRO NO PERF OR DOC: HCPCS | Performed by: FAMILY MEDICINE

## 2020-01-23 PROCEDURE — 3023F SPIROM DOC REV: CPT | Performed by: FAMILY MEDICINE

## 2020-01-23 PROCEDURE — G8427 DOCREV CUR MEDS BY ELIG CLIN: HCPCS | Performed by: FAMILY MEDICINE

## 2020-01-23 PROCEDURE — 1036F TOBACCO NON-USER: CPT | Performed by: FAMILY MEDICINE

## 2020-01-23 PROCEDURE — 1123F ACP DISCUSS/DSCN MKR DOCD: CPT | Performed by: FAMILY MEDICINE

## 2020-01-23 PROCEDURE — 4040F PNEUMOC VAC/ADMIN/RCVD: CPT | Performed by: FAMILY MEDICINE

## 2020-01-23 PROCEDURE — 36415 COLL VENOUS BLD VENIPUNCTURE: CPT | Performed by: FAMILY MEDICINE

## 2020-01-23 RX ORDER — ESCITALOPRAM OXALATE 10 MG/1
10 TABLET ORAL DAILY
Qty: 90 TABLET | Refills: 3 | Status: SHIPPED | OUTPATIENT
Start: 2020-01-23 | End: 2021-01-01

## 2020-01-23 ASSESSMENT — PATIENT HEALTH QUESTIONNAIRE - PHQ9: DEPRESSION UNABLE TO ASSESS: FUNCTIONAL CAPACITY MOTIVATION LIMITS ACCURACY

## 2020-01-23 NOTE — PROGRESS NOTES
Take 1 tablet by mouth daily 90 tablet 3    potassium chloride (KLOR-CON M) 10 MEQ extended release tablet TAKE 1 TABLET BY MOUTH TWICE DAILY 180 tablet 1    labetalol (NORMODYNE) 100 MG tablet TAKE 1 TABLET BY MOUTH TWICE DAILY 180 tablet 1    furosemide (LASIX) 80 MG tablet TAKE 1 TABLET BY MOUTH TWICE DAILY 180 tablet 1    Fluticasone furoate-vilanterol (BREO ELLIPTA) 200-25 MCG/INH AEPB inhaler Inhale 1 puff into the lungs daily 1 each 0    simvastatin (ZOCOR) 40 MG tablet TAKE 1 TABLET BY MOUTH EVERY EVENING 90 tablet 1    isosorbide mononitrate (IMDUR) 30 MG extended release tablet TAKE 1 TABLET BY MOUTH TWICE DAILY FOR BLOOD PRESSURE 180 tablet 0    gabapentin (NEURONTIN) 300 MG capsule TAKE 1 CAPSULE BY MOUTH THREE TIMES DAILY 90 capsule 3    tiotropium (SPIRIVA HANDIHALER) 18 MCG inhalation capsule INHALE THE CONTENTS OF 1 CAPSULE VIA INHALATION DEVICE DAILY FOR BREATHING 90 capsule 3    fluticasone (FLONASE) 50 MCG/ACT nasal spray 1 spray by Each Nare route daily as needed for Rhinitis      famotidine (PEPCID) 10 MG tablet Take 10 mg by mouth 2 times daily      doxazosin (CARDURA) 8 MG tablet TAKE 1 TABLET BY MOUTH EVERY NIGHT AT BEDTIME FOR BLOOD PRESSURE 90 tablet 3    VENTOLIN  (90 Base) MCG/ACT inhaler INHALE 2 PUFFS INTO THE LUNGS EVERY 6 HOURS AS NEEDED FOR WHEEZING 1 Inhaler 5    Nebulizers (VIOS LC SPRINT) MISC USE UTD  0    Compression Stockings MISC by Does not apply route 15-20 mmHg knee high 1 each 0    Respiratory Therapy Supplies (NEBULIZER/TUBING/MOUTHPIECE) KIT 1 kit by Does not apply route daily as needed (sob) 1 kit 0    albuterol (PROVENTIL) (2.5 MG/3ML) 0.083% nebulizer solution Take 3 mLs by nebulization every 6 hours as needed for Wheezing 120 vial 3    nitroGLYCERIN (NITROSTAT) 0.4 MG SL tablet Place 1 tablet under the tongue every 5 minutes as needed for Chest pain (chest pain) 25 tablet 3    Spacer/Aero-Holding Chambers KOSTA 1 Device by Does not apply route daily as needed (with Albuterol inhaler) 1 Device 0    Cholecalciferol (VITAMIN D-3) 5000 UNITS TABS Take 5,000 Units by mouth daily       aspirin 81 MG EC tablet Take 81 mg by mouth nightly        No current facility-administered medications for this visit. Review of Systems:   All others are negative, except as noted in the HPI.     Patient History:  Past Medical History:   Diagnosis Date    Atherosclerosis of aorta (HCC)     CHF (congestive heart failure) (HCC)     CKD (chronic kidney disease) stage 3, GFR 30-59 ml/min (HCC)     COPD (chronic obstructive pulmonary disease) ([address]     CVA, old, ataxia 2007    DM type 2 with diabetic peripheral neuropathy ([facility] 10/23/2014    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 2008    Fall 9/2015    Fatty liver     Hypertension     Mixed hyperlipidemia     Morbid obesity [facility]     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 ([facility] 2008    Obesity, diabetes, and hypertension syndrome ([facility] August, 2015    Psoriasis     Risk for falls     Type 2 diabetes mellitus with microalbuminuria or microproteinuria     Vitamin D deficiency 2011        Past Surgical History:   Procedure Laterality Date    CATARACT REMOVAL Right 7/3/13    Magaly Sax    TURP  8/14/12    Teja        Social History     Socioeconomic History    Marital status:      Spouse name: Not on file    Number of children: 11    Years of education: Not on file    Highest education level: Not on file   Occupational History    Occupation: , navarro     Employer: [address] [place] [address] Drive: retired 1991   Social Needs    Financial resource strain: Not on file    Food insecurity:     Worry: Not on file     Inability: Not on file   [facility] needs:     Medical: Not on file     Non-medical: Not on file   Tobacco Use    Smoking status: Former Smoker     Packs/day: 2.00     Years: 50.00     Pack years: 100.00     Types: Cigarettes     Start date: [birth year]4/1950     Last attempt to quit: tenderness. Musculoskeletal:      Comments: Currently using a walker   Skin:     General: Skin is warm and dry. Neurological:      Mental Status: He is alert and oriented to person, place, and time.             Laboratory Studies:  Lab Results   Component Value Date    LABA1C 6.4 12/01/2019    LABA1C 6.5 07/16/2019    LABA1C 6.0 05/15/2019        Lab Results   Component Value Date    WBC 10.2 12/02/2019    HGB 12.0 (L) 12/02/2019    HCT 36.2 (L) 12/02/2019    MCV 89.1 12/02/2019     12/02/2019        Lab Results   Component Value Date     01/09/2020    K 5.6 (H) 01/09/2020    CL 97 (L) 01/09/2020    CO2 32 01/09/2020    BUN 15 01/09/2020    CREATININE 1.6 (H) 01/09/2020    GLUCOSE 199 (H) 01/09/2020    CALCIUM 9.7 01/09/2020    PROT 7.2 11/29/2019    LABALBU 3.5 12/03/2019    BILITOT 0.5 11/29/2019    ALKPHOS 54 11/29/2019    AST 23 11/29/2019    ALT 12 11/29/2019    LABGLOM 41 (A) 01/09/2020    GFRAA 50 (A) 01/09/2020    AGRATIO 1.2 11/29/2019    GLOB 3.3 11/29/2019       Lab Results   Component Value Date    CHOL 99 05/15/2019    TRIG 59 05/15/2019    HDL 39 (L) 05/15/2019    LDLCALC 48 05/15/2019    LABVLDL 12 05/15/2019       Lab Results   Component Value Date    TSH 1.63 05/14/2019        Lab Results   Component Value Date    VITD25 55.7 02/17/2016    VITD25 36.2 02/11/2015    VITD25 33 01/16/2014            Health Maintenance Review:  Health Maintenance Due   Topic Date Due    Shingles Vaccine (2 of 3) 05/15/2015    Annual Wellness Visit (AWV)  05/29/2019    Diabetic foot exam  06/01/2019         Cancer Screenings:  Lab Results   Component Value Date    PSA 0.50 03/07/2016     Immunizations:  Immunization History   Administered Date(s) Administered    Influenza Virus Vaccine 10/07/2009, 01/05/2010    Influenza, High Dose (Fluzone 65 yrs and older) 11/29/2011, 10/01/2012, 10/29/2013, 10/23/2014, 11/09/2015, 09/19/2016, 12/01/2017, 11/27/2018    Influenza, Triv, inactivated, subunit, adjuvanted, IM (Fluad 65 yrs and older) 11/07/2019    Pneumococcal Conjugate 13-valent ([id number]) 02/11/2015    Pneumococcal Polysaccharide ([id number]) 11/21/2002, 09/19/2016    Td, unspecified formulation 04/03/2001, 04/17/2012    Tdap (Boostrix, Adacel) 03/20/2015, 09/01/2017    Zoster Live (Zostavax) 04/17/2014, 03/20/2015         Assessment:   1. Hyperkalemia    2. Centrilobular emphysema ([address]    3. Chronic respiratory failure with hypoxia (HCC)    4. DM type 2 with diabetic peripheral neuropathy ([address]    5. Frequent falls           Plan:  Hyperkalemia  -     Comprehensive Metabolic Panel; Future - check lab today - hold on kcl for now but continue usual dose lasix pending response    Centrilobular emphysema ([address]  - Currently using oxygen   - Continue scheduled pulmonologist visits - Will be returning in April 2020 - cpm -seems stable    Chronic respiratory failure with hypoxia (HCC)  - Currently using oxygen   - Continue scheduled pulmonologist visits - Will be returning in April 2020     DM type 2 with diabetic peripheral neuropathy ([address]  - A1C on 12/1/19 was 6.4%  - encouraged patient to maintain a healthy, balanced diet while limiting sugars; advised patient to stay active as much as tolerable  - advised patient to stay up-to-date with annual eye and foot exams      Frequent falls  - Ways to decrease fall risk dw/ pt - He is currently using a walker -encourage regular HEP from PT    Other orders  -     escitalopram (LEXAPRO) 10 MG tablet; Take 1 tablet by mouth daily restart for depression    - Discussed talking with  on Aging for assistance with showering     Reviewed previous labs and discussed results with patient:  - orders for CMP have been placed and will be done today; will contact patient if there are any abnormal results or findings       I have reviewed patient's pertinent medical history, relevant laboratory and imaging studies, and past/future health maintenance.  Patient's medications have been reviewed and were discussed with the patient. Refills given today, see note below. Discussed with the patient the importance of adhering to their current medication regimen as directed. Advised the patient that they should continue to work on eating a healthy balanced diet and staying active by exercising within their personal limits. Patient was advised to keep future appointments with their respective specialty care team(s). Patient had the opportunity to ask questions, all of which were answered to the best of my ability and with patient satisfaction. Patient advised to schedule a return visit for reevaluation in as needed. Patient understands and is agreeable with the care plan following today's visit. Patient is to schedule an appointment for any new or worsening symptoms. Requested Prescriptions     Signed Prescriptions Disp Refills    escitalopram (LEXAPRO) 10 MG tablet 90 tablet 3     Sig: Take 1 tablet by mouth daily      Orders Placed This Encounter   Procedures    Comprehensive Metabolic Panel     Standing Status:   Future     Number of Occurrences:   1     Standing Expiration Date:   1/23/2021           By signing my name below, IRachana, attest that this documentation has been prepared under the direction and in the presence of Jyothi Carmichael MD.   Electronically Signed: Cristi Easley, 1/23/2020, 11:32 AM.      IJyothi MD, personally performed the services described in this documentation. All medical record entries made by the scribe were at my direction and in my presence. I have reviewed the chart and discharge instructions (if applicable) and agree that the record reflects my personal performance and is accurate and complete.  Jyothi Carmichael MD, 1/23/2020, 1:13 PM.

## 2020-02-12 ENCOUNTER — TELEPHONE (OUTPATIENT)
Dept: FAMILY MEDICINE CLINIC | Age: 85
End: 2020-02-12

## 2020-02-12 NOTE — TELEPHONE ENCOUNTER
PT WIFE REQUESTING A LETTER TO HAVE A LIFT TO PUT ON THE BACK OF THE CAR - FOR HIS  ELECTRIC CART -SAYING HE NEEDS THIS -  IF YOU CAN WRITE THIS THEY WON'T HAVE TO PAY INCOME TAX ON IT     Garo Samano @  [phone]

## 2020-02-26 RX ORDER — GABAPENTIN 300 MG/1
CAPSULE ORAL
Qty: 270 CAPSULE | Refills: 1 | Status: SHIPPED | OUTPATIENT
Start: 2020-02-26 | End: 2020-07-29 | Stop reason: SDUPTHER

## 2020-04-15 ENCOUNTER — TELEPHONE (OUTPATIENT)
Dept: OTHER | Facility: CLINIC | Age: 85
End: 2020-04-15

## 2020-04-23 ENCOUNTER — VIRTUAL VISIT (OUTPATIENT)
Dept: FAMILY MEDICINE CLINIC | Age: 85
End: 2020-04-23
Payer: MEDICARE

## 2020-04-23 PROCEDURE — G2012 BRIEF CHECK IN BY MD/QHP: HCPCS | Performed by: FAMILY MEDICINE

## 2020-04-23 RX ORDER — ISOSORBIDE MONONITRATE 30 MG/1
TABLET, EXTENDED RELEASE ORAL
Qty: 180 TABLET | Refills: 3 | Status: SHIPPED | OUTPATIENT
Start: 2020-04-23 | End: 2021-01-01 | Stop reason: SDUPTHER

## 2020-04-23 ASSESSMENT — PATIENT HEALTH QUESTIONNAIRE - PHQ9
SUM OF ALL RESPONSES TO PHQ QUESTIONS 1-9: 0
2. FEELING DOWN, DEPRESSED OR HOPELESS: 0
1. LITTLE INTEREST OR PLEASURE IN DOING THINGS: 0
SUM OF ALL RESPONSES TO PHQ9 QUESTIONS 1 & 2: 0
SUM OF ALL RESPONSES TO PHQ QUESTIONS 1-9: 0

## 2020-04-23 NOTE — PROGRESS NOTES
Lashon Loza is a 81 y.o. male evaluated via telephone on 4/23/2020. Chief Complaint   Patient presents with    Other     CHRONIC CONDIDTION UPDATE    Hypertension     HTN ROUTINE FOLLOW UP      BP Readings from Last 3 Encounters:   01/23/20 122/76   12/03/19 (!) 176/82   11/07/19 134/82     Wt Readings from Last 3 Encounters:   01/23/20 269 lb (122 kg)   12/03/19 271 lb 9.7 oz (123.2 kg)   11/07/19 277 lb (125.6 kg)     Pulse Readings from Last 3 Encounters:   01/23/20 82   12/03/19 61   11/07/19 94   bp other day 120/80. occ dizzy when first gets out of bed - sits on bed for awhile. No ha  No cp/palpitations  Breathing good  No sinus/ allergy problems  Some stiffness in legs - using walker. needsrefill on imdur   Diagnosis Orders   1. Stage 3 chronic kidney disease ([address]     2. DM type 2 with diabetic peripheral neuropathy ([address]     3. COPD, moderate ([address]     4. Chronic diastolic (congestive) heart failure ([address]     5. Mixed hyperlipidemia     6. Essential hypertension     7. Mild anemia     generally stable  Refill imdur  F/u pulm/ nephrology routinely  Labs reviewed  Will need lipid,hepatic in near future  cpm for chronic conditions - bp well controlled  No cp /angina or sxs of chf - copd stable  covid 19 prevention dw/ pt      Consent:  He and/or health care decision maker is aware that that he may receive a bill for this telephone service, depending on his insurance coverage, and has provided verbal consent to proceed: Yes      Documentation:  I communicated with the patient and/or health care decision maker about see above. Details of this discussion including any medical advice provided: see above      I affirm this is a Patient Initiated Episode with an Established Patient who has not had a related appointment within my department in the past 7 days or scheduled within the next 24 hours.     Patient identification was verified at the start of the visit: Yes    Total Time: minutes: 11-20

## 2020-04-29 RX ORDER — SIMVASTATIN 40 MG
TABLET ORAL
Qty: 90 TABLET | Refills: 1 | Status: SHIPPED | OUTPATIENT
Start: 2020-04-29 | End: 2020-01-01

## 2020-04-29 RX ORDER — DOXAZOSIN 8 MG/1
TABLET ORAL
Qty: 90 TABLET | Refills: 1 | Status: SHIPPED | OUTPATIENT
Start: 2020-04-29 | End: 2020-01-01

## 2020-05-26 RX ORDER — LABETALOL 100 MG/1
TABLET, FILM COATED ORAL
Qty: 180 TABLET | Refills: 1 | Status: SHIPPED | OUTPATIENT
Start: 2020-05-26 | End: 2020-01-01

## 2020-05-26 RX ORDER — FUROSEMIDE 80 MG
TABLET ORAL
Qty: 180 TABLET | Refills: 1 | Status: SHIPPED | OUTPATIENT
Start: 2020-05-26 | End: 2020-01-01

## 2020-05-26 RX ORDER — POTASSIUM CHLORIDE 750 MG/1
TABLET, FILM COATED, EXTENDED RELEASE ORAL
Qty: 180 TABLET | Refills: 1 | Status: SHIPPED | OUTPATIENT
Start: 2020-05-26 | End: 2021-01-01

## 2020-06-15 NOTE — CARE COORDINATION
Is This A New Presentation, Or A Follow-Up?: Skin Lesion
Which Family Member (Optional)?: Son
by Does not apply route daily as needed (sob) 11/19/18   Zelda Clements MD   albuterol (PROVENTIL) (2.5 MG/3ML) 0.083% nebulizer solution Take 3 mLs by nebulization every 6 hours as needed for Wheezing 11/19/18   Zelda Clements MD   nitroGLYCERIN (NITROSTAT) 0.4 MG SL tablet Place 1 tablet under the tongue every 5 minutes as needed for Chest pain (chest pain) 6/22/18   Basim Da Silva MD   tiotropium [facility]) 18 MCG inhalation capsule One inhalation daily for breathing  Patient taking differently: nightly One inhalation daily for breathing 6/21/18   Zelda Clements MD   Spacer/Aero-Holding [place] BRAMBILA 1 Device by Does not apply route daily as needed (with Albuterol inhaler) 9/19/16   SANAZ Chang - CNP   Cholecalciferol (VITAMIN D-3) 5000 UNITS TABS Take 5,000 Units by mouth daily     Historical Provider, MD   aspirin 81 MG EC tablet Take 81 mg by mouth nightly     Historical Provider, MD       Future Appointments   Date Time Provider Heriberto Franco   7/9/2019  1:20 PM Basim Da Silva MD [facility]   8/8/2019 10:00 AM [facility] Clements [facility]     ,   Diabetes Assessment    Medic Alert ID:  No  Meal Planning:  None   How often do you test your blood sugar?:  Daily   Do you have barriers with adherence to non-pharmacologic self-management interventions?  (Nutrition/Exercise/Self-Monitoring):  No   Have you ever had to go to the ED for symptoms of low blood sugar?:  No       No patient-reported symptoms      ,   Congestive Heart Failure Assessment    Are you currently restricting fluids?:  No Restriction  Do you understand a low sodium diet?:  No  Do you understand how to read food labels?:  No  How many restaurant meals do you eat per week?:  0  Do you salt your food before tasting it?:  No     No patient-reported symptoms      Symptoms:      Symptom course:  stable  Weight trend:  stable  Salt intake watch compared to last visit:  stable     ,   COPD Assessment    Does the

## 2020-07-29 ENCOUNTER — TELEPHONE (OUTPATIENT)
Dept: FAMILY MEDICINE CLINIC | Age: 85
End: 2020-07-29

## 2020-07-29 RX ORDER — GABAPENTIN 300 MG/1
CAPSULE ORAL
Qty: 48 CAPSULE | Refills: 0 | Status: SHIPPED | OUTPATIENT
Start: 2020-07-29 | End: 2020-08-17

## 2020-07-29 NOTE — TELEPHONE ENCOUNTER
Patient has been without the Gabapentin x 1 week. [facility] said it was filled in 2 bottles. Mickye Brar does not remember getting a second bottle and doesn't understand why they would have only put the last few weeks in a separate bottle. Bottle they have does say 1 of 2 but she does not have bottle #2. Patient is realy feeling the effects or not having this medication.    Asking if we can please refill or give him a temp supply to get to next fill date

## 2020-08-17 RX ORDER — GABAPENTIN 300 MG/1
CAPSULE ORAL
Qty: 270 CAPSULE | Refills: 0 | Status: SHIPPED | OUTPATIENT
Start: 2020-08-17 | End: 2020-01-01

## 2020-10-29 NOTE — ED NOTES
Bed: 05  Expected date:   Expected time:   Means of arrival:   Comments:  Hayden Waldron RN  10/29/20 [id number]

## 2020-10-29 NOTE — ED PROVIDER NOTES
I independently performed a history and physical on Nela Patino. All diagnostic, treatment, and disposition decisions were made by myself in conjunction with the advanced practice provider. Briefly, this is a 81 y.o. male here for a mechanical fall. Tripped over curb going into Doctors office  No reported injuries  No HA, or neck pain    On exam, Obese male, NAD, Heart RRR, Lungs CTAB, no C-spine TTP. Screenings     Valarie Coma Scale  Eye Opening: Spontaneous  Best Verbal Response: Oriented  Best Motor Response: Obeys commands  [place] Coma Scale Score: 15             MDM  Imaging benign  DC home. Patient Referrals:  Ramila Menjivar MD  [facility] [address]  [phone]    Schedule an appointment as soon as possible for a visit in 2 days      [facility] Emergency Department  [address] [place] [address]  [phone]    As needed, If symptoms worsen      Discharge Medications:  New Prescriptions    No medications on file       FINAL IMPRESSION  1. Fall, initial encounter    2. Closed head injury, initial encounter        Blood pressure (!) 145/74, pulse 72, temperature 98 °F (36.7 °C), resp. rate 14, height 5' 9\" (1.753 m), weight 261 lb (118.4 kg), SpO2 98 %. For further details of Magi Troy emergency department encounter, please see documentation by advanced practice provider, Lori Johnson MD  10/29/20 [id number]

## 2020-10-29 NOTE — ED PROVIDER NOTES
[facility]        Pt Name: Lydia Harper  MRN: [id number]  [place] 1935  Date of evaluation: 10/29/2020  Provider: Norberto Muñoz PA-C  PCP: Arlyne Gosselin, MD     I have seen and evaluated this patient with my supervising physician Paula Heaton MD.    [facility]       Chief Complaint   Patient presents with   [facility] 25 brought pt from PCP this am with fall into the building. Pt hit the back of head and left side of body. Denies LOC on blood thinners       HISTORY OF PRESENT ILLNESS   (Location, Timing/Onset, Context/Setting, Quality, Duration, Modifying Factors, Severity, Associated Signs and Symptoms)  Note limiting factors. Lydia Harper is a 81 y.o. male presents to the emergency department today for evaluation for a fall. The patient states that he was walking into his doctor's office, he states that it was raining, the step was slick, and this caused him to fall, hitting the back of his head. The patient denies feeling dizzy, lightheaded, having chest pain or shortness of breath before his fall, he states that his fall was mechanical in nature. The patient states that he did hit his head, no wounds. He is on an aspirin but no blood thinners. He has no nausea or vomiting. He has no neck pain or back pain. He has no chest pain, shortness of breath or abdominal pain. He does have an abrasion to his left knee, and his tetanus has been updated within the past 5 years. He denies any pain to the knee. He was able to get up and walk after the injury occurred. The patient has no complaints of pain at this time, and he otherwise has no complaints     Nursing Notes were all reviewed and agreed with or any disagreements were addressed in the HPI.     REVIEW OF SYSTEMS    (2-9 systems for level 4, 10 or more for level 5)     Review of Systems   Constitutional: Negative for activity change, appetite change, chills and fever. Eyes: Negative for visual disturbance. Respiratory: Negative for shortness of breath. Cardiovascular: Negative for chest pain. Gastrointestinal: Negative for abdominal pain, diarrhea, nausea and vomiting. Genitourinary: Negative for difficulty urinating, dysuria and hematuria. Musculoskeletal: Negative for arthralgias. Skin: Positive for wound. Neurological: Negative for weakness and numbness. Positives and Pertinent negatives as per HPI. Except as noted above in the ROS, all other systems were reviewed and negative.        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 ([address] 2008    Obesity, diabetes, and hypertension syndrome ([address] August, 2015    Psoriasis     Risk for falls     Type 2 diabetes mellitus with microalbuminuria or microproteinuria     Vitamin D deficiency 2011         SURGICAL HISTORY     Past Surgical History:   Procedure Laterality Date    CATARACT REMOVAL Right 7/3/13    Allyvickyagnieszka Brodie    ARETHAP  8/14/12    Teja         CURRENTMEDICATIONS       Discharge Medication List as of 10/29/2020 10:36 AM      CONTINUE these medications which have NOT CHANGED    Details   gabapentin (NEURONTIN) 300 MG capsule TAKE 1 CAPSULE BY MOUTH THREE TIMES DAILY, Disp-270 capsule,R-0Normal      potassium chloride (KLOR-CON) 10 MEQ extended release tablet TAKE 1 TABLET BY MOUTH TWICE DAILY, Disp-180 tablet,R-1Normal      isosorbide mononitrate (IMDUR) 30 MG extended release tablet TAKE 1 TABLET BY MOUTH TWICE DAILY FOR BLOOD PRESSURE, Disp-180 tablet,R-3Normal      escitalopram (LEXAPRO) 10 MG tablet Take 1 tablet by mouth daily, Disp-90 tablet, R-3**Patient requests 90 days supply**Normal      Fluticasone furoate-vilanterol (BREO ELLIPTA) 200-25 MCG/INH AEPB inhaler Inhale 1 puff into the lungs daily, Disp-1 each, R-0NO PRINT      tiotropium (SPIRIVA HANDIHALER) 18 MCG inhalation capsule INHALE THE CONTENTS OF 1 CAPSULE VIA INHALATION DEVICE DAILY FOR BREATHING, Disp-90 capsule, R-3Normal      fluticasone (FLONASE) 50 MCG/ACT nasal spray 1 spray by Each Nare route daily as needed for RhinitisHistorical Med      famotidine (PEPCID) 10 MG tablet Take 10 mg by mouth 2 times dailyHistorical Med      VENTOLIN  (90 Base) MCG/ACT inhaler INHALE 2 PUFFS INTO THE LUNGS EVERY 6 HOURS AS NEEDED FOR WHEEZING, Disp-1 Inhaler, R-5Normal      Nebulizers (VIOS LC SPRINT) MISC R-0, Historical Med      Compression Stockings MISC Starting Tue 11/27/2018, Disp-1 each, R-0, Gwvmp30-15 mmHg knee high      Respiratory Therapy Supplies (NEBULIZER/TUBING/MOUTHPIECE) KIT DAILY PRN Starting Mon 11/19/2018, Disp-1 kit, R-0, Normal      albuterol (PROVENTIL) (2.5 MG/3ML) 0.083% nebulizer solution Take 3 mLs by nebulization every 6 hours as needed for Wheezing, Disp-120 vial, R-3Normal      nitroGLYCERIN (NITROSTAT) 0.4 MG SL tablet Place 1 tablet under the tongue every 5 minutes as needed for Chest pain (chest pain), Disp-25 tablet, R-3Normal      Spacer/Aero-Holding Chambers KOSTA DAILY PRN Starting 9/19/2016, Until Discontinued, Disp-1 Device, R-0, Normal      Cholecalciferol (VITAMIN D-3) 5000 UNITS TABS Take 5,000 Units by mouth daily Historical Med      aspirin 81 MG EC tablet Take 81 mg by mouth nightly Historical Med               ALLERGIES     Patient has no known allergies. FAMILYHISTORY     History reviewed. No pertinent family history.        SOCIAL HISTORY       Social History     Tobacco Use    Smoking status: Former Smoker     Packs/day: 2.00     Years: 50.00     Pack years: 100.00     Types: Cigarettes     Start date: 1951     Last attempt to quit: 2006     Years since quittin.9    Smokeless tobacco: Never Used    Tobacco comment: don't resume cigs   Substance Use Topics    Alcohol use: Yes     Alcohol/week: 7.0 standard drinks     Types: 7 Standard drinks or equivalent per week     Comment: occas    Drug use: No       SCREENINGS    [place] Coma Scale  Eye Opening: Spontaneous  Best Verbal Response: Oriented  Best Motor Response: Obeys commands  [place] Coma Scale Score: 15        PHYSICAL EXAM    (up to 7 for level 4, 8 or more for level 5)     ED Triage Vitals   BP Temp Temp src Pulse Resp SpO2 Height Weight   -- -- -- -- -- -- -- --       Physical Exam  Vitals signs and nursing note reviewed. Constitutional:       Appearance: He is well-developed. He is not diaphoretic. HENT:      Head: Normocephalic and atraumatic. Right Ear: External ear normal.      Left Ear: External ear normal.      Nose: Nose normal.   Eyes:      General:         Right eye: No discharge. Left eye: No discharge. Neck:      Musculoskeletal: Normal range of motion and neck supple. Trachea: No tracheal deviation. Cardiovascular:      Rate and Rhythm: Normal rate and regular rhythm. Heart sounds: No murmur. Pulmonary:      Effort: Pulmonary effort is normal. No respiratory distress. Breath sounds: Normal breath sounds. No wheezing or rales. Abdominal:      General: Bowel sounds are normal. There is no distension. Palpations: Abdomen is soft. Tenderness: There is no abdominal tenderness. There is no guarding. Musculoskeletal: Normal range of motion. Comments: Small abrasion noted to the left anterior knee, there is no overlying edema, ecchymosis, or tenderness. He has full range of motion of all joints.     No midline spinal tenderness,   Skin:     General: Skin is warm and dry. Neurological:      Mental Status: He is alert and oriented to person, place, and time. GCS: GCS eye subscore is 4. GCS verbal subscore is 5. GCS motor subscore is 6. Psychiatric:         Behavior: Behavior normal.         DIAGNOSTIC RESULTS   LABS:    Labs Reviewed - No data to display    All other labs were within normal range or not returned as of this dictation. EKG: All EKG's are interpreted by the Emergency Department Physician in the absence of a cardiologist.  Please see their note for interpretation of EKG. RADIOLOGY:   Non-plain film images such as CT, Ultrasound and MRI are read by the radiologist. Plain radiographic images are visualized and preliminarily interpreted by the ED Provider with the below findings:        Interpretation per the Radiologist below, if available at the time of this note:    CT CERVICAL SPINE WO CONTRAST   Final Result   No acute abnormality of the cervical spine. CT HEAD WO CONTRAST   Final Result   No acute intracranial abnormality. No results found. PROCEDURES   Unless otherwise noted below, none     Procedures    CRITICAL CARE TIME   N/A    CONSULTS:  None      EMERGENCY DEPARTMENT COURSE and DIFFERENTIAL DIAGNOSIS/MDM:   Vitals:    Vitals:    10/29/20 0959 10/29/20 1004   BP: (!) 160/64 (!) 145/74   Pulse: 73 72   Resp: 18 14   Temp:  98 °F (36.7 °C)   SpO2: 99% 98%   Weight:  261 lb (118.4 kg)   Height:  5' 9\" (1.753 m)       Patient was given the following medications:  Medications - No data to display        Briefly, this is a 55-year-old male who presents emergency department today for evaluation for mechanical fall that occurred today before arriving to the ED. Patient was walking to his doctor's office, the scalpel slipped in the rain, and he fell backwards. No loss of consciousness. No vomiting. Patient has no complaints of any pain at this time    Physical exam is unremarkable, no focal logical deficits.   No reproducible tenderness. He does have a mild abrasion noted to his left knee, his tetanus is up-to-date. He has no tenderness over his knee and does not want an x-ray    CT of head and cervical spine are negative    Wife is in the room, and she states that the patient does fall frequently. I did offer lab work and further work-up for this but she declined stating that they have a nurse practitioner that comes to the house and she states that she will just have this worked up with her. Supportive care discussed at home otherwise, his fall was mechanical in nature and I feel the chemo managed as an outpatient. He is to obtain follow-up with his primary care physician within 2 to 3 days reevaluation. He is to return to the ED for any new or worsening symptoms, patient and family agree with plan. Stable for discharge. My suspicion is low at this time for acute internal hemorrhage, subarachnoid hemorrhage, epidural hematoma, subdural hematoma, skull fracture, cervical spine fracture or other emergent etiology    FINAL IMPRESSION      1. Fall, initial encounter    2.  Closed head injury, initial encounter          DISPOSITION/PLAN   DISPOSITION Decision To Discharge 10/29/2020 10:26:02 AM      PATIENT REFERREDTO:  Rhea Ayers [facility] [address]  [phone]    Schedule an appointment as soon as possible for a visit in 2 days      [facility] Emergency Department  [address] [place] [address]  [phone]    As needed, If symptoms worsen      DISCHARGE MEDICATIONS:  Discharge Medication List as of 10/29/2020 10:36 AM          DISCONTINUED MEDICATIONS:  Discharge Medication List as of 10/29/2020 10:36 AM                 (Please note that portions of this note were completed with a voice recognition program.  Efforts were made to edit the dictations but occasionally words are mis-transcribed.)    Lucia Amanda PA-C (electronically signed)           Lucia Amanda MICHELLE  10/29/20 7855

## 2020-10-30 NOTE — ED PROVIDER NOTES
[facility]      Pt Name: Lydia Harper  MRN: [id number]  [place] 1935  Date of evaluation: 10/30/2020  Provider: JEAN Busch    This patient was not seen and evaluated by the attending physician No att. providers found. CHIEF COMPLAINT       Chief Complaint   Patient presents with    Fall     patient in by [facility] ems from home, fell at the doctors yesterday. seen and evaluated, still having L sided rib pain, only with movement       CRITICAL CARE TIME   I performed a total Critical Care time of 15 minutes, excluding separately reportable procedures. There was a high probability of clinically significant/life threatening deterioration in the patient's condition which required my urgent intervention. Not limited to multiple reexaminations, discussions with attending physician and consultants. HISTORY OF PRESENT ILLNESS  (Location/Symptom, Timing/Onset, Context/Setting, Quality, Duration, Modifying Factors, Severity.)   Lydia Harper is a 81 y.o. male who presents to the emergency department via EMS accompanied by his wife. Patient had a mechanical trip and fall yesterday. Wife states \"he falls sometimes. \"  He was being helped into his doctor's office when he tripped over a curb landing onto his left side. He is not on blood thinners or aspirin. He said some left lower rib pain since the fall. He did not take anything over the pain. He has history of COPD, diabetes on oxygen via nasal cannula 3 L. Follows with Dr. Mitra Morgan with pulmonology. He has been coughing up some white thick phlegm. No fevers. Nursing Notes were reviewed and I agree. REVIEW OF SYSTEMS    (2-9 systems for level 4, 10 or more for level 5)     Review of Systems   Constitutional: Negative for fever. Respiratory: Positive for cough. Negative for shortness of breath. Cardiovascular: Positive for chest pain.    Gastrointestinal: Negative for abdominal pain and vomiting. Musculoskeletal: Negative for back pain, neck pain and neck stiffness. Skin: Negative for color change, rash and wound. Neurological: Negative for weakness, numbness and headaches. Psychiatric/Behavioral: Negative for agitation, behavioral problems and confusion. Except as noted above the remainder of the review of systems was reviewed and negative.        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 ([address] 2008    Obesity, diabetes, and hypertension syndrome ([address] August, 2015    Psoriasis     Risk for falls     Type 2 diabetes mellitus with microalbuminuria or microproteinuria     Vitamin D deficiency 2011       SURGICAL HISTORY           Procedure Laterality Date    CATARACT REMOVAL Right 7/3/13    Jorene Rusty    TURP  8/14/12    Teja       CURRENT MEDICATIONS       Previous Medications    ALBUTEROL (PROVENTIL) (2.5 MG/3ML) 0.083% NEBULIZER SOLUTION    Take 3 mLs by nebulization every 6 hours as needed for Wheezing    ASPIRIN 81 MG EC TABLET    Take 81 mg by mouth nightly     CHOLECALCIFEROL (VITAMIN D-3) 5000 UNITS TABS    Take 5,000 Units by mouth daily     COMPRESSION STOCKINGS MISC    by Does not apply route 15-20 mmHg knee high    DOXAZOSIN (CARDURA) 8 MG TABLET    TAKE 1 TABLET BY MOUTH EVERY NIGHT AT BEDTIME FOR BLOOD PRESSURE    ESCITALOPRAM (LEXAPRO) 10 MG TABLET    Take 1 tablet by mouth daily    FAMOTIDINE (PEPCID) 10 MG TABLET    Take 10 mg by mouth 2 times daily    FLUTICASONE (FLONASE) 50 MCG/ACT NASAL SPRAY    1 spray by Each Nare route daily as needed for Rhinitis    FLUTICASONE FUROATE-VILANTEROL (BREO ELLIPTA) 200-25 MCG/INH AEPB INHALER    Inhale 1 puff into the lungs daily    FUROSEMIDE (LASIX) 80 MG TABLET    TAKE 1 TABLET BY MOUTH TWICE DAILY    GABAPENTIN (NEURONTIN) 300 MG CAPSULE    TAKE 1 CAPSULE BY MOUTH THREE TIMES DAILY    ISOSORBIDE MONONITRATE (IMDUR) 30 MG EXTENDED RELEASE TABLET    TAKE 1 TABLET BY MOUTH TWICE DAILY FOR BLOOD PRESSURE    LABETALOL (NORMODYNE) 100 MG TABLET    TAKE 1 TABLET BY MOUTH TWICE DAILY    NEBULIZERS (VIOS LC SPRINT) MISC    USE UTD    NITROGLYCERIN (NITROSTAT) 0.4 MG SL TABLET    Place 1 tablet under the tongue every 5 minutes as needed for Chest pain (chest pain)    POTASSIUM CHLORIDE (KLOR-CON) 10 MEQ EXTENDED RELEASE TABLET    TAKE 1 TABLET BY MOUTH TWICE DAILY    RESPIRATORY THERAPY SUPPLIES (NEBULIZER/TUBING/MOUTHPIECE) KIT    1 kit by Does not apply route daily as needed (sob)    SIMVASTATIN (ZOCOR) 40 MG TABLET    TAKE 1 TABLET BY MOUTH EVERY EVENING    SPACER/AERO-HOLDING CHAMBERS KOSTA    1 Device by Does not apply route daily as needed (with Albuterol inhaler)    TIOTROPIUM (SPIRIVA HANDIHALER) 18 MCG INHALATION CAPSULE    INHALE THE CONTENTS OF 1 CAPSULE VIA INHALATION DEVICE DAILY FOR BREATHING    VENTOLIN  (90 BASE) MCG/ACT INHALER    INHALE 2 PUFFS INTO THE LUNGS EVERY 6 HOURS AS NEEDED FOR WHEEZING       ALLERGIES     Patient has no known allergies. FAMILY HISTORY     History reviewed. No pertinent family history. Family Status   Relation Name Status    Mother      Father      Brother  Alive        SOCIAL HISTORY      reports that he quit smoking about 13 years ago. His smoking use included cigarettes. He started smoking about 69 years ago. He has a 100.00 pack-year smoking history. He has never used smokeless tobacco. He reports current alcohol use of about 7.0 standard drinks of alcohol per week.  He reports that he does not use drugs. PHYSICAL EXAM    (up to 7 for level 4, 8 or more for level 5)     ED Triage Vitals   BP Temp Temp Source Pulse Resp SpO2 Height Weight   10/30/20 1540 10/30/20 1540 10/30/20 1817 10/30/20 1540 10/30/20 1540 10/30/20 1540 10/30/20 1543 10/30/20 1543   (!) 133/58 97 °F (36.1 °C) Oral 75 16 100 % 5' 9\" (1.753 m) 271 lb 13.2 oz (123.3 kg)       Physical Exam  Vitals signs and nursing note reviewed. Constitutional:       Appearance: Normal appearance. HENT:      Head: Normocephalic and atraumatic. Eyes:      Pupils: Pupils are equal, round, and reactive to light. Cardiovascular:      Rate and Rhythm: Normal rate. Pulmonary:      Effort: Pulmonary effort is normal. No respiratory distress. Chest:      Chest wall: Tenderness present. Abdominal:      Tenderness: There is no abdominal tenderness. There is no guarding or rebound. Musculoskeletal: Normal range of motion. Neurological:      General: No focal deficit present. Mental Status: He is alert and oriented to person, place, and time. Mental status is at baseline. DIAGNOSTIC RESULTS     RADIOLOGY:   Non-plain film images such as CT, Ultrasound and MRI are read by the radiologist. Plain radiographic images are visualized and preliminarily interpreted by JEAN Tolbert with the below findings:    Reviewed radiologist's interpretation. Interpretation per the Radiologist below, if available at the time of this note:    CT CHEST WO CONTRAST   Final Result   No acute fracture. Emphysema. Mild nodular opacities posteromedially in the right lower lobe concerning for   infection. Recommend follow-up CT in 3 months to ensure resolution.                LABS:  Labs Reviewed   CBC WITH AUTO DIFFERENTIAL - Abnormal; Notable for the following components:       Result Value    RBC 3.98 (*)     Hemoglobin 11.2 (*)     Hematocrit 33.7 (*)     Platelets 733 (*)     Lymphocytes Absolute 0.9 (*) All other components within normal limits    Narrative:     Performed at:  [facility]  [address] [place] [address]   Phone [phone]   COMPREHENSIVE METABOLIC PANEL - Abnormal; Notable for the following components:    Sodium 134 (*)     Potassium 5.4 (*)     Chloride 92 (*)     CO2 33 (*)     Glucose 423 (*)     BUN 22 (*)     CREATININE 1.8 (*)     GFR Non- 36 (*)     GFR  44 (*)     All other components within normal limits    Narrative:     Performed at:  [facility]  [address] [place] [address]   Phone [phone]   LIPASE    Narrative:     Performed at:  [facility]  [address] [address] [address]   Phone [phone]       All other labs were within normal range or not returned as of this dictation. EMERGENCY DEPARTMENT COURSE and DIFFERENTIAL DIAGNOSIS/MDM:   Vitals:    Vitals:    10/30/20 1732 10/30/20 1748 10/30/20 1802 10/30/20 1817   BP: (!) 134/53 (!) 127/50 (!) 125/58 (!) 127/56   Pulse: 78   70   Resp: 17   16   Temp:    97.7 °F (36.5 °C)   TempSrc:    Oral   SpO2: 100% 100% 100% 100%   Weight:       Height:         I discussed with Deniz Maciel and/or family the exam results, diagnosis, care, prognosis, reasons to return and the importance of follow up. Patient and/or family is in full agreement with plan and all questions have been answered. Specific discharge instructions explained, including reasons to return to the emergency department. Deniz Maciel is well appearing, non-toxic, and afebrile at the time of discharge. Patient is some left-sided rib pain and tenderness after a fall yesterday. No abdominal tenderness or pain. He has chronic kidney disease and diabetes. His blood sugar is elevated and will need to be monitored.   He had CAT scans of head and neck yesterday and CT of chest today shows no acute traumatic abnormality. There is concern for possible right-sided pneumonia or infection. He has a pulmonologist to follow-up with I will cover him with azithromycin. Wife understands return for new, worsening or other concerns otherwise incentive spirometer, topical lidocaine patches and oral Tylenol. I estimate there is LOW risk for CAUDA EQUINA or CENTRAL CORD SYNDROME, COMPARTMENT SYNDROME, CORD COMPRESSION,  INTRACRANIAL HEMORRHAGE OR EDEMA, INTRA-ABDOMINAL INJURY, PERFORATED BOWEL, SUBDURAL OR EPIDURAL HEMATOMA, TENDON or NEUROVASCULAR INJURY, PNEUMOTHORAX, HEMOTHORAX, PERICARDIAL TAMPONADE, CARDIAC CONTUSION, or a THORACIC AORTIC DISSECTION, thus I consider the discharge disposition reasonable. Also, there is no evidence or peritonitis, sepsis, or toxicity. CONSULTS:  None    PROCEDURES:  Procedures      FINAL IMPRESSION      1. Fall on same level from slipping, tripping or stumbling, subsequent encounter    2. Rib pain on left side    3. Abnormal CT of the chest          DISPOSITION/PLAN   DISPOSITION Decision To Discharge 10/30/2020 05:54:29 PM      PATIENT REFERRED TO:  Parker Oliva MD  [facility] [address]  [phone]    Call in 1 day  For follow up      DISCHARGE MEDICATIONS:  New Prescriptions    AZITHROMYCIN (ZITHROMAX) 250 MG TABLET    2 po day 1, then 1 po days 2-5    LIDOCAINE (LIDODERM) 5 %    Place 1 patch onto the skin every 24 hours for 14 days 12 hours on, 12 hours off.        (Please note that portions of this note were completed with a voice recognition program.  Efforts were made to edit the dictations but occasionally words are mis-transcribed.)    Mikayla Peck, [address] [place], [place]  10/30/20 8553

## 2020-10-30 NOTE — TELEPHONE ENCOUNTER
PT'S WIFE CALLED, PT WENT TO THE ER YESTERDAY AFTER FALLING OUTSIDE OF OUR OFFICE PRIOR TO COMING IN FOR HIS VISIT. HE WAS SENT TO THE ER VIA AMBULANCE. THE ER WORKED HIM UP BUT DID NOT GET A CXR. TODAY, THIS AFTERNOON THE PT IS C/O SEVERE LEFT SIDED PAIN WITH INCREASED SOB. PT IS ON OXYGEN AND STILL HAVING SOB. I CALLED DR GHOSH TO SEE IF HE SHOULD GET XRAYS OR GO BACK TO THE ER.  PER TEDDY, HE NEEDS TO GO BACK TO THE ER VIA AMBULANCE TO BE REEVALUATED FOR POSSIBLE RIB FRACTURE. WIFE AGREED AND WILL CALL 911 TO TAKE HIM BECAUSE HE IS HIGH RISK FOR REOCCURING FALLS.   [address]

## 2020-10-30 NOTE — ED NOTES
Pt to ED via [facility] from home. States he fell at the doctors yesterday, was seen and evaluated, still having L sided rib pain, only with movement.      Diana Menjivar RN  10/30/20 6316

## 2020-10-30 NOTE — ED NOTES
Incentive spirometer given to pt. Pt and wife educated on use of IS, return demonstration provided by pt. Pt and wife both verbalized understanding.       Fernie Hawkins RN  10/30/20 6188

## 2020-10-30 NOTE — ED NOTES
Discharge and education instructions reviewed. Patient verbalized understanding, teach-back successful. Patient denied questions at this time. No acute distress noted. Patient instructed to follow-up as noted - return to emergency department if symptoms worsen. Patient verbalized understanding. Discharged per EDMD with discharge instructions.         Sylvain Whitman RN  10/30/20 6546

## 2020-11-03 PROBLEM — J18.9 PNEUMONIA: Status: RESOLVED | Noted: 2019-11-29 | Resolved: 2020-01-01

## 2020-12-26 NOTE — ED NOTES
Ambulated patient with walker without complications. ED provider Fbaio Bahena notified.       [facility]  12/26/20 8488

## 2020-12-26 NOTE — ED PROVIDER NOTES
**ADVANCED PRACTICE PROVIDER, I HAVE EVALUATED THIS PATIENT**        [address]      Pt Name: Betina ANTUNEZ:[id number]  [place] 1935  Date of evaluation: 12/26/2020  Provider: Anil Rg PA-C      Chief Complaint:    Chief Complaint   Patient presents with    Fall     fall from a standing position denies loc or dizziness prior to fall. pt has hematoma on the posterior side of head. not on blood thinners        Nursing Notes, Past Medical Hx, Past Surgical Hx, Social Hx, Allergies, and Family Hx were all reviewed and agreed with or any disagreements were addressed in the HPI.    HPI:  (Location, Duration, Timing, Severity, Quality, Assoc Sx, Context, Modifying factors)  This is a  81 y.o. male complaint of fall. Patient state was getting out of bed and he stumbled and fell hit the back of his head. No loss of consciousness. Denies headache, feels a bump on the back of his head. Denies neck pain, no back pain. No chest pain, no abdominal pain. No extremity weakness. No other complaints. He was able to get himself up off the floor.       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 [facility]) 2008    Obesity, diabetes, and hypertension syndrome ([address] August, 2015    Psoriasis     Risk for falls     Type 2 diabetes mellitus with microalbuminuria or microproteinuria     Vitamin D deficiency 2011         Procedure Laterality Date    CATARACT REMOVAL Right 7/3/13    Carlos Wing TURP  8/14/12    Teja       Medications:  Discharge Medication List as of 12/26/2020  3:54 PM      CONTINUE these medications which have NOT CHANGED    Details   gabapentin (NEURONTIN) 300 MG capsule TAKE 1 CAPSULE BY MOUTH THREE TIMES DAILY, Disp-270 capsule, R-0Normal      azithromycin (ZITHROMAX) 250 MG tablet 2 po day 1, then 1 po days 2-5, Disp-1 packet,R-0Print      simvastatin (ZOCOR) 40 MG tablet TAKE 1 TABLET BY MOUTH EVERY EVENING, Disp-90 tablet,R-1Normal      doxazosin (CARDURA) 8 MG tablet TAKE 1 TABLET BY MOUTH EVERY NIGHT AT BEDTIME FOR BLOOD PRESSURE, Disp-90 tablet,R-1Normal      furosemide (LASIX) 80 MG tablet TAKE 1 TABLET BY MOUTH TWICE DAILY, Disp-180 tablet,R-1Normal      labetalol (NORMODYNE) 100 MG tablet TAKE 1 TABLET BY MOUTH TWICE DAILY, Disp-180 tablet,R-1Normal      potassium chloride (KLOR-CON) 10 MEQ extended release tablet TAKE 1 TABLET BY MOUTH TWICE DAILY, Disp-180 tablet,R-1Normal      isosorbide mononitrate (IMDUR) 30 MG extended release tablet TAKE 1 TABLET BY MOUTH TWICE DAILY FOR BLOOD PRESSURE, Disp-180 tablet,R-3Normal      escitalopram (LEXAPRO) 10 MG tablet Take 1 tablet by mouth daily, Disp-90 tablet, R-3**Patient requests 90 days supply**Normal      Fluticasone furoate-vilanterol (BREO ELLIPTA) 200-25 MCG/INH AEPB inhaler Inhale 1 puff into the lungs daily, Disp-1 each, R-0NO PRINT      tiotropium (SPIRIVA HANDIHALER) 18 MCG inhalation capsule INHALE THE CONTENTS OF 1 CAPSULE VIA INHALATION DEVICE DAILY FOR BREATHING, Disp-90 capsule, R-3Normal      fluticasone (FLONASE) 50 MCG/ACT nasal spray 1 spray by Each Nare route daily as needed for RhinitisHistorical Med      famotidine (PEPCID) 10 MG tablet Take 10 mg by mouth 2 times dailyHistorical Med      VENTOLIN  (90 Base) MCG/ACT inhaler INHALE 2 PUFFS INTO THE LUNGS EVERY 6 HOURS AS NEEDED FOR WHEEZING, Disp-1 Inhaler, R-5Normal      Nebulizers (VIOS LC SPRINT) MISC R-0, [facility] Med Compression Stockings MISC Starting Tue 11/27/2018, Disp-1 each, R-0, [id number] mmHg knee high      Respiratory Therapy Supplies (NEBULIZER/TUBING/MOUTHPIECE) KIT DAILY PRN Starting Mon 11/19/2018, Disp-1 kit, R-0, Normal      albuterol (PROVENTIL) (2.5 MG/3ML) 0.083% nebulizer solution Take 3 mLs by nebulization every 6 hours as needed for Wheezing, Disp-120 vial, R-3Normal      nitroGLYCERIN (NITROSTAT) 0.4 MG SL tablet Place 1 tablet under the tongue every 5 minutes as needed for Chest pain (chest pain), Disp-25 tablet, R-3Normal      Spacer/Aero-Holding Chambers KOSTA DAILY PRN Starting 9/19/2016, Until Discontinued, Disp-1 Device, R-0, Normal      Cholecalciferol (VITAMIN D-3) 5000 UNITS TABS Take 5,000 Units by mouth daily Historical Med      aspirin 81 MG EC tablet Take 81 mg by mouth nightly Historical Med               Review of Systems:  Review of Systems   Constitutional: Negative for chills and fever. HENT: Negative for congestion, facial swelling and sneezing. Eyes: Negative for discharge and redness. Respiratory: Negative for apnea, choking and shortness of breath. Cardiovascular: Negative for chest pain. Gastrointestinal: Negative for abdominal pain, nausea and vomiting. Genitourinary: Negative for dysuria. Musculoskeletal: Negative for back pain, neck pain and neck stiffness. Neurological: Negative for dizziness, tremors, seizures and headaches. All other systems reviewed and are negative. Positives and Pertinent negatives as per HPI. Except as noted above in the ROS, problem specific ROS was completed and is negative. Physical Exam:  Physical Exam  Vitals signs and nursing note reviewed. Constitutional:       Appearance: He is well-developed. He is not diaphoretic. HENT:      Head: Normocephalic. Contusion present. Nose: Nose normal.      Mouth/Throat:      Mouth: Mucous membranes are moist.      Pharynx: Oropharynx is clear.    Eyes:      General: Right eye: No discharge. Left eye: No discharge. Extraocular Movements: Extraocular movements intact. Conjunctiva/sclera: Conjunctivae normal.      Pupils: Pupils are equal, round, and reactive to light. Neck:      Musculoskeletal: Normal range of motion and neck supple. No spinous process tenderness or muscular tenderness. Cardiovascular:      Rate and Rhythm: Normal rate and regular rhythm. Heart sounds: Normal heart sounds. No murmur. No friction rub. No gallop. Pulmonary:      Effort: Pulmonary effort is normal. No respiratory distress. Breath sounds: Normal breath sounds. No wheezing or rales. Chest:      Chest wall: No tenderness. Abdominal:      General: Abdomen is flat. Bowel sounds are normal. There is no distension. Palpations: Abdomen is soft. There is no mass. Tenderness: There is no abdominal tenderness. There is no guarding or rebound. Musculoskeletal: Normal range of motion. Skin:     General: Skin is warm and dry. Neurological:      Mental Status: He is alert and oriented to person, place, and time. Psychiatric:         Behavior: Behavior normal.         MEDICAL DECISION MAKING    Vitals:    Vitals:    12/26/20 1346 12/26/20 1402 12/26/20 1433 12/26/20 1532   BP: (!) 181/78 (!) 168/79 (!) 158/81 (!) 178/68   Pulse:       Resp:    17   Temp:       TempSrc:       SpO2: 92% 96% 97% 100%   Weight:       Height:           LABS:Labs Reviewed - No data to display     Remainder of labs reviewed and werenegative at this time or not returned at the time of this note.     RADIOLOGY:   Non-plain film images such as CT, Ultrasound and MRI are read by the radiologist. Ken Hull PA-C have directly visualized the radiologic plain film image(s) with the below findings:        Interpretation per the Radiologist below, if available at the time of this note:    CT HEAD WO CONTRAST   Final Result   No acute intracranial abnormality      Atrophy and small-vessel ischemic change         CT CERVICAL SPINE WO CONTRAST   Final Result   No acute abnormality of the cervical spine. No results found. MEDICAL DECISION MAKING / ED COURSE:      PROCEDURES:   Procedures    None    Patient was given:  Medications - No data to display      Emergency room course: Patient on exam pupils equal round and reactive to light extraocular movement is intact. Throat is clear. Cardiovascular regular rhythm, lungs are clear. No wheeze, rales or rhonchi noted. Patient has no midline tenderness cervical, thoracic or lumbar spine. Full flexion extension cervical spine for lateral movement full rotation. No nuchal rigidity. Abdomen is soft nontender. Pelvic stable anterior lateral compression. Patient has normal bowel sounds all 4 quadrant abdomen. No CVA or flank tenderness. Bilateral lower extremities show no edema. Good strength against resisted plantar dorsiflexion. He has not negative pronator drift to the upper and lower extremities.  strength 5+ equal bilaterally. No facial drooping no slurred speech noted. Is alert oriented x4. Does not appear to be in acute distress. CT scan of head shows no acute intracranial abnormality. CT scan of the cervical spine show no acute abnormality of the cervical spine. I had the nurse to ambulate the patient and walk him around the emergency room with his walker. Patient did very well. I discussed his CT results with him. He will be given discharge instructions close head injury precaution. He was okay with going home. Continue his home medication as prescribed. Take OTC Tylenol as needed for any pain. He understood discharge plan. He will be discharged stable condition. The patient tolerated their visit well. I evaluated the patient. The physician was available for consultation as needed.   The patient and / or the family were informed of the results of any tests, a time was given to

## 2021-01-01 ENCOUNTER — TELEPHONE (OUTPATIENT)
Dept: FAMILY MEDICINE CLINIC | Age: 86
End: 2021-01-01

## 2021-01-01 ENCOUNTER — TELEPHONE (OUTPATIENT)
Dept: PULMONOLOGY | Age: 86
End: 2021-01-01

## 2021-01-01 ENCOUNTER — HOSPITAL ENCOUNTER (EMERGENCY)
Age: 86
Discharge: HOME | End: 2021-01-14
Attending: EMERGENCY MEDICINE
Payer: MEDICARE

## 2021-01-01 ENCOUNTER — TELEPHONE (OUTPATIENT)
Dept: OTHER | Facility: CLINIC | Age: 86
End: 2021-01-01

## 2021-01-01 ENCOUNTER — APPOINTMENT (OUTPATIENT)
Dept: CARDIAC CATH/INVASIVE PROCEDURES | Age: 86
DRG: 286 | End: 2021-01-01
Payer: MEDICARE

## 2021-01-01 ENCOUNTER — VIRTUAL VISIT (OUTPATIENT)
Dept: PULMONOLOGY | Age: 86
End: 2021-01-01
Payer: MEDICARE

## 2021-01-01 ENCOUNTER — APPOINTMENT (OUTPATIENT)
Dept: CT IMAGING | Age: 86
DRG: 286 | End: 2021-01-01
Payer: MEDICARE

## 2021-01-01 ENCOUNTER — HOSPITAL ENCOUNTER (INPATIENT)
Age: 86
LOS: 7 days | Discharge: NURSING, REHAB OR LONG-TERM CARE | DRG: 291 | End: 2021-09-16
Attending: EMERGENCY MEDICINE | Admitting: INTERNAL MEDICINE
Payer: MEDICARE

## 2021-01-01 ENCOUNTER — APPOINTMENT (OUTPATIENT)
Dept: GENERAL RADIOLOGY | Age: 86
DRG: 286 | End: 2021-01-01
Payer: MEDICARE

## 2021-01-01 ENCOUNTER — HOSPITAL ENCOUNTER (OUTPATIENT)
Dept: CT IMAGING | Age: 86
Discharge: HOME | End: 2021-03-05
Payer: MEDICARE

## 2021-01-01 ENCOUNTER — OFFICE VISIT (OUTPATIENT)
Dept: CARDIOLOGY CLINIC | Age: 86
End: 2021-01-01
Payer: MEDICARE

## 2021-01-01 ENCOUNTER — APPOINTMENT (OUTPATIENT)
Dept: GENERAL RADIOLOGY | Age: 86
DRG: 291 | End: 2021-01-01
Payer: MEDICARE

## 2021-01-01 ENCOUNTER — OFFICE VISIT (OUTPATIENT)
Dept: FAMILY MEDICINE CLINIC | Age: 86
End: 2021-01-01
Payer: MEDICARE

## 2021-01-01 ENCOUNTER — VIRTUAL VISIT (OUTPATIENT)
Dept: FAMILY MEDICINE CLINIC | Age: 86
End: 2021-01-01
Payer: MEDICARE

## 2021-01-01 ENCOUNTER — HOSPITAL ENCOUNTER (INPATIENT)
Age: 86
LOS: 7 days | Discharge: NURSING, REHAB OR LONG-TERM CARE | DRG: 286 | End: 2021-08-15
Attending: INTERNAL MEDICINE | Admitting: INTERNAL MEDICINE
Payer: MEDICARE

## 2021-01-01 ENCOUNTER — APPOINTMENT (OUTPATIENT)
Dept: CT IMAGING | Age: 86
End: 2021-01-01
Payer: MEDICARE

## 2021-01-01 VITALS
HEIGHT: 70 IN | BODY MASS INDEX: 38.28 KG/M2 | WEIGHT: 267.42 LBS | RESPIRATION RATE: 18 BRPM | DIASTOLIC BLOOD PRESSURE: 72 MMHG | TEMPERATURE: 98.1 F | SYSTOLIC BLOOD PRESSURE: 196 MMHG | OXYGEN SATURATION: 94 % | HEART RATE: 102 BPM

## 2021-01-01 VITALS
RESPIRATION RATE: 20 BRPM | HEIGHT: 69 IN | TEMPERATURE: 98.4 F | BODY MASS INDEX: 38.53 KG/M2 | WEIGHT: 260.14 LBS | HEART RATE: 99 BPM | SYSTOLIC BLOOD PRESSURE: 165 MMHG | OXYGEN SATURATION: 91 % | DIASTOLIC BLOOD PRESSURE: 77 MMHG

## 2021-01-01 VITALS
DIASTOLIC BLOOD PRESSURE: 77 MMHG | WEIGHT: 259.04 LBS | BODY MASS INDEX: 38.37 KG/M2 | TEMPERATURE: 98.1 F | SYSTOLIC BLOOD PRESSURE: 137 MMHG | HEART RATE: 64 BPM | HEIGHT: 69 IN | OXYGEN SATURATION: 93 % | RESPIRATION RATE: 18 BRPM

## 2021-01-01 VITALS
WEIGHT: 273 LBS | BODY MASS INDEX: 39.08 KG/M2 | SYSTOLIC BLOOD PRESSURE: 128 MMHG | HEIGHT: 70 IN | OXYGEN SATURATION: 94 % | DIASTOLIC BLOOD PRESSURE: 68 MMHG | HEART RATE: 78 BPM

## 2021-01-01 VITALS
DIASTOLIC BLOOD PRESSURE: 84 MMHG | WEIGHT: 265 LBS | SYSTOLIC BLOOD PRESSURE: 146 MMHG | BODY MASS INDEX: 39.25 KG/M2 | HEART RATE: 102 BPM | RESPIRATION RATE: 22 BRPM | HEIGHT: 69 IN

## 2021-01-01 DIAGNOSIS — J44.9 COPD, MODERATE (HCC): ICD-10-CM

## 2021-01-01 DIAGNOSIS — E11.42 DM TYPE 2 WITH DIABETIC PERIPHERAL NEUROPATHY (HCC): ICD-10-CM

## 2021-01-01 DIAGNOSIS — N18.32 STAGE 3B CHRONIC KIDNEY DISEASE (HCC): ICD-10-CM

## 2021-01-01 DIAGNOSIS — R91.1 PULMONARY NODULE SEEN ON IMAGING STUDY: ICD-10-CM
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
## 2021-01-01 DIAGNOSIS — E66.01 SEVERE OBESITY (BMI 35.0-35.9 WITH COMORBIDITY) (HCC): ICD-10-CM

## 2021-01-01 DIAGNOSIS — R53.1 GENERAL WEAKNESS: ICD-10-CM

## 2021-01-01 DIAGNOSIS — J96.21 ACUTE ON CHRONIC RESPIRATORY FAILURE WITH HYPOXIA (HCC): Primary | ICD-10-CM

## 2021-01-01 DIAGNOSIS — R68.89 INCREASED OXYGEN DEMAND: ICD-10-CM

## 2021-01-01 DIAGNOSIS — E87.5 HIGH SERUM POTASSIUM LEVEL: Primary | ICD-10-CM

## 2021-01-01 DIAGNOSIS — E55.9 VITAMIN D DEFICIENCY: ICD-10-CM

## 2021-01-01 DIAGNOSIS — F33.0 MAJOR DEPRESSIVE DISORDER, RECURRENT, MILD (HCC): ICD-10-CM

## 2021-01-01 DIAGNOSIS — R91.1 PULMONARY NODULE SEEN ON IMAGING STUDY: Primary | ICD-10-CM

## 2021-01-01 DIAGNOSIS — J44.9 COPD, MODERATE (HCC): Primary | ICD-10-CM

## 2021-01-01 DIAGNOSIS — J96.11 CHRONIC RESPIRATORY FAILURE WITH HYPOXIA (HCC): Primary | ICD-10-CM

## 2021-01-01 DIAGNOSIS — J96.11 CHRONIC RESPIRATORY FAILURE WITH HYPOXIA (HCC): ICD-10-CM

## 2021-01-01 DIAGNOSIS — R06.02 SHORTNESS OF BREATH: ICD-10-CM

## 2021-01-01 DIAGNOSIS — S01.01XA LACERATION OF SCALP, INITIAL ENCOUNTER: ICD-10-CM

## 2021-01-01 DIAGNOSIS — I50.32 CHRONIC DIASTOLIC (CONGESTIVE) HEART FAILURE (HCC): ICD-10-CM

## 2021-01-01 DIAGNOSIS — D64.9 MILD ANEMIA: ICD-10-CM

## 2021-01-01 DIAGNOSIS — I70.0 ATHEROSCLEROSIS OF AORTA (HCC): Primary | ICD-10-CM

## 2021-01-01 DIAGNOSIS — I10 ESSENTIAL HYPERTENSION: ICD-10-CM

## 2021-01-01 DIAGNOSIS — I50.9 ACUTE CONGESTIVE HEART FAILURE, UNSPECIFIED HEART FAILURE TYPE (HCC): ICD-10-CM

## 2021-01-01 DIAGNOSIS — E78.2 MIXED HYPERLIPIDEMIA: ICD-10-CM

## 2021-01-01 DIAGNOSIS — I70.0 ATHEROSCLEROSIS OF AORTA (HCC): ICD-10-CM

## 2021-01-01 DIAGNOSIS — R77.8 ELEVATED TROPONIN: Primary | ICD-10-CM

## 2021-01-01 DIAGNOSIS — I51.89 DIASTOLIC DYSFUNCTION: ICD-10-CM

## 2021-01-01 DIAGNOSIS — I50.31 ACUTE DIASTOLIC (CONGESTIVE) HEART FAILURE (HCC): Primary | ICD-10-CM

## 2021-01-01 DIAGNOSIS — I48.0 PAROXYSMAL A-FIB (HCC): ICD-10-CM

## 2021-01-01 DIAGNOSIS — R94.31 ACUTE ELECTROCARDIOGRAM CHANGES: ICD-10-CM

## 2021-01-01 LAB
A/G RATIO: 1.2 (ref 1.1–2.2)
A/G RATIO: 1.9 (ref 1.1–2.2)
ALBUMIN SERPL-MCNC: 3.3 G/DL (ref 3.4–5)
ALBUMIN SERPL-MCNC: 3.7 G/DL (ref 3.4–5)
ALBUMIN SERPL-MCNC: 4.3 G/DL (ref 3.4–5)
ALP BLD-CCNC: 60 U/L (ref 40–129)
ALP BLD-CCNC: 62 U/L (ref 40–129)
ALT SERPL-CCNC: 13 U/L (ref 10–40)
ALT SERPL-CCNC: 7 U/L (ref 10–40)
ANION GAP SERPL CALCULATED.3IONS-SCNC: 10 MMOL/L (ref 3–16)
ANION GAP SERPL CALCULATED.3IONS-SCNC: 11 MMOL/L (ref 3–16)
ANION GAP SERPL CALCULATED.3IONS-SCNC: 12 MMOL/L (ref 3–16)
ANION GAP SERPL CALCULATED.3IONS-SCNC: 13 MMOL/L (ref 3–16)
ANION GAP SERPL CALCULATED.3IONS-SCNC: 13 MMOL/L (ref 3–16)
ANION GAP SERPL CALCULATED.3IONS-SCNC: 14 MMOL/L (ref 3–16)
ANION GAP SERPL CALCULATED.3IONS-SCNC: 15 MMOL/L (ref 3–16)
ANION GAP SERPL CALCULATED.3IONS-SCNC: 16 MMOL/L (ref 3–16)
ANION GAP SERPL CALCULATED.3IONS-SCNC: 17 MMOL/L (ref 3–16)
ANION GAP SERPL CALCULATED.3IONS-SCNC: 7 MMOL/L (ref 3–16)
ANION GAP SERPL CALCULATED.3IONS-SCNC: 8 MMOL/L (ref 3–16)
APTT: 37.3 SEC (ref 26.2–38.6)
APTT: 39.6 SEC (ref 26.2–38.6)
APTT: 55.5 SEC (ref 26.2–38.6)
AST SERPL-CCNC: 10 U/L (ref 15–37)
AST SERPL-CCNC: 14 U/L (ref 15–37)
BASE EXCESS ARTERIAL: 6.7 MMOL/L (ref -3–3)
BASE EXCESS ARTERIAL: 7.7 MMOL/L (ref -3–3)
BASE EXCESS ARTERIAL: 8.8 MMOL/L (ref -3–3)
BASE EXCESS VENOUS: 12.6 MMOL/L
BASOPHILS ABSOLUTE: 0 K/UL (ref 0–0.2)
BASOPHILS ABSOLUTE: 0.1 K/UL (ref 0–0.2)
BASOPHILS RELATIVE PERCENT: 0 %
BASOPHILS RELATIVE PERCENT: 0.1 %
BASOPHILS RELATIVE PERCENT: 0.2 %
BASOPHILS RELATIVE PERCENT: 0.5 %
BASOPHILS RELATIVE PERCENT: 0.6 %
BASOPHILS RELATIVE PERCENT: 0.6 %
BASOPHILS RELATIVE PERCENT: 0.7 %
BILIRUB SERPL-MCNC: 0.4 MG/DL (ref 0–1)
BILIRUB SERPL-MCNC: 0.4 MG/DL (ref 0–1)
BILIRUBIN URINE: NEGATIVE
BILIRUBIN URINE: NEGATIVE
BLOOD, URINE: NEGATIVE
BLOOD, URINE: NEGATIVE
BUN BLDV-MCNC: 16 MG/DL (ref 7–20)
BUN BLDV-MCNC: 17 MG/DL (ref 7–20)
BUN BLDV-MCNC: 21 MG/DL (ref 7–20)
BUN BLDV-MCNC: 23 MG/DL (ref 7–20)
BUN BLDV-MCNC: 24 MG/DL (ref 7–20)
BUN BLDV-MCNC: 29 MG/DL (ref 7–20)
BUN BLDV-MCNC: 37 MG/DL (ref 7–20)
BUN BLDV-MCNC: 41 MG/DL (ref 7–20)
BUN BLDV-MCNC: 48 MG/DL (ref 7–20)
BUN BLDV-MCNC: 50 MG/DL (ref 7–20)
BUN BLDV-MCNC: 53 MG/DL (ref 7–20)
BUN BLDV-MCNC: 53 MG/DL (ref 7–20)
BUN BLDV-MCNC: 59 MG/DL (ref 7–20)
BUN BLDV-MCNC: 60 MG/DL (ref 7–20)
BUN BLDV-MCNC: 61 MG/DL (ref 7–20)
BUN BLDV-MCNC: 62 MG/DL (ref 7–20)
BUN BLDV-MCNC: 68 MG/DL (ref 7–20)
CALCIUM SERPL-MCNC: 8.4 MG/DL (ref 8.3–10.6)
CALCIUM SERPL-MCNC: 8.5 MG/DL (ref 8.3–10.6)
CALCIUM SERPL-MCNC: 8.6 MG/DL (ref 8.3–10.6)
CALCIUM SERPL-MCNC: 8.6 MG/DL (ref 8.3–10.6)
CALCIUM SERPL-MCNC: 8.7 MG/DL (ref 8.3–10.6)
CALCIUM SERPL-MCNC: 8.8 MG/DL (ref 8.3–10.6)
CALCIUM SERPL-MCNC: 8.9 MG/DL (ref 8.3–10.6)
CALCIUM SERPL-MCNC: 9 MG/DL (ref 8.3–10.6)
CALCIUM SERPL-MCNC: 9 MG/DL (ref 8.3–10.6)
CALCIUM SERPL-MCNC: 9.2 MG/DL (ref 8.3–10.6)
CALCIUM SERPL-MCNC: 9.3 MG/DL (ref 8.3–10.6)
CALCIUM SERPL-MCNC: 9.4 MG/DL (ref 8.3–10.6)
CARBOXYHEMOGLOBIN ARTERIAL: 1.3 % (ref 0–1.5)
CARBOXYHEMOGLOBIN ARTERIAL: 1.6 % (ref 0–1.5)
CARBOXYHEMOGLOBIN ARTERIAL: 2.1 % (ref 0–1.5)
CARBOXYHEMOGLOBIN: 1.5 %
CHLORIDE BLD-SCNC: 100 MMOL/L (ref 99–110)
CHLORIDE BLD-SCNC: 90 MMOL/L (ref 99–110)
CHLORIDE BLD-SCNC: 91 MMOL/L (ref 99–110)
CHLORIDE BLD-SCNC: 92 MMOL/L (ref 99–110)
CHLORIDE BLD-SCNC: 93 MMOL/L (ref 99–110)
CHLORIDE BLD-SCNC: 94 MMOL/L (ref 99–110)
CHLORIDE BLD-SCNC: 95 MMOL/L (ref 99–110)
CHLORIDE BLD-SCNC: 95 MMOL/L (ref 99–110)
CHLORIDE BLD-SCNC: 96 MMOL/L (ref 99–110)
CHLORIDE BLD-SCNC: 96 MMOL/L (ref 99–110)
CHLORIDE BLD-SCNC: 98 MMOL/L (ref 99–110)
CHLORIDE BLD-SCNC: 98 MMOL/L (ref 99–110)
CHOLESTEROL, TOTAL: 126 MG/DL (ref 0–199)
CLARITY: ABNORMAL
CLARITY: CLEAR
CO2: 27 MMOL/L (ref 21–32)
CO2: 27 MMOL/L (ref 21–32)
CO2: 28 MMOL/L (ref 21–32)
CO2: 30 MMOL/L (ref 21–32)
CO2: 31 MMOL/L (ref 21–32)
CO2: 31 MMOL/L (ref 21–32)
CO2: 32 MMOL/L (ref 21–32)
CO2: 33 MMOL/L (ref 21–32)
CO2: 35 MMOL/L (ref 21–32)
CO2: 36 MMOL/L (ref 21–32)
CO2: 38 MMOL/L (ref 21–32)
CO2: 38 MMOL/L (ref 21–32)
COLOR: YELLOW
COLOR: YELLOW
CREAT SERPL-MCNC: 1.3 MG/DL (ref 0.8–1.3)
CREAT SERPL-MCNC: 1.4 MG/DL (ref 0.8–1.3)
CREAT SERPL-MCNC: 1.5 MG/DL (ref 0.8–1.3)
CREAT SERPL-MCNC: 1.6 MG/DL (ref 0.8–1.3)
CREAT SERPL-MCNC: 1.7 MG/DL (ref 0.8–1.3)
CREAT SERPL-MCNC: 1.7 MG/DL (ref 0.8–1.3)
CREAT SERPL-MCNC: 1.8 MG/DL (ref 0.8–1.3)
CREAT SERPL-MCNC: 1.9 MG/DL (ref 0.8–1.3)
CREAT SERPL-MCNC: 2 MG/DL (ref 0.8–1.3)
CREAT SERPL-MCNC: 2 MG/DL (ref 0.8–1.3)
CREAT SERPL-MCNC: 2.1 MG/DL (ref 0.8–1.3)
CREAT SERPL-MCNC: 2.1 MG/DL (ref 0.8–1.3)
EKG ATRIAL RATE: 82 BPM
EKG ATRIAL RATE: 83 BPM
EKG ATRIAL RATE: 97 BPM
EKG DIAGNOSIS: NORMAL
EKG P AXIS: 70 DEGREES
EKG P AXIS: 75 DEGREES
EKG P AXIS: 86 DEGREES
EKG P-R INTERVAL: 210 MS
EKG P-R INTERVAL: 222 MS
EKG P-R INTERVAL: 240 MS
EKG Q-T INTERVAL: 402 MS
EKG Q-T INTERVAL: 412 MS
EKG Q-T INTERVAL: 444 MS
EKG QRS DURATION: 120 MS
EKG QRS DURATION: 138 MS
EKG QRS DURATION: 148 MS
EKG QTC CALCULATION (BAZETT): 484 MS
EKG QTC CALCULATION (BAZETT): 510 MS
EKG QTC CALCULATION (BAZETT): 518 MS
EKG R AXIS: -59 DEGREES
EKG R AXIS: -62 DEGREES
EKG R AXIS: -69 DEGREES
EKG T AXIS: 12 DEGREES
EKG T AXIS: 30 DEGREES
EKG T AXIS: 9 DEGREES
EKG VENTRICULAR RATE: 82 BPM
EKG VENTRICULAR RATE: 83 BPM
EKG VENTRICULAR RATE: 97 BPM
EOSINOPHILS ABSOLUTE: 0 K/UL (ref 0–0.6)
EOSINOPHILS ABSOLUTE: 0.2 K/UL (ref 0–0.6)
EOSINOPHILS ABSOLUTE: 0.2 K/UL (ref 0–0.6)
EOSINOPHILS ABSOLUTE: 0.3 K/UL (ref 0–0.6)
EOSINOPHILS ABSOLUTE: 0.3 K/UL (ref 0–0.6)
EOSINOPHILS RELATIVE PERCENT: 0 %
EOSINOPHILS RELATIVE PERCENT: 0.1 %
EOSINOPHILS RELATIVE PERCENT: 0.3 %
EOSINOPHILS RELATIVE PERCENT: 0.3 %
EOSINOPHILS RELATIVE PERCENT: 0.5 %
EOSINOPHILS RELATIVE PERCENT: 1.9 %
EOSINOPHILS RELATIVE PERCENT: 2.9 %
EOSINOPHILS RELATIVE PERCENT: 3.9 %
EOSINOPHILS RELATIVE PERCENT: 6.7 %
EPITHELIAL CELLS, UA: 0 /HPF (ref 0–5)
EPITHELIAL CELLS, UA: 1 /HPF (ref 0–5)
FERRITIN: 114.8 NG/ML (ref 30–400)
FERRITIN: 234 NG/ML (ref 30–400)
FOLATE: 13.14 NG/ML (ref 4.78–24.2)
GFR AFRICAN AMERICAN: 36
GFR AFRICAN AMERICAN: 36
GFR AFRICAN AMERICAN: 39
GFR AFRICAN AMERICAN: 39
GFR AFRICAN AMERICAN: 41
GFR AFRICAN AMERICAN: 44
GFR AFRICAN AMERICAN: 47
GFR AFRICAN AMERICAN: 47
GFR AFRICAN AMERICAN: 50
GFR AFRICAN AMERICAN: 54
GFR AFRICAN AMERICAN: 58
GFR AFRICAN AMERICAN: >60
GFR NON-AFRICAN AMERICAN: 30
GFR NON-AFRICAN AMERICAN: 30
GFR NON-AFRICAN AMERICAN: 32
GFR NON-AFRICAN AMERICAN: 32
GFR NON-AFRICAN AMERICAN: 34
GFR NON-AFRICAN AMERICAN: 36
GFR NON-AFRICAN AMERICAN: 38
GFR NON-AFRICAN AMERICAN: 38
GFR NON-AFRICAN AMERICAN: 41
GFR NON-AFRICAN AMERICAN: 44
GFR NON-AFRICAN AMERICAN: 48
GFR NON-AFRICAN AMERICAN: 52
GLOBULIN: 2.3 G/DL
GLOBULIN: 3.1 G/DL
GLUCOSE BLD-MCNC: 100 MG/DL (ref 70–99)
GLUCOSE BLD-MCNC: 107 MG/DL (ref 70–99)
GLUCOSE BLD-MCNC: 115 MG/DL (ref 70–99)
GLUCOSE BLD-MCNC: 120 MG/DL (ref 70–99)
GLUCOSE BLD-MCNC: 127 MG/DL (ref 70–99)
GLUCOSE BLD-MCNC: 136 MG/DL (ref 70–99)
GLUCOSE BLD-MCNC: 138 MG/DL (ref 70–99)
GLUCOSE BLD-MCNC: 140 MG/DL (ref 70–99)
GLUCOSE BLD-MCNC: 143 MG/DL (ref 70–99)
GLUCOSE BLD-MCNC: 143 MG/DL (ref 70–99)
GLUCOSE BLD-MCNC: 149 MG/DL (ref 70–99)
GLUCOSE BLD-MCNC: 153 MG/DL (ref 70–99)
GLUCOSE BLD-MCNC: 162 MG/DL (ref 70–99)
GLUCOSE BLD-MCNC: 169 MG/DL (ref 70–99)
GLUCOSE BLD-MCNC: 176 MG/DL (ref 70–99)
GLUCOSE BLD-MCNC: 181 MG/DL (ref 70–99)
GLUCOSE BLD-MCNC: 182 MG/DL (ref 70–99)
GLUCOSE BLD-MCNC: 183 MG/DL (ref 70–99)
GLUCOSE BLD-MCNC: 188 MG/DL (ref 70–99)
GLUCOSE BLD-MCNC: 196 MG/DL (ref 70–99)
GLUCOSE BLD-MCNC: 199 MG/DL (ref 70–99)
GLUCOSE BLD-MCNC: 199 MG/DL (ref 70–99)
GLUCOSE BLD-MCNC: 200 MG/DL (ref 70–99)
GLUCOSE BLD-MCNC: 200 MG/DL (ref 70–99)
GLUCOSE BLD-MCNC: 202 MG/DL (ref 70–99)
GLUCOSE BLD-MCNC: 203 MG/DL (ref 70–99)
GLUCOSE BLD-MCNC: 207 MG/DL (ref 70–99)
GLUCOSE BLD-MCNC: 212 MG/DL (ref 70–99)
GLUCOSE BLD-MCNC: 215 MG/DL (ref 70–99)
GLUCOSE BLD-MCNC: 215 MG/DL (ref 70–99)
GLUCOSE BLD-MCNC: 218 MG/DL (ref 70–99)
GLUCOSE BLD-MCNC: 220 MG/DL (ref 70–99)
GLUCOSE BLD-MCNC: 224 MG/DL (ref 70–99)
GLUCOSE BLD-MCNC: 225 MG/DL (ref 70–99)
GLUCOSE BLD-MCNC: 225 MG/DL (ref 70–99)
GLUCOSE BLD-MCNC: 230 MG/DL (ref 70–99)
GLUCOSE BLD-MCNC: 234 MG/DL (ref 70–99)
GLUCOSE BLD-MCNC: 237 MG/DL (ref 70–99)
GLUCOSE BLD-MCNC: 238 MG/DL (ref 70–99)
GLUCOSE BLD-MCNC: 245 MG/DL (ref 70–99)
GLUCOSE BLD-MCNC: 248 MG/DL (ref 70–99)
GLUCOSE BLD-MCNC: 257 MG/DL (ref 70–99)
GLUCOSE BLD-MCNC: 270 MG/DL (ref 70–99)
GLUCOSE BLD-MCNC: 62 MG/DL (ref 70–99)
GLUCOSE BLD-MCNC: 70 MG/DL (ref 70–99)
GLUCOSE BLD-MCNC: 77 MG/DL (ref 70–99)
GLUCOSE BLD-MCNC: 80 MG/DL (ref 70–99)
GLUCOSE BLD-MCNC: 95 MG/DL (ref 70–99)
GLUCOSE URINE: NEGATIVE MG/DL
GLUCOSE URINE: NEGATIVE MG/DL
HBA1C MFR BLD: 6 %
HCO3 ARTERIAL: 32.3 MMOL/L (ref 21–29)
HCO3 ARTERIAL: 34.1 MMOL/L (ref 21–29)
HCO3 ARTERIAL: 35.3 MMOL/L (ref 21–29)
HCO3 VENOUS: 42 MMOL/L (ref 23–29)
HCT VFR BLD CALC: 31.3 % (ref 40.5–52.5)
HCT VFR BLD CALC: 32.5 % (ref 40.5–52.5)
HCT VFR BLD CALC: 32.5 % (ref 40.5–52.5)
HCT VFR BLD CALC: 33.2 % (ref 40.5–52.5)
HCT VFR BLD CALC: 34 % (ref 40.5–52.5)
HCT VFR BLD CALC: 34.7 % (ref 40.5–52.5)
HCT VFR BLD CALC: 34.8 % (ref 40.5–52.5)
HCT VFR BLD CALC: 35.1 % (ref 40.5–52.5)
HCT VFR BLD CALC: 36.2 % (ref 40.5–52.5)
HCT VFR BLD CALC: 37.6 % (ref 40.5–52.5)
HCT VFR BLD CALC: 37.8 % (ref 40.5–52.5)
HCT VFR BLD CALC: 38 % (ref 40.5–52.5)
HDLC SERPL-MCNC: 34 MG/DL (ref 40–60)
HEMOGLOBIN, ART, EXTENDED: 10.6 G/DL (ref 13.5–17.5)
HEMOGLOBIN, ART, EXTENDED: 12.3 G/DL (ref 13.5–17.5)
HEMOGLOBIN, ART, EXTENDED: 12.6 G/DL (ref 13.5–17.5)
HEMOGLOBIN: 10.6 G/DL (ref 13.5–17.5)
HEMOGLOBIN: 10.7 G/DL (ref 13.5–17.5)
HEMOGLOBIN: 10.8 G/DL (ref 13.5–17.5)
HEMOGLOBIN: 11 G/DL (ref 13.5–17.5)
HEMOGLOBIN: 11.2 G/DL (ref 13.5–17.5)
HEMOGLOBIN: 11.5 G/DL (ref 13.5–17.5)
HEMOGLOBIN: 11.5 G/DL (ref 13.5–17.5)
HEMOGLOBIN: 11.6 G/DL (ref 13.5–17.5)
HEMOGLOBIN: 12.1 G/DL (ref 13.5–17.5)
HEMOGLOBIN: 12.2 G/DL (ref 13.5–17.5)
HEMOGLOBIN: 12.4 G/DL (ref 13.5–17.5)
HEMOGLOBIN: 12.5 G/DL (ref 13.5–17.5)
HYALINE CASTS: 0 /LPF (ref 0–8)
HYALINE CASTS: 0 /LPF (ref 0–8)
IRON SATURATION: 25 % (ref 20–50)
IRON SATURATION: 28 % (ref 20–50)
IRON: 65 UG/DL (ref 59–158)
IRON: 73 UG/DL (ref 59–158)
KETONES, URINE: NEGATIVE MG/DL
KETONES, URINE: NEGATIVE MG/DL
L. PNEUMOPHILA SEROGP 1 UR AG: NORMAL
LDL CHOLESTEROL CALCULATED: 66 MG/DL
LEUKOCYTE ESTERASE, URINE: ABNORMAL
LEUKOCYTE ESTERASE, URINE: NEGATIVE
LV EF: 58 %
LVEF MODALITY: NORMAL
LYMPHOCYTES ABSOLUTE: 0.4 K/UL (ref 1–5.1)
LYMPHOCYTES ABSOLUTE: 0.7 K/UL (ref 1–5.1)
LYMPHOCYTES ABSOLUTE: 0.7 K/UL (ref 1–5.1)
LYMPHOCYTES ABSOLUTE: 0.8 K/UL (ref 1–5.1)
LYMPHOCYTES ABSOLUTE: 0.9 K/UL (ref 1–5.1)
LYMPHOCYTES ABSOLUTE: 1 K/UL (ref 1–5.1)
LYMPHOCYTES RELATIVE PERCENT: 10.5 %
LYMPHOCYTES RELATIVE PERCENT: 10.7 %
LYMPHOCYTES RELATIVE PERCENT: 11.1 %
LYMPHOCYTES RELATIVE PERCENT: 11.2 %
LYMPHOCYTES RELATIVE PERCENT: 11.9 %
LYMPHOCYTES RELATIVE PERCENT: 12 %
LYMPHOCYTES RELATIVE PERCENT: 15.6 %
LYMPHOCYTES RELATIVE PERCENT: 5.1 %
LYMPHOCYTES RELATIVE PERCENT: 5.8 %
LYMPHOCYTES RELATIVE PERCENT: 7.6 %
LYMPHOCYTES RELATIVE PERCENT: 9.3 %
MAGNESIUM: 1.8 MG/DL (ref 1.8–2.4)
MAGNESIUM: 1.8 MG/DL (ref 1.8–2.4)
MAGNESIUM: 1.9 MG/DL (ref 1.8–2.4)
MAGNESIUM: 2 MG/DL (ref 1.8–2.4)
MAGNESIUM: 2.1 MG/DL (ref 1.8–2.4)
MCH RBC QN AUTO: 27.7 PG (ref 26–34)
MCH RBC QN AUTO: 28 PG (ref 26–34)
MCH RBC QN AUTO: 28 PG (ref 26–34)
MCH RBC QN AUTO: 28.1 PG (ref 26–34)
MCH RBC QN AUTO: 28.1 PG (ref 26–34)
MCH RBC QN AUTO: 28.4 PG (ref 26–34)
MCH RBC QN AUTO: 28.5 PG (ref 26–34)
MCH RBC QN AUTO: 28.7 PG (ref 26–34)
MCH RBC QN AUTO: 28.8 PG (ref 26–34)
MCH RBC QN AUTO: 29.1 PG (ref 26–34)
MCHC RBC AUTO-ENTMCNC: 32.2 G/DL (ref 31–36)
MCHC RBC AUTO-ENTMCNC: 32.8 G/DL (ref 31–36)
MCHC RBC AUTO-ENTMCNC: 32.8 G/DL (ref 31–36)
MCHC RBC AUTO-ENTMCNC: 32.9 G/DL (ref 31–36)
MCHC RBC AUTO-ENTMCNC: 32.9 G/DL (ref 31–36)
MCHC RBC AUTO-ENTMCNC: 33.1 G/DL (ref 31–36)
MCHC RBC AUTO-ENTMCNC: 33.1 G/DL (ref 31–36)
MCHC RBC AUTO-ENTMCNC: 33.3 G/DL (ref 31–36)
MCHC RBC AUTO-ENTMCNC: 33.3 G/DL (ref 31–36)
MCHC RBC AUTO-ENTMCNC: 33.4 G/DL (ref 31–36)
MCHC RBC AUTO-ENTMCNC: 33.4 G/DL (ref 31–36)
MCHC RBC AUTO-ENTMCNC: 33.8 G/DL (ref 31–36)
MCV RBC AUTO: 84.6 FL (ref 80–100)
MCV RBC AUTO: 84.9 FL (ref 80–100)
MCV RBC AUTO: 85 FL (ref 80–100)
MCV RBC AUTO: 85 FL (ref 80–100)
MCV RBC AUTO: 85.3 FL (ref 80–100)
MCV RBC AUTO: 85.6 FL (ref 80–100)
MCV RBC AUTO: 85.6 FL (ref 80–100)
MCV RBC AUTO: 85.8 FL (ref 80–100)
MCV RBC AUTO: 85.9 FL (ref 80–100)
MCV RBC AUTO: 86.2 FL (ref 80–100)
MCV RBC AUTO: 86.9 FL (ref 80–100)
MCV RBC AUTO: 87.4 FL (ref 80–100)
METHEMOGLOBIN ARTERIAL: 0.1 %
METHEMOGLOBIN ARTERIAL: 0.4 %
METHEMOGLOBIN ARTERIAL: 0.5 %
METHEMOGLOBIN VENOUS: 0.6 %
MICROSCOPIC EXAMINATION: YES
MICROSCOPIC EXAMINATION: YES
MONOCYTES ABSOLUTE: 0.1 K/UL (ref 0–1.3)
MONOCYTES ABSOLUTE: 0.4 K/UL (ref 0–1.3)
MONOCYTES ABSOLUTE: 0.4 K/UL (ref 0–1.3)
MONOCYTES ABSOLUTE: 0.5 K/UL (ref 0–1.3)
MONOCYTES ABSOLUTE: 0.6 K/UL (ref 0–1.3)
MONOCYTES ABSOLUTE: 1.1 K/UL (ref 0–1.3)
MONOCYTES RELATIVE PERCENT: 11.3 %
MONOCYTES RELATIVE PERCENT: 12.2 %
MONOCYTES RELATIVE PERCENT: 2.1 %
MONOCYTES RELATIVE PERCENT: 4.6 %
MONOCYTES RELATIVE PERCENT: 5 %
MONOCYTES RELATIVE PERCENT: 6.1 %
MONOCYTES RELATIVE PERCENT: 6.1 %
MONOCYTES RELATIVE PERCENT: 6.5 %
MONOCYTES RELATIVE PERCENT: 8.3 %
MONOCYTES RELATIVE PERCENT: 8.5 %
MONOCYTES RELATIVE PERCENT: 8.6 %
NEUTROPHILS ABSOLUTE: 2.8 K/UL (ref 1.7–7.7)
NEUTROPHILS ABSOLUTE: 4.4 K/UL (ref 1.7–7.7)
NEUTROPHILS ABSOLUTE: 5.1 K/UL (ref 1.7–7.7)
NEUTROPHILS ABSOLUTE: 5.4 K/UL (ref 1.7–7.7)
NEUTROPHILS ABSOLUTE: 5.7 K/UL (ref 1.7–7.7)
NEUTROPHILS ABSOLUTE: 5.9 K/UL (ref 1.7–7.7)
NEUTROPHILS ABSOLUTE: 6.3 K/UL (ref 1.7–7.7)
NEUTROPHILS ABSOLUTE: 6.9 K/UL (ref 1.7–7.7)
NEUTROPHILS ABSOLUTE: 6.9 K/UL (ref 1.7–7.7)
NEUTROPHILS ABSOLUTE: 7.6 K/UL (ref 1.7–7.7)
NEUTROPHILS ABSOLUTE: 7.8 K/UL (ref 1.7–7.7)
NEUTROPHILS RELATIVE PERCENT: 65.8 %
NEUTROPHILS RELATIVE PERCENT: 74.3 %
NEUTROPHILS RELATIVE PERCENT: 75.9 %
NEUTROPHILS RELATIVE PERCENT: 77.1 %
NEUTROPHILS RELATIVE PERCENT: 80.1 %
NEUTROPHILS RELATIVE PERCENT: 80.8 %
NEUTROPHILS RELATIVE PERCENT: 82.9 %
NEUTROPHILS RELATIVE PERCENT: 84 %
NEUTROPHILS RELATIVE PERCENT: 89.4 %
NEUTROPHILS RELATIVE PERCENT: 89.9 %
NEUTROPHILS RELATIVE PERCENT: 90 %
NITRITE, URINE: NEGATIVE
NITRITE, URINE: NEGATIVE
O2 SAT, ARTERIAL: 87.4 %
O2 SAT, ARTERIAL: 93.4 %
O2 SAT, ARTERIAL: 95.1 %
O2 SAT, VEN: 28 %
O2 THERAPY: ABNORMAL
PCO2 ARTERIAL: 50.3 MMHG (ref 35–45)
PCO2 ARTERIAL: 55.5 MMHG (ref 35–45)
PCO2 ARTERIAL: 55.8 MMHG (ref 35–45)
PCO2, VEN: 79.4 MMHG (ref 40–50)
PDW BLD-RTO: 15.3 % (ref 12.4–15.4)
PDW BLD-RTO: 15.4 % (ref 12.4–15.4)
PDW BLD-RTO: 15.4 % (ref 12.4–15.4)
PDW BLD-RTO: 15.6 % (ref 12.4–15.4)
PDW BLD-RTO: 15.7 % (ref 12.4–15.4)
PDW BLD-RTO: 15.8 % (ref 12.4–15.4)
PDW BLD-RTO: 15.9 % (ref 12.4–15.4)
PDW BLD-RTO: 16 % (ref 12.4–15.4)
PDW BLD-RTO: 16.2 % (ref 12.4–15.4)
PERFORMED ON: ABNORMAL
PH ARTERIAL: 7.4 (ref 7.35–7.45)
PH ARTERIAL: 7.41 (ref 7.35–7.45)
PH ARTERIAL: 7.42 (ref 7.35–7.45)
PH UA: 5.5 (ref 5–8)
PH UA: 5.5 (ref 5–8)
PH VENOUS: 7.33 (ref 7.35–7.45)
PHOSPHORUS: 4.2 MG/DL (ref 2.5–4.9)
PLATELET # BLD: 404 K/UL (ref 135–450)
PLATELET # BLD: 439 K/UL (ref 135–450)
PLATELET # BLD: 456 K/UL (ref 135–450)
PLATELET # BLD: 459 K/UL (ref 135–450)
PLATELET # BLD: 470 K/UL (ref 135–450)
PLATELET # BLD: 473 K/UL (ref 135–450)
PLATELET # BLD: 483 K/UL (ref 135–450)
PLATELET # BLD: 484 K/UL (ref 135–450)
PLATELET # BLD: 487 K/UL (ref 135–450)
PLATELET # BLD: 487 K/UL (ref 135–450)
PLATELET # BLD: 524 K/UL (ref 135–450)
PLATELET # BLD: 578 K/UL (ref 135–450)
PMV BLD AUTO: 7.1 FL (ref 5–10.5)
PMV BLD AUTO: 7.4 FL (ref 5–10.5)
PMV BLD AUTO: 7.5 FL (ref 5–10.5)
PMV BLD AUTO: 7.5 FL (ref 5–10.5)
PMV BLD AUTO: 7.6 FL (ref 5–10.5)
PMV BLD AUTO: 7.6 FL (ref 5–10.5)
PMV BLD AUTO: 7.7 FL (ref 5–10.5)
PMV BLD AUTO: 7.8 FL (ref 5–10.5)
PMV BLD AUTO: 7.9 FL (ref 5–10.5)
PMV BLD AUTO: 7.9 FL (ref 5–10.5)
PO2 ARTERIAL: 50.9 MMHG (ref 75–108)
PO2 ARTERIAL: 63.5 MMHG (ref 75–108)
PO2 ARTERIAL: 74.6 MMHG (ref 75–108)
PO2, VEN: <30 MMHG
POTASSIUM REFLEX MAGNESIUM: 3.6 MMOL/L (ref 3.5–5.1)
POTASSIUM REFLEX MAGNESIUM: 3.8 MMOL/L (ref 3.5–5.1)
POTASSIUM REFLEX MAGNESIUM: 4.1 MMOL/L (ref 3.5–5.1)
POTASSIUM REFLEX MAGNESIUM: 4.4 MMOL/L (ref 3.5–5.1)
POTASSIUM REFLEX MAGNESIUM: 4.4 MMOL/L (ref 3.5–5.1)
POTASSIUM SERPL-SCNC: 3.6 MMOL/L (ref 3.5–5.1)
POTASSIUM SERPL-SCNC: 3.6 MMOL/L (ref 3.5–5.1)
POTASSIUM SERPL-SCNC: 3.9 MMOL/L (ref 3.5–5.1)
POTASSIUM SERPL-SCNC: 3.9 MMOL/L (ref 3.5–5.1)
POTASSIUM SERPL-SCNC: 4 MMOL/L (ref 3.5–5.1)
POTASSIUM SERPL-SCNC: 4.2 MMOL/L (ref 3.5–5.1)
POTASSIUM SERPL-SCNC: 4.2 MMOL/L (ref 3.5–5.1)
POTASSIUM SERPL-SCNC: 4.3 MMOL/L (ref 3.5–5.1)
POTASSIUM SERPL-SCNC: 4.3 MMOL/L (ref 3.5–5.1)
POTASSIUM SERPL-SCNC: 5.8 MMOL/L (ref 3.5–5.1)
PRO-BNP: 1968 PG/ML (ref 0–449)
PRO-BNP: 2102 PG/ML (ref 0–449)
PRO-BNP: 2124 PG/ML (ref 0–449)
PRO-BNP: 3065 PG/ML (ref 0–449)
PRO-BNP: 3938 PG/ML (ref 0–449)
PROCALCITONIN: 0.32 NG/ML (ref 0–0.15)
PROCALCITONIN: 0.42 NG/ML (ref 0–0.15)
PROTEIN UA: 100 MG/DL
PROTEIN UA: 30 MG/DL
RBC # BLD: 3.69 M/UL (ref 4.2–5.9)
RBC # BLD: 3.79 M/UL (ref 4.2–5.9)
RBC # BLD: 3.8 M/UL (ref 4.2–5.9)
RBC # BLD: 3.92 M/UL (ref 4.2–5.9)
RBC # BLD: 3.97 M/UL (ref 4.2–5.9)
RBC # BLD: 3.97 M/UL (ref 4.2–5.9)
RBC # BLD: 4.09 M/UL (ref 4.2–5.9)
RBC # BLD: 4.1 M/UL (ref 4.2–5.9)
RBC # BLD: 4.19 M/UL (ref 4.2–5.9)
RBC # BLD: 4.37 M/UL (ref 4.2–5.9)
RBC # BLD: 4.41 M/UL (ref 4.2–5.9)
RBC # BLD: 4.43 M/UL (ref 4.2–5.9)
RBC UA: 1 /HPF (ref 0–4)
RBC UA: 3 /HPF (ref 0–4)
SARS-COV-2, NAAT: NOT DETECTED
SARS-COV-2, PCR: NOT DETECTED
SARS-COV-2: NOT DETECTED
SARS-COV-2: NOT DETECTED
SODIUM BLD-SCNC: 134 MMOL/L (ref 136–145)
SODIUM BLD-SCNC: 134 MMOL/L (ref 136–145)
SODIUM BLD-SCNC: 136 MMOL/L (ref 136–145)
SODIUM BLD-SCNC: 137 MMOL/L (ref 136–145)
SODIUM BLD-SCNC: 138 MMOL/L (ref 136–145)
SODIUM BLD-SCNC: 139 MMOL/L (ref 136–145)
SODIUM BLD-SCNC: 139 MMOL/L (ref 136–145)
SODIUM BLD-SCNC: 140 MMOL/L (ref 136–145)
SODIUM BLD-SCNC: 141 MMOL/L (ref 136–145)
SODIUM BLD-SCNC: 142 MMOL/L (ref 136–145)
SODIUM BLD-SCNC: 142 MMOL/L (ref 136–145)
SODIUM BLD-SCNC: 143 MMOL/L (ref 136–145)
SPECIFIC GRAVITY UA: 1.01 (ref 1–1.03)
SPECIFIC GRAVITY UA: 1.01 (ref 1–1.03)
STREP PNEUMONIAE ANTIGEN, URINE: NORMAL
TCO2 ARTERIAL: 33.9 MMOL/L
TCO2 ARTERIAL: 35.8 MMOL/L
TCO2 ARTERIAL: 37 MMOL/L
TCO2 CALC VENOUS: 44 MMOL/L
TOTAL IRON BINDING CAPACITY: 229 UG/DL (ref 260–445)
TOTAL IRON BINDING CAPACITY: 293 UG/DL (ref 260–445)
TOTAL PROTEIN: 6.6 G/DL (ref 6.4–8.2)
TOTAL PROTEIN: 6.8 G/DL (ref 6.4–8.2)
TRIGL SERPL-MCNC: 131 MG/DL (ref 0–150)
TROPONIN: 0.04 NG/ML
TROPONIN: 0.05 NG/ML
TROPONIN: 0.05 NG/ML
TROPONIN: 0.06 NG/ML
TROPONIN: 0.08 NG/ML
TROPONIN: 0.09 NG/ML
TSH REFLEX: 0.97 UIU/ML (ref 0.27–4.2)
URINE CULTURE, ROUTINE: NORMAL
URINE REFLEX TO CULTURE: ABNORMAL
URINE REFLEX TO CULTURE: YES
URINE TYPE: ABNORMAL
URINE TYPE: ABNORMAL
UROBILINOGEN, URINE: 0.2 E.U./DL
UROBILINOGEN, URINE: 0.2 E.U./DL
VITAMIN B-12: 279 PG/ML (ref 211–911)
VITAMIN D 25-HYDROXY: 60.5 NG/ML
VLDLC SERPL CALC-MCNC: 26 MG/DL
WBC # BLD: 4.3 K/UL (ref 4–11)
WBC # BLD: 4.9 K/UL (ref 4–11)
WBC # BLD: 6.3 K/UL (ref 4–11)
WBC # BLD: 7 K/UL (ref 4–11)
WBC # BLD: 7.3 K/UL (ref 4–11)
WBC # BLD: 7.5 K/UL (ref 4–11)
WBC # BLD: 7.6 K/UL (ref 4–11)
WBC # BLD: 7.7 K/UL (ref 4–11)
WBC # BLD: 7.9 K/UL (ref 4–11)
WBC # BLD: 8.6 K/UL (ref 4–11)
WBC # BLD: 9 K/UL (ref 4–11)
WBC # BLD: 9.3 K/UL (ref 4–11)
WBC UA: 18 /HPF (ref 0–5)
WBC UA: 3 /HPF (ref 0–5)

## 2021-01-01 PROCEDURE — 12002 RPR S/N/AX/GEN/TRNK2.6-7.5CM: CPT

## 2021-01-01 PROCEDURE — 6370000000 HC RX 637 (ALT 250 FOR IP): Performed by: INTERNAL MEDICINE

## 2021-01-01 PROCEDURE — 2700000000 HC OXYGEN THERAPY PER DAY

## 2021-01-01 PROCEDURE — 80048 BASIC METABOLIC PNL TOTAL CA: CPT

## 2021-01-01 PROCEDURE — 94660 CPAP INITIATION&MGMT: CPT

## 2021-01-01 PROCEDURE — 2580000003 HC RX 258: Performed by: INTERNAL MEDICINE

## 2021-01-01 PROCEDURE — 6370000000 HC RX 637 (ALT 250 FOR IP): Performed by: NURSE PRACTITIONER

## 2021-01-01 PROCEDURE — 97530 THERAPEUTIC ACTIVITIES: CPT

## 2021-01-01 PROCEDURE — 94761 N-INVAS EAR/PLS OXIMETRY MLT: CPT

## 2021-01-01 PROCEDURE — 99233 SBSQ HOSP IP/OBS HIGH 50: CPT | Performed by: INTERNAL MEDICINE

## 2021-01-01 PROCEDURE — 94640 AIRWAY INHALATION TREATMENT: CPT

## 2021-01-01 PROCEDURE — 6360000002 HC RX W HCPCS: Performed by: INTERNAL MEDICINE

## 2021-01-01 PROCEDURE — 71045 X-RAY EXAM CHEST 1 VIEW: CPT

## 2021-01-01 PROCEDURE — 85730 THROMBOPLASTIN TIME PARTIAL: CPT

## 2021-01-01 PROCEDURE — 85025 COMPLETE CBC W/AUTO DIFF WBC: CPT

## 2021-01-01 PROCEDURE — 36600 WITHDRAWAL OF ARTERIAL BLOOD: CPT

## 2021-01-01 PROCEDURE — 84484 ASSAY OF TROPONIN QUANT: CPT

## 2021-01-01 PROCEDURE — 6360000002 HC RX W HCPCS

## 2021-01-01 PROCEDURE — 2060000000 HC ICU INTERMEDIATE R&B

## 2021-01-01 PROCEDURE — 1123F ACP DISCUSS/DSCN MKR DOCD: CPT | Performed by: FAMILY MEDICINE

## 2021-01-01 PROCEDURE — 99232 SBSQ HOSP IP/OBS MODERATE 35: CPT | Performed by: NURSE PRACTITIONER

## 2021-01-01 PROCEDURE — 99284 EMERGENCY DEPT VISIT MOD MDM: CPT

## 2021-01-01 PROCEDURE — 2500000003 HC RX 250 WO HCPCS

## 2021-01-01 PROCEDURE — 6370000000 HC RX 637 (ALT 250 FOR IP): Performed by: FAMILY MEDICINE

## 2021-01-01 PROCEDURE — 6360000002 HC RX W HCPCS: Performed by: FAMILY MEDICINE

## 2021-01-01 PROCEDURE — 36415 COLL VENOUS BLD VENIPUNCTURE: CPT

## 2021-01-01 PROCEDURE — 83880 ASSAY OF NATRIURETIC PEPTIDE: CPT

## 2021-01-01 PROCEDURE — U0003 INFECTIOUS AGENT DETECTION BY NUCLEIC ACID (DNA OR RNA); SEVERE ACUTE RESPIRATORY SYNDROME CORONAVIRUS 2 (SARS-COV-2) (CORONAVIRUS DISEASE [COVID-19]), AMPLIFIED PROBE TECHNIQUE, MAKING USE OF HIGH THROUGHPUT TECHNOLOGIES AS DESCRIBED BY CMS-2020-01-R: HCPCS

## 2021-01-01 PROCEDURE — 99214 OFFICE O/P EST MOD 30 MIN: CPT | Performed by: INTERNAL MEDICINE

## 2021-01-01 PROCEDURE — 71250 CT THORAX DX C-: CPT

## 2021-01-01 PROCEDURE — 3023F SPIROM DOC REV: CPT | Performed by: FAMILY MEDICINE

## 2021-01-01 PROCEDURE — G8926 SPIRO NO PERF OR DOC: HCPCS | Performed by: INTERNAL MEDICINE

## 2021-01-01 PROCEDURE — G8926 SPIRO NO PERF OR DOC: HCPCS | Performed by: FAMILY MEDICINE

## 2021-01-01 PROCEDURE — 87086 URINE CULTURE/COLONY COUNT: CPT

## 2021-01-01 PROCEDURE — 1036F TOBACCO NON-USER: CPT | Performed by: INTERNAL MEDICINE

## 2021-01-01 PROCEDURE — 96374 THER/PROPH/DIAG INJ IV PUSH: CPT

## 2021-01-01 PROCEDURE — 82803 BLOOD GASES ANY COMBINATION: CPT

## 2021-01-01 PROCEDURE — C1751 CATH, INF, PER/CENT/MIDLINE: HCPCS

## 2021-01-01 PROCEDURE — 1123F ACP DISCUSS/DSCN MKR DOCD: CPT | Performed by: INTERNAL MEDICINE

## 2021-01-01 PROCEDURE — 84145 PROCALCITONIN (PCT): CPT

## 2021-01-01 PROCEDURE — 83550 IRON BINDING TEST: CPT

## 2021-01-01 PROCEDURE — 1111F DSCHRG MED/CURRENT MED MERGE: CPT | Performed by: NURSE PRACTITIONER

## 2021-01-01 PROCEDURE — 4040F PNEUMOC VAC/ADMIN/RCVD: CPT | Performed by: FAMILY MEDICINE

## 2021-01-01 PROCEDURE — 1036F TOBACCO NON-USER: CPT | Performed by: FAMILY MEDICINE

## 2021-01-01 PROCEDURE — G8427 DOCREV CUR MEDS BY ELIG CLIN: HCPCS | Performed by: FAMILY MEDICINE

## 2021-01-01 PROCEDURE — U0005 INFEC AGEN DETEC AMPLI PROBE: HCPCS

## 2021-01-01 PROCEDURE — 6370000000 HC RX 637 (ALT 250 FOR IP): Performed by: PHYSICIAN ASSISTANT

## 2021-01-01 PROCEDURE — 99223 1ST HOSP IP/OBS HIGH 75: CPT | Performed by: INTERNAL MEDICINE

## 2021-01-01 PROCEDURE — 97116 GAIT TRAINING THERAPY: CPT

## 2021-01-01 PROCEDURE — 97535 SELF CARE MNGMENT TRAINING: CPT

## 2021-01-01 PROCEDURE — 83735 ASSAY OF MAGNESIUM: CPT

## 2021-01-01 PROCEDURE — 93005 ELECTROCARDIOGRAM TRACING: CPT | Performed by: PHYSICIAN ASSISTANT

## 2021-01-01 PROCEDURE — 99285 EMERGENCY DEPT VISIT HI MDM: CPT

## 2021-01-01 PROCEDURE — 83540 ASSAY OF IRON: CPT

## 2021-01-01 PROCEDURE — 82728 ASSAY OF FERRITIN: CPT

## 2021-01-01 PROCEDURE — 2580000003 HC RX 258: Performed by: NURSE PRACTITIONER

## 2021-01-01 PROCEDURE — 99232 SBSQ HOSP IP/OBS MODERATE 35: CPT | Performed by: INTERNAL MEDICINE

## 2021-01-01 PROCEDURE — 70450 CT HEAD/BRAIN W/O DYE: CPT

## 2021-01-01 PROCEDURE — 4A023N6 MEASUREMENT OF CARDIAC SAMPLING AND PRESSURE, RIGHT HEART, PERCUTANEOUS APPROACH: ICD-10-PCS | Performed by: INTERNAL MEDICINE

## 2021-01-01 PROCEDURE — 93010 ELECTROCARDIOGRAM REPORT: CPT | Performed by: INTERNAL MEDICINE

## 2021-01-01 PROCEDURE — 99214 OFFICE O/P EST MOD 30 MIN: CPT | Performed by: NURSE PRACTITIONER

## 2021-01-01 PROCEDURE — 0HBRXZZ EXCISION OF TOE NAIL, EXTERNAL APPROACH: ICD-10-PCS | Performed by: INTERNAL MEDICINE

## 2021-01-01 PROCEDURE — 87635 SARS-COV-2 COVID-19 AMP PRB: CPT

## 2021-01-01 PROCEDURE — G8417 CALC BMI ABV UP PARAM F/U: HCPCS | Performed by: INTERNAL MEDICINE

## 2021-01-01 PROCEDURE — G8417 CALC BMI ABV UP PARAM F/U: HCPCS | Performed by: FAMILY MEDICINE

## 2021-01-01 PROCEDURE — 97110 THERAPEUTIC EXERCISES: CPT

## 2021-01-01 PROCEDURE — 6370000000 HC RX 637 (ALT 250 FOR IP): Performed by: STUDENT IN AN ORGANIZED HEALTH CARE EDUCATION/TRAINING PROGRAM

## 2021-01-01 PROCEDURE — G8427 DOCREV CUR MEDS BY ELIG CLIN: HCPCS | Performed by: NURSE PRACTITIONER

## 2021-01-01 PROCEDURE — 4040F PNEUMOC VAC/ADMIN/RCVD: CPT | Performed by: INTERNAL MEDICINE

## 2021-01-01 PROCEDURE — 99214 OFFICE O/P EST MOD 30 MIN: CPT | Performed by: FAMILY MEDICINE

## 2021-01-01 PROCEDURE — 4040F PNEUMOC VAC/ADMIN/RCVD: CPT | Performed by: NURSE PRACTITIONER

## 2021-01-01 PROCEDURE — 87449 NOS EACH ORGANISM AG IA: CPT

## 2021-01-01 PROCEDURE — 3023F SPIROM DOC REV: CPT | Performed by: INTERNAL MEDICINE

## 2021-01-01 PROCEDURE — 93451 RIGHT HEART CATH: CPT

## 2021-01-01 PROCEDURE — 81001 URINALYSIS AUTO W/SCOPE: CPT

## 2021-01-01 PROCEDURE — G8428 CUR MEDS NOT DOCUMENT: HCPCS | Performed by: INTERNAL MEDICINE

## 2021-01-01 PROCEDURE — 6360000002 HC RX W HCPCS: Performed by: PHYSICIAN ASSISTANT

## 2021-01-01 PROCEDURE — 93451 RIGHT HEART CATH: CPT | Performed by: INTERNAL MEDICINE

## 2021-01-01 PROCEDURE — 1036F TOBACCO NON-USER: CPT | Performed by: NURSE PRACTITIONER

## 2021-01-01 PROCEDURE — 73030 X-RAY EXAM OF SHOULDER: CPT

## 2021-01-01 PROCEDURE — 97162 PT EVAL MOD COMPLEX 30 MIN: CPT

## 2021-01-01 PROCEDURE — 2500000003 HC RX 250 WO HCPCS: Performed by: NURSE PRACTITIONER

## 2021-01-01 PROCEDURE — 2580000003 HC RX 258

## 2021-01-01 PROCEDURE — 83036 HEMOGLOBIN GLYCOSYLATED A1C: CPT | Performed by: FAMILY MEDICINE

## 2021-01-01 PROCEDURE — B2111ZZ FLUOROSCOPY OF MULTIPLE CORONARY ARTERIES USING LOW OSMOLAR CONTRAST: ICD-10-PCS | Performed by: INTERNAL MEDICINE

## 2021-01-01 PROCEDURE — 97166 OT EVAL MOD COMPLEX 45 MIN: CPT

## 2021-01-01 PROCEDURE — 93005 ELECTROCARDIOGRAM TRACING: CPT | Performed by: NURSE PRACTITIONER

## 2021-01-01 PROCEDURE — 93005 ELECTROCARDIOGRAM TRACING: CPT | Performed by: INTERNAL MEDICINE

## 2021-01-01 PROCEDURE — 80053 COMPREHEN METABOLIC PANEL: CPT

## 2021-01-01 PROCEDURE — C1894 INTRO/SHEATH, NON-LASER: HCPCS

## 2021-01-01 PROCEDURE — 72125 CT NECK SPINE W/O DYE: CPT

## 2021-01-01 PROCEDURE — 6360000002 HC RX W HCPCS: Performed by: NURSE PRACTITIONER

## 2021-01-01 PROCEDURE — 2500000003 HC RX 250 WO HCPCS: Performed by: STUDENT IN AN ORGANIZED HEALTH CARE EDUCATION/TRAINING PROGRAM

## 2021-01-01 PROCEDURE — 94760 N-INVAS EAR/PLS OXIMETRY 1: CPT

## 2021-01-01 PROCEDURE — 36415 COLL VENOUS BLD VENIPUNCTURE: CPT | Performed by: FAMILY MEDICINE

## 2021-01-01 PROCEDURE — 80069 RENAL FUNCTION PANEL: CPT

## 2021-01-01 PROCEDURE — 6370000000 HC RX 637 (ALT 250 FOR IP)

## 2021-01-01 PROCEDURE — 71046 X-RAY EXAM CHEST 2 VIEWS: CPT

## 2021-01-01 PROCEDURE — 1123F ACP DISCUSS/DSCN MKR DOCD: CPT | Performed by: NURSE PRACTITIONER

## 2021-01-01 PROCEDURE — 2500000003 HC RX 250 WO HCPCS: Performed by: EMERGENCY MEDICINE

## 2021-01-01 PROCEDURE — 93306 TTE W/DOPPLER COMPLETE: CPT

## 2021-01-01 PROCEDURE — 85027 COMPLETE CBC AUTOMATED: CPT

## 2021-01-01 PROCEDURE — G8417 CALC BMI ABV UP PARAM F/U: HCPCS | Performed by: NURSE PRACTITIONER

## 2021-01-01 PROCEDURE — G8482 FLU IMMUNIZE ORDER/ADMIN: HCPCS | Performed by: FAMILY MEDICINE

## 2021-01-01 PROCEDURE — 99283 EMERGENCY DEPT VISIT LOW MDM: CPT

## 2021-01-01 PROCEDURE — G8484 FLU IMMUNIZE NO ADMIN: HCPCS | Performed by: INTERNAL MEDICINE

## 2021-01-01 RX ORDER — LANOLIN ALCOHOL/MO/W.PET/CERES
3 CREAM (GRAM) TOPICAL NIGHTLY PRN
Status: DISCONTINUED | OUTPATIENT
Start: 2021-01-01 | End: 2021-01-01 | Stop reason: HOSPADM

## 2021-01-01 RX ORDER — ALBUTEROL SULFATE 2.5 MG/3ML
2.5 SOLUTION RESPIRATORY (INHALATION) 2 TIMES DAILY
Status: DISCONTINUED | OUTPATIENT
Start: 2021-01-01 | End: 2021-01-01 | Stop reason: HOSPADM

## 2021-01-01 RX ORDER — HYDRALAZINE HYDROCHLORIDE 25 MG/1
25 TABLET, FILM COATED ORAL EVERY 8 HOURS SCHEDULED
Status: DISCONTINUED | OUTPATIENT
Start: 2021-01-01 | End: 2021-01-01 | Stop reason: HOSPADM

## 2021-01-01 RX ORDER — FUROSEMIDE 40 MG/1
40 TABLET ORAL 2 TIMES DAILY
Qty: 60 TABLET | Refills: 0
Start: 2021-01-01 | End: 2021-01-01 | Stop reason: SDUPTHER

## 2021-01-01 RX ORDER — METHYLPREDNISOLONE SODIUM SUCCINATE 40 MG/ML
40 INJECTION, POWDER, LYOPHILIZED, FOR SOLUTION INTRAMUSCULAR; INTRAVENOUS EVERY 6 HOURS
Status: DISCONTINUED | OUTPATIENT
Start: 2021-01-01 | End: 2021-01-01

## 2021-01-01 RX ORDER — ASPIRIN 81 MG/1
81 TABLET ORAL DAILY
Status: DISCONTINUED | OUTPATIENT
Start: 2021-01-01 | End: 2021-01-01 | Stop reason: HOSPADM

## 2021-01-01 RX ORDER — SODIUM CHLORIDE 0.9 % (FLUSH) 0.9 %
5-40 SYRINGE (ML) INJECTION EVERY 12 HOURS SCHEDULED
Status: DISCONTINUED | OUTPATIENT
Start: 2021-01-01 | End: 2021-01-01 | Stop reason: HOSPADM

## 2021-01-01 RX ORDER — METHYLPREDNISOLONE SODIUM SUCCINATE 125 MG/2ML
125 INJECTION, POWDER, LYOPHILIZED, FOR SOLUTION INTRAMUSCULAR; INTRAVENOUS ONCE
Status: COMPLETED | OUTPATIENT
Start: 2021-01-01 | End: 2021-01-01

## 2021-01-01 RX ORDER — POLYETHYLENE GLYCOL 3350 17 G/17G
17 POWDER, FOR SOLUTION ORAL DAILY PRN
Status: DISCONTINUED | OUTPATIENT
Start: 2021-01-01 | End: 2021-01-01 | Stop reason: HOSPADM

## 2021-01-01 RX ORDER — FUROSEMIDE 40 MG/1
40 TABLET ORAL 2 TIMES DAILY
Status: DISCONTINUED | OUTPATIENT
Start: 2021-01-01 | End: 2021-01-01 | Stop reason: HOSPADM

## 2021-01-01 RX ORDER — LABETALOL 100 MG/1
TABLET, FILM COATED ORAL
Qty: 180 TABLET | Refills: 3 | Status: ON HOLD | OUTPATIENT
Start: 2021-01-01 | End: 2021-01-01 | Stop reason: HOSPADM

## 2021-01-01 RX ORDER — HEPARIN SODIUM 1000 [USP'U]/ML
60 INJECTION, SOLUTION INTRAVENOUS; SUBCUTANEOUS PRN
Status: DISCONTINUED | OUTPATIENT
Start: 2021-01-01 | End: 2021-01-01

## 2021-01-01 RX ORDER — POTASSIUM CHLORIDE 7.45 MG/ML
10 INJECTION INTRAVENOUS PRN
Status: DISCONTINUED | OUTPATIENT
Start: 2021-01-01 | End: 2021-01-01 | Stop reason: HOSPADM

## 2021-01-01 RX ORDER — LABETALOL 100 MG/1
TABLET, FILM COATED ORAL
Status: COMPLETED
Start: 2021-01-01 | End: 2021-01-01

## 2021-01-01 RX ORDER — FUROSEMIDE 10 MG/ML
40 INJECTION INTRAMUSCULAR; INTRAVENOUS ONCE
Status: COMPLETED | OUTPATIENT
Start: 2021-01-01 | End: 2021-01-01

## 2021-01-01 RX ORDER — ATORVASTATIN CALCIUM 20 MG/1
TABLET, FILM COATED ORAL
Status: DISPENSED
Start: 2021-01-01 | End: 2021-01-01

## 2021-01-01 RX ORDER — GLIPIZIDE 10 MG/1
TABLET ORAL
Qty: 90 TABLET | Refills: 0 | Status: SHIPPED | OUTPATIENT
Start: 2021-01-01 | End: 2021-01-01

## 2021-01-01 RX ORDER — DEXTROSE MONOHYDRATE 50 MG/ML
100 INJECTION, SOLUTION INTRAVENOUS PRN
Status: DISCONTINUED | OUTPATIENT
Start: 2021-01-01 | End: 2021-01-01 | Stop reason: HOSPADM

## 2021-01-01 RX ORDER — LABETALOL 100 MG/1
100 TABLET, FILM COATED ORAL EVERY 8 HOURS SCHEDULED
Status: DISCONTINUED | OUTPATIENT
Start: 2021-01-01 | End: 2021-01-01

## 2021-01-01 RX ORDER — ACETAMINOPHEN 325 MG/1
650 TABLET ORAL EVERY 4 HOURS PRN
Status: DISCONTINUED | OUTPATIENT
Start: 2021-01-01 | End: 2021-01-01 | Stop reason: HOSPADM

## 2021-01-01 RX ORDER — GABAPENTIN 300 MG/1
300 CAPSULE ORAL 2 TIMES DAILY
Status: DISCONTINUED | OUTPATIENT
Start: 2021-01-01 | End: 2021-01-01

## 2021-01-01 RX ORDER — PREDNISONE 20 MG/1
40 TABLET ORAL DAILY
Status: DISCONTINUED | OUTPATIENT
Start: 2021-01-01 | End: 2021-01-01

## 2021-01-01 RX ORDER — PREDNISONE 10 MG/1
10 TABLET ORAL DAILY
Qty: 3 TABLET | Refills: 0
Start: 2021-01-01 | End: 2021-01-01

## 2021-01-01 RX ORDER — HEPARIN SODIUM 5000 [USP'U]/ML
INJECTION, SOLUTION INTRAVENOUS; SUBCUTANEOUS
Status: DISPENSED
Start: 2021-01-01 | End: 2021-01-01

## 2021-01-01 RX ORDER — ISOSORBIDE MONONITRATE 30 MG/1
30 TABLET, EXTENDED RELEASE ORAL DAILY
Status: DISCONTINUED | OUTPATIENT
Start: 2021-01-01 | End: 2021-01-01 | Stop reason: HOSPADM

## 2021-01-01 RX ORDER — MAGNESIUM SULFATE IN WATER 40 MG/ML
2000 INJECTION, SOLUTION INTRAVENOUS PRN
Status: DISCONTINUED | OUTPATIENT
Start: 2021-01-01 | End: 2021-01-01 | Stop reason: HOSPADM

## 2021-01-01 RX ORDER — ACETAZOLAMIDE 250 MG/1
250 TABLET ORAL ONCE
Status: COMPLETED | OUTPATIENT
Start: 2021-01-01 | End: 2021-01-01

## 2021-01-01 RX ORDER — SODIUM CHLORIDE 0.9 % (FLUSH) 0.9 %
5-40 SYRINGE (ML) INJECTION EVERY 12 HOURS SCHEDULED
Status: DISCONTINUED | OUTPATIENT
Start: 2021-01-01 | End: 2021-01-01

## 2021-01-01 RX ORDER — GLIPIZIDE 10 MG/1
TABLET ORAL
Qty: 90 TABLET | Refills: 0 | Status: SHIPPED | OUTPATIENT
Start: 2021-01-01 | End: 2021-01-01 | Stop reason: SDUPTHER

## 2021-01-01 RX ORDER — ALBUTEROL SULFATE 2.5 MG/3ML
2.5 SOLUTION RESPIRATORY (INHALATION)
Status: DISCONTINUED | OUTPATIENT
Start: 2021-01-01 | End: 2021-01-01 | Stop reason: HOSPADM

## 2021-01-01 RX ORDER — POTASSIUM CHLORIDE 750 MG/1
TABLET, FILM COATED, EXTENDED RELEASE ORAL
Qty: 180 TABLET | Refills: 0 | OUTPATIENT
Start: 2021-01-01

## 2021-01-01 RX ORDER — HYDRALAZINE HYDROCHLORIDE 20 MG/ML
10 INJECTION INTRAMUSCULAR; INTRAVENOUS EVERY 6 HOURS PRN
Status: DISCONTINUED | OUTPATIENT
Start: 2021-01-01 | End: 2021-01-01 | Stop reason: HOSPADM

## 2021-01-01 RX ORDER — GABAPENTIN 300 MG/1
300 CAPSULE ORAL 3 TIMES DAILY
Status: DISCONTINUED | OUTPATIENT
Start: 2021-01-01 | End: 2021-01-01

## 2021-01-01 RX ORDER — SODIUM CHLORIDE 0.9 % (FLUSH) 0.9 %
10 SYRINGE (ML) INJECTION EVERY 12 HOURS SCHEDULED
Status: DISCONTINUED | OUTPATIENT
Start: 2021-01-01 | End: 2021-01-01 | Stop reason: HOSPADM

## 2021-01-01 RX ORDER — PREDNISONE 20 MG/1
40 TABLET ORAL DAILY
Status: COMPLETED | OUTPATIENT
Start: 2021-01-01 | End: 2021-01-01

## 2021-01-01 RX ORDER — GABAPENTIN 300 MG/1
600 CAPSULE ORAL NIGHTLY
Status: DISCONTINUED | OUTPATIENT
Start: 2021-01-01 | End: 2021-01-01 | Stop reason: HOSPADM

## 2021-01-01 RX ORDER — ALBUTEROL SULFATE 2.5 MG/3ML
2.5 SOLUTION RESPIRATORY (INHALATION) EVERY 8 HOURS
Status: DISCONTINUED | OUTPATIENT
Start: 2021-01-01 | End: 2021-01-01

## 2021-01-01 RX ORDER — HEPARIN SODIUM 1000 [USP'U]/ML
4000 INJECTION, SOLUTION INTRAVENOUS; SUBCUTANEOUS ONCE
Status: COMPLETED | OUTPATIENT
Start: 2021-01-01 | End: 2021-01-01

## 2021-01-01 RX ORDER — GABAPENTIN 100 MG/1
100 CAPSULE ORAL 2 TIMES DAILY
Status: DISCONTINUED | OUTPATIENT
Start: 2021-01-01 | End: 2021-01-01 | Stop reason: HOSPADM

## 2021-01-01 RX ORDER — ESCITALOPRAM OXALATE 10 MG/1
10 TABLET ORAL DAILY
Qty: 90 TABLET | Refills: 0 | Status: SHIPPED | OUTPATIENT
Start: 2021-01-01 | End: 2021-01-01 | Stop reason: SDUPTHER

## 2021-01-01 RX ORDER — GLIPIZIDE 10 MG/1
TABLET ORAL
Qty: 90 TABLET | Refills: 3 | Status: SHIPPED | OUTPATIENT
Start: 2021-01-01

## 2021-01-01 RX ORDER — ATORVASTATIN CALCIUM 20 MG/1
20 TABLET, FILM COATED ORAL DAILY
Status: DISCONTINUED | OUTPATIENT
Start: 2021-01-01 | End: 2021-01-01 | Stop reason: HOSPADM

## 2021-01-01 RX ORDER — SODIUM CHLORIDE FOR INHALATION 3 %
4 VIAL, NEBULIZER (ML) INHALATION 2 TIMES DAILY
Status: COMPLETED | OUTPATIENT
Start: 2021-01-01 | End: 2021-01-01

## 2021-01-01 RX ORDER — HEPARIN SODIUM 10000 [USP'U]/100ML
INJECTION, SOLUTION INTRAVENOUS
Status: DISPENSED
Start: 2021-01-01 | End: 2021-01-01

## 2021-01-01 RX ORDER — GLIPIZIDE 5 MG/1
10 TABLET ORAL
Status: DISCONTINUED | OUTPATIENT
Start: 2021-01-01 | End: 2021-01-01 | Stop reason: HOSPADM

## 2021-01-01 RX ORDER — SODIUM CHLORIDE 0.9 % (FLUSH) 0.9 %
5-40 SYRINGE (ML) INJECTION PRN
Status: DISCONTINUED | OUTPATIENT
Start: 2021-01-01 | End: 2021-01-01 | Stop reason: HOSPADM

## 2021-01-01 RX ORDER — ESCITALOPRAM OXALATE 10 MG/1
10 TABLET ORAL DAILY
Qty: 90 TABLET | Refills: 3 | Status: SHIPPED | OUTPATIENT
Start: 2021-01-01

## 2021-01-01 RX ORDER — SIMVASTATIN 40 MG
TABLET ORAL
Qty: 90 TABLET | Refills: 3 | Status: SHIPPED | OUTPATIENT
Start: 2021-01-01

## 2021-01-01 RX ORDER — ISOSORBIDE MONONITRATE 30 MG/1
TABLET, EXTENDED RELEASE ORAL
Qty: 180 TABLET | Refills: 0 | Status: SHIPPED | OUTPATIENT
Start: 2021-01-01 | End: 2021-01-01 | Stop reason: SDUPTHER

## 2021-01-01 RX ORDER — ACETAMINOPHEN 325 MG/1
650 TABLET ORAL ONCE
Status: COMPLETED | OUTPATIENT
Start: 2021-01-01 | End: 2021-01-01

## 2021-01-01 RX ORDER — BUDESONIDE AND FORMOTEROL FUMARATE DIHYDRATE 160; 4.5 UG/1; UG/1
2 AEROSOL RESPIRATORY (INHALATION) 2 TIMES DAILY
Status: DISCONTINUED | OUTPATIENT
Start: 2021-01-01 | End: 2021-01-01 | Stop reason: HOSPADM

## 2021-01-01 RX ORDER — ALBUTEROL SULFATE 90 UG/1
2 AEROSOL, METERED RESPIRATORY (INHALATION) ONCE
Status: COMPLETED | OUTPATIENT
Start: 2021-01-01 | End: 2021-01-01

## 2021-01-01 RX ORDER — DOXAZOSIN MESYLATE 4 MG/1
TABLET ORAL
Status: COMPLETED
Start: 2021-01-01 | End: 2021-01-01

## 2021-01-01 RX ORDER — IPRATROPIUM BROMIDE AND ALBUTEROL SULFATE 2.5; .5 MG/3ML; MG/3ML
1 SOLUTION RESPIRATORY (INHALATION)
Status: DISCONTINUED | OUTPATIENT
Start: 2021-01-01 | End: 2021-01-01

## 2021-01-01 RX ORDER — IPRATROPIUM BROMIDE AND ALBUTEROL SULFATE 2.5; .5 MG/3ML; MG/3ML
1 SOLUTION RESPIRATORY (INHALATION)
Status: DISCONTINUED | OUTPATIENT
Start: 2021-01-01 | End: 2021-01-01 | Stop reason: HOSPADM

## 2021-01-01 RX ORDER — SODIUM CHLORIDE 0.9 % (FLUSH) 0.9 %
5-40 SYRINGE (ML) INJECTION EVERY 12 HOURS SCHEDULED
Status: DISCONTINUED | OUTPATIENT
Start: 2021-01-01 | End: 2021-01-01 | Stop reason: SDUPTHER

## 2021-01-01 RX ORDER — IPRATROPIUM BROMIDE AND ALBUTEROL SULFATE 2.5; .5 MG/3ML; MG/3ML
1 SOLUTION RESPIRATORY (INHALATION) EVERY 8 HOURS
Status: DISCONTINUED | OUTPATIENT
Start: 2021-01-01 | End: 2021-01-01

## 2021-01-01 RX ORDER — GLIPIZIDE 10 MG/1
10 TABLET ORAL
Qty: 30 TABLET | Refills: 5 | Status: SHIPPED | OUTPATIENT
Start: 2021-01-01 | End: 2021-01-01

## 2021-01-01 RX ORDER — ACETAMINOPHEN 650 MG/1
650 SUPPOSITORY RECTAL EVERY 4 HOURS PRN
Status: DISCONTINUED | OUTPATIENT
Start: 2021-01-01 | End: 2021-01-01 | Stop reason: HOSPADM

## 2021-01-01 RX ORDER — CALCIUM CARBONATE 200(500)MG
500 TABLET,CHEWABLE ORAL 3 TIMES DAILY PRN
Status: DISCONTINUED | OUTPATIENT
Start: 2021-01-01 | End: 2021-01-01

## 2021-01-01 RX ORDER — MORPHINE SULFATE 2 MG/ML
2 INJECTION, SOLUTION INTRAMUSCULAR; INTRAVENOUS
Status: DISCONTINUED | OUTPATIENT
Start: 2021-01-01 | End: 2021-01-01 | Stop reason: HOSPADM

## 2021-01-01 RX ORDER — ACETAMINOPHEN 325 MG/1
650 TABLET ORAL EVERY 4 HOURS PRN
COMMUNITY

## 2021-01-01 RX ORDER — SODIUM CHLORIDE 0.9 % (FLUSH) 0.9 %
5-40 SYRINGE (ML) INJECTION PRN
Status: DISCONTINUED | OUTPATIENT
Start: 2021-01-01 | End: 2021-01-01 | Stop reason: SDUPTHER

## 2021-01-01 RX ORDER — SODIUM CHLORIDE 9 MG/ML
25 INJECTION, SOLUTION INTRAVENOUS PRN
Status: DISCONTINUED | OUTPATIENT
Start: 2021-01-01 | End: 2021-01-01 | Stop reason: SDUPTHER

## 2021-01-01 RX ORDER — DOXAZOSIN MESYLATE 4 MG/1
8 TABLET ORAL NIGHTLY
Status: DISCONTINUED | OUTPATIENT
Start: 2021-01-01 | End: 2021-01-01 | Stop reason: HOSPADM

## 2021-01-01 RX ORDER — SODIUM CHLORIDE 9 MG/ML
25 INJECTION, SOLUTION INTRAVENOUS PRN
Status: DISCONTINUED | OUTPATIENT
Start: 2021-01-01 | End: 2021-01-01

## 2021-01-01 RX ORDER — FUROSEMIDE 40 MG/1
TABLET ORAL
Qty: 90 TABLET | Refills: 1 | Status: ON HOLD | OUTPATIENT
Start: 2021-01-01 | End: 2021-01-01 | Stop reason: HOSPADM

## 2021-01-01 RX ORDER — HYDRALAZINE HYDROCHLORIDE 25 MG/1
25 TABLET, FILM COATED ORAL EVERY 8 HOURS SCHEDULED
Qty: 90 TABLET | Refills: 0
Start: 2021-01-01

## 2021-01-01 RX ORDER — FUROSEMIDE 80 MG
TABLET ORAL
Qty: 180 TABLET | Refills: 0 | Status: SHIPPED | OUTPATIENT
Start: 2021-01-01 | End: 2021-01-01 | Stop reason: SDUPTHER

## 2021-01-01 RX ORDER — SODIUM CHLORIDE 9 MG/ML
25 INJECTION, SOLUTION INTRAVENOUS PRN
Status: DISCONTINUED | OUTPATIENT
Start: 2021-01-01 | End: 2021-01-01 | Stop reason: HOSPADM

## 2021-01-01 RX ORDER — ONDANSETRON 2 MG/ML
4 INJECTION INTRAMUSCULAR; INTRAVENOUS EVERY 6 HOURS PRN
Status: DISCONTINUED | OUTPATIENT
Start: 2021-01-01 | End: 2021-01-01 | Stop reason: HOSPADM

## 2021-01-01 RX ORDER — HEPARIN SODIUM 5000 [USP'U]/ML
5000 INJECTION, SOLUTION INTRAVENOUS; SUBCUTANEOUS EVERY 8 HOURS SCHEDULED
Status: DISCONTINUED | OUTPATIENT
Start: 2021-01-01 | End: 2021-01-01 | Stop reason: HOSPADM

## 2021-01-01 RX ORDER — PREDNISONE 1 MG/1
15 TABLET ORAL DAILY
Qty: 9 TABLET | Refills: 0
Start: 2021-01-01 | End: 2021-01-01

## 2021-01-01 RX ORDER — HYDRALAZINE HYDROCHLORIDE 25 MG/1
TABLET, FILM COATED ORAL
Status: DISPENSED
Start: 2021-01-01 | End: 2021-01-01

## 2021-01-01 RX ORDER — ALBUTEROL SULFATE 90 UG/1
2 AEROSOL, METERED RESPIRATORY (INHALATION) EVERY 6 HOURS PRN
Status: DISCONTINUED | OUTPATIENT
Start: 2021-01-01 | End: 2021-01-01

## 2021-01-01 RX ORDER — PREDNISONE 10 MG/1
10 TABLET ORAL DAILY
Status: DISCONTINUED | OUTPATIENT
Start: 2021-01-01 | End: 2021-01-01 | Stop reason: HOSPADM

## 2021-01-01 RX ORDER — ESCITALOPRAM OXALATE 10 MG/1
10 TABLET ORAL DAILY
Status: DISCONTINUED | OUTPATIENT
Start: 2021-01-01 | End: 2021-01-01 | Stop reason: HOSPADM

## 2021-01-01 RX ORDER — ISOSORBIDE MONONITRATE 30 MG/1
TABLET, EXTENDED RELEASE ORAL
Status: COMPLETED
Start: 2021-01-01 | End: 2021-01-01

## 2021-01-01 RX ORDER — ALBUTEROL SULFATE 2.5 MG/3ML
2.5 SOLUTION RESPIRATORY (INHALATION) EVERY 6 HOURS PRN
Status: DISCONTINUED | OUTPATIENT
Start: 2021-01-01 | End: 2021-01-01

## 2021-01-01 RX ORDER — ISOSORBIDE MONONITRATE 30 MG/1
TABLET, EXTENDED RELEASE ORAL
Qty: 180 TABLET | Refills: 3 | Status: SHIPPED | OUTPATIENT
Start: 2021-01-01

## 2021-01-01 RX ORDER — TRAMADOL HYDROCHLORIDE 50 MG/1
25 TABLET ORAL ONCE
Status: COMPLETED | OUTPATIENT
Start: 2021-01-01 | End: 2021-01-01

## 2021-01-01 RX ORDER — ONDANSETRON 2 MG/ML
4 INJECTION INTRAMUSCULAR; INTRAVENOUS EVERY 4 HOURS PRN
Status: DISCONTINUED | OUTPATIENT
Start: 2021-01-01 | End: 2021-01-01 | Stop reason: HOSPADM

## 2021-01-01 RX ORDER — LABETALOL 100 MG/1
100 TABLET, FILM COATED ORAL 3 TIMES DAILY
Qty: 60 TABLET | Refills: 0
Start: 2021-01-01

## 2021-01-01 RX ORDER — ISOSORBIDE MONONITRATE 30 MG/1
TABLET, EXTENDED RELEASE ORAL
Status: DISPENSED
Start: 2021-01-01 | End: 2021-01-01

## 2021-01-01 RX ORDER — PREDNISONE 20 MG/1
20 TABLET ORAL DAILY
Status: DISCONTINUED | OUTPATIENT
Start: 2021-01-01 | End: 2021-01-01 | Stop reason: HOSPADM

## 2021-01-01 RX ORDER — HYDRALAZINE HYDROCHLORIDE 10 MG/1
10 TABLET, FILM COATED ORAL EVERY 8 HOURS SCHEDULED
Status: DISCONTINUED | OUTPATIENT
Start: 2021-01-01 | End: 2021-01-01

## 2021-01-01 RX ORDER — NICOTINE POLACRILEX 4 MG
15 LOZENGE BUCCAL PRN
Status: DISCONTINUED | OUTPATIENT
Start: 2021-01-01 | End: 2021-01-01 | Stop reason: HOSPADM

## 2021-01-01 RX ORDER — PREDNISONE 20 MG/1
20 TABLET ORAL DAILY
Status: DISCONTINUED | OUTPATIENT
Start: 2021-01-01 | End: 2021-01-01

## 2021-01-01 RX ORDER — METOLAZONE 5 MG/1
5 TABLET ORAL DAILY
Status: DISCONTINUED | OUTPATIENT
Start: 2021-01-01 | End: 2021-01-01

## 2021-01-01 RX ORDER — LABETALOL 100 MG/1
TABLET, FILM COATED ORAL
Qty: 180 TABLET | Refills: 0 | Status: SHIPPED | OUTPATIENT
Start: 2021-01-01 | End: 2021-01-01 | Stop reason: SDUPTHER

## 2021-01-01 RX ORDER — LIDOCAINE HYDROCHLORIDE 10 MG/ML
5 INJECTION, SOLUTION INFILTRATION; PERINEURAL ONCE
Status: COMPLETED | OUTPATIENT
Start: 2021-01-01 | End: 2021-01-01

## 2021-01-01 RX ORDER — LABETALOL 100 MG/1
100 TABLET, FILM COATED ORAL 3 TIMES DAILY
Status: DISCONTINUED | OUTPATIENT
Start: 2021-01-01 | End: 2021-01-01 | Stop reason: HOSPADM

## 2021-01-01 RX ORDER — CEFDINIR 300 MG/1
300 CAPSULE ORAL 2 TIMES DAILY
Qty: 4 CAPSULE | Refills: 0
Start: 2021-01-01 | End: 2021-01-01

## 2021-01-01 RX ORDER — DOXYCYCLINE HYCLATE 100 MG
100 TABLET ORAL EVERY 12 HOURS SCHEDULED
Qty: 4 TABLET | Refills: 0 | Status: SHIPPED | OUTPATIENT
Start: 2021-01-01 | End: 2021-01-01

## 2021-01-01 RX ORDER — PREDNISONE 1 MG/1
5 TABLET ORAL DAILY
Status: DISCONTINUED | OUTPATIENT
Start: 2021-01-01 | End: 2021-01-01

## 2021-01-01 RX ORDER — PREDNISONE 1 MG/1
5 TABLET ORAL DAILY
Status: DISCONTINUED | OUTPATIENT
Start: 2021-01-01 | End: 2021-01-01 | Stop reason: HOSPADM

## 2021-01-01 RX ORDER — BUMETANIDE 0.25 MG/ML
1 INJECTION, SOLUTION INTRAMUSCULAR; INTRAVENOUS ONCE
Status: COMPLETED | OUTPATIENT
Start: 2021-01-01 | End: 2021-01-01

## 2021-01-01 RX ORDER — LIDOCAINE HYDROCHLORIDE 10 MG/ML
5 INJECTION, SOLUTION INFILTRATION; PERINEURAL ONCE
Status: CANCELLED | OUTPATIENT
Start: 2021-01-01 | End: 2021-01-01

## 2021-01-01 RX ORDER — HEPARIN SODIUM 10000 [USP'U]/100ML
0-3000 INJECTION, SOLUTION INTRAVENOUS CONTINUOUS
Status: DISCONTINUED | OUTPATIENT
Start: 2021-01-01 | End: 2021-01-01

## 2021-01-01 RX ORDER — IPRATROPIUM BROMIDE AND ALBUTEROL SULFATE 2.5; .5 MG/3ML; MG/3ML
1 SOLUTION RESPIRATORY (INHALATION) 2 TIMES DAILY
Status: DISCONTINUED | OUTPATIENT
Start: 2021-01-01 | End: 2021-01-01 | Stop reason: HOSPADM

## 2021-01-01 RX ORDER — ONDANSETRON 4 MG/1
4 TABLET, ORALLY DISINTEGRATING ORAL EVERY 8 HOURS PRN
Status: DISCONTINUED | OUTPATIENT
Start: 2021-01-01 | End: 2021-01-01 | Stop reason: HOSPADM

## 2021-01-01 RX ORDER — PREDNISONE 20 MG/1
20 TABLET ORAL DAILY
Qty: 3 TABLET | Refills: 0
Start: 2021-01-01 | End: 2021-01-01

## 2021-01-01 RX ORDER — SIMVASTATIN 40 MG
TABLET ORAL
Qty: 90 TABLET | Refills: 0 | Status: SHIPPED | OUTPATIENT
Start: 2021-01-01 | End: 2021-01-01 | Stop reason: SDUPTHER

## 2021-01-01 RX ORDER — LABETALOL 100 MG/1
TABLET, FILM COATED ORAL
Status: DISPENSED
Start: 2021-01-01 | End: 2021-01-01

## 2021-01-01 RX ORDER — DOXAZOSIN 8 MG/1
TABLET ORAL
Qty: 90 TABLET | Refills: 1 | Status: SHIPPED | OUTPATIENT
Start: 2021-01-01

## 2021-01-01 RX ORDER — IPRATROPIUM BROMIDE AND ALBUTEROL SULFATE 2.5; .5 MG/3ML; MG/3ML
3 SOLUTION RESPIRATORY (INHALATION) ONCE
Status: DISCONTINUED | OUTPATIENT
Start: 2021-01-01 | End: 2021-01-01

## 2021-01-01 RX ORDER — HEPARIN SODIUM 1000 [USP'U]/ML
30 INJECTION, SOLUTION INTRAVENOUS; SUBCUTANEOUS PRN
Status: DISCONTINUED | OUTPATIENT
Start: 2021-01-01 | End: 2021-01-01

## 2021-01-01 RX ORDER — ATORVASTATIN CALCIUM 40 MG/1
40 TABLET, FILM COATED ORAL DAILY
Status: DISCONTINUED | OUTPATIENT
Start: 2021-01-01 | End: 2021-01-01 | Stop reason: HOSPADM

## 2021-01-01 RX ORDER — DEXTROSE MONOHYDRATE 25 G/50ML
12.5 INJECTION, SOLUTION INTRAVENOUS PRN
Status: DISCONTINUED | OUTPATIENT
Start: 2021-01-01 | End: 2021-01-01 | Stop reason: HOSPADM

## 2021-01-01 RX ORDER — POTASSIUM CHLORIDE 750 MG/1
TABLET, FILM COATED, EXTENDED RELEASE ORAL
Qty: 180 TABLET | Refills: 0 | Status: ON HOLD | OUTPATIENT
Start: 2021-01-01 | End: 2021-01-01

## 2021-01-01 RX ORDER — PREDNISONE 1 MG/1
5 TABLET ORAL DAILY
Qty: 3 TABLET | Refills: 0
Start: 2021-01-01 | End: 2021-01-01

## 2021-01-01 RX ORDER — SODIUM CHLORIDE 0.9 % (FLUSH) 0.9 %
5-40 SYRINGE (ML) INJECTION PRN
Status: DISCONTINUED | OUTPATIENT
Start: 2021-01-01 | End: 2021-01-01

## 2021-01-01 RX ORDER — DOXAZOSIN MESYLATE 4 MG/1
8 TABLET ORAL DAILY
Status: DISCONTINUED | OUTPATIENT
Start: 2021-01-01 | End: 2021-01-01 | Stop reason: HOSPADM

## 2021-01-01 RX ORDER — ACETAMINOPHEN 325 MG/1
650 TABLET ORAL EVERY 4 HOURS PRN
Status: DISCONTINUED | OUTPATIENT
Start: 2021-01-01 | End: 2021-01-01

## 2021-01-01 RX ORDER — DOXYCYCLINE HYCLATE 100 MG
100 TABLET ORAL EVERY 12 HOURS SCHEDULED
Status: DISCONTINUED | OUTPATIENT
Start: 2021-01-01 | End: 2021-01-01 | Stop reason: HOSPADM

## 2021-01-01 RX ORDER — PREDNISONE 10 MG/1
10 TABLET ORAL DAILY
Status: DISCONTINUED | OUTPATIENT
Start: 2021-01-01 | End: 2021-01-01

## 2021-01-01 RX ORDER — FUROSEMIDE 10 MG/ML
40 INJECTION INTRAMUSCULAR; INTRAVENOUS 2 TIMES DAILY
Status: DISCONTINUED | OUTPATIENT
Start: 2021-01-01 | End: 2021-01-01 | Stop reason: HOSPADM

## 2021-01-01 RX ORDER — FUROSEMIDE 40 MG/1
TABLET ORAL
Status: DISPENSED
Start: 2021-01-01 | End: 2021-01-01

## 2021-01-01 RX ORDER — LABETALOL 100 MG/1
100 TABLET, FILM COATED ORAL 2 TIMES DAILY
Status: DISCONTINUED | OUTPATIENT
Start: 2021-01-01 | End: 2021-01-01

## 2021-01-01 RX ORDER — DOXAZOSIN 8 MG/1
TABLET ORAL
Qty: 90 TABLET | Refills: 0 | Status: SHIPPED | OUTPATIENT
Start: 2021-01-01 | End: 2021-01-01 | Stop reason: SDUPTHER

## 2021-01-01 RX ORDER — ATORVASTATIN CALCIUM 20 MG/1
TABLET, FILM COATED ORAL
Status: COMPLETED
Start: 2021-01-01 | End: 2021-01-01

## 2021-01-01 RX ORDER — HEPARIN SODIUM 1000 [USP'U]/ML
INJECTION, SOLUTION INTRAVENOUS; SUBCUTANEOUS
Status: DISPENSED
Start: 2021-01-01 | End: 2021-01-01

## 2021-01-01 RX ORDER — FUROSEMIDE 40 MG/1
40 TABLET ORAL 2 TIMES DAILY
Qty: 60 TABLET | Refills: 0 | Status: SHIPPED | OUTPATIENT
Start: 2021-01-01

## 2021-01-01 RX ORDER — HYDRALAZINE HYDROCHLORIDE 25 MG/1
TABLET, FILM COATED ORAL
Status: COMPLETED
Start: 2021-01-01 | End: 2021-01-01

## 2021-01-01 RX ORDER — GABAPENTIN 300 MG/1
CAPSULE ORAL
Qty: 270 CAPSULE | Refills: 1 | Status: SHIPPED | OUTPATIENT
Start: 2021-01-01 | End: 2021-01-01

## 2021-01-01 RX ORDER — METHYLPREDNISOLONE SODIUM SUCCINATE 40 MG/ML
INJECTION, POWDER, LYOPHILIZED, FOR SOLUTION INTRAMUSCULAR; INTRAVENOUS
Status: COMPLETED
Start: 2021-01-01 | End: 2021-01-01

## 2021-01-01 RX ORDER — GABAPENTIN 300 MG/1
300 CAPSULE ORAL EVERY MORNING
Status: DISCONTINUED | OUTPATIENT
Start: 2021-01-01 | End: 2021-01-01 | Stop reason: HOSPADM

## 2021-01-01 RX ORDER — SODIUM CHLORIDE 0.9 % (FLUSH) 0.9 %
10 SYRINGE (ML) INJECTION PRN
Status: DISCONTINUED | OUTPATIENT
Start: 2021-01-01 | End: 2021-01-01 | Stop reason: HOSPADM

## 2021-01-01 RX ORDER — GABAPENTIN 300 MG/1
CAPSULE ORAL
Status: DISPENSED
Start: 2021-01-01 | End: 2021-01-01

## 2021-01-01 RX ORDER — HEPARIN SODIUM 1000 [USP'U]/ML
60 INJECTION, SOLUTION INTRAVENOUS; SUBCUTANEOUS ONCE
Status: DISCONTINUED | OUTPATIENT
Start: 2021-01-01 | End: 2021-01-01

## 2021-01-01 RX ORDER — GABAPENTIN 300 MG/1
CAPSULE ORAL
Status: COMPLETED
Start: 2021-01-01 | End: 2021-01-01

## 2021-01-01 RX ADMIN — HEPARIN SODIUM 5000 UNITS: 5000 INJECTION INTRAVENOUS; SUBCUTANEOUS at 14:17

## 2021-01-01 RX ADMIN — HEPARIN SODIUM 5000 UNITS: 5000 INJECTION INTRAVENOUS; SUBCUTANEOUS at 21:06

## 2021-01-01 RX ADMIN — HEPARIN SODIUM 5000 UNITS: 5000 INJECTION INTRAVENOUS; SUBCUTANEOUS at 05:45

## 2021-01-01 RX ADMIN — CEFTRIAXONE 2000 MG: 2 INJECTION, POWDER, FOR SOLUTION INTRAMUSCULAR; INTRAVENOUS at 18:28

## 2021-01-01 RX ADMIN — INSULIN LISPRO 4 UNITS: 100 INJECTION, SOLUTION INTRAVENOUS; SUBCUTANEOUS at 17:56

## 2021-01-01 RX ADMIN — Medication 10 ML: at 08:31

## 2021-01-01 RX ADMIN — METHYLPREDNISOLONE SODIUM SUCCINATE 40 MG: 40 INJECTION, POWDER, FOR SOLUTION INTRAMUSCULAR; INTRAVENOUS at 03:27

## 2021-01-01 RX ADMIN — LIDOCAINE HYDROCHLORIDE 5 ML: 10 INJECTION, SOLUTION INFILTRATION; PERINEURAL at 07:52

## 2021-01-01 RX ADMIN — GABAPENTIN 300 MG: 300 CAPSULE ORAL at 09:46

## 2021-01-01 RX ADMIN — ACETAMINOPHEN 650 MG: 325 TABLET ORAL at 03:25

## 2021-01-01 RX ADMIN — INSULIN LISPRO 2 UNITS: 100 INJECTION, SOLUTION INTRAVENOUS; SUBCUTANEOUS at 21:41

## 2021-01-01 RX ADMIN — SODIUM CHLORIDE, PRESERVATIVE FREE 10 ML: 5 INJECTION INTRAVENOUS at 09:08

## 2021-01-01 RX ADMIN — ESCITALOPRAM OXALATE 10 MG: 10 TABLET ORAL at 13:01

## 2021-01-01 RX ADMIN — FUROSEMIDE 40 MG: 40 TABLET ORAL at 17:50

## 2021-01-01 RX ADMIN — ISOSORBIDE MONONITRATE 30 MG: 30 TABLET, EXTENDED RELEASE ORAL at 08:22

## 2021-01-01 RX ADMIN — PREDNISONE 40 MG: 20 TABLET ORAL at 09:58

## 2021-01-01 RX ADMIN — FUROSEMIDE 40 MG: 10 INJECTION, SOLUTION INTRAMUSCULAR; INTRAVENOUS at 18:15

## 2021-01-01 RX ADMIN — ISOSORBIDE MONONITRATE 30 MG: 30 TABLET, EXTENDED RELEASE ORAL at 09:58

## 2021-01-01 RX ADMIN — HEPARIN SODIUM 5000 UNITS: 5000 INJECTION INTRAVENOUS; SUBCUTANEOUS at 06:02

## 2021-01-01 RX ADMIN — GABAPENTIN 100 MG: 100 CAPSULE ORAL at 08:22

## 2021-01-01 RX ADMIN — ISOSORBIDE MONONITRATE 30 MG: 30 TABLET, EXTENDED RELEASE ORAL at 09:18

## 2021-01-01 RX ADMIN — LABETALOL HYDROCHLORIDE 100 MG: 100 TABLET, FILM COATED ORAL at 14:17

## 2021-01-01 RX ADMIN — INSULIN LISPRO 2 UNITS: 100 INJECTION, SOLUTION INTRAVENOUS; SUBCUTANEOUS at 09:03

## 2021-01-01 RX ADMIN — LABETALOL HYDROCHLORIDE 100 MG: 100 TABLET, FILM COATED ORAL at 09:26

## 2021-01-01 RX ADMIN — ESCITALOPRAM OXALATE 10 MG: 10 TABLET ORAL at 07:43

## 2021-01-01 RX ADMIN — ASPIRIN 81 MG: 81 TABLET, COATED ORAL at 08:39

## 2021-01-01 RX ADMIN — HYDRALAZINE HYDROCHLORIDE 10 MG: 20 INJECTION INTRAMUSCULAR; INTRAVENOUS at 04:03

## 2021-01-01 RX ADMIN — BUDESONIDE AND FORMOTEROL FUMARATE DIHYDRATE 2 PUFF: 160; 4.5 AEROSOL RESPIRATORY (INHALATION) at 20:13

## 2021-01-01 RX ADMIN — DOXAZOSIN 8 MG: 4 TABLET ORAL at 08:31

## 2021-01-01 RX ADMIN — ISOSORBIDE MONONITRATE 30 MG: 30 TABLET, EXTENDED RELEASE ORAL at 22:35

## 2021-01-01 RX ADMIN — ATORVASTATIN CALCIUM 20 MG: 20 TABLET, FILM COATED ORAL at 22:34

## 2021-01-01 RX ADMIN — LABETALOL HYDROCHLORIDE 100 MG: 100 TABLET, FILM COATED ORAL at 21:19

## 2021-01-01 RX ADMIN — ATORVASTATIN CALCIUM 20 MG: 20 TABLET, FILM COATED ORAL at 08:05

## 2021-01-01 RX ADMIN — HYDRALAZINE HYDROCHLORIDE 25 MG: 25 TABLET, FILM COATED ORAL at 12:56

## 2021-01-01 RX ADMIN — HEPARIN SODIUM 5000 UNITS: 5000 INJECTION INTRAVENOUS; SUBCUTANEOUS at 20:24

## 2021-01-01 RX ADMIN — Medication 10 ML: at 21:25

## 2021-01-01 RX ADMIN — ALBUTEROL SULFATE 2.5 MG: 2.5 SOLUTION RESPIRATORY (INHALATION) at 16:07

## 2021-01-01 RX ADMIN — BUDESONIDE AND FORMOTEROL FUMARATE DIHYDRATE 2 PUFF: 160; 4.5 AEROSOL RESPIRATORY (INHALATION) at 19:55

## 2021-01-01 RX ADMIN — DOXAZOSIN 8 MG: 4 TABLET ORAL at 21:07

## 2021-01-01 RX ADMIN — LABETALOL HYDROCHLORIDE 100 MG: 100 TABLET, FILM COATED ORAL at 20:23

## 2021-01-01 RX ADMIN — INSULIN LISPRO 2 UNITS: 100 INJECTION, SOLUTION INTRAVENOUS; SUBCUTANEOUS at 21:08

## 2021-01-01 RX ADMIN — ALBUTEROL SULFATE 2.5 MG: 2.5 SOLUTION RESPIRATORY (INHALATION) at 23:52

## 2021-01-01 RX ADMIN — SODIUM CHLORIDE, PRESERVATIVE FREE 10 ML: 5 INJECTION INTRAVENOUS at 20:10

## 2021-01-01 RX ADMIN — LABETALOL HYDROCHLORIDE 100 MG: 100 TABLET, FILM COATED ORAL at 12:35

## 2021-01-01 RX ADMIN — GABAPENTIN 300 MG: 300 CAPSULE ORAL at 13:00

## 2021-01-01 RX ADMIN — ASPIRIN 81 MG: 81 TABLET, COATED ORAL at 08:58

## 2021-01-01 RX ADMIN — ESCITALOPRAM OXALATE 10 MG: 10 TABLET ORAL at 09:02

## 2021-01-01 RX ADMIN — GLIPIZIDE 10 MG: 5 TABLET ORAL at 06:24

## 2021-01-01 RX ADMIN — HYDRALAZINE HYDROCHLORIDE 10 MG: 10 TABLET, FILM COATED ORAL at 06:06

## 2021-01-01 RX ADMIN — ASPIRIN 81 MG: 81 TABLET, COATED ORAL at 09:02

## 2021-01-01 RX ADMIN — INSULIN LISPRO 4 UNITS: 100 INJECTION, SOLUTION INTRAVENOUS; SUBCUTANEOUS at 17:18

## 2021-01-01 RX ADMIN — BUDESONIDE AND FORMOTEROL FUMARATE DIHYDRATE 2 PUFF: 160; 4.5 AEROSOL RESPIRATORY (INHALATION) at 19:48

## 2021-01-01 RX ADMIN — BUDESONIDE AND FORMOTEROL FUMARATE DIHYDRATE 2 PUFF: 160; 4.5 AEROSOL RESPIRATORY (INHALATION) at 09:12

## 2021-01-01 RX ADMIN — INSULIN LISPRO 6 UNITS: 100 INJECTION, SOLUTION INTRAVENOUS; SUBCUTANEOUS at 17:31

## 2021-01-01 RX ADMIN — Medication 10 ML: at 21:18

## 2021-01-01 RX ADMIN — TIOTROPIUM BROMIDE INHALATION SPRAY 2 PUFF: 3.12 SPRAY, METERED RESPIRATORY (INHALATION) at 08:20

## 2021-01-01 RX ADMIN — LABETALOL HYDROCHLORIDE 100 MG: 100 TABLET, FILM COATED ORAL at 22:36

## 2021-01-01 RX ADMIN — IPRATROPIUM BROMIDE 2 PUFF: 17 AEROSOL, METERED RESPIRATORY (INHALATION) at 15:36

## 2021-01-01 RX ADMIN — GABAPENTIN 600 MG: 300 CAPSULE ORAL at 22:36

## 2021-01-01 RX ADMIN — LABETALOL HYDROCHLORIDE 100 MG: 100 TABLET, FILM COATED ORAL at 21:07

## 2021-01-01 RX ADMIN — IPRATROPIUM BROMIDE AND ALBUTEROL SULFATE 1 AMPULE: .5; 3 SOLUTION RESPIRATORY (INHALATION) at 03:49

## 2021-01-01 RX ADMIN — HEPARIN SODIUM 5000 UNITS: 5000 INJECTION INTRAVENOUS; SUBCUTANEOUS at 06:15

## 2021-01-01 RX ADMIN — PREDNISONE 40 MG: 20 TABLET ORAL at 11:06

## 2021-01-01 RX ADMIN — HYDRALAZINE HYDROCHLORIDE 25 MG: 25 TABLET, FILM COATED ORAL at 21:04

## 2021-01-01 RX ADMIN — CEFTRIAXONE 2000 MG: 2 INJECTION, POWDER, FOR SOLUTION INTRAMUSCULAR; INTRAVENOUS at 17:15

## 2021-01-01 RX ADMIN — LABETALOL HYDROCHLORIDE 100 MG: 100 TABLET, FILM COATED ORAL at 15:03

## 2021-01-01 RX ADMIN — BUDESONIDE AND FORMOTEROL FUMARATE DIHYDRATE 2 PUFF: 160; 4.5 AEROSOL RESPIRATORY (INHALATION) at 19:50

## 2021-01-01 RX ADMIN — Medication 10 ML: at 09:27

## 2021-01-01 RX ADMIN — DOXAZOSIN 8 MG: 4 TABLET ORAL at 20:10

## 2021-01-01 RX ADMIN — ACETAMINOPHEN 650 MG: 325 TABLET ORAL at 13:00

## 2021-01-01 RX ADMIN — ESCITALOPRAM OXALATE 10 MG: 10 TABLET ORAL at 09:18

## 2021-01-01 RX ADMIN — IPRATROPIUM BROMIDE AND ALBUTEROL SULFATE 1 AMPULE: .5; 3 SOLUTION RESPIRATORY (INHALATION) at 07:51

## 2021-01-01 RX ADMIN — DOXYCYCLINE HYCLATE 100 MG: 100 TABLET, COATED ORAL at 21:05

## 2021-01-01 RX ADMIN — METHYLPREDNISOLONE SODIUM SUCCINATE 40 MG: 40 INJECTION, POWDER, FOR SOLUTION INTRAMUSCULAR; INTRAVENOUS at 08:17

## 2021-01-01 RX ADMIN — LABETALOL HYDROCHLORIDE 100 MG: 100 TABLET, FILM COATED ORAL at 21:25

## 2021-01-01 RX ADMIN — ATORVASTATIN CALCIUM 40 MG: 40 TABLET, FILM COATED ORAL at 07:30

## 2021-01-01 RX ADMIN — TIOTROPIUM BROMIDE INHALATION SPRAY 2 PUFF: 3.12 SPRAY, METERED RESPIRATORY (INHALATION) at 09:11

## 2021-01-01 RX ADMIN — HYDRALAZINE HYDROCHLORIDE 25 MG: 25 TABLET, FILM COATED ORAL at 21:41

## 2021-01-01 RX ADMIN — Medication 10 ML: at 20:59

## 2021-01-01 RX ADMIN — ESCITALOPRAM OXALATE 10 MG: 10 TABLET ORAL at 07:53

## 2021-01-01 RX ADMIN — HYDRALAZINE HYDROCHLORIDE 25 MG: 25 TABLET, FILM COATED ORAL at 05:45

## 2021-01-01 RX ADMIN — ALBUTEROL SULFATE 2.5 MG: 2.5 SOLUTION RESPIRATORY (INHALATION) at 13:10

## 2021-01-01 RX ADMIN — BUDESONIDE AND FORMOTEROL FUMARATE DIHYDRATE 2 PUFF: 160; 4.5 AEROSOL RESPIRATORY (INHALATION) at 08:24

## 2021-01-01 RX ADMIN — MORPHINE SULFATE 2 MG: 2 INJECTION, SOLUTION INTRAMUSCULAR; INTRAVENOUS at 06:29

## 2021-01-01 RX ADMIN — HYDRALAZINE HYDROCHLORIDE 25 MG: 25 TABLET, FILM COATED ORAL at 20:56

## 2021-01-01 RX ADMIN — ISOSORBIDE MONONITRATE 30 MG: 30 TABLET, EXTENDED RELEASE ORAL at 08:58

## 2021-01-01 RX ADMIN — HEPARIN SODIUM 5000 UNITS: 5000 INJECTION INTRAVENOUS; SUBCUTANEOUS at 21:18

## 2021-01-01 RX ADMIN — GABAPENTIN 300 MG: 300 CAPSULE ORAL at 13:42

## 2021-01-01 RX ADMIN — BUDESONIDE AND FORMOTEROL FUMARATE DIHYDRATE 2 PUFF: 160; 4.5 AEROSOL RESPIRATORY (INHALATION) at 09:11

## 2021-01-01 RX ADMIN — GABAPENTIN 300 MG: 300 CAPSULE ORAL at 07:30

## 2021-01-01 RX ADMIN — IPRATROPIUM BROMIDE AND ALBUTEROL SULFATE 1 AMPULE: .5; 3 SOLUTION RESPIRATORY (INHALATION) at 19:55

## 2021-01-01 RX ADMIN — DOXAZOSIN 8 MG: 4 TABLET ORAL at 09:58

## 2021-01-01 RX ADMIN — DOXYCYCLINE HYCLATE 100 MG: 100 TABLET, COATED ORAL at 08:22

## 2021-01-01 RX ADMIN — HEPARIN SODIUM 5000 UNITS: 5000 INJECTION INTRAVENOUS; SUBCUTANEOUS at 12:55

## 2021-01-01 RX ADMIN — LABETALOL HYDROCHLORIDE 100 MG: 100 TABLET, FILM COATED ORAL at 10:03

## 2021-01-01 RX ADMIN — GABAPENTIN 300 MG: 300 CAPSULE ORAL at 21:19

## 2021-01-01 RX ADMIN — ACETAMINOPHEN 650 MG: 325 TABLET ORAL at 17:07

## 2021-01-01 RX ADMIN — METOLAZONE 5 MG: 5 TABLET ORAL at 10:03

## 2021-01-01 RX ADMIN — LABETALOL HYDROCHLORIDE 100 MG: 100 TABLET, FILM COATED ORAL at 08:22

## 2021-01-01 RX ADMIN — ESCITALOPRAM OXALATE 10 MG: 10 TABLET ORAL at 08:40

## 2021-01-01 RX ADMIN — HYDRALAZINE HYDROCHLORIDE 25 MG: 25 TABLET, FILM COATED ORAL at 05:31

## 2021-01-01 RX ADMIN — METOLAZONE 5 MG: 5 TABLET ORAL at 08:58

## 2021-01-01 RX ADMIN — INSULIN LISPRO 2 UNITS: 100 INJECTION, SOLUTION INTRAVENOUS; SUBCUTANEOUS at 20:47

## 2021-01-01 RX ADMIN — ACETAMINOPHEN 650 MG: 325 TABLET ORAL at 22:25

## 2021-01-01 RX ADMIN — INSULIN LISPRO 4 UNITS: 100 INJECTION, SOLUTION INTRAVENOUS; SUBCUTANEOUS at 17:12

## 2021-01-01 RX ADMIN — Medication 3 MG: at 00:37

## 2021-01-01 RX ADMIN — ATORVASTATIN CALCIUM 20 MG: 20 TABLET, FILM COATED ORAL at 09:02

## 2021-01-01 RX ADMIN — CEFTRIAXONE 2000 MG: 2 INJECTION, POWDER, FOR SOLUTION INTRAMUSCULAR; INTRAVENOUS at 18:03

## 2021-01-01 RX ADMIN — GABAPENTIN 300 MG: 300 CAPSULE ORAL at 21:05

## 2021-01-01 RX ADMIN — ALBUTEROL SULFATE 2.5 MG: 2.5 SOLUTION RESPIRATORY (INHALATION) at 00:18

## 2021-01-01 RX ADMIN — ESCITALOPRAM OXALATE 10 MG: 10 TABLET ORAL at 09:58

## 2021-01-01 RX ADMIN — FUROSEMIDE 40 MG: 40 TABLET ORAL at 10:05

## 2021-01-01 RX ADMIN — PREDNISONE 40 MG: 20 TABLET ORAL at 08:58

## 2021-01-01 RX ADMIN — FUROSEMIDE 40 MG: 40 TABLET ORAL at 08:40

## 2021-01-01 RX ADMIN — HEPARIN SODIUM 5000 UNITS: 5000 INJECTION INTRAVENOUS; SUBCUTANEOUS at 12:59

## 2021-01-01 RX ADMIN — ASPIRIN 81 MG: 81 TABLET, COATED ORAL at 09:46

## 2021-01-01 RX ADMIN — DOXAZOSIN 8 MG: 4 TABLET ORAL at 12:59

## 2021-01-01 RX ADMIN — METHYLPREDNISOLONE SODIUM SUCCINATE 40 MG: 40 INJECTION, POWDER, FOR SOLUTION INTRAMUSCULAR; INTRAVENOUS at 20:57

## 2021-01-01 RX ADMIN — ALBUTEROL SULFATE 2.5 MG: 2.5 SOLUTION RESPIRATORY (INHALATION) at 08:15

## 2021-01-01 RX ADMIN — SODIUM CHLORIDE 25 ML: 9 INJECTION, SOLUTION INTRAVENOUS at 17:14

## 2021-01-01 RX ADMIN — LABETALOL HYDROCHLORIDE 100 MG: 100 TABLET, FILM COATED ORAL at 14:02

## 2021-01-01 RX ADMIN — ATORVASTATIN CALCIUM 20 MG: 20 TABLET, FILM COATED ORAL at 08:40

## 2021-01-01 RX ADMIN — DOXYCYCLINE HYCLATE 100 MG: 100 TABLET, COATED ORAL at 09:07

## 2021-01-01 RX ADMIN — GABAPENTIN 100 MG: 100 CAPSULE ORAL at 21:19

## 2021-01-01 RX ADMIN — BUDESONIDE AND FORMOTEROL FUMARATE DIHYDRATE 2 PUFF: 160; 4.5 AEROSOL RESPIRATORY (INHALATION) at 20:20

## 2021-01-01 RX ADMIN — PREDNISONE 40 MG: 20 TABLET ORAL at 08:31

## 2021-01-01 RX ADMIN — DOXYCYCLINE HYCLATE 100 MG: 100 TABLET, COATED ORAL at 21:06

## 2021-01-01 RX ADMIN — TRAMADOL HYDROCHLORIDE 25 MG: 50 TABLET, FILM COATED ORAL at 13:42

## 2021-01-01 RX ADMIN — BUMETANIDE 1 MG: 0.25 INJECTION, SOLUTION INTRAMUSCULAR; INTRAVENOUS at 04:26

## 2021-01-01 RX ADMIN — TIOTROPIUM BROMIDE INHALATION SPRAY 2 PUFF: 3.12 SPRAY, METERED RESPIRATORY (INHALATION) at 07:55

## 2021-01-01 RX ADMIN — DOXAZOSIN 8 MG: 4 TABLET ORAL at 20:23

## 2021-01-01 RX ADMIN — ESCITALOPRAM OXALATE 10 MG: 10 TABLET ORAL at 08:22

## 2021-01-01 RX ADMIN — Medication 10 ML: at 21:06

## 2021-01-01 RX ADMIN — ESCITALOPRAM OXALATE 10 MG: 10 TABLET ORAL at 10:05

## 2021-01-01 RX ADMIN — HEPARIN SODIUM 5000 UNITS: 5000 INJECTION INTRAVENOUS; SUBCUTANEOUS at 21:05

## 2021-01-01 RX ADMIN — BUDESONIDE AND FORMOTEROL FUMARATE DIHYDRATE 2 PUFF: 160; 4.5 AEROSOL RESPIRATORY (INHALATION) at 07:55

## 2021-01-01 RX ADMIN — DOXAZOSIN 8 MG: 4 TABLET ORAL at 20:56

## 2021-01-01 RX ADMIN — BUDESONIDE AND FORMOTEROL FUMARATE DIHYDRATE 2 PUFF: 160; 4.5 AEROSOL RESPIRATORY (INHALATION) at 07:34

## 2021-01-01 RX ADMIN — HYDRALAZINE HYDROCHLORIDE 25 MG: 25 TABLET, FILM COATED ORAL at 13:58

## 2021-01-01 RX ADMIN — SODIUM CHLORIDE, PRESERVATIVE FREE 10 ML: 5 INJECTION INTRAVENOUS at 21:09

## 2021-01-01 RX ADMIN — LABETALOL HYDROCHLORIDE 100 MG: 100 TABLET, FILM COATED ORAL at 09:02

## 2021-01-01 RX ADMIN — HEPARIN SODIUM 5000 UNITS: 5000 INJECTION INTRAVENOUS; SUBCUTANEOUS at 05:34

## 2021-01-01 RX ADMIN — ATORVASTATIN CALCIUM 40 MG: 40 TABLET, FILM COATED ORAL at 09:07

## 2021-01-01 RX ADMIN — LABETALOL HYDROCHLORIDE 100 MG: 100 TABLET, FILM COATED ORAL at 09:46

## 2021-01-01 RX ADMIN — ESCITALOPRAM OXALATE 10 MG: 10 TABLET ORAL at 07:30

## 2021-01-01 RX ADMIN — BUDESONIDE AND FORMOTEROL FUMARATE DIHYDRATE 2 PUFF: 160; 4.5 AEROSOL RESPIRATORY (INHALATION) at 08:33

## 2021-01-01 RX ADMIN — BUDESONIDE AND FORMOTEROL FUMARATE DIHYDRATE 2 PUFF: 160; 4.5 AEROSOL RESPIRATORY (INHALATION) at 08:07

## 2021-01-01 RX ADMIN — GABAPENTIN 300 MG: 300 CAPSULE ORAL at 08:58

## 2021-01-01 RX ADMIN — IPRATROPIUM BROMIDE AND ALBUTEROL SULFATE 1 AMPULE: .5; 3 SOLUTION RESPIRATORY (INHALATION) at 09:04

## 2021-01-01 RX ADMIN — TIOTROPIUM BROMIDE INHALATION SPRAY 2 PUFF: 3.12 SPRAY, METERED RESPIRATORY (INHALATION) at 09:12

## 2021-01-01 RX ADMIN — GABAPENTIN 300 MG: 300 CAPSULE ORAL at 09:21

## 2021-01-01 RX ADMIN — IPRATROPIUM BROMIDE AND ALBUTEROL SULFATE 1 AMPULE: .5; 3 SOLUTION RESPIRATORY (INHALATION) at 07:34

## 2021-01-01 RX ADMIN — INSULIN LISPRO 4 UNITS: 100 INJECTION, SOLUTION INTRAVENOUS; SUBCUTANEOUS at 12:55

## 2021-01-01 RX ADMIN — Medication 10 ML: at 07:43

## 2021-01-01 RX ADMIN — BUDESONIDE AND FORMOTEROL FUMARATE DIHYDRATE 2 PUFF: 160; 4.5 AEROSOL RESPIRATORY (INHALATION) at 20:21

## 2021-01-01 RX ADMIN — Medication 10 ML: at 07:31

## 2021-01-01 RX ADMIN — INSULIN LISPRO 2 UNITS: 100 INJECTION, SOLUTION INTRAVENOUS; SUBCUTANEOUS at 12:26

## 2021-01-01 RX ADMIN — ESCITALOPRAM OXALATE 10 MG: 10 TABLET ORAL at 09:49

## 2021-01-01 RX ADMIN — HYDRALAZINE HYDROCHLORIDE 25 MG: 25 TABLET, FILM COATED ORAL at 14:17

## 2021-01-01 RX ADMIN — FUROSEMIDE 40 MG: 10 INJECTION, SOLUTION INTRAMUSCULAR; INTRAVENOUS at 18:35

## 2021-01-01 RX ADMIN — INSULIN LISPRO 4 UNITS: 100 INJECTION, SOLUTION INTRAVENOUS; SUBCUTANEOUS at 12:03

## 2021-01-01 RX ADMIN — LABETALOL HYDROCHLORIDE 100 MG: 100 TABLET, FILM COATED ORAL at 13:59

## 2021-01-01 RX ADMIN — HEPARIN SODIUM 5000 UNITS: 5000 INJECTION INTRAVENOUS; SUBCUTANEOUS at 15:02

## 2021-01-01 RX ADMIN — ACETAMINOPHEN 650 MG: 325 TABLET ORAL at 21:18

## 2021-01-01 RX ADMIN — GABAPENTIN 600 MG: 300 CAPSULE ORAL at 21:05

## 2021-01-01 RX ADMIN — HEPARIN SODIUM 5000 UNITS: 5000 INJECTION INTRAVENOUS; SUBCUTANEOUS at 12:35

## 2021-01-01 RX ADMIN — DOXAZOSIN 8 MG: 4 TABLET ORAL at 07:53

## 2021-01-01 RX ADMIN — ISOSORBIDE MONONITRATE 30 MG: 30 TABLET, EXTENDED RELEASE ORAL at 10:05

## 2021-01-01 RX ADMIN — PREDNISONE 40 MG: 20 TABLET ORAL at 09:07

## 2021-01-01 RX ADMIN — SODIUM CHLORIDE SOLN NEBU 3% 4 ML: 3 NEBU SOLN at 08:24

## 2021-01-01 RX ADMIN — HYDRALAZINE HYDROCHLORIDE 10 MG: 10 TABLET, FILM COATED ORAL at 15:02

## 2021-01-01 RX ADMIN — ENOXAPARIN SODIUM 40 MG: 40 INJECTION SUBCUTANEOUS at 07:30

## 2021-01-01 RX ADMIN — HYDRALAZINE HYDROCHLORIDE 25 MG: 25 TABLET, FILM COATED ORAL at 21:07

## 2021-01-01 RX ADMIN — GLIPIZIDE 10 MG: 5 TABLET ORAL at 09:06

## 2021-01-01 RX ADMIN — ACETAMINOPHEN 650 MG: 325 TABLET ORAL at 00:33

## 2021-01-01 RX ADMIN — SODIUM CHLORIDE, PRESERVATIVE FREE 10 ML: 5 INJECTION INTRAVENOUS at 22:37

## 2021-01-01 RX ADMIN — ATORVASTATIN CALCIUM 40 MG: 40 TABLET, FILM COATED ORAL at 08:23

## 2021-01-01 RX ADMIN — BUDESONIDE AND FORMOTEROL FUMARATE DIHYDRATE 2 PUFF: 160; 4.5 AEROSOL RESPIRATORY (INHALATION) at 20:01

## 2021-01-01 RX ADMIN — ATORVASTATIN CALCIUM 20 MG: 20 TABLET, FILM COATED ORAL at 09:18

## 2021-01-01 RX ADMIN — ALBUTEROL SULFATE 2.5 MG: 2.5 SOLUTION RESPIRATORY (INHALATION) at 07:55

## 2021-01-01 RX ADMIN — Medication 10 ML: at 21:19

## 2021-01-01 RX ADMIN — HYDRALAZINE HYDROCHLORIDE 25 MG: 25 TABLET, FILM COATED ORAL at 15:02

## 2021-01-01 RX ADMIN — HYDRALAZINE HYDROCHLORIDE 25 MG: 25 TABLET, FILM COATED ORAL at 20:23

## 2021-01-01 RX ADMIN — ASPIRIN 81 MG: 81 TABLET, COATED ORAL at 10:03

## 2021-01-01 RX ADMIN — FUROSEMIDE 40 MG: 40 TABLET ORAL at 17:12

## 2021-01-01 RX ADMIN — ACETAMINOPHEN 650 MG: 325 TABLET ORAL at 00:14

## 2021-01-01 RX ADMIN — HYDRALAZINE HYDROCHLORIDE 10 MG: 10 TABLET, FILM COATED ORAL at 13:42

## 2021-01-01 RX ADMIN — SODIUM CHLORIDE SOLN NEBU 3% 4 ML: 3 NEBU SOLN at 09:17

## 2021-01-01 RX ADMIN — ATORVASTATIN CALCIUM 40 MG: 40 TABLET, FILM COATED ORAL at 07:43

## 2021-01-01 RX ADMIN — FUROSEMIDE 40 MG: 10 INJECTION, SOLUTION INTRAMUSCULAR; INTRAVENOUS at 07:31

## 2021-01-01 RX ADMIN — ESCITALOPRAM OXALATE 10 MG: 10 TABLET ORAL at 08:31

## 2021-01-01 RX ADMIN — FUROSEMIDE 40 MG: 40 TABLET ORAL at 09:46

## 2021-01-01 RX ADMIN — INSULIN LISPRO 2 UNITS: 100 INJECTION, SOLUTION INTRAVENOUS; SUBCUTANEOUS at 21:07

## 2021-01-01 RX ADMIN — GABAPENTIN 600 MG: 300 CAPSULE ORAL at 20:10

## 2021-01-01 RX ADMIN — LABETALOL HYDROCHLORIDE 100 MG: 100 TABLET, FILM COATED ORAL at 07:30

## 2021-01-01 RX ADMIN — IPRATROPIUM BROMIDE AND ALBUTEROL SULFATE 1 AMPULE: .5; 3 SOLUTION RESPIRATORY (INHALATION) at 19:48

## 2021-01-01 RX ADMIN — HYDRALAZINE HYDROCHLORIDE 25 MG: 25 TABLET, FILM COATED ORAL at 06:04

## 2021-01-01 RX ADMIN — ALBUTEROL SULFATE 2.5 MG: 2.5 SOLUTION RESPIRATORY (INHALATION) at 07:26

## 2021-01-01 RX ADMIN — ACETAMINOPHEN 650 MG: 325 TABLET ORAL at 18:59

## 2021-01-01 RX ADMIN — FUROSEMIDE 40 MG: 10 INJECTION, SOLUTION INTRAMUSCULAR; INTRAVENOUS at 09:26

## 2021-01-01 RX ADMIN — Medication 10 ML: at 08:23

## 2021-01-01 RX ADMIN — FUROSEMIDE 40 MG: 40 TABLET ORAL at 17:18

## 2021-01-01 RX ADMIN — LABETALOL HYDROCHLORIDE 100 MG: 100 TABLET, FILM COATED ORAL at 09:19

## 2021-01-01 RX ADMIN — DOXAZOSIN 8 MG: 4 TABLET ORAL at 07:43

## 2021-01-01 RX ADMIN — GABAPENTIN 600 MG: 300 CAPSULE ORAL at 21:41

## 2021-01-01 RX ADMIN — BUDESONIDE AND FORMOTEROL FUMARATE DIHYDRATE 2 PUFF: 160; 4.5 AEROSOL RESPIRATORY (INHALATION) at 19:30

## 2021-01-01 RX ADMIN — DOXYCYCLINE HYCLATE 100 MG: 100 TABLET, COATED ORAL at 21:19

## 2021-01-01 RX ADMIN — IPRATROPIUM BROMIDE AND ALBUTEROL SULFATE 1 AMPULE: .5; 3 SOLUTION RESPIRATORY (INHALATION) at 16:43

## 2021-01-01 RX ADMIN — HEPARIN SODIUM 5000 UNITS: 5000 INJECTION INTRAVENOUS; SUBCUTANEOUS at 14:03

## 2021-01-01 RX ADMIN — BUDESONIDE AND FORMOTEROL FUMARATE DIHYDRATE 2 PUFF: 160; 4.5 AEROSOL RESPIRATORY (INHALATION) at 07:45

## 2021-01-01 RX ADMIN — ISOSORBIDE MONONITRATE 30 MG: 30 TABLET, EXTENDED RELEASE ORAL at 08:39

## 2021-01-01 RX ADMIN — BUDESONIDE AND FORMOTEROL FUMARATE DIHYDRATE 2 PUFF: 160; 4.5 AEROSOL RESPIRATORY (INHALATION) at 20:09

## 2021-01-01 RX ADMIN — LABETALOL HYDROCHLORIDE 100 MG: 100 TABLET, FILM COATED ORAL at 12:56

## 2021-01-01 RX ADMIN — Medication 10 ML: at 07:53

## 2021-01-01 RX ADMIN — CEFTRIAXONE 2000 MG: 2 INJECTION, POWDER, FOR SOLUTION INTRAMUSCULAR; INTRAVENOUS at 17:14

## 2021-01-01 RX ADMIN — LABETALOL HYDROCHLORIDE 100 MG: 100 TABLET, FILM COATED ORAL at 08:58

## 2021-01-01 RX ADMIN — HEPARIN SODIUM 5000 UNITS: 5000 INJECTION INTRAVENOUS; SUBCUTANEOUS at 15:03

## 2021-01-01 RX ADMIN — LABETALOL HYDROCHLORIDE 100 MG: 100 TABLET, FILM COATED ORAL at 14:03

## 2021-01-01 RX ADMIN — ALBUTEROL SULFATE 2.5 MG: 2.5 SOLUTION RESPIRATORY (INHALATION) at 08:32

## 2021-01-01 RX ADMIN — BUDESONIDE AND FORMOTEROL FUMARATE DIHYDRATE 2 PUFF: 160; 4.5 AEROSOL RESPIRATORY (INHALATION) at 09:14

## 2021-01-01 RX ADMIN — ATORVASTATIN CALCIUM 40 MG: 40 TABLET, FILM COATED ORAL at 07:53

## 2021-01-01 RX ADMIN — INSULIN LISPRO 2 UNITS: 100 INJECTION, SOLUTION INTRAVENOUS; SUBCUTANEOUS at 20:57

## 2021-01-01 RX ADMIN — GABAPENTIN 300 MG: 300 CAPSULE ORAL at 10:04

## 2021-01-01 RX ADMIN — ISOSORBIDE MONONITRATE 30 MG: 30 TABLET, EXTENDED RELEASE ORAL at 07:53

## 2021-01-01 RX ADMIN — HEPARIN SODIUM 5000 UNITS: 5000 INJECTION INTRAVENOUS; SUBCUTANEOUS at 21:08

## 2021-01-01 RX ADMIN — LABETALOL HYDROCHLORIDE 100 MG: 100 TABLET, FILM COATED ORAL at 21:06

## 2021-01-01 RX ADMIN — TIOTROPIUM BROMIDE INHALATION SPRAY 2 PUFF: 3.12 SPRAY, METERED RESPIRATORY (INHALATION) at 08:07

## 2021-01-01 RX ADMIN — DOXYCYCLINE HYCLATE 100 MG: 100 TABLET, COATED ORAL at 18:38

## 2021-01-01 RX ADMIN — ATORVASTATIN CALCIUM 20 MG: 20 TABLET, FILM COATED ORAL at 08:58

## 2021-01-01 RX ADMIN — PREDNISONE 40 MG: 20 TABLET ORAL at 17:06

## 2021-01-01 RX ADMIN — HYDRALAZINE HYDROCHLORIDE 25 MG: 25 TABLET, FILM COATED ORAL at 14:03

## 2021-01-01 RX ADMIN — LABETALOL HYDROCHLORIDE 100 MG: 100 TABLET, FILM COATED ORAL at 08:31

## 2021-01-01 RX ADMIN — LABETALOL HYDROCHLORIDE 100 MG: 100 TABLET, FILM COATED ORAL at 12:54

## 2021-01-01 RX ADMIN — SODIUM CHLORIDE, PRESERVATIVE FREE 10 ML: 5 INJECTION INTRAVENOUS at 20:24

## 2021-01-01 RX ADMIN — PREDNISONE 40 MG: 20 TABLET ORAL at 10:04

## 2021-01-01 RX ADMIN — BUDESONIDE AND FORMOTEROL FUMARATE DIHYDRATE 2 PUFF: 160; 4.5 AEROSOL RESPIRATORY (INHALATION) at 20:58

## 2021-01-01 RX ADMIN — HYDRALAZINE HYDROCHLORIDE 25 MG: 25 TABLET, FILM COATED ORAL at 12:44

## 2021-01-01 RX ADMIN — HYDRALAZINE HYDROCHLORIDE 25 MG: 25 TABLET, FILM COATED ORAL at 22:36

## 2021-01-01 RX ADMIN — SODIUM CHLORIDE, PRESERVATIVE FREE 10 ML: 5 INJECTION INTRAVENOUS at 10:12

## 2021-01-01 RX ADMIN — HEPARIN SODIUM 5000 UNITS: 5000 INJECTION INTRAVENOUS; SUBCUTANEOUS at 21:07

## 2021-01-01 RX ADMIN — LABETALOL HYDROCHLORIDE 100 MG: 100 TABLET, FILM COATED ORAL at 21:05

## 2021-01-01 RX ADMIN — GABAPENTIN 300 MG: 300 CAPSULE ORAL at 08:31

## 2021-01-01 RX ADMIN — IPRATROPIUM BROMIDE AND ALBUTEROL SULFATE 1 AMPULE: .5; 3 SOLUTION RESPIRATORY (INHALATION) at 15:19

## 2021-01-01 RX ADMIN — ATORVASTATIN CALCIUM 20 MG: 20 TABLET, FILM COATED ORAL at 09:46

## 2021-01-01 RX ADMIN — BUDESONIDE AND FORMOTEROL FUMARATE DIHYDRATE 2 PUFF: 160; 4.5 AEROSOL RESPIRATORY (INHALATION) at 08:20

## 2021-01-01 RX ADMIN — HYDRALAZINE HYDROCHLORIDE 25 MG: 25 TABLET, FILM COATED ORAL at 20:10

## 2021-01-01 RX ADMIN — ISOSORBIDE MONONITRATE 30 MG: 30 TABLET, EXTENDED RELEASE ORAL at 07:30

## 2021-01-01 RX ADMIN — LABETALOL HYDROCHLORIDE 100 MG: 100 TABLET, FILM COATED ORAL at 21:18

## 2021-01-01 RX ADMIN — LABETALOL HYDROCHLORIDE 100 MG: 100 TABLET, FILM COATED ORAL at 20:55

## 2021-01-01 RX ADMIN — IPRATROPIUM BROMIDE AND ALBUTEROL SULFATE 1 AMPULE: .5; 3 SOLUTION RESPIRATORY (INHALATION) at 12:22

## 2021-01-01 RX ADMIN — HYDRALAZINE HYDROCHLORIDE 25 MG: 25 TABLET, FILM COATED ORAL at 06:02

## 2021-01-01 RX ADMIN — HEPARIN SODIUM 5000 UNITS: 5000 INJECTION INTRAVENOUS; SUBCUTANEOUS at 14:02

## 2021-01-01 RX ADMIN — LABETALOL HYDROCHLORIDE 100 MG: 100 TABLET, FILM COATED ORAL at 09:58

## 2021-01-01 RX ADMIN — CEFTRIAXONE 2000 MG: 2 INJECTION, POWDER, FOR SOLUTION INTRAMUSCULAR; INTRAVENOUS at 18:38

## 2021-01-01 RX ADMIN — DOXAZOSIN 8 MG: 4 TABLET ORAL at 22:35

## 2021-01-01 RX ADMIN — GABAPENTIN 300 MG: 300 CAPSULE ORAL at 13:31

## 2021-01-01 RX ADMIN — ISOSORBIDE MONONITRATE 30 MG: 30 TABLET, EXTENDED RELEASE ORAL at 07:43

## 2021-01-01 RX ADMIN — ALBUTEROL SULFATE 2.5 MG: 2.5 SOLUTION RESPIRATORY (INHALATION) at 20:12

## 2021-01-01 RX ADMIN — Medication 10 ML: at 21:17

## 2021-01-01 RX ADMIN — INSULIN LISPRO 2 UNITS: 100 INJECTION, SOLUTION INTRAVENOUS; SUBCUTANEOUS at 08:38

## 2021-01-01 RX ADMIN — ISOSORBIDE MONONITRATE 30 MG: 30 TABLET, EXTENDED RELEASE ORAL at 09:06

## 2021-01-01 RX ADMIN — IPRATROPIUM BROMIDE AND ALBUTEROL SULFATE 1 AMPULE: .5; 3 SOLUTION RESPIRATORY (INHALATION) at 20:20

## 2021-01-01 RX ADMIN — FUROSEMIDE 40 MG: 40 TABLET ORAL at 08:06

## 2021-01-01 RX ADMIN — PREDNISONE 40 MG: 20 TABLET ORAL at 09:02

## 2021-01-01 RX ADMIN — IPRATROPIUM BROMIDE AND ALBUTEROL SULFATE 1 AMPULE: .5; 3 SOLUTION RESPIRATORY (INHALATION) at 23:46

## 2021-01-01 RX ADMIN — TIOTROPIUM BROMIDE INHALATION SPRAY 2 PUFF: 3.12 SPRAY, METERED RESPIRATORY (INHALATION) at 07:25

## 2021-01-01 RX ADMIN — LABETALOL HYDROCHLORIDE 100 MG: 100 TABLET, FILM COATED ORAL at 07:53

## 2021-01-01 RX ADMIN — GABAPENTIN 600 MG: 300 CAPSULE ORAL at 20:56

## 2021-01-01 RX ADMIN — INSULIN LISPRO 4 UNITS: 100 INJECTION, SOLUTION INTRAVENOUS; SUBCUTANEOUS at 11:54

## 2021-01-01 RX ADMIN — BUDESONIDE AND FORMOTEROL FUMARATE DIHYDRATE 2 PUFF: 160; 4.5 AEROSOL RESPIRATORY (INHALATION) at 07:57

## 2021-01-01 RX ADMIN — LABETALOL HYDROCHLORIDE 100 MG: 100 TABLET, FILM COATED ORAL at 07:43

## 2021-01-01 RX ADMIN — BUDESONIDE AND FORMOTEROL FUMARATE DIHYDRATE 2 PUFF: 160; 4.5 AEROSOL RESPIRATORY (INHALATION) at 20:14

## 2021-01-01 RX ADMIN — SODIUM CHLORIDE, PRESERVATIVE FREE 10 ML: 5 INJECTION INTRAVENOUS at 08:18

## 2021-01-01 RX ADMIN — INSULIN LISPRO 2 UNITS: 100 INJECTION, SOLUTION INTRAVENOUS; SUBCUTANEOUS at 08:17

## 2021-01-01 RX ADMIN — ACETAMINOPHEN 650 MG: 325 TABLET ORAL at 23:42

## 2021-01-01 RX ADMIN — HEPARIN SODIUM 4000 UNITS: 1000 INJECTION INTRAVENOUS; SUBCUTANEOUS at 19:21

## 2021-01-01 RX ADMIN — HYDRALAZINE HYDROCHLORIDE 10 MG: 10 TABLET, FILM COATED ORAL at 21:05

## 2021-01-01 RX ADMIN — ISOSORBIDE MONONITRATE 30 MG: 30 TABLET, EXTENDED RELEASE ORAL at 09:46

## 2021-01-01 RX ADMIN — ISOSORBIDE MONONITRATE 30 MG: 30 TABLET, EXTENDED RELEASE ORAL at 09:26

## 2021-01-01 RX ADMIN — DOXYCYCLINE HYCLATE 100 MG: 100 TABLET, COATED ORAL at 09:59

## 2021-01-01 RX ADMIN — DOXAZOSIN 8 MG: 4 TABLET ORAL at 09:06

## 2021-01-01 RX ADMIN — GABAPENTIN 300 MG: 300 CAPSULE ORAL at 07:53

## 2021-01-01 RX ADMIN — ACETAMINOPHEN 650 MG: 325 TABLET ORAL at 07:30

## 2021-01-01 RX ADMIN — ALBUTEROL SULFATE 2.5 MG: 2.5 SOLUTION RESPIRATORY (INHALATION) at 19:50

## 2021-01-01 RX ADMIN — HYDRALAZINE HYDROCHLORIDE 25 MG: 25 TABLET, FILM COATED ORAL at 21:19

## 2021-01-01 RX ADMIN — METHYLPREDNISOLONE SODIUM SUCCINATE 40 MG: 40 INJECTION, POWDER, FOR SOLUTION INTRAMUSCULAR; INTRAVENOUS at 22:35

## 2021-01-01 RX ADMIN — ASPIRIN 81 MG: 81 TABLET, COATED ORAL at 08:07

## 2021-01-01 RX ADMIN — HYDRALAZINE HYDROCHLORIDE 10 MG: 10 TABLET, FILM COATED ORAL at 12:59

## 2021-01-01 RX ADMIN — METHYLPREDNISOLONE SODIUM SUCCINATE 40 MG: 40 INJECTION, POWDER, FOR SOLUTION INTRAMUSCULAR; INTRAVENOUS at 09:35

## 2021-01-01 RX ADMIN — Medication 10 ML: at 21:05

## 2021-01-01 RX ADMIN — GLIPIZIDE 10 MG: 5 TABLET ORAL at 06:03

## 2021-01-01 RX ADMIN — ACETAMINOPHEN 650 MG: 325 TABLET ORAL at 15:44

## 2021-01-01 RX ADMIN — GABAPENTIN 300 MG: 300 CAPSULE ORAL at 08:39

## 2021-01-01 RX ADMIN — MORPHINE SULFATE 2 MG: 2 INJECTION, SOLUTION INTRAMUSCULAR; INTRAVENOUS at 09:27

## 2021-01-01 RX ADMIN — LABETALOL HYDROCHLORIDE 100 MG: 100 TABLET, FILM COATED ORAL at 20:10

## 2021-01-01 RX ADMIN — HEPARIN SODIUM 4000 UNITS: 1000 INJECTION INTRAVENOUS; SUBCUTANEOUS at 03:15

## 2021-01-01 RX ADMIN — HYDRALAZINE HYDROCHLORIDE 25 MG: 25 TABLET, FILM COATED ORAL at 21:06

## 2021-01-01 RX ADMIN — ESCITALOPRAM OXALATE 10 MG: 10 TABLET ORAL at 09:06

## 2021-01-01 RX ADMIN — SODIUM CHLORIDE, PRESERVATIVE FREE 10 ML: 5 INJECTION INTRAVENOUS at 08:39

## 2021-01-01 RX ADMIN — ASPIRIN 81 MG: 81 TABLET, COATED ORAL at 09:19

## 2021-01-01 RX ADMIN — BUDESONIDE AND FORMOTEROL FUMARATE DIHYDRATE 2 PUFF: 160; 4.5 AEROSOL RESPIRATORY (INHALATION) at 07:44

## 2021-01-01 RX ADMIN — GABAPENTIN 300 MG: 300 CAPSULE ORAL at 07:43

## 2021-01-01 RX ADMIN — HYDRALAZINE HYDROCHLORIDE 25 MG: 25 TABLET, FILM COATED ORAL at 14:02

## 2021-01-01 RX ADMIN — SODIUM CHLORIDE, PRESERVATIVE FREE 10 ML: 5 INJECTION INTRAVENOUS at 21:17

## 2021-01-01 RX ADMIN — GABAPENTIN 300 MG: 300 CAPSULE ORAL at 20:58

## 2021-01-01 RX ADMIN — ACETAZOLAMIDE 250 MG: 250 TABLET ORAL at 13:04

## 2021-01-01 RX ADMIN — HEPARIN SODIUM 5000 UNITS: 5000 INJECTION INTRAVENOUS; SUBCUTANEOUS at 13:42

## 2021-01-01 RX ADMIN — ACETAMINOPHEN 650 MG: 325 TABLET ORAL at 02:39

## 2021-01-01 RX ADMIN — MORPHINE SULFATE 2 MG: 2 INJECTION, SOLUTION INTRAMUSCULAR; INTRAVENOUS at 20:59

## 2021-01-01 RX ADMIN — BUDESONIDE AND FORMOTEROL FUMARATE DIHYDRATE 2 PUFF: 160; 4.5 AEROSOL RESPIRATORY (INHALATION) at 19:41

## 2021-01-01 RX ADMIN — METHYLPREDNISOLONE SODIUM SUCCINATE 40 MG: 40 INJECTION, POWDER, FOR SOLUTION INTRAMUSCULAR; INTRAVENOUS at 03:18

## 2021-01-01 RX ADMIN — DOXAZOSIN 8 MG: 4 TABLET ORAL at 07:30

## 2021-01-01 RX ADMIN — ACETAMINOPHEN 650 MG: 325 TABLET ORAL at 07:53

## 2021-01-01 RX ADMIN — DOXYCYCLINE HYCLATE 100 MG: 100 TABLET, COATED ORAL at 07:53

## 2021-01-01 RX ADMIN — GLIPIZIDE 10 MG: 5 TABLET ORAL at 06:06

## 2021-01-01 RX ADMIN — INSULIN LISPRO 4 UNITS: 100 INJECTION, SOLUTION INTRAVENOUS; SUBCUTANEOUS at 17:50

## 2021-01-01 RX ADMIN — METHYLPREDNISOLONE SODIUM SUCCINATE 125 MG: 125 INJECTION, POWDER, FOR SOLUTION INTRAMUSCULAR; INTRAVENOUS at 16:10

## 2021-01-01 RX ADMIN — IPRATROPIUM BROMIDE AND ALBUTEROL SULFATE 1 AMPULE: .5; 3 SOLUTION RESPIRATORY (INHALATION) at 19:40

## 2021-01-01 RX ADMIN — HEPARIN SODIUM 5000 UNITS: 5000 INJECTION INTRAVENOUS; SUBCUTANEOUS at 06:06

## 2021-01-01 RX ADMIN — HEPARIN SODIUM 5000 UNITS: 5000 INJECTION INTRAVENOUS; SUBCUTANEOUS at 12:56

## 2021-01-01 RX ADMIN — GABAPENTIN 300 MG: 300 CAPSULE ORAL at 09:02

## 2021-01-01 RX ADMIN — HEPARIN SODIUM 5000 UNITS: 5000 INJECTION INTRAVENOUS; SUBCUTANEOUS at 21:41

## 2021-01-01 RX ADMIN — ISOSORBIDE MONONITRATE 30 MG: 30 TABLET, EXTENDED RELEASE ORAL at 08:05

## 2021-01-01 RX ADMIN — HEPARIN SODIUM 5000 UNITS: 5000 INJECTION INTRAVENOUS; SUBCUTANEOUS at 17:10

## 2021-01-01 RX ADMIN — ENOXAPARIN SODIUM 40 MG: 40 INJECTION SUBCUTANEOUS at 09:26

## 2021-01-01 RX ADMIN — ESCITALOPRAM OXALATE 10 MG: 10 TABLET ORAL at 08:06

## 2021-01-01 RX ADMIN — HYDRALAZINE HYDROCHLORIDE 25 MG: 25 TABLET, FILM COATED ORAL at 06:05

## 2021-01-01 RX ADMIN — TIOTROPIUM BROMIDE INHALATION SPRAY 2 PUFF: 3.12 SPRAY, METERED RESPIRATORY (INHALATION) at 08:33

## 2021-01-01 RX ADMIN — GLIPIZIDE 10 MG: 5 TABLET ORAL at 06:05

## 2021-01-01 RX ADMIN — ALBUTEROL SULFATE 2.5 MG: 2.5 SOLUTION RESPIRATORY (INHALATION) at 09:12

## 2021-01-01 RX ADMIN — ISOSORBIDE MONONITRATE 30 MG: 30 TABLET, EXTENDED RELEASE ORAL at 08:31

## 2021-01-01 RX ADMIN — SODIUM CHLORIDE SOLN NEBU 3% 4 ML: 3 NEBU SOLN at 19:55

## 2021-01-01 RX ADMIN — BUDESONIDE AND FORMOTEROL FUMARATE DIHYDRATE 2 PUFF: 160; 4.5 AEROSOL RESPIRATORY (INHALATION) at 07:25

## 2021-01-01 RX ADMIN — DOXAZOSIN 8 MG: 4 TABLET ORAL at 21:04

## 2021-01-01 RX ADMIN — GABAPENTIN 300 MG: 300 CAPSULE ORAL at 08:04

## 2021-01-01 RX ADMIN — ESCITALOPRAM OXALATE 10 MG: 10 TABLET ORAL at 08:58

## 2021-01-01 RX ADMIN — SODIUM CHLORIDE, PRESERVATIVE FREE 10 ML: 5 INJECTION INTRAVENOUS at 20:57

## 2021-01-01 RX ADMIN — HYDRALAZINE HYDROCHLORIDE 25 MG: 25 TABLET, FILM COATED ORAL at 12:35

## 2021-01-01 RX ADMIN — ACETAMINOPHEN 650 MG: 325 TABLET ORAL at 18:15

## 2021-01-01 RX ADMIN — TIOTROPIUM BROMIDE INHALATION SPRAY 2 PUFF: 3.12 SPRAY, METERED RESPIRATORY (INHALATION) at 08:17

## 2021-01-01 RX ADMIN — PREDNISONE 40 MG: 20 TABLET ORAL at 08:22

## 2021-01-01 RX ADMIN — IPRATROPIUM BROMIDE AND ALBUTEROL SULFATE 1 AMPULE: .5; 3 SOLUTION RESPIRATORY (INHALATION) at 04:58

## 2021-01-01 RX ADMIN — ATORVASTATIN CALCIUM 40 MG: 40 TABLET, FILM COATED ORAL at 09:26

## 2021-01-01 RX ADMIN — ALBUTEROL SULFATE 2.5 MG: 2.5 SOLUTION RESPIRATORY (INHALATION) at 08:20

## 2021-01-01 RX ADMIN — Medication 10 ML: at 09:09

## 2021-01-01 RX ADMIN — INSULIN LISPRO 4 UNITS: 100 INJECTION, SOLUTION INTRAVENOUS; SUBCUTANEOUS at 12:35

## 2021-01-01 RX ADMIN — HYDRALAZINE HYDROCHLORIDE 25 MG: 25 TABLET, FILM COATED ORAL at 05:34

## 2021-01-01 RX ADMIN — Medication 10 ML: at 09:59

## 2021-01-01 RX ADMIN — LABETALOL HYDROCHLORIDE 100 MG: 100 TABLET, FILM COATED ORAL at 20:58

## 2021-01-01 RX ADMIN — ACETAMINOPHEN 650 MG: 325 TABLET ORAL at 07:43

## 2021-01-01 RX ADMIN — HEPARIN SODIUM 5000 UNITS: 5000 INJECTION INTRAVENOUS; SUBCUTANEOUS at 06:08

## 2021-01-01 RX ADMIN — SODIUM CHLORIDE, PRESERVATIVE FREE 10 ML: 5 INJECTION INTRAVENOUS at 08:57

## 2021-01-01 RX ADMIN — HEPARIN SODIUM 5000 UNITS: 5000 INJECTION INTRAVENOUS; SUBCUTANEOUS at 21:19

## 2021-01-01 RX ADMIN — GABAPENTIN 600 MG: 300 CAPSULE ORAL at 20:23

## 2021-01-01 RX ADMIN — GABAPENTIN 300 MG: 300 CAPSULE ORAL at 09:06

## 2021-01-01 RX ADMIN — HYDRALAZINE HYDROCHLORIDE 25 MG: 25 TABLET, FILM COATED ORAL at 06:14

## 2021-01-01 RX ADMIN — DOXAZOSIN 8 MG: 4 TABLET ORAL at 21:40

## 2021-01-01 RX ADMIN — HEPARIN SODIUM 5000 UNITS: 5000 INJECTION INTRAVENOUS; SUBCUTANEOUS at 21:25

## 2021-01-01 RX ADMIN — HEPARIN SODIUM 1000 UNITS/HR: 10000 INJECTION, SOLUTION INTRAVENOUS at 19:26

## 2021-01-01 RX ADMIN — FUROSEMIDE 40 MG: 10 INJECTION, SOLUTION INTRAMUSCULAR; INTRAVENOUS at 00:10

## 2021-01-01 RX ADMIN — GABAPENTIN 100 MG: 100 CAPSULE ORAL at 09:58

## 2021-01-01 RX ADMIN — ACETAMINOPHEN 650 MG: 325 TABLET ORAL at 13:31

## 2021-01-01 RX ADMIN — DOXYCYCLINE HYCLATE 100 MG: 100 TABLET, COATED ORAL at 08:31

## 2021-01-01 RX ADMIN — HEPARIN SODIUM 5000 UNITS: 5000 INJECTION INTRAVENOUS; SUBCUTANEOUS at 06:04

## 2021-01-01 RX ADMIN — INSULIN LISPRO 2 UNITS: 100 INJECTION, SOLUTION INTRAVENOUS; SUBCUTANEOUS at 20:43

## 2021-01-01 RX ADMIN — METOLAZONE 5 MG: 5 TABLET ORAL at 06:09

## 2021-01-01 RX ADMIN — HYDRALAZINE HYDROCHLORIDE 25 MG: 25 TABLET, FILM COATED ORAL at 12:54

## 2021-01-01 RX ADMIN — GLIPIZIDE 10 MG: 5 TABLET ORAL at 05:50

## 2021-01-01 RX ADMIN — METHYLPREDNISOLONE SODIUM SUCCINATE 40 MG: 40 INJECTION, POWDER, FOR SOLUTION INTRAMUSCULAR; INTRAVENOUS at 16:59

## 2021-01-01 RX ADMIN — FUROSEMIDE 40 MG: 10 INJECTION, SOLUTION INTRAMUSCULAR; INTRAVENOUS at 14:20

## 2021-01-01 RX ADMIN — INSULIN LISPRO 2 UNITS: 100 INJECTION, SOLUTION INTRAVENOUS; SUBCUTANEOUS at 12:04

## 2021-01-01 RX ADMIN — INSULIN LISPRO 6 UNITS: 100 INJECTION, SOLUTION INTRAVENOUS; SUBCUTANEOUS at 12:47

## 2021-01-01 RX ADMIN — LABETALOL HYDROCHLORIDE 100 MG: 100 TABLET, FILM COATED ORAL at 09:06

## 2021-01-01 RX ADMIN — PREDNISONE 40 MG: 20 TABLET ORAL at 08:40

## 2021-01-01 RX ADMIN — IPRATROPIUM BROMIDE AND ALBUTEROL SULFATE 1 AMPULE: .5; 3 SOLUTION RESPIRATORY (INHALATION) at 11:30

## 2021-01-01 RX ADMIN — BUDESONIDE AND FORMOTEROL FUMARATE DIHYDRATE 2 PUFF: 160; 4.5 AEROSOL RESPIRATORY (INHALATION) at 20:52

## 2021-01-01 RX ADMIN — DOXAZOSIN 8 MG: 4 TABLET ORAL at 08:23

## 2021-01-01 RX ADMIN — ATORVASTATIN CALCIUM 40 MG: 40 TABLET, FILM COATED ORAL at 09:58

## 2021-01-01 RX ADMIN — HEPARIN SODIUM 5000 UNITS: 5000 INJECTION INTRAVENOUS; SUBCUTANEOUS at 20:10

## 2021-01-01 RX ADMIN — INSULIN LISPRO 4 UNITS: 100 INJECTION, SOLUTION INTRAVENOUS; SUBCUTANEOUS at 17:02

## 2021-01-01 RX ADMIN — ALBUTEROL SULFATE 2.5 MG: 2.5 SOLUTION RESPIRATORY (INHALATION) at 17:04

## 2021-01-01 RX ADMIN — HEPARIN SODIUM 5000 UNITS: 5000 INJECTION INTRAVENOUS; SUBCUTANEOUS at 05:33

## 2021-01-01 RX ADMIN — GABAPENTIN 300 MG: 300 CAPSULE ORAL at 21:25

## 2021-01-01 RX ADMIN — HYDRALAZINE HYDROCHLORIDE 25 MG: 25 TABLET, FILM COATED ORAL at 05:32

## 2021-01-01 RX ADMIN — ATORVASTATIN CALCIUM 20 MG: 20 TABLET, FILM COATED ORAL at 10:03

## 2021-01-01 RX ADMIN — IPRATROPIUM BROMIDE AND ALBUTEROL SULFATE 1 AMPULE: .5; 3 SOLUTION RESPIRATORY (INHALATION) at 08:24

## 2021-01-01 RX ADMIN — SODIUM CHLORIDE, PRESERVATIVE FREE 10 ML: 5 INJECTION INTRAVENOUS at 22:44

## 2021-01-01 RX ADMIN — ALBUTEROL SULFATE 2 PUFF: 90 AEROSOL, METERED RESPIRATORY (INHALATION) at 15:36

## 2021-01-01 RX ADMIN — ALBUTEROL SULFATE 2.5 MG: 2.5 SOLUTION RESPIRATORY (INHALATION) at 16:16

## 2021-01-01 RX ADMIN — INSULIN LISPRO 2 UNITS: 100 INJECTION, SOLUTION INTRAVENOUS; SUBCUTANEOUS at 08:57

## 2021-01-01 RX ADMIN — BUDESONIDE AND FORMOTEROL FUMARATE DIHYDRATE 2 PUFF: 160; 4.5 AEROSOL RESPIRATORY (INHALATION) at 08:17

## 2021-01-01 RX ADMIN — LABETALOL HYDROCHLORIDE 100 MG: 100 TABLET, FILM COATED ORAL at 21:41

## 2021-01-01 RX ADMIN — GLIPIZIDE 10 MG: 5 TABLET ORAL at 06:38

## 2021-01-01 RX ADMIN — HYDRALAZINE HYDROCHLORIDE 10 MG: 10 TABLET, FILM COATED ORAL at 21:19

## 2021-01-01 RX ADMIN — ALBUTEROL SULFATE 2.5 MG: 2.5 SOLUTION RESPIRATORY (INHALATION) at 23:57

## 2021-01-01 RX ADMIN — ACETAZOLAMIDE 250 MG: 250 TABLET ORAL at 20:23

## 2021-01-01 RX ADMIN — LABETALOL HYDROCHLORIDE 100 MG: 100 TABLET, FILM COATED ORAL at 08:06

## 2021-01-01 RX ADMIN — INSULIN LISPRO 4 UNITS: 100 INJECTION, SOLUTION INTRAVENOUS; SUBCUTANEOUS at 17:22

## 2021-01-01 RX ADMIN — PREDNISONE 40 MG: 20 TABLET ORAL at 09:18

## 2021-01-01 RX ADMIN — ACETAMINOPHEN 650 MG: 325 TABLET ORAL at 23:46

## 2021-01-01 RX ADMIN — SODIUM CHLORIDE, PRESERVATIVE FREE 10 ML: 5 INJECTION INTRAVENOUS at 09:22

## 2021-01-01 RX ADMIN — LABETALOL HYDROCHLORIDE 100 MG: 100 TABLET, FILM COATED ORAL at 12:45

## 2021-01-01 RX ADMIN — SODIUM CHLORIDE 25 ML: 9 INJECTION, SOLUTION INTRAVENOUS at 18:27

## 2021-01-01 RX ADMIN — GABAPENTIN 600 MG: 300 CAPSULE ORAL at 21:07

## 2021-01-01 RX ADMIN — ISOSORBIDE MONONITRATE 30 MG: 30 TABLET, EXTENDED RELEASE ORAL at 09:03

## 2021-01-01 RX ADMIN — ALBUTEROL SULFATE 2.5 MG: 2.5 SOLUTION RESPIRATORY (INHALATION) at 20:15

## 2021-01-01 RX ADMIN — BUDESONIDE AND FORMOTEROL FUMARATE DIHYDRATE 2 PUFF: 160; 4.5 AEROSOL RESPIRATORY (INHALATION) at 07:51

## 2021-01-01 RX ADMIN — METOLAZONE 5 MG: 5 TABLET ORAL at 08:39

## 2021-01-01 RX ADMIN — ACETAMINOPHEN 650 MG: 325 TABLET ORAL at 08:52

## 2021-01-01 RX ADMIN — GABAPENTIN 100 MG: 100 CAPSULE ORAL at 21:06

## 2021-01-01 RX ADMIN — GABAPENTIN 300 MG: 300 CAPSULE ORAL at 21:18

## 2021-01-01 RX ADMIN — HYDRALAZINE HYDROCHLORIDE 10 MG: 10 TABLET, FILM COATED ORAL at 06:08

## 2021-01-01 RX ADMIN — IPRATROPIUM BROMIDE AND ALBUTEROL SULFATE 1 AMPULE: .5; 3 SOLUTION RESPIRATORY (INHALATION) at 07:45

## 2021-01-01 RX ADMIN — BUDESONIDE AND FORMOTEROL FUMARATE DIHYDRATE 2 PUFF: 160; 4.5 AEROSOL RESPIRATORY (INHALATION) at 20:18

## 2021-01-01 RX ADMIN — SODIUM CHLORIDE SOLN NEBU 3% 4 ML: 3 NEBU SOLN at 20:21

## 2021-01-01 RX ADMIN — ATORVASTATIN CALCIUM 40 MG: 40 TABLET, FILM COATED ORAL at 08:31

## 2021-01-01 RX ADMIN — INSULIN LISPRO 4 UNITS: 100 INJECTION, SOLUTION INTRAVENOUS; SUBCUTANEOUS at 10:05

## 2021-01-01 RX ADMIN — LABETALOL HYDROCHLORIDE 100 MG: 100 TABLET, FILM COATED ORAL at 08:40

## 2021-01-01 RX ADMIN — ACETAMINOPHEN 650 MG: 325 TABLET ORAL at 17:13

## 2021-01-01 RX ADMIN — FUROSEMIDE 40 MG: 40 TABLET ORAL at 16:58

## 2021-01-01 RX ADMIN — HEPARIN SODIUM 5000 UNITS: 5000 INJECTION INTRAVENOUS; SUBCUTANEOUS at 06:05

## 2021-01-01 SDOH — ECONOMIC STABILITY: TRANSPORTATION INSECURITY
IN THE PAST 12 MONTHS, HAS LACK OF TRANSPORTATION KEPT YOU FROM MEETINGS, WORK, OR FROM GETTING THINGS NEEDED FOR DAILY LIVING?: PATIENT DECLINED

## 2021-01-01 SDOH — ECONOMIC STABILITY: FOOD INSECURITY: WITHIN THE PAST 12 MONTHS, THE FOOD YOU BOUGHT JUST DIDN'T LAST AND YOU DIDN'T HAVE MONEY TO GET MORE.: PATIENT DECLINED

## 2021-01-01 SDOH — ECONOMIC STABILITY: INCOME INSECURITY: HOW HARD IS IT FOR YOU TO PAY FOR THE VERY BASICS LIKE FOOD, HOUSING, MEDICAL CARE, AND HEATING?: PATIENT DECLINED

## 2021-01-01 SDOH — ECONOMIC STABILITY: TRANSPORTATION INSECURITY
IN THE PAST 12 MONTHS, HAS THE LACK OF TRANSPORTATION KEPT YOU FROM MEDICAL APPOINTMENTS OR FROM GETTING MEDICATIONS?: PATIENT DECLINED

## 2021-01-01 ASSESSMENT — PAIN SCALES - GENERAL
PAINLEVEL_OUTOF10: 6
PAINLEVEL_OUTOF10: 6
PAINLEVEL_OUTOF10: 7
PAINLEVEL_OUTOF10: 5
PAINLEVEL_OUTOF10: 6
PAINLEVEL_OUTOF10: 7
PAINLEVEL_OUTOF10: 8
PAINLEVEL_OUTOF10: 3
PAINLEVEL_OUTOF10: 7
PAINLEVEL_OUTOF10: 0
PAINLEVEL_OUTOF10: 4
PAINLEVEL_OUTOF10: 3
PAINLEVEL_OUTOF10: 0
PAINLEVEL_OUTOF10: 8
PAINLEVEL_OUTOF10: 0
PAINLEVEL_OUTOF10: 6
PAINLEVEL_OUTOF10: 6
PAINLEVEL_OUTOF10: 0
PAINLEVEL_OUTOF10: 0
PAINLEVEL_OUTOF10: 3
PAINLEVEL_OUTOF10: 6
PAINLEVEL_OUTOF10: 6
PAINLEVEL_OUTOF10: 3
PAINLEVEL_OUTOF10: 0
PAINLEVEL_OUTOF10: 0
PAINLEVEL_OUTOF10: 1
PAINLEVEL_OUTOF10: 0
PAINLEVEL_OUTOF10: 2
PAINLEVEL_OUTOF10: 4
PAINLEVEL_OUTOF10: 3
PAINLEVEL_OUTOF10: 0
PAINLEVEL_OUTOF10: 0
PAINLEVEL_OUTOF10: 3
PAINLEVEL_OUTOF10: 0
PAINLEVEL_OUTOF10: 0
PAINLEVEL_OUTOF10: 7
PAINLEVEL_OUTOF10: 2
PAINLEVEL_OUTOF10: 0
PAINLEVEL_OUTOF10: 6
PAINLEVEL_OUTOF10: 0
PAINLEVEL_OUTOF10: 6
PAINLEVEL_OUTOF10: 6
PAINLEVEL_OUTOF10: 5
PAINLEVEL_OUTOF10: 3
PAINLEVEL_OUTOF10: 0
PAINLEVEL_OUTOF10: 5
PAINLEVEL_OUTOF10: 0
PAINLEVEL_OUTOF10: 8

## 2021-01-01 ASSESSMENT — PAIN DESCRIPTION - PAIN TYPE
TYPE: ACUTE PAIN
TYPE: NEUROPATHIC PAIN
TYPE: ACUTE PAIN
TYPE: CHRONIC PAIN
TYPE: NEUROPATHIC PAIN
TYPE: NEUROPATHIC PAIN
TYPE: CHRONIC PAIN
TYPE: NEUROPATHIC PAIN
TYPE: ACUTE PAIN
TYPE: CHRONIC PAIN
TYPE: CHRONIC PAIN
TYPE: ACUTE PAIN
TYPE: CHRONIC PAIN
TYPE: ACUTE PAIN

## 2021-01-01 ASSESSMENT — PAIN DESCRIPTION - LOCATION
LOCATION: SHOULDER
LOCATION: FOOT
LOCATION: SHOULDER
LOCATION: FOOT
LOCATION: FOOT
LOCATION: SHOULDER
LOCATION: FOOT
LOCATION: SHOULDER
LOCATION: SHOULDER
LOCATION: FOOT
LOCATION: SHOULDER

## 2021-01-01 ASSESSMENT — PAIN DESCRIPTION - ORIENTATION
ORIENTATION: LEFT
ORIENTATION: RIGHT
ORIENTATION: RIGHT;LEFT
ORIENTATION: RIGHT;LEFT
ORIENTATION: RIGHT
ORIENTATION: RIGHT;LEFT
ORIENTATION: RIGHT
ORIENTATION: LEFT
ORIENTATION: RIGHT;LEFT
ORIENTATION: RIGHT;LEFT
ORIENTATION: LEFT

## 2021-01-01 ASSESSMENT — PAIN - FUNCTIONAL ASSESSMENT
PAIN_FUNCTIONAL_ASSESSMENT: PREVENTS OR INTERFERES SOME ACTIVE ACTIVITIES AND ADLS

## 2021-01-01 ASSESSMENT — PAIN DESCRIPTION - PROGRESSION
CLINICAL_PROGRESSION: GRADUALLY WORSENING
CLINICAL_PROGRESSION: GRADUALLY WORSENING
CLINICAL_PROGRESSION: NOT CHANGED
CLINICAL_PROGRESSION: GRADUALLY WORSENING
CLINICAL_PROGRESSION: NOT CHANGED
CLINICAL_PROGRESSION: NOT CHANGED

## 2021-01-01 ASSESSMENT — PAIN DESCRIPTION - ONSET
ONSET: ON-GOING
ONSET: ON-GOING
ONSET: GRADUAL
ONSET: GRADUAL
ONSET: ON-GOING
ONSET: GRADUAL
ONSET: ON-GOING
ONSET: UNABLE TO TELL

## 2021-01-01 ASSESSMENT — PAIN DESCRIPTION - DESCRIPTORS
DESCRIPTORS: PINS AND NEEDLES
DESCRIPTORS: BURNING
DESCRIPTORS: ACHING
DESCRIPTORS: DISCOMFORT
DESCRIPTORS: TINGLING
DESCRIPTORS: BURNING;TINGLING
DESCRIPTORS: DISCOMFORT
DESCRIPTORS: BURNING;TINGLING
DESCRIPTORS: TINGLING
DESCRIPTORS: BURNING;TINGLING
DESCRIPTORS: ACHING

## 2021-01-01 ASSESSMENT — ENCOUNTER SYMPTOMS
COLOR CHANGE: 0
VOMITING: 0
SHORTNESS OF BREATH: 1
ABDOMINAL PAIN: 0
BACK PAIN: 0
COUGH: 1

## 2021-01-01 ASSESSMENT — PATIENT HEALTH QUESTIONNAIRE - PHQ9
1. LITTLE INTEREST OR PLEASURE IN DOING THINGS: 0
SUM OF ALL RESPONSES TO PHQ9 QUESTIONS 1 & 2: 0

## 2021-01-01 ASSESSMENT — PAIN DESCRIPTION - FREQUENCY
FREQUENCY: CONTINUOUS
FREQUENCY: CONTINUOUS
FREQUENCY: INTERMITTENT
FREQUENCY: CONTINUOUS
FREQUENCY: INTERMITTENT
FREQUENCY: INTERMITTENT
FREQUENCY: CONTINUOUS
FREQUENCY: CONTINUOUS

## 2021-01-06 NOTE — TELEPHONE ENCOUNTER
Arti from [employer] is calling to let us know that they are treating Yolanda Taylor for a UTI and he has also stop taking his diabetes medication. Today is sugar was 400 and last night is was 350. They have order him a glucose  meter and medication. JUST FYI     She patient said refuse to give himself a shot.

## 2021-01-06 NOTE — TELEPHONE ENCOUNTER
CALLED AND SPOKE TO TEO AT [facility] AND ADVISED ON DR. Maciel Latif NOTE. SHE IS GOING TO HAVE THEM CALL ME BACK TO FIND OUT WHAT MEDICATION WAS ORDERED FOR PATIENT.  SC

## 2021-01-06 NOTE — TELEPHONE ENCOUNTER
Okay on uti, but am confused on stopping dm meds. If sugars are 350-400, this needs to be treated. We can find alternatives to injections possibly to bring this down. ? Virtual visit a possibility?

## 2021-01-07 NOTE — TELEPHONE ENCOUNTER
I spoke w/ rip - start glipizide 10mg in am w/ breakfast  Monitor bs and let me know back how sugars are running in next week  Has been off meds for awhile - won't take shot  Cr 1.8

## 2021-01-07 NOTE — TELEPHONE ENCOUNTER
SON[facility] CALLED ME BACK AND STATED THAT THEY PUT HIM ON MEDICATION FOR THE UTI, NOT ON DIABETIC MEDICATION. THEY ONLY ORDERED A GLUCOSE METER FOR PATIENT TO CHECK HIS SUGARS. SHE IS ASKING TO SEE WHAT DR. GHOSH MAY WANT TO DO ABOUT THE DIABETIC MEDICATION.     SONIA @ [address]

## 2021-01-07 NOTE — TELEPHONE ENCOUNTER
Arti from [employer] is returning [place] phone call, please give her a call back.      [phone]

## 2021-01-14 NOTE — ED PROVIDER NOTES
back pain. No joint pain. Integumentary: No rash. No abrasions. Laceration to right parietal scalp. Neurologic: No headache. No focal numbness or weakness.       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 ([address] 2008    Obesity, diabetes, and hypertension syndrome ([facility] August, 2015    Psoriasis     Risk for falls     Type 2 diabetes mellitus with microalbuminuria or microproteinuria     Vitamin D deficiency 2011         SURGICALHISTORY       Past Surgical History:   Procedure Laterality Date    CATARACT REMOVAL Right 7/3/13    Norvel Raddle    TURP  8/14/12    Teja         CURRENT MEDICATIONS       Discharge Medication List as of 1/14/2021  8:39 AM      CONTINUE these medications which have NOT CHANGED    Details   glipiZIDE (GLUCOTROL) 10 MG tablet TAKE 1 TABLET BY MOUTH DAILY WITH BREAKFAST, Disp-90 tablet, R-0**Patient requests 90 days supply**Normal      gabapentin (NEURONTIN) 300 MG capsule TAKE 1 CAPSULE BY MOUTH THREE TIMES DAILY, Disp-270 capsule, R-0Normal      azithromycin (ZITHROMAX) 250 MG tablet 2 po day 1, then 1 po days 2-5, Disp-1 packet,R-0Print      simvastatin (ZOCOR) 40 MG tablet TAKE 1 TABLET BY MOUTH EVERY EVENING, Disp-90 tablet,R-1Normal      doxazosin (CARDURA) 8 MG tablet TAKE 1 TABLET BY MOUTH EVERY NIGHT AT BEDTIME FOR BLOOD PRESSURE, Disp-90 tablet,R-1Normal      furosemide (LASIX) 80 MG tablet TAKE 1 TABLET BY MOUTH TWICE DAILY, Disp-180 tablet,R-1Normal      labetalol (NORMODYNE) 100 MG tablet TAKE 1 TABLET BY MOUTH TWICE DAILY, Disp-180 tablet,R-1Normal      potassium chloride (KLOR-CON) 10 MEQ extended release tablet TAKE 1 TABLET BY MOUTH TWICE DAILY, Disp-180 tablet,R-1Normal      isosorbide mononitrate (IMDUR) 30 MG extended release tablet TAKE 1 TABLET BY MOUTH TWICE DAILY FOR BLOOD PRESSURE, Disp-180 tablet,R-3Normal      escitalopram (LEXAPRO) 10 MG tablet Take 1 tablet by mouth daily, Disp-90 tablet, R-3**Patient requests 90 days supply**Normal      Fluticasone furoate-vilanterol (BREO ELLIPTA) 200-25 MCG/INH AEPB inhaler Inhale 1 puff into the lungs daily, Disp-1 each, R-0NO PRINT      tiotropium (SPIRIVA HANDIHALER) 18 MCG inhalation capsule INHALE THE CONTENTS OF 1 CAPSULE VIA INHALATION DEVICE DAILY FOR BREATHING, Disp-90 capsule, R-3Normal      fluticasone (FLONASE) 50 MCG/ACT nasal spray 1 spray by Each Nare route daily as needed for RhinitisHistorical Med      famotidine (PEPCID) 10 MG tablet Take 10 mg by mouth 2 times dailyHistorical Med      VENTOLIN  (90 Base) MCG/ACT inhaler INHALE 2 PUFFS INTO THE LUNGS EVERY 6 HOURS AS NEEDED FOR WHEEZING, Disp-1 Inhaler, R-5Normal      Nebulizers (VIOS LC SPRINT) MISC R-0, Historical Med      Compression Stockings MISC Starting Tue 11/27/2018, Disp-1 each, R-0, [id number] mmHg knee high      Respiratory Therapy Supplies (NEBULIZER/TUBING/MOUTHPIECE) KIT DAILY PRN Starting Mon 11/19/2018, Disp-1 kit, R-0, Normal      albuterol (PROVENTIL) (2.5 MG/3ML) 0.083% nebulizer solution Take 3 mLs by nebulization every 6 hours as needed for Wheezing, Disp-120 vial, R-3Normal      nitroGLYCERIN (NITROSTAT) 0.4 MG SL tablet Place 1 tablet under the tongue every 5 minutes as needed for Chest pain (chest pain), Disp-25 tablet, R-3Normal      Spacer/Aero-Holding Chambers KOSTA DAILY PRN Starting 9/19/2016, Until Discontinued, Disp-1 Device, R-0, Normal      Cholecalciferol (VITAMIN D-3) 5000 UNITS TABS Take 5,000 Units by mouth daily Historical Med      aspirin 81 MG EC tablet Take 81 mg by mouth nightly Historical Med             ALLERGIES     Patient has no known allergies. FAMILY HISTORY     No family history on file. SOCIAL HISTORY       Social History     Socioeconomic History    Marital status:      Spouse name: Not on file    Number of children: 11    Years of education: Not on file    Highest education level: Not on file   Occupational History    Occupation: , navarro     Employer: [address] [place] [address] Drive: retired    Social Needs    Financial resource strain: Not on file    Food insecurity     Worry: Not on file     Inability: Not on file   [employer] needs     Medical: Not on file     Non-medical: Not on file   Tobacco Use    Smoking status: Former Smoker     Packs/day: 2.00     Years: 50.00     Pack years: 100.00     Types: Cigarettes     Start date: 1951     Quit date: 2006     Years since quittin.1    Smokeless tobacco: Never Used    Tobacco comment: don't resume cigs   Substance and Sexual Activity    Alcohol use:  Yes     Alcohol/week: 7.0 standard drinks     Types: 7 Standard drinks or equivalent per week     Comment: occas    Drug use: No    Sexual activity: Never     Partners: Female   Lifestyle    Physical activity     Days per week: Not on file     Minutes per session: Not on file    Stress: Not on file   Relationships    Social connections     Talks on phone: Not on file     Gets together: Not on file     Attends Worship service: Not on file     Active member of club or organization: Not on file     Attends meetings of clubs or organizations: Not on file     Relationship status: Not on file    Intimate partner violence     Fear of current or ex partner: Not on file     Emotionally abused: Not on file     Physically abused: Not on file     Forced sexual activity: Not on file   Other Topics Concern    Not on file   Social History Narrative    Not on file       SCREENINGS PHYSICAL EXAM    (up to 7 for level 4, 8 or more for level 5)     ED Triage Vitals [01/14/21 0655]   BP Temp Temp Source Pulse Resp SpO2 Height Weight   (!) 153/91 98.4 °F (36.9 °C) Oral 109 16 92 % 5' 9\" (1.753 m) 260 lb 2.3 oz (118 kg)       General: Alert and oriented, No acute distress. Eye: Normal conjunctiva. Pupils equal and reactive. HENT: Oral mucosa is moist.  4 cm curvilinear laceration over the right parietal scalp, bleeding controlled. No underlying hematoma. Respiratory: Lungs are clear to auscultation, Respirations are non-labored. Cardiovascular: Normal rate, Regular rhythm. No chest wall tenderness. Gastrointestinal: Soft, Non-tender, Non-distended. Musculoskeletal: No midline spinal tenderness or step-off. No extremity tenderness, deformity, or swelling. Integumentary: Warm, Dry. No other bruising or abrasions noted. Neurologic: Alert, Oriented, No focal defects. Normal speech and cranial nerve exam.  Motor and sensation intact in all extremities. Psychiatric: Cooperative. DIAGNOSTIC RESULTS     EKG: All EKG's are interpreted by the Emergency Department Physician who either signs or Co-signsthis chart in the absence of a cardiologist.        RADIOLOGY:   [facility] [employer] such as CT, Ultrasound and MRI are read by the radiologist. Plain radiographic images are visualized and preliminarily interpreted by the emergency physician with the below findings:      Interpretation per the Radiologist below, if available at the time ofthis note:    CT Head WO Contrast   Final Result   1. No acute intracranial abnormality. 2. High right frontal scalp laceration, without acute skull fracture. 3. Stable age-appropriate atrophy and chronic small vessel ischemic white   matter disease. CT Cervical Spine WO Contrast   Final Result   No acute abnormality of the cervical spine.                ED BEDSIDE ULTRASOUND:   Performed by ED Physician - none    LABS:  Labs Reviewed - No noted below, none     Laceration repair    Date/Time: 1/14/2021 8:00 AM  Performed by: Abhinav Flores MD  Authorized by: Abhinav Flores MD     Consent:     Consent obtained:  Verbal    Consent given by:  Patient    Risks discussed:  Infection, pain and poor cosmetic result  Anesthesia (see MAR for exact dosages): Anesthesia method:  Local infiltration    Local anesthetic:  Lidocaine 1%   Laceration details:     Location: Right parietal scalp  Repair type:     Repair type:  Simple  Pre-procedure details:     Preparation:  Patient was prepped and draped in usual sterile fashion  Exploration:     Hemostasis achieved with:  Direct pressure    Wound exploration: wound explored through full range of motion and entire depth of wound probed and visualized      Wound extent: Superficial  Treatment:     Area cleansed with:  Betadine    Amount of cleaning:  Extensive    Irrigation solution:  Sterile saline    Irrigation method:  Syringe    Visualized foreign bodies/material removed: yes    Skin repair:     Repair method: Staples    Number of staples: 4  Approximation:     Approximation: Close  Post-procedure details:     Dressing: Wound left open    Patient tolerance of procedure: Tolerated well, no immediate complications  Comments: Minimal estimated blood loss, no complication    FINAL IMPRESSION      1. Fall, initial encounter    2.  Laceration of scalp, initial encounter          DISPOSITION/PLAN   DISPOSITION Decision To Discharge 01/14/2021 08:52:10 AM      PATIENT REFERRED TO:  Thi Cronin MD  [facility] [address]  [phone]    Schedule an appointment as soon as possible for a visit in 1 week  For staple removal      DISCHARGE MEDICATIONS:  Discharge Medication List as of 1/14/2021  8:39 AM             (Please note that portions of this note were completed with a voice recognition program.Efforts were made to edit the dictations but occasionally words are mis-transcribed.)    [facility] Dale Peck MD (electronically signed)  Attending Emergency Physician        Jatinder Cota MD  01/14/21 1566

## 2021-01-14 NOTE — ED NOTES
Bed: B-10  Expected date: 1/14/21  Expected time: 6:47 AM  Means of arrival: [facility]  Comments:  80 y/o M fall      Janie Franz, [address] [facility]  01/14/21 7154

## 2021-01-21 NOTE — TELEPHONE ENCOUNTER
Lissette Wheeler NP from [facility] calls in. Requesting Breo Ellipta refill. LOV: 2019. NOV: not yet scheduled. Wife will talk with children to see when they can assist with transportation or VV as she cannot handle Deon by herself. Please advise.

## 2021-02-16 PROBLEM — R91.1 PULMONARY NODULE SEEN ON IMAGING STUDY: Status: ACTIVE | Noted: 2021-01-01

## 2021-02-16 NOTE — ASSESSMENT & PLAN NOTE
Pulmonary nodule seen on CT chest October 2020. Repeat ordered. Given number to call. This visit was completed virtually using Doxy. me  Provider: Pauline Murphy  Location of patient:   [address] [address]   Location of provider:   Beatriz Tapia [facility]  [address] [place] [address].   [address] [place] [address]  Dept: [phone]  Dept Fax: [phone]  Loc: [phone]    Consent: given  Other parties on phone call: wife

## 2021-02-16 NOTE — LETTER
[facility] Pulmonology   [address] [facility] [facility]  Phone: [phone]  Fax: [phone]     2/16/2021    Dr. Roderick Hill MD    I have seen your patient, Jazlyn Tyler on follow up. Here is the assessment and plan for your patient. Any question, please do not hesitate to call. Problem List Items Addressed This Visit     Pulmonary nodule seen on imaging study - Primary     Pulmonary nodule seen on CT chest October 2020. Repeat ordered. Given number to call. This visit was completed virtually using Doxy. me  Provider: Manjula Ruiz  Location of patient:   [address] [facility] [address]   Location of provider:   [address] PULMONOLOGY SLEEP AND CRITICAL CARE  [address] [facility] [facility]  Dept: [phone]  Dept Fax: [phone]  Loc: [phone]    Consent: given  Other parties on phone call: wife                    Relevant Medications    Fluticasone furoate-vilanterol (BREO ELLIPTA) 200-25 MCG/INH AEPB inhaler    Other Relevant Orders    CT CHEST WO CONTRAST    COPD, moderate ([address]     Stable on Breo and Spiriva. Refills. Relevant Medications    Fluticasone furoate-vilanterol (BREO ELLIPTA) 200-25 MCG/INH AEPB inhaler    Chronic respiratory failure with hypoxia (HCC)     Continues to need and require 3 L nasal cannula. Benefiting with shortness of breath. He was advised to monitor his saturation by pulse oximeter. Titrate to 90% saturation. Wife expressed understanding for this.                  Sincerely,    Karuna Rodriguez Pulmonary, Sleep and Critical Care  [phone]

## 2021-02-16 NOTE — LETTER
[facility]  [facility] [address]  Phone: [phone]  Fax: [phone]    No ref. provider found        February 16, 2021       Patient: Baron Ram   MR Number: [id number]   YOB: 1935   Date of Visit: 2/16/2021       Dear Dr. Alverto Red ref. provider found: Thank you for the request for consultation for Gokul Benavides to me for the evaluation of ***. Below are the relevant portions of my assessment and plan of care. If you have questions, please do not hesitate to call me. I look forward to following Cristiana Cantu along with you.     Sincerely,        Sergio Anne MD    CC providers:    No Recipients

## 2021-02-16 NOTE — PROGRESS NOTES
REASON FOR CONSULTATION/CC: rosendo         PCP: Anaid Cole MD    HISTORY OF PRESENT ILLNESS: Yaya Zimmerman is a 81y.o. year old male with a history of ROSENDO who presents :                Chronic hypoxemia  3L NC      COPD  Breo Spiriva   symptoms controlled       Pulmonary nodule                                     PAST MEDICAL HISTORY:  Past Medical History:   Diagnosis Date    Atherosclerosis of aorta ([address]     CHF (congestive heart failure) (HCC)     CKD (chronic kidney disease) stage 3, GFR 30-59 ml/min     COPD (chronic obstructive pulmonary disease) ([address]     CVA, old, ataxia 2007    DM type 2 with diabetic peripheral neuropathy ([address] 10/23/2014    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 2008    Fall 9/2015    Fatty liver     Hypertension     Mixed hyperlipidemia     Morbid obesity ([address]     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 ([address] 2008    Obesity, diabetes, and hypertension syndrome ([address] August, 2015    Psoriasis     Risk for falls     Type 2 diabetes mellitus with microalbuminuria or microproteinuria     Vitamin D deficiency 2011       PAST SURGICAL HISTORY:  Past Surgical History:   Procedure Laterality Date    CATARACT REMOVAL Right 7/3/13    Luci Whitney    TURP  8/14/12    Teja       FAMILY HISTORY:  family history is not on file. SOCIAL HISTORY:   reports that he quit smoking about 14 years ago. His smoking use included cigarettes. He started smoking about 69 years ago. He has a 100.00 pack-year smoking history. He has never used smokeless tobacco.      ALLERGIES:  Patient has No Known Allergies. Objective:   PHYSICAL EXAM:  There were no vitals taken for this visit.'    Gen: No distress. Eyes:  No sclera icterus. No conjunctival injection. ENT: No discharge. Pharynx clear. External appearance of ears and nose normal.  Neck: Trachea midline. No obvious mass. Resp: No accessory muscle use. No clear wheezing. Speaking full sentences.    CV:     GI:    Skin: Warm, dry, normal knee high 1 each 0    Respiratory Therapy Supplies (NEBULIZER/TUBING/MOUTHPIECE) KIT 1 kit by Does not apply route daily as needed (sob) 1 kit 0    albuterol (PROVENTIL) (2.5 MG/3ML) 0.083% nebulizer solution Take 3 mLs by nebulization every 6 hours as needed for Wheezing 120 vial 3    nitroGLYCERIN (NITROSTAT) 0.4 MG SL tablet Place 1 tablet under the tongue every 5 minutes as needed for Chest pain (chest pain) 25 tablet 3    Spacer/Aero-Holding Chambers KOSTA 1 Device by Does not apply route daily as needed (with Albuterol inhaler) 1 Device 0    Cholecalciferol (VITAMIN D-3) 5000 UNITS TABS Take 5,000 Units by mouth daily       aspirin 81 MG EC tablet Take 81 mg by mouth nightly        No current facility-administered medications for this visit. Data Reviewed:     Category 1 Data points:      Last CBC  Lab Results   Component Value Date    WBC 7.9 10/30/2020    RBC 3.98 10/30/2020    HGB 11.2 10/30/2020    MCV 84.7 10/30/2020     10/30/2020     Last Renal  Lab Results   Component Value Date     10/30/2020    K 5.4 10/30/2020    K 4.0 11/30/2019    CL 92 10/30/2020    CO2 33 10/30/2020    CO2 29 09/25/2020    CO2 31 06/12/2020    BUN 22 10/30/2020    CREATININE 1.8 10/30/2020    GLUCOSE 423 10/30/2020    CALCIUM 9.0 10/30/2020         Notes Reviewed:     Impression   No acute fracture.       Emphysema.       Mild nodular opacities posteromedially in the right lower lobe concerning for   infection.  Recommend follow-up CT in 3 months to ensure resolution. Total labs reviewed 3+     Category 2 Data points:    Radiology Review:  Independent interpretation of pulmonary nodule in the right lower lboe.      Category 3 Data points:    Discussed management or interpretation of test with external provider:       Assessment:     ·  ROSENDO - stopped using   · Hypersomnia  · Chronic resp failure  · COPD, severe    Plan:           Problem List Items Addressed This Visit     Pulmonary nodule seen on imaging study - Primary     Pulmonary nodule seen on CT chest October 2020. Repeat ordered. Given number to call. This visit was completed virtually using Doxy. me  Provider: Pratibha Mix  Location of patient:   [address] [facility] [place] [address]   Location of provider:   [facility] PULMONOLOGY SLEEP AND CRITICAL CARE  [address] [facility]. [address] [place] [address]  Dept: [phone]  Dept Fax: [phone]  Loc: [phone]    Consent: given  Other parties on phone call: wife                    Relevant Orders    CT CHEST WO CONTRAST    COPD, moderate ([address]     Stable on Breo and Spiriva. Refills. Chronic respiratory failure with hypoxia (HCC)     Continues to need and require 3 L nasal cannula. Benefiting with shortness of breath. He was advised to monitor his saturation by pulse oximeter. Titrate to 90% saturation. Wife expressed understanding for this.

## 2021-02-16 NOTE — ASSESSMENT & PLAN NOTE
Continues to need and require 3 L nasal cannula. Benefiting with shortness of breath. He was advised to monitor his saturation by pulse oximeter. Titrate to 90% saturation. Wife expressed understanding for this.

## 2021-02-23 NOTE — CARE COORDINATION
6/21/18  Yes Maura Jeong MD   labetalol (NORMODYNE) 100 MG tablet Take 1 tablet by mouth every 12 hours 6/18/18  Yes Aditi Garcia MD   pioglitazone (ACTOS) 30 MG tablet TAKE 1 TABLET BY MOUTH DAILY FOR DIABETES 4/26/18  Yes Adriane Zhong MD   simvastatin (ZOCOR) 40 MG tablet TAKE 1 TABLET BY MOUTH EVERY EVENING 3/28/18  Yes Adriane Zhong MD   Fluticasone Furoate-Vilanterol (BREO ELLIPTA) 200-25 MCG/INH AEPB Inhale 1 puff into the lungs daily 1/15/18  Yes Maura Jeong MD   isosorbide mononitrate (IMDUR) 30 MG extended release tablet TAKE 1 TABLET BY MOUTH TWICE DAILY FOR BLOOD PRESSURE 1/1/18  Yes Adriane Zhong MD   albuterol sulfate HFA (PROAIR HFA) 108 (90 BASE) MCG/ACT inhaler Inhale 2 puffs into the lungs every 6 hours as needed for Wheezing or Shortness of Breath 9/19/16  Yes SANAZ Dumas CNP   Spacer/Aero-Holding Jennifer Rude 1 Device by Does not apply route daily as needed (with Albuterol inhaler) 9/19/16  Yes SANAZ Dumas CNP   furosemide (LASIX) 40 MG tablet Take 1 tablet by mouth 2 times daily 7/20/16  Yes Historical Provider, MD   Blood Glucose Monitoring Suppl (ACCU-CHEK NEVAEH SMARTVIEW) W/DEVICE KIT CHECK SUGARS THREE TIMES DAILY AS INSTRUCTED 10/15/15  Yes Pat Ny MD   Blood Glucose Calibration (ACCU-CHEK SMARTVIEW CONTROL) LIQD USE AS DIRECTED 10/15/15  Yes Pat Ny MD   [facility] Use for blood sugar testing 8/6/15  Yes Pat Ny MD   famotidine (PEPCID) 20 MG tablet TAKE 1/2 TABLET BY MOUTH TWICE DAILY FOR STOMACH 12/2/14  Yes Pat Ny MD   Insulin Pen Needle ([employer] PEN NEEDLES 31G) 31G X 8 MM MISC 1 each by Does not apply route daily.  8/26/14  Yes Pat Ny MD   fluticasone (FLONASE) 50 MCG/ACT nasal spray 1 spray each nostril daily for allergies  Patient taking differently: 1 spray by Nasal route daily as needed for Allergies 1 spray each nostril daily for allergies 4/29/13  Yes Pat Ny MD Is This A New Presentation, Or A Follow-Up?: Follow Up Isotretinoin Display Cumulative Dose In The Note?: No Patient Reported Weight In Pounds (Required For Calculation): 190

## 2021-03-18 NOTE — PROGRESS NOTES
3/18/2021    TELEHEALTH EVALUATION -- Audio/Visual (During HGENZ-37 public health emergency)    HPI:    Charlene Smith (:  1935) has requested an audio/video evaluation for the following concern(s):    Chief Complaint   Patient presents with    Hypertension     HTN ROUTINE FOLLOW UP    Other     DEPRESSION SCREEN AND SOCIAL DETERMINANT DUE    Other     CHRONIC CONDITION UPDATE DUE TODAY     BP Readings from Last 3 Encounters:   21 (!) 165/77   20 (!) 178/68   10/30/20 (!) 128/55     Pulse Readings from Last 3 Encounters:   21 99   20 80   10/30/20 71     Wt Readings from Last 3 Encounters:   21 260 lb 2.3 oz (118 kg)   20 261 lb 3.9 oz (118.5 kg)   10/30/20 271 lb 13.2 oz (123.3 kg)     Seen by pulm - dr. Justyna Martinez - on  - breo/spiriva for mod copd w/ 3L oxygen for chronic resp fail w/ hypoxia w / monitoring  Ct for pulm nodule ordered by pulm - no tx presently for alyssia  Had fall - in er  and   No ha/ dizzy- legs/ feet hurt all the time - swollen. Using walker to get around not real stable - gets dizzy. Not checked bp for awhile  Urination okay  No cp/palpitations. Swelling increases as day goes on. Mild allergy sxs - not bad. Not much cough. Has not seen  Kidney doctor 2/2 covid concerns - gfr 39 - for 6 months      Review of Systems    Prior to Visit Medications    Medication Sig Taking?  Authorizing Provider   Fluticasone furoate-vilanterol (BREO ELLIPTA) 200-25 MCG/INH AEPB inhaler Inhale 1 puff into the lungs daily  Stefanie Fox MD   isosorbide mononitrate (IMDUR) 30 MG extended release tablet TAKE 1 TABLET BY MOUTH TWICE DAILY FOR BLOOD PRESSURE  Ken Johnson MD   potassium chloride (KLOR-CON) 10 MEQ extended release tablet TAKE 1 TABLET BY MOUTH TWICE DAILY  Ken Johnson MD   glipiZIDE (GLUCOTROL) 10 MG tablet TAKE 1 TABLET BY MOUTH DAILY WITH BREAKFAST  Ken Johnson MD   gabapentin (NEURONTIN) 300 MG capsule TAKE 1 CAPSULE BY MOUTH UNITS TABS Take 5,000 Units by mouth daily   Historical Provider, MD   aspirin 81 MG EC tablet Take 81 mg by mouth nightly   Historical Provider, MD       Social History     Tobacco Use    Smoking status: Former Smoker     Packs/day: 2.00     Years: 50.00     Pack years: 100.00     Types: Cigarettes     Start date: 1951     Quit date: 2006     Years since quittin.3    Smokeless tobacco: Never Used    Tobacco comment: don't resume cigs   Substance Use Topics    Alcohol use: Yes     Alcohol/week: 7.0 standard drinks     Types: 7 Standard drinks or equivalent per week     Comment: occas    Drug use: No        PHYSICAL EXAMINATION:  [ INSTRUCTIONS:  \"[x]\" Indicates a positive item  \"[]\" Indicates a negative item  -- DELETE ALL ITEMS NOT EXAMINED]  Vital Signs: (As obtained by patient/caregiver or practitioner observation)    Blood pressure-  Heart rate-    Respiratory rate-    Temperature-  Pulse oximetry-     Constitutional: [x] Appears well-developed and well-nourished [] No apparent distress      [] Abnormal-   Mental status  [x] Alert and awake  [] Oriented to person/place/time []Able to follow commands      Eyes:  EOM    []  Normal  [] Abnormal-  Sclera  []  Normal  [] Abnormal -         Discharge []  None visible  [] Abnormal -    HENT:   [x] Normocephalic, atraumatic.   [] Abnormal   [] Mouth/Throat: Mucous membranes are moist.     External Ears [] Normal  [] Abnormal-     Neck: [] No visualized mass     Pulmonary/Chest: [x] Respiratory effort normal.  [] No visualized signs of difficulty breathing or respiratory distress        [] Abnormal-      Musculoskeletal:   [] Normal gait with no signs of ataxia         [] Normal range of motion of neck        [] Abnormal-       Neurological:        [x] No Facial Asymmetry (Cranial nerve 7 motor function) (limited exam to video visit)          [] No gaze palsy        [] Abnormal-         Skin:        [x] No significant exanthematous lesions or discoloration noted on facial skin         [] Abnormal-            Psychiatric:       [x] Normal Affect [] No Hallucinations        [] Abnormal-     Other pertinent observable physical exam findings-     ASSESSMENT/PLAN:  There are no diagnoses linked to this encounter. Diagnosis Orders   1. Atherosclerosis of aorta ([address]     2. COPD, moderate ([address]     3. Chronic diastolic (congestive) heart failure ([address]     4. Chronic respiratory failure with hypoxia (HCC)     5. Stage 3b chronic kidney disease       Cont lexapro for mood - seems to be working well  F/u nephro,cardio, pulm soon  Refill imdur   F/u 3-6 months once gets covid vaccine -in office  Fall precautions dw/ pt  Using walker  Encouraged use of compression for leg swelling mostly on left side  Refill imdur  Copd/ cad seems stable presently  F/u nephrology periodically and needs lipid in near future  Strongly encouraged patient to check bp if can find machine  pulm note, nephro note/ labs reviewed  No follow-ups on file. Terrie Sorensen, was evaluated through a synchronous (real-time) audio-video encounter. The patient (or guardian if applicable) is aware that this is a billable service. Verbal consent to proceed has been obtained within the past 12 months. The visit was conducted pursuant to the emergency declaration under the [address] [place] [address] authority and the [employer] Act. Patient identification was verified, and a caregiver was present when appropriate. The patient was located in a state where the provider was credentialed to provide care. Total time spent on this encounter: 21 or more minutes were spent on the digital evaluation and management of this patient. --Roland Carvalho MD on 3/18/2021 at 2:36 PM    An electronic signature was used to authenticate this note.

## 2021-05-10 NOTE — ASSESSMENT & PLAN NOTE
Xenia Han continues to need and benefit from supplemental oxygen.  he is using oxygen continuously at 3 L NC.

## 2021-05-10 NOTE — PROGRESS NOTES
REASON FOR CONSULTATION/CC: alyssia         PCP: Nazia Cannon MD    HISTORY OF PRESENT ILLNESS: Leonela Prabhakar is a 81y.o. year old male with a history of ALYSSIA who presents :                Chronic hypoxemia  3L NC, no change    COPD  Breo Spiriva   Continues to be well controlled. Pulmonary nodule   CT march 2021. DM with neuropathy  complains of \"tingling\" that is chronic          Objective:   PHYSICAL EXAM:  There were no vitals taken for this visit.'    Gen: No distress. Eyes:  No sclera icterus. No conjunctival injection. ENT: No discharge. Pharynx clear. External appearance of ears and nose normal.  Neck: Trachea midline. No obvious mass. Resp: No accessory muscle use. No clear wheezing. Speaking full sentences. CV:     GI:    Skin: Warm, dry, normal texture and turgor. No nodule on exposed extremities. Lymph:    M/S: No cyanosis. Neuro: Moves all four extremities. Psych: Oriented x 3. No anxiety. Awake. Alert. Intact judgement and insight. Data Reviewed:          Assessment:     ·  ALYSSIA - stopped using   · Hypersomnia  · Chronic resp failure  · COPD, severe  · DM with neuropathy     Plan:           Problem List Items Addressed This Visit     DM type 2 with diabetic peripheral neuropathy ([address]     He is complains of tingling. On neuropathy. Advise him to follow up Nazia Cannon MD.           COPD, moderate ([address]     Controlled with Breo and Spiriva . Chronic respiratory failure with hypoxia (HCC)       Leonela Prabhakar continues to need and benefit from supplemental oxygen.  he is using oxygen continuously at 3 L NC.

## 2021-07-09 PROBLEM — F33.0 MAJOR DEPRESSIVE DISORDER, RECURRENT, MILD (HCC): Status: ACTIVE | Noted: 2021-01-01

## 2021-07-09 NOTE — PROGRESS NOTES
[facility]  Clinic Note    Date: 7/9/2021                                               Subjective:     Chief Complaint   Patient presents with    Hypertension     3 MO HTN ROUTINE FOLLOW UP      HPI  Seen by pulm - dr. Travon Clifford recently - reviewed - stable on breo/spiriva - continuous 3 liters oxygen - bumped to 3.5 L - pulse ox upper 90's  Some tingling - dm neuropathy  Sees podiatry regularly  Gets dizzy and legs go out  - feels off balance  Uses walker wherever he goes. Breathing fairly stable. Sugars were high but lately better - usually around 120 in am  Taking glipizide 10 in am  Vision bad - hasn' t seen eye doctor recently -hard to get out - hard on wife  Trouble w/ shower - wife feeling overwhelmed  [place] from insurance checks vitals monthly  Feels weaker generally - falling 1x/ month  Urinating all the time - wetting bed - pack of depends every other day -   Reduced lasix from bid to once daily w/o much change. Swelling not bad  No burning  And unable to give urine today  Echo 5/19 - normal EF  Lays around a lot - getting weak  Denies depression  Sleeps off and on day/ night  Not much swelling  Legs hurting some - effects sleep  Some tingling/ pain.   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     Stage 3 chronic kidney disease (HCC)     Atherosclerosis of aorta (HCC)     Type 2 diabetes mellitus with microalbuminuria or microproteinuria     DM type 2 with diabetic peripheral neuropathy ([address] 10/23/2014    Fatty liver     Mixed hyperlipidemia     CVA, old, ataxia     COPD, moderate ([address]     Urinary frequency 07/23/2012    Vitamin D deficiency     Psoriasis     Pulmonary nodule seen on imaging study 02/16/2021    Sinus tachycardia     Multifocal pneumonia     Chronic respiratory failure with hypoxia (HCC)     Atrial fibrillation (HCC)     Shortness of breath 05/14/2019    Bilateral pleural effusion     Diastolic dysfunction     Elevated brain natriuretic peptide (BNP) level     Essential hypertension     Chronic vasomotor rhinitis 06/21/2018    ROSENDO (obstructive sleep apnea) 06/20/2018    Chest pain 03/09/2018    Chronic respiratory failure with hypoxia and hypercapnia (HCC) 03/07/2018    Orthopnea 03/07/2018    Abnormal CXR 03/07/2018    SOB (shortness of breath)      Past Medical History:   Diagnosis Date    Atherosclerosis of aorta (HCC)     [facility]     CKD (chronic kidney disease) stage 3, GFR 30-59 ml/min (HCC)     COPD (chronic obstructive pulmonary disease) ([address]     CVA, old, ataxia 2007    DM type 2 with diabetic peripheral neuropathy ([address] 10/23/2014    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 2008    Fall 9/2015    Fatty liver     Hypertension     Mixed hyperlipidemia     Morbid obesity ([address]     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 ([address] 2008    Obesity, diabetes, and hypertension syndrome ([address] August, 2015    Psoriasis     Risk for falls     Type 2 diabetes mellitus with microalbuminuria or microproteinuria     Vitamin D deficiency 2011     Past Surgical History:   Procedure Laterality Date    CATARACT REMOVAL Right 7/3/13    [facility]    TURP  8/14/12    [facility]     Admission on 10/30/2020, Discharged on 10/30/2020   Component Date Value Ref Range Status    WBC 10/30/2020 7.9  4.0 - 11.0 K/uL Final    RBC 10/30/2020 3.98* 4.20 - 5.90 M/uL Final    Hemoglobin 10/30/2020 11.2* 13.5 - 17.5 g/dL Final    Hematocrit 10/30/2020 33.7* 40.5 - 52.5 % Final    MCV 10/30/2020 84.7  80.0 - 100.0 fL Final    MCH 10/30/2020 28.1  26.0 - 34.0 pg Final    MCHC 10/30/2020 33.2  31.0 - 36.0 g/dL Final    RDW 10/30/2020 14.1  12.4 - 15.4 % Final    Platelets 31/56/5477 496* 135 - 450 K/uL Final    MPV 10/30/2020 8.0  5.0 - 10.5 fL Final    Neutrophils % 10/30/2020 77.6  % Final    Lymphocytes % 10/30/2020 11.9  % Final    Monocytes % 10/30/2020 7.7  % Final    Eosinophils % 10/30/2020 2.1  % Final    Basophils % 10/30/2020 0.7  % Final    Neutrophils Absolute 10/30/2020 6.1  1.7 - 7.7 K/uL Final    Lymphocytes Absolute 10/30/2020 0.9* 1.0 - 5.1 K/uL Final    Monocytes Absolute 10/30/2020 0.6  0.0 - 1.3 K/uL Final    Eosinophils Absolute 10/30/2020 0.2  0.0 - 0.6 K/uL Final    Basophils Absolute 10/30/2020 0.1  0.0 - 0.2 K/uL Final    Sodium 10/30/2020 134* 136 - 145 mmol/L Final    Potassium 10/30/2020 5.4* 3.5 - 5.1 mmol/L Final    Comment: Specimen hemolysis has exceeded the interference as defined by Roche. Value may be falsely increased. Suggest recollection if clinically  indicated.  Chloride 10/30/2020 92* 99 - 110 mmol/L Final    CO2 10/30/2020 33* 21 - 32 mmol/L Final    Anion Gap 10/30/2020 9  3 - 16 Final    Glucose 10/30/2020 423* 70 - 99 mg/dL Final    BUN 10/30/2020 22* 7 - 20 mg/dL Final    CREATININE 10/30/2020 1.8* 0.8 - 1.3 mg/dL Final    GFR Non- 10/30/2020 36* >60 Final    Comment: >60 mL/min/1.73m2 EGFR, calc. for ages 25 and older using the  MDRD formula (not corrected for weight), is valid for stable  renal function.  GFR  10/30/2020 44* >60 Final    Comment: Chronic Kidney Disease: less than 60 ml/min/1.73 sq.m. Kidney Failure: less than 15 ml/min/1.73 sq.m. Results valid for patients 18 years and older.  Calcium 10/30/2020 9.0  8.3 - 10.6 mg/dL Final    Total Protein 10/30/2020 6.8  6.4 - 8.2 g/dL Final    Albumin 10/30/2020 3.9  3.4 - 5.0 g/dL Final    Albumin/Globulin Ratio 10/30/2020 1.3  1.1 - 2.2 Final    Total Bilirubin 10/30/2020 0.4  0.0 - 1.0 mg/dL Final    Alkaline Phosphatase 10/30/2020 56  40 - 129 U/L Final    Comment: Specimen hemolysis has exceeded the interference as defined by Roche. Value may be falsely decreased. Suggest recollection if clinically  indicated.       ALT 10/30/2020 11  10 - 40 U/L Final    Comment: Specimen hemolysis has exceeded the interference as defined by Roche. Result may be affected. Suggest recollection if clinically indicated.  AST 10/30/2020 22  15 - 37 U/L Final    Comment: Specimen hemolysis has exceeded the interference as defined by Roche. Value may be falsely increased. Suggest recollection if clinically  indicated.  Globulin 10/30/2020 2.9  g/dL Final    Lipase 10/30/2020 47.0  13.0 - 60.0 U/L Final     No family history on file.   Current Outpatient Medications   Medication Sig Dispense Refill    gabapentin (NEURONTIN) 300 MG capsule TAKE 1 CAPSULE BY MOUTH THREE TIMES DAILY 270 capsule 1    furosemide (LASIX) 80 MG tablet TAKE 1 TABLET BY MOUTH TWICE DAILY 180 tablet 0    doxazosin (CARDURA) 8 MG tablet TAKE 1 TABLET BY MOUTH EVERY NIGHT AT BEDTIME FOR BLOOD PRESSURE 90 tablet 0    labetalol (NORMODYNE) 100 MG tablet TAKE 1 TABLET BY MOUTH TWICE DAILY 180 tablet 0    escitalopram (LEXAPRO) 10 MG tablet TAKE 1 TABLET BY MOUTH DAILY 90 tablet 0    simvastatin (ZOCOR) 40 MG tablet TAKE 1 TABLET BY MOUTH EVERY EVENING 90 tablet 0    glipiZIDE (GLUCOTROL) 10 MG tablet TAKE 1 TABLET BY MOUTH DAILY WITH BREAKFAST 90 tablet 0    isosorbide mononitrate (IMDUR) 30 MG extended release tablet TAKE 1 TABLET BY MOUTH TWICE DAILY FOR BLOOD PRESSURE 180 tablet 3    Fluticasone furoate-vilanterol (BREO ELLIPTA) 200-25 MCG/INH AEPB inhaler Inhale 1 puff into the lungs daily 1 each 11    tiotropium (SPIRIVA HANDIHALER) 18 MCG inhalation capsule INHALE THE CONTENTS OF 1 CAPSULE VIA INHALATION DEVICE DAILY FOR BREATHING 90 capsule 3    fluticasone (FLONASE) 50 MCG/ACT nasal spray 1 spray by Each Nare route daily as needed for Rhinitis      famotidine (PEPCID) 10 MG tablet Take 10 mg by mouth 2 times daily      VENTOLIN  (90 Base) MCG/ACT inhaler INHALE 2 PUFFS INTO THE LUNGS EVERY 6 HOURS AS NEEDED FOR WHEEZING 1 Inhaler 5    Nebulizers (VIOS LC SPRINT) MISC USE UTD  0    Compression Stockings MISC by Does not apply route 15-20 mmHg knee high 1 each 0    Respiratory Therapy Supplies (NEBULIZER/TUBING/MOUTHPIECE) KIT 1 kit by Does not apply route daily as needed (sob) 1 kit 0    albuterol (PROVENTIL) (2.5 MG/3ML) 0.083% nebulizer solution Take 3 mLs by nebulization every 6 hours as needed for Wheezing 120 vial 3    nitroGLYCERIN (NITROSTAT) 0.4 MG SL tablet Place 1 tablet under the tongue every 5 minutes as needed for Chest pain (chest pain) 25 tablet 3    Spacer/Aero-Holding Chambers KOSTA 1 Device by Does not apply route daily as needed (with Albuterol inhaler) 1 Device 0    Cholecalciferol (VITAMIN D-3) 5000 UNITS TABS Take 5,000 Units by mouth daily       aspirin 81 MG EC tablet Take 81 mg by mouth nightly       potassium chloride (KLOR-CON) 10 MEQ extended release tablet TAKE 1 TABLET BY MOUTH TWICE DAILY (Patient not taking: Reported on 7/9/2021) 180 tablet 0     No current facility-administered medications for this visit. No Known Allergies    Review of Systems    Objective:  Ht 5' 9\" (1.753 m)   Wt 265 lb (120.2 kg)   BMI 39.13 kg/m²     BP Readings from Last 3 Encounters:   01/14/21 (!) 165/77   12/26/20 (!) 178/68   10/30/20 (!) 128/55     BP Readings from Last 3 Encounters:   07/09/21 (!) 146/84   01/14/21 (!) 165/77   12/26/20 (!) 178/68       Pulse Readings from Last 3 Encounters:   07/09/21 102   01/14/21 99   12/26/20 80       Pulse Readings from Last 3 Encounters:   01/14/21 99   12/26/20 80   10/30/20 71       Wt Readings from Last 3 Encounters:   07/09/21 265 lb (120.2 kg)   01/14/21 260 lb 2.3 oz (118 kg)   12/26/20 261 lb 3.9 oz (118.5 kg)       Physical Exam  Constitutional:       General: He is not in acute distress. Appearance: He is well-developed. Eyes:      General: No scleral icterus. Conjunctiva/sclera: Conjunctivae normal.   Cardiovascular:      Rate and Rhythm: Normal rate and regular rhythm. Heart sounds: Normal heart sounds.  No murmur heard.   No gallop. Pulmonary:      Effort: Pulmonary effort is normal. No respiratory distress. Breath sounds: No wheezing, rhonchi or rales. Comments: Decreased bs  Abdominal:      General: Bowel sounds are normal. There is no distension. Palpations: Abdomen is soft. Tenderness: There is no abdominal tenderness. Musculoskeletal:         General: Swelling (trace ankle) present. Skin:     Findings: No erythema or rash. Neurological:      Mental Status: He is alert. Assessment/Plan:      Diagnosis Orders   1. Chronic respiratory failure with hypoxia [facility])  Ambulatory referral to Home Health   2. Atherosclerosis of aorta ([facility]     3. COPD, moderate ([facility]  Ambulatory referral to Home Health   4. DM type 2 with diabetic peripheral neuropathy (HCC)  POCT glycosylated hemoglobin (Hb A1C)   5. Diastolic dysfunction     6. Essential hypertension  Comprehensive Metabolic Panel   7. Mixed hyperlipidemia  Lipid Panel   8. Vitamin D deficiency  Vitamin D 25 Hydroxy   9. Paroxysmal A-fib ([facility]     10. Severe obesity (BMI 35.0-35.9 with comorbidity) ([facility]  Ambulatory referral to Home Health   11. Major depressive disorder, recurrent, mild     12. Mild anemia  CBC Auto Differential    Iron and TIBC    Ferritin    Vitamin B12 & Folate   13. General weakness  TSH with Reflex    Ambulatory referral to Home Health   14. Fall, initial encounter  Ambulatory referral to [address] [place] [address] precautions/ walker - PT/ OT  Consider bumping labetolol if hr/ bp stay on high end but want to avoid low bp w/ falls  Increase gabapentin 300 bid to 300 am/ 600 hs w/ massage - suspect dm neuropathy  Reduce lasix 80/d to 40/d and if tolerated 40 every other day w/ urinary issues  utd on covid   COA encouraged - home nursing referral for PT/ OT/ nursing and aid for shower assist  Consider visiting [facility] for help for wife  Check labs - ?  Vit d supplement  Unable to leave home w/o maximal assistance  Margaret Brewer, MD KELSEY  7/9/2021  10:40 AM

## 2021-07-12 NOTE — PROGRESS NOTES
Lyric Jc MD   7/11/2021  1:41 PM EDT Back to Top      Anemia is improved. Iron is in normal range. Rest of labs look good. Kidney function is a little better than last time, though still reduced. Potassium was high. Do not take any potassium supplements, salt substitute and limit intake of bananas/ potatoes. Recheck potassium level again in next couple weeks (bmp,mg)     Pt scheduled for repeat lab draw for above labs per [facility] for 8/3/2021.   Labs pended for future visit./caitlin

## 2021-07-15 NOTE — TELEPHONE ENCOUNTER
SPOKE TO THE WIFE, PT IS NOT CURRENTLY TAKING THIS MEDICATION. IT HAS BEEN STOPPED DUE TO ELEVATED POTASSIUM LEVELS. WE SUSPECT IT WAS ON AUTOMATIC REFILL. PRESCRIPTION DENIED AT THIS TIME. [facility]    Component Value Ref Range & Units Status Collected Lab   Sodium 141  136 - 145 mmol/L Final 07/09/2021 11:09 AM [facility]   Potassium 5.8High   3.5 - 5.1 mmol/L Final 07/09/2021 11:09 AM [facility]   Chloride 100  99 - 110 mmol/L Final 07/09/2021 11:09 AM [facility]   CO2 30  21 - 32 mmol/L Final 07/09/2021 11:09 AM [facility]   Anion Gap 11  3 - 16 Final 07/09/2021 11:09 AM [facility]   Glucose 140High   70 - 99 mg/dL Final 07/09/2021 11:09 AM [facility]   BUN 21High   7 - 20 mg/dL Final 07/09/2021 11:09 AM [facility]   CREATININE 1.5High   0.8 - 1.3 mg/dL Final 07/09/2021 11:09 AM [facility]   GFR Non- 44Abnormal   >60 Final 07/09/2021 11:09 AM [facility]   >60 mL/min/1.73m2 EGFR, calc. for ages 25 and older using the   MDRD formula (not corrected for weight), is valid for stable   renal function. GFR  54Abnormal   >60 Final 07/09/2021 11:09 AM [facility]   Chronic Kidney Disease: less than 60 ml/min/1.73 sq. m.         Kidney Failure: less than 15 ml/min/1.73 sq.m. Results valid for patients 18 years and older.     Calcium 9.4  8.3 - 10.6 mg/dL Final 07/09/2021 11:09 AM [facility]   Total Protein 6.6  6.4 - 8.2 g/dL Final 07/09/2021 11:09 AM [facility]   Albumin 4.3  3.4 - 5.0 g/dL Final 07/09/2021 11:09 AM [facility]   Albumin/Globulin Ratio 1.9  1.1 - 2.2 Final 07/09/2021 11:09 AM [facility]   Total Bilirubin 0.4  0.0 - 1.0 mg/dL Final 07/09/2021 11:09 AM [facility]   Alkaline Phosphatase 62  40 - 129 U/L Final 07/09/2021 11:09 AM [facility]   ALT 13  10 - 40 U/L Final 07/09/2021 11:09 AM [facility]   AST 14Low   15 - 37 U/L Final 07/09/2021 11:09 AM  - [facility]   Globulin 2.3  g/dL Final 07/09/2021 11:09 AM  - [facility] Performed By    [facility] Anurag Marquez Name Director Address Valid Date Range   19-PP - [facility] Colette MERARI Spence [address] [place] [id number] 09/26/20 0000-Present

## 2021-08-08 PROBLEM — I50.31 ACUTE DIASTOLIC (CONGESTIVE) HEART FAILURE (HCC): Status: ACTIVE | Noted: 2021-01-01

## 2021-08-08 NOTE — ED TRIAGE NOTES
Patient arrives to the ED with complaints of shortness of breath. States it the onset of approximately a week ago. History of COPD, wears 3L nasal canula. Patient is alert and oriented. Coughing up sputum. Denies any chest pain. Patient is coming from home, lives with wife.

## 2021-08-08 NOTE — ED NOTES

## 2021-08-08 NOTE — PROGRESS NOTES
Patient arrived to [employer] via cart. Patient was weighed and assisted to bed. Patient placed on monitor and verifies with CMU. Admission completed. Assessment completed. See flow sheet. Plan of care discussed and all questions answered. Patient arrived without arm band and one was placed on left wrist. Patient was placed in clean brief and water provided per request. Bed locked and in low position. Side rails X2 up and bed alarm on. Bedisde table and belongings at patient side. Patient oriented to bed, call light and pain management. All questions answered. Patient denies pain at this time.

## 2021-08-08 NOTE — PROGRESS NOTES
Hospitalist Progress Note      PCP: Dodie Vuong MD    Date of Admission: 8/7/2021    Chief Complaint: Hypoxia    Hospital Course:      Subjective: states SOB somewhat better from admission. Medications:  Reviewed    Infusion Medications    sodium chloride       Scheduled Medications    budesonide-formoterol  2 puff Inhalation BID    isosorbide mononitrate  30 mg Oral Daily    labetalol  100 mg Oral BID    atorvastatin  40 mg Oral Daily    tiotropium  2 puff Inhalation Daily    sodium chloride flush  5-40 mL Intravenous 2 times per day    enoxaparin  40 mg Subcutaneous Daily    furosemide  40 mg Intravenous BID    [START ON 8/9/2021] glipiZIDE  10 mg Oral QAM AC    gabapentin  300 mg Oral TID    escitalopram  10 mg Oral Daily    doxazosin  8 mg Oral Daily     PRN Meds: sodium chloride flush, sodium chloride, ondansetron **OR** ondansetron, perflutren lipid microspheres, potassium chloride, magnesium sulfate, albuterol, morphine, acetaminophen, hydrALAZINE      Intake/Output Summary (Last 24 hours) at 8/8/2021 1458  Last data filed at 8/8/2021 1306  Gross per 24 hour   Intake 600 ml   Output 500 ml   Net 100 ml       Exam:    BP (!) 165/87   Pulse 80   Temp 97.9 °F (36.6 °C) (Oral)   Resp 20   Ht 5' 10\" (1.778 m)   Wt 266 lb 8.6 oz (120.9 kg)   SpO2 95%   BMI 38.24 kg/m²     General appearance: No apparent distress, appears stated age and cooperative. HEENT: Pupils equal, round, and reactive to light. Conjunctivae/corneas clear. Neck: Supple, with full range of motion. No jugular venous distention. Trachea midline. Respiratory:  Normal respiratory effort. Clear to auscultation, bilaterally without Rales/Wheezes/Rhonchi. Cardiovascular: Regular rate and rhythm with normal S1/S2 without murmurs, rubs or gallops. Abdomen: Soft, non-tender, non-distended with normal bowel sounds. Musculoskeletal: No clubbing, cyanosis or edema bilaterally.   Full range of motion without acute CHF:  - echo ordered  - diuresis per cardiology    H/o COPD:  - cont home inhalers for now  - holding on steroids as no course breath sounds or wheezing on exam  - consult pulm to confirm clinical suspicions    Chronic pain:  - cont home medications    DVT Prophylaxis: Lovenox  Diet: ADULT DIET; Regular; Low Fat/Low Chol/High Fiber/2 gm Na;  Low Sodium (2 gm); 2000 ml  Code Status: Full Code    PT/OT Eval Status: Ordered    Dispo - PCU    Nyla Garcia MD

## 2021-08-08 NOTE — PROGRESS NOTES
Medication Reconciliation    List of medications patient is currently taking is complete. Source of information: 1. Conversation with patient at bedside                                      2. EPIC records      Allergies  Patient has no known allergies. Notes regarding home medications:   1. Patient no longer taking a potassium supplement. Removed from list.  2. Patient is supposed to take furosemide 40 mg daily, however he says he has not been taking it consistently. He can't remember the last time he took it.

## 2021-08-08 NOTE — PROGRESS NOTES
4 Eyes Skin Assessment     NAME:  Nayan Lamar  YOB: 1935  MEDICAL RECORD NUMBER:  [id number]    The patient is being assess for  Admission    I agree that 2 RN's have performed a thorough Head to Toe Skin Assessment on the patient. ALL assessment sites listed below have been assessed. Areas assessed by both nurses: arms, legs, back, trunk, feet, sacrum, buttocks            Does the Patient have a Wound? Yes wound(s) were present on assessment.  LDA wound assessment was Initiated and completed        Alphonse Prevention initiated:  Yes   Wound Care Orders initiated:  No    Pressure Injury (Stage 3,4, Unstageable, DTI, NWPT, and Complex wounds) if present place consult order under [de-identified] No    New and Established Ostomies if present place consult order under : No      Nurse 1 eSignature: Electronically signed by Fanny Mederos RN on 8/8/21 at 5:22 AM EDT    **SHARE this note so that the co-signing nurse is able to place an eSignature**    Nurse 2 eSignature: Electronically signed by Parisa Dodson RN on 8/8/21 at 5:24 AM EDT

## 2021-08-08 NOTE — ED PROVIDER NOTES
[address] [facility]      Pt Name: Lydia Harper  MRN: [id number]  [place] 1935  Date of evaluation: 8/7/2021  Provider: JEAN Busch    This patient was not seen and evaluated by the attending physician No att. providers found. CHIEF COMPLAINT       Chief Complaint   Patient presents with    Shortness of Breath       CRITICAL CARE TIME   I performed a total Critical Care time of 31 minutes, excluding separately reportable procedures. There was a high probability of clinically significant/life threatening deterioration in the patient's condition which required my urgent intervention. Not limited to multiple reexaminations, discussions with attending physician and consultants. HISTORY OF PRESENT ILLNESS  (Location/Symptom, Timing/Onset, Context/Setting, Quality, Duration, Modifying Factors, Severity.)   Lydia Harper is a 81 y.o. male who presents to the emergency department via EMS from home where he lives with his wife complaining of shortness of breath. He denies productive cough or known fevers. He states he gained about 10 pounds in the last week or 2. He has history of diabetes, COPD and CVA states he follows with Dr. Mitra Morgan and uses 3 L of oxygen via nasal cannula at baseline. Not aware of any fevers. EMS reported the patient was hypoxic at 84% on his home oxygen. He is increasing his oxygen to 4 L via nasal cannula at home. He is vaccinated for COVID-19 by the [address] [place] [address] approximately a month ago. He denies chest pain. Nursing Notes were reviewed and I agree. REVIEW OF SYSTEMS    (2-9 systems for level 4, 10 or more for level 5)     Review of Systems   Constitutional: Negative for fever. Respiratory: Positive for cough and shortness of breath. Cardiovascular: Positive for chest pain and leg swelling. Gastrointestinal: Negative for abdominal pain and vomiting.    Musculoskeletal: Negative for back pain. Skin: Negative for color change and rash. Neurological: Negative for weakness. Psychiatric/Behavioral: Negative for agitation, behavioral problems and confusion. Except as noted above the remainder of the review of systems was reviewed and negative.        PAST MEDICAL HISTORY         Diagnosis Date    Atherosclerosis of aorta ([address]     CHF [facility]congestive heart failure) [facility]     CKD (chronic kidney disease) stage 3, GFR 30-59 ml/min (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 ([address] 2008    Obesity, diabetes, and hypertension syndrome ([address] August, 2015    Psoriasis     Risk for falls     Type 2 diabetes mellitus with microalbuminuria or microproteinuria     Vitamin D deficiency 2011       SURGICAL HISTORY           Procedure Laterality Date    CATARACT REMOVAL Right 7/3/13    Patti Fernando    TURP  8/14/12    Teja       CURRENT MEDICATIONS       Previous Medications    ALBUTEROL (PROVENTIL) (2.5 MG/3ML) 0.083% NEBULIZER SOLUTION    Take 3 mLs by nebulization every 6 hours as needed for Wheezing    ASPIRIN 81 MG EC TABLET    Take 81 mg by mouth nightly     CHOLECALCIFEROL (VITAMIN D-3) 5000 UNITS TABS    Take 5,000 Units by mouth daily     COMPRESSION STOCKINGS MISC    by Does not apply route 15-20 mmHg knee high    DOXAZOSIN (CARDURA) 8 MG TABLET    TAKE 1 TABLET BY MOUTH EVERY NIGHT AT BEDTIME FOR BLOOD PRESSURE    ESCITALOPRAM (LEXAPRO) 10 MG TABLET    Take 1 tablet by mouth daily    FAMOTIDINE (PEPCID) 10 MG TABLET    Take 10 mg by mouth 2 times daily    FLUTICASONE (FLONASE) 50 MCG/ACT NASAL SPRAY    1 spray by Each Nare route daily as needed for Rhinitis    FLUTICASONE FUROATE-VILANTEROL (BREO ELLIPTA) 200-25 MCG/INH AEPB INHALER    Inhale 1 puff into the lungs daily    FUROSEMIDE (LASIX) 40 MG TABLET    TAKE 1 TABLET BY MOUTH  DAILY    GABAPENTIN (NEURONTIN) 300 MG CAPSULE    TAKE 1 CAPSULE BY MOUTH THREE TIMES DAILY    GLIPIZIDE (GLUCOTROL) 10 MG TABLET    TAKE 1 TABLET BY MOUTH DAILY WITH BREAKFAST    ISOSORBIDE MONONITRATE (IMDUR) 30 MG EXTENDED RELEASE TABLET    TAKE 1 TABLET BY MOUTH TWICE DAILY FOR BLOOD PRESSURE    LABETALOL (NORMODYNE) 100 MG TABLET    TAKE 1 TABLET BY MOUTH TWICE DAILY    NEBULIZERS (VIOS LC SPRINT) MISC    USE UTD    NITROGLYCERIN (NITROSTAT) 0.4 MG SL TABLET    Place 1 tablet under the tongue every 5 minutes as needed for Chest pain (chest pain)    POTASSIUM CHLORIDE (KLOR-CON) 10 MEQ EXTENDED RELEASE TABLET    TAKE 1 TABLET BY MOUTH TWICE DAILY    RESPIRATORY THERAPY SUPPLIES (NEBULIZER/TUBING/MOUTHPIECE) KIT    1 kit by Does not apply route daily as needed (sob)    SIMVASTATIN (ZOCOR) 40 MG TABLET    TAKE 1 TABLET BY MOUTH EVERY EVENING    SPACER/AERO-HOLDING CHAMBERS KOSTA    1 Device by Does not apply route daily as needed (with Albuterol inhaler)    TIOTROPIUM (SPIRIVA HANDIHALER) 18 MCG INHALATION CAPSULE    INHALE THE CONTENTS OF 1 CAPSULE VIA INHALATION DEVICE DAILY FOR BREATHING    VENTOLIN  (90 BASE) MCG/ACT INHALER    INHALE 2 PUFFS INTO THE LUNGS EVERY 6 HOURS AS NEEDED FOR WHEEZING       ALLERGIES     Patient has no known allergies. FAMILY HISTORY     History reviewed. No pertinent family history. Family Status   Relation Name Status    Mother      Father      Brother  Alive        SOCIAL HISTORY      reports that he quit smoking about 14 years ago. His smoking use included cigarettes. He started smoking about 70 years ago. He has a 100.00 pack-year smoking history. He has never used smokeless tobacco. He reports current alcohol use of about 7.0 standard drinks of alcohol per week.  He reports that he does not use drugs. PHYSICAL EXAM    (up to 7 for level 4, 8 or more for level 5)     ED Triage Vitals [08/07/21 2214]   BP Temp Temp Source Pulse Resp SpO2 Height Weight   (!) 197/87 99.6 °F (37.6 °C) Oral 100 20 94 % -- 271 lb 6.2 oz (123.1 kg)       Physical Exam  Vitals and nursing note reviewed. Constitutional:       Appearance: He is well-developed. HENT:      Head: Normocephalic and atraumatic. Cardiovascular:      Rate and Rhythm: Normal rate. Pulmonary:      Effort: Pulmonary effort is normal. No respiratory distress. Breath sounds: Wheezing and rales present. Musculoskeletal:      Cervical back: Normal range of motion. Right lower leg: Edema present. Left lower leg: Edema present. Skin:     General: Skin is warm. Neurological:      General: No focal deficit present. Mental Status: He is alert. Psychiatric:         Mood and Affect: Mood normal.         Behavior: Behavior normal.         DIAGNOSTIC RESULTS     EKG: All EKG's are interpreted by JEAN España in the absence of a cardiologist.    EKG interpreted by myself - please refer to attending physician's note for complete EKG interpretation:  No evidence of acute ischemia or injury. RADIOLOGY:   Non-plain film images such as CT, Ultrasound and MRI are read by the radiologist. Plain radiographic images are visualized and preliminarily interpreted by JEAN España with the below findings:    Reviewed radiologist's interpretation. Interpretation per the Radiologist below, if available at the time of this note:    XR SHOULDER RIGHT (MIN 2 VIEWS)   Final Result   Degenerative change in the right shoulder with no acute abnormality. XR CHEST PORTABLE   Final Result   Cardiomegaly with diffuse interstitial changes that suggest underlying   pulmonary edema. A chronic history of interstitial lung disease in the   setting of COPD cannot be excluded.                LABS:  Labs Reviewed   CBC WITH AUTO DIFFERENTIAL - Abnormal; Notable for the following components:       Result Value    RBC 4.19 (*)     Hemoglobin 12.1 (*)     Hematocrit 36.2 (*)     RDW 16.0 (*)     Platelets 148 (*)     Lymphocytes Absolute 0.9 (*)     All other components within normal limits    Narrative:     Performed at:  [address] [facility]   Phone [phone]   COMPREHENSIVE METABOLIC PANEL - Abnormal; Notable for the following components:    Glucose 120 (*)     CREATININE 1.6 (*)     GFR Non- 41 (*)     GFR  50 (*)     ALT 7 (*)     AST 10 (*)     All other components within normal limits    Narrative:     Performed at:  [address] [facility]   Phone [phone]   BRAIN NATRIURETIC PEPTIDE - Abnormal; Notable for the following components:    Pro-BNP 3,065 (*)     All other components within normal limits    Narrative:     Performed at:  [address] [facility]   Phone [phone]   TROPONIN - Abnormal; Notable for the following components:    Troponin 0.05 (*)     All other components within normal limits    Narrative:     Performed at:  [address] [facility]   Phone [phone]   BLOOD GAS, ARTERIAL - Abnormal; Notable for the following components:    pCO2, Arterial 55.5 (*)     pO2, Arterial 50.9 (*)     HCO3, Arterial 34.1 (*)     Base Excess, Arterial 7.7 (*)     Hemoglobin, Art, Extended 12.3 (*)     O2 Sat, Arterial 87.4 (*)     Carboxyhgb, Arterial 2.1 (*)     All other components within normal limits    Narrative:     Performed at:  [address] [facility]   Phone [phone] RAPID    Narrative:     Performed at:  [facility]  [place] [address]

## 2021-08-08 NOTE — ED NOTES
Bed: B-34  Expected date: 8/7/21  Expected time: 10:07 PM  Means of arrival: [facility]  Comments:  naheed Joiner, [address] [facility]  08/07/21 3637

## 2021-08-08 NOTE — PLAN OF CARE
Problem: Falls - Risk of:  Goal: Will remain free from falls  Description: Will remain free from falls  8/8/2021 1626 by Andria Calderon RN  Outcome: Ongoing     Problem: Falls - Risk of:  Goal: Absence of physical injury  Description: Absence of physical injury  8/8/2021 1626 by Andria Calderon RN  Outcome: Ongoing     Problem: Skin Integrity:  Goal: Will show no infection signs and symptoms  Description: Will show no infection signs and symptoms  8/8/2021 1626 by Andria Calderon RN  Outcome: Ongoing     Problem: Skin Integrity:  Goal: Absence of new skin breakdown  Description: Absence of new skin breakdown  8/8/2021 1626 by Andria Calderon RN  Outcome: Ongoing     Problem: OXYGENATION/RESPIRATORY FUNCTION  Goal: Patient will maintain patent airway  8/8/2021 1626 by Andria Calderon RN  Outcome: Ongoing     Problem: FLUID AND ELECTROLYTE IMBALANCE  Goal: Fluid and electrolyte balance are achieved/maintained  8/8/2021 1626 by Andria Calderon RN  Outcome: Ongoing     Problem: ACTIVITY INTOLERANCE/IMPAIRED MOBILITY  Goal: Mobility/activity is maintained at optimum level for patient  8/8/2021 1626 by Andria Calderon RN  Outcome: Ongoing     Problem: Discharge Planning:  Goal: Discharged to appropriate level of care  Description: Discharged to appropriate level of care  8/8/2021 1626 by Andria Calderon RN  Outcome: Ongoing     Problem: Airway Clearance - Ineffective:  Goal: Ability to maintain a clear airway will improve  Description: Ability to maintain a clear airway will improve  8/8/2021 1626 by Andria Calderon RN  Outcome: Ongoing     Problem: Breathing Pattern - Ineffective:  Goal: Ability to achieve and maintain a regular respiratory rate will improve  Description: Ability to achieve and maintain a regular respiratory rate will improve  8/8/2021 1626 by Andria Calderon RN  Outcome: Ongoing     Problem: Gas Exchange - Impaired:  Goal: Levels of oxygenation will improve  Description: Levels of oxygenation will improve  8/8/2021 1626 by Tatyana Moore RN  Outcome: Ongoing     Problem: Pain:  Goal: Pain level will decrease  Description: Pain level will decrease  Outcome: Ongoing     Problem: Pain:  Goal: Control of acute pain  Description: Control of acute pain  Outcome: Ongoing     Problem: Pain:  Goal: Control of chronic pain  Description: Control of chronic pain  Outcome: Ongoing

## 2021-08-08 NOTE — CONSULTS
[address]  Cardiology Consult    Mac Mendoza  1935 August 8, 2021      Reason for Referral: Shortness of breath    CC: Shortness of breath      Subjective:     History of Present Illness:    Mac Mendoza is a 81 y.o. patient with a PMH significant for hypertension presented with complaints of shortness of breath. Lives at home got short of breath for past 2 to 3 days. Has history of COPD also. Shortness of breath made worse with exertion partially relieved at rest going on for past 3 to 4 days. Has some weight gain recently. Lower extremity edema mild. No chest pain palpitation diaphoresis or loss of consciousness reported by the patient. Past Medical History:   has a past medical history of Atherosclerosis of aorta (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 Surgical History:   has a past surgical history that includes TURP (8/14/12) and Cataract removal (Right, 7/3/13). Social History:   reports that he quit smoking about 14 years ago. His smoking use included cigarettes. He started smoking about 70 years ago. He has a 100.00 pack-year smoking history. He has never used smokeless tobacco. He reports current alcohol use of about 7.0 standard drinks of alcohol per week. He reports that he does not use drugs. Family History:  family history is not on file.     Home Medications:  Were reviewed and are listed in nursing record and/or below  Prior to Admission medications    Medication Sig Start Date End Date Taking?  Authorizing Provider   furosemide (LASIX) 40 MG tablet TAKE 1 TABLET BY MOUTH  DAILY 7/9/21  Yes Lynsey Loredo MD   doxazosin (CARDURA) 8 MG tablet TAKE 1 TABLET BY MOUTH EVERY NIGHT AT BEDTIME FOR BLOOD PRESSURE 7/9/21  Yes Lynsey Loredo MD   labetalol (NORMODYNE) 100 MG tablet TAKE 1 TABLET BY MOUTH TWICE DAILY 7/9/21  Yes Lynsey Loredo MD   escitalopram (LEXAPRO) 10 MG tablet Take 1 tablet by mouth daily 7/9/21  Yes Lynsey Loredo MD   simvastatin (ZOCOR) 40 MG tablet TAKE 1 TABLET BY MOUTH EVERY EVENING 7/9/21  Yes Lynsey Loredo MD   glipiZIDE (GLUCOTROL) 10 MG tablet TAKE 1 TABLET BY MOUTH DAILY WITH BREAKFAST 7/9/21  Yes Lynsey Loredo MD   gabapentin (NEURONTIN) 300 MG capsule TAKE 1 CAPSULE BY MOUTH THREE TIMES DAILY 5/6/21 8/8/21 Yes Lynsey Loredo MD   isosorbide mononitrate (IMDUR) 30 MG extended release tablet TAKE 1 TABLET BY MOUTH TWICE DAILY FOR BLOOD PRESSURE 3/18/21  Yes Lynsey Loredo MD   Fluticasone furoate-vilanterol (BREO ELLIPTA) 200-25 MCG/INH AEPB inhaler Inhale 1 puff into the lungs daily 2/16/21  Yes Venancio Cho MD   aspirin 81 MG EC tablet Take 81 mg by mouth nightly    Yes Historical Provider, MD   potassium chloride (KLOR-CON) 10 MEQ extended release tablet TAKE 1 TABLET BY MOUTH TWICE DAILY  Patient not taking: Reported on 7/9/2021 4/21/21   Lynsey Loredo MD   tiotropium (Madison Kandace) 18 MCG inhalation capsule INHALE THE CONTENTS OF 1 CAPSULE VIA INHALATION DEVICE DAILY FOR BREATHING 6/24/19   Venancio Cho MD   fluticasone (FLONASE) 50 MCG/ACT nasal spray 1 spray by Each Nare route daily as needed for Rhinitis    Historical Provider, MD   famotidine (PEPCID) 10 MG tablet Take 10 mg by mouth 2 times daily    Historical Provider, MD   VENTOLIN  (90 Base) MCG/ACT inhaler INHALE 2 PUFFS INTO THE LUNGS EVERY 6 HOURS AS NEEDED FOR WHEEZING 3/26/19   Venancio Cho MD   Nebulizers (VIOS LC SPRINT) MISC unanticipated weight loss. There's been no change in energy level, sleep pattern, or activity level. No fevers, chills. · Eyes: No visual changes or diplopia. No scleral icterus. · ENT: No Headaches, hearing loss or vertigo. No mouth sores or sore throat. · Cardiovascular: No Chest pain, tightness or discomfort.  Shortness of breath. No Dyspnea on exertion, Orthopnea, Paroxysmal nocturnal dyspnea or breathlessness at rest.   No Palpitations.  No Syncope ('blackouts', 'faints', 'collapse') or dizziness. · Respiratory: No cough or wheezing, no sputum production. No hematemesis. · Gastrointestinal: No abdominal pain, appetite loss, blood in stools. No change in bowel or bladder habits. · Genitourinary: No dysuria, trouble voiding, or hematuria. · Musculoskeletal:  No gait disturbance, no joint complaints. · Integumentary: No rash or pruritis. · Neurological: No headache, diplopia, change in muscle strength, numbness or tingling. · Psychiatric: No anxiety or depression. · Endocrine: No temperature intolerance. No excessive thirst, fluid intake, or urination. No tremor. · Hematologic/Lymphatic: No abnormal bruising or bleeding, blood clots or swollen lymph nodes. · Allergic/Immunologic: No nasal congestion or hives. Objective:     PHYSICAL EXAM:      Vitals:    08/08/21 0759   BP: (!) 192/96   Pulse: 92   Resp: 20   Temp: 98.5 °F (36.9 °C)   SpO2: 96%    Weight: 266 lb 8.6 oz (120.9 kg)       General Appearance:  Alert, cooperative, no distress, appears stated age. Head:  Normocephalic, without obvious abnormality, atraumatic. Eyes:  Pupils equal and round. No scleral icterus. Mouth: Moist mucosa, no pharyngeal erythema. Nose: Nares normal. No drainage or sinus tenderness. Neck: Supple, symmetrical, trachea midline. No adenopathy. No tenderness/mass/nodules. No carotid bruit or elevated JVD.    Lungs:   Respiratory Effort: Normal   Auscultation: Clear to auscultation bilaterally, respirations unlabored. No wheeze, rales   Chest Wall:  No tenderness or deformity. Cardiovascular:    Pulses  Palpation: normal   Ascultation: Regular rate, S1/ S2 normal. No murmur, rub, or gallop. 2+ radial and pedal pulses, symmetric  Carotid  Femoral   Abdomen and Gastrointestinal:   Soft, non-tender, bowel sounds active. Liver and Spleen  Masses   Musculoskeletal: No muscle wasting  Back  Gait   Extremities: Extremities normal, atraumatic. No cyanosis or edema. No cyanosis clubbing       Skin: Inspection and palpation performed, no rashes or lesions. Pysch: Normal mood and affect. Alert and oriented to time place person   Neurologic: Normal gross motor and sensory exam.       Labs     All labs have been reviewed    Lab Results   Component Value Date    WBC 7.5 08/07/2021    RBC 4.19 08/07/2021    HGB 12.1 08/07/2021    HCT 36.2 08/07/2021    MCV 86.2 08/07/2021    RDW 16.0 08/07/2021     08/07/2021     Lab Results   Component Value Date     08/08/2021    K 3.9 08/08/2021    K 4.0 11/30/2019     08/08/2021    CO2 30 08/08/2021    BUN 16 08/08/2021    CREATININE 1.3 08/08/2021    GFRAA >60 08/08/2021    GFRAA >60 04/15/2013    AGRATIO 1.2 08/07/2021    LABGLOM 52 08/08/2021    GLUCOSE 107 08/08/2021    PROT 6.8 08/07/2021    PROT 7.1 01/15/2013    CALCIUM 8.9 08/08/2021    BILITOT 0.4 08/07/2021    ALKPHOS 60 08/07/2021    AST 10 08/07/2021    ALT 7 08/07/2021     No results found for: PTINR  Lab Results   Component Value Date    LABA1C 6.0 07/09/2021     Lab Results   Component Value Date    CKTOTAL 176 08/28/2016    TROPONINI 0.05 (H) 08/07/2021       Cardiac, Vascular and Imaging Data all Personally Reviewed in Detail by Myself      EKG: Sinus rhythm with 1st degree A-V blockRight bundle branch blockLeft anterior fascicular block Bifascicular block     Echocardiogram: Normal left ventricle size and systolic function with an estimated ejection   fraction of 55-60%.  No regional wall motion abnormalities are seen. There is   mildly increased left ventricular wall thickness. The right ventricle appears mildly dilated with normal systolic function. The left and right atria appear moderately dilated. Mild mitral regurgitation. Trivial tricuspid regurgitation. The ascending aorta is mildly dilated measuring 3.6 cm. Stress Test:     Cath: Other imaging:     Chest x-ray shows low penetration with possible cardiomegaly but this was a portable chest x-ray. Assessment and Plan     -Acute hypoxemic on chronic respiratory failure. Possible acute diastolic heart failure but will need an echocardiogram to define ejection fraction. Continue oxygen support. Consult pulmonary. Await a new echocardiogram.  Repeat chest x-ray we will review it. Consider CT chest.    -COPD consider pulmonary consultation. Thank you for allowing us to participate in the care of Nela Patino. Please do not hesitate to contact me if you have any questions.     Harleen Chawla MD, MPH    [facility], [facility] [address]  Ph: [phone]  Fax: [phone]    8/8/2021 8:54 AM

## 2021-08-08 NOTE — ED NOTES
[facility] Patient to be transported to Room 5265 via stretcher by RN. Patient transported with bedside cardiac monitor and with IV medications infusing. IV site clean, dry, and intact. MEWS score and pain assessed as 6 out of 10 and documented. Updated patient on plan of care.      Dillon Lopez RN  08/08/21 6032

## 2021-08-09 NOTE — PROGRESS NOTES
Physical Therapy    Facility/Department: [address] PROGRESSIVE CARE  Initial Assessment/Treatment Session    NAME: Dano Naylor  : 1935  MRN: [id number]    Date of Service: 2021    Discharge Recommendations:  Patient would benefit from continued therapy after discharge, 3-5 sessions per week   PT Equipment Recommendations  Equipment Needed: No    Assessment   Body structures, Functions, Activity limitations: Decreased functional mobility ; Increased pain;Decreased ROM; Decreased strength;Decreased balance;Decreased posture;Decreased endurance;Decreased ADL status  Assessment: Pt is an 81 y.o. M. admitted  for acute on chronic respiratory failure, COPD. He presents mildly SOB on 5 L. O2 (uses 3 L. at baseline), and c/o severe pain in (B) feet d/t neuropathy. PT noted mild-moderate LE weakness, and pt required Mod A  to stand to walker. He was unable to ambulate d/t weakness and pain, and even struggled to complete transfer to stedy. He would benefit from continued therapy to improve his strength, and gradually progress EOB/OOB activity. Recommend low-moderate frequency therapy upon D/C. Dano Naylor scored a  on the AM-PAC short mobility form. Current research shows that an AM-PAC score of 17 or less is typically not associated with a discharge to the patient's home setting. Based on the patient's AM-PAC score and their current functional mobility deficits, it is recommended that the patient have 3-5 sessions per week of Physical Therapy at d/c to increase the patient's independence. Please see assessment section for further patient specific details. If patient discharges prior to next session this note will serve as a discharge summary. Please see below for the latest assessment towards goals.      Specific instructions for Next Treatment: Improve strength; ambulate when able  Prognosis: Fair  Decision Making: Medium Complexity  History: See below  Exam: Strength; ROM; Balance; Transfers  Clinical Presentation: Evolving  PT Education: Goals; General Safety;Gait Training;PT Role;Orientation;Plan of Care; Functional Mobility Training;Equipment;Transfer Training  Barriers to Learning: Pain; SOB  REQUIRES PT FOLLOW UP: Yes  Activity Tolerance  Activity Tolerance: Patient limited by fatigue;Patient limited by endurance; Patient limited by pain       Patient Diagnosis(es): The primary encounter diagnosis was Acute on chronic respiratory failure with hypoxia ([facility] [address] A diagnosis of Acute congestive heart failure, unspecified heart failure type [facility]) was also pertinent to this visit. 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 has a past surgical history that includes TURP (8/14/12) and Cataract removal (Right, 7/3/13). Restrictions  Restrictions/Precautions  Restrictions/Precautions: Fall Risk  Position Activity Restriction  Other position/activity restrictions: 5 L. O2 per nc (uses 3 L. at baseline)     Vision/Hearing  Vision: Impaired  Vision Exceptions: Wears glasses for reading  Hearing: Within functional limits       Subjective  General  Chart Reviewed: Yes  Additional Pertinent Hx: Per Dr. Flor Lopez, \"This is a 81 y.o. male who presented to the ED on 8/7 with a CC of worsening SOB for the past week. Uses 3 liters at baseline. He was increasing oxygen at home with some help. Also gained 10 lbs with more orthopnea.   He denies feeling sick. Says he is urinating a lot and feeling better. He uses Breo, Spiriva and nebs at home. He stopped using CPAP machine in the past for unknown reasons. \"  Response To Previous Treatment: Not applicable  Referring Practitioner: Dr. Sarah Roblero  Referral Date : 08/09/21  Diagnosis: Acute on Chronic Respiratory Failure; COPD  Follows Commands: Within Functional Limits  Subjective  Subjective: Pt c/o neuropathy in (B) LEs. Agreeable to evaluation. Pain Screening  Patient Currently in Pain: Denies    Orientation  Orientation  Overall Orientation Status: Within Normal Limits     Social/Functional History  Social/Functional History  Lives With: Spouse  Type of Home: Apartment ([facility])  Home Layout: One level  Home Access: Stairs to enter without rails  Entrance Stairs - Number of Steps: 2  Bathroom Shower/Tub: Tub/Shower unit, Walk-in shower  Bathroom Toilet: Handicap height  Bathroom Equipment: Grab bars around toilet  Home Equipment: [employer], Electric scooter, 4 wheeled walker  ADL Assistance: Needs assistance (Wife assists with sponge bathing. Pt is independent with toileting, and dressing.)  Homemaking Assistance:  (Wife cooks and cleans)  Ambulation Assistance: Independent (Uses 4WW at all times)  Transfer Assistance: Independent  Active : No  Patient's  Info: wife, Osiel Jacobsen drives patient  Additional Comments: Pt reports a fall 1 month ago. Objective    AROM RLE (degrees)  RLE AROM: WFL  RLE General AROM: Hip Flex mildly limited,  Knee Flex/Ext WFL. Ankle PF/DF WFL  AROM LLE (degrees)  LLE General AROM: Hip Flex severely limited; Knee Flex/Ext mildly limited.   Ankle PF/DF WFL  AROM RUE (degrees)  RUE AROM : WFL  AROM LUE (degrees)  LUE General AROM: Shoulder Flex severely limited,  Elbow Flex/Ext WFL     Strength RLE  Strength RLE: Exception  Comment: hip Flex 3-/5, Knee Flex/Ext 3+/5, Ankle PF/DF 3/5  Strength LLE  Strength LLE: Exception  Comment: Hip Flex 2-/5, Knee Flex/Ext 3-/5, Ankle PF/DF 3/5  Strength RUE  Strength RUE: WFL  Comment: Shoulder Flex, Elbow Flex/Ext grossly 3+/5  Strength LUE  Comment: Shoulder Flex 2-/5, Elbow Flex/Ext 3/5     Bed mobility  Supine to Sit: Minimal assistance     Transfers  Sit to Stand: Moderate Assistance (Mod A to RW; Mod A to stedy from elevated EOB)  Stand to sit: Minimal Assistance  Bed to Chair: Dependent/Total (Using stedy)     Ambulation  Ambulation?: No     Balance  Comments: Pt able to maintain sitting balance at EOB with supervision. He required Min-Mod A to maintain standing balance at walker. Plan   Plan  Times per week: 2-3x  Specific instructions for Next Treatment: Improve strength; ambulate when able  Current Treatment Recommendations: Strengthening, ROM, Balance Training, Endurance Training, Functional Mobility Training, Transfer Training, Gait Training, Stair training, Equipment Evaluation, Education, & procurement, Patient/Caregiver Education & Training, Safety Education & Training, Neuromuscular Re-education, Home Exercise Program  Safety Devices  Type of devices: All fall risk precautions in place, Call light within reach, Chair alarm in place, Gait belt, Left in chair, Nurse notified  Restraints  Initially in place: No    AM-PAC Score  AM-PAC Inpatient Mobility Raw Score : 11 (08/09/21 1411)  AM-PAC Inpatient T-Scale Score : 33.86 (08/09/21 1411)  Mobility Inpatient CMS 0-100% Score: 72.57 (08/09/21 1411)  Mobility Inpatient CMS G-Code Modifier : CL (08/09/21 1411)          Goals  Short term goals  Time Frame for Short term goals: In 2-3 days pt will perform  Short term goal 1: Bed mobility CGA  Short term goal 2: Transfer to walker Min A  Short term goal 3: Ambulation 8' with walker, Min A  Long term goals  Time Frame for Long term goals : LTG = STG  Patient Goals   Patient goals :  To get stronger       Therapy Time   Individual Concurrent Group Co-treatment   Time In 1330         Time Out 1410         Minutes 40 Timed Code Treatment Minutes: 25 Minutes   Evaluation time: 15 min.      Mabel Cline PT    Electronically signed by Mabel Cline PT [id number] on 8/9/2021 at 2:18 PM

## 2021-08-09 NOTE — CONSULTS
PATIENT IS SEEN AT THE REQUEST OF DR. Hanson for acute on chronic hypoxia, please confirm etiology    CONSULTING PHYSICIAN: Margie    HISTORY OF PRESENT ILLNESS:  This is a 81 y.o. male who presented to the ED on  with a CC of worsening SOB for the past week. Uses 3 liters at baseline. He was increasing oxygen at home with some help. Also gained 10 lbs with more orthopnea. He denies feeling sick. Says he is urinating a lot and feeling better. He uses Breo, Spiriva and nebs at home. He stopped using CPAP machine in the past for unknown reasons. 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     Fall 2015    Fatty liver     Hypertension     Mixed hyperlipidemia     Morbid obesity ([address]     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 His smoking use included cigarettes. He started smoking about 70 years ago. He has a 100.00 pack-year smoking history.  He has never used smokeless tobacco.   7 alcohol per week   Used to be a      Scheduled Meds:   budesonide-formoterol  2 puff Inhalation BID    isosorbide mononitrate  30 mg Oral Daily    labetalol  100 mg Oral BID    atorvastatin  40 mg Oral Daily    tiotropium  2 puff Inhalation Daily    sodium chloride flush  5-40 mL Intravenous 2 times per day    enoxaparin  40 mg Subcutaneous Daily    furosemide  40 mg Intravenous BID    glipiZIDE  10 mg Oral QAM AC    gabapentin  300 mg Oral TID    escitalopram  10 mg Oral Daily    doxazosin  8 mg Oral Daily       Continuous Infusions:   sodium chloride         PRN Meds:  sodium chloride flush, sodium chloride, ondansetron **OR** ondansetron, perflutren lipid microspheres, potassium chloride, magnesium sulfate, albuterol, morphine, acetaminophen, hydrALAZINE    ALLERGIES:  Patient has No Known Allergies. REVIEW OF SYSTEMS:  Constitutional: Weight gain   HENT: Negative for sore throat  Eyes: Negative for redness   Respiratory: more SOB, more use of oxygen   Cardiovascular: Orthopnea   Gastrointestinal: Negative for vomiting, diarrhea   Genitourinary: Negative for hematuria, negative for dysuria  Musculoskeletal: Negative for arthralgias   Skin: Negative for rash  Neurological: Negative for syncope  Hematological: Negative for adenopathy  Extremities:  Negative for swelling    PHYSICAL EXAM:  Blood pressure 127/77, pulse 90, temperature 99.2 °F (37.3 °C), temperature source Oral, resp. rate 20, height 5' 10\" (1.778 m), weight 275 lb 5.7 oz (124.9 kg), SpO2 91 %.'  Gen: Obese, chronically ill   Eyes: PERRL. No sclera icterus. No conjunctival injection. ENT: No discharge. Pharynx clear. Mallampati IV   Neck: Trachea midline. No obvious mass. Resp: Poor air movement with slight expiratory wheezes   CV: Regular rate. Regular rhythm. No murmur or rub. GI: Large abdomen   Skin: Warm and dry. No nodule on exposed extremities. Lymph: No cervical LAD. No supraclavicular LAD. M/S: No cyanosis. No joint deformity. Neuro: Awake. Alert. Moves all four extremities.    EXT:   trace edema, no clubbing    LABS:  CBC:   Recent Labs     21   WBC 7.5   HGB 12.1*   HCT 36.2*   MCV 86.2   *     BMP:   Recent Labs     21  0544 21  0525    141 142   K 4.3 3.9 4.2    100 98*   CO2 32 30 31   BUN 16 16 23*   CREATININE 1.6* 1.3 1.4*     LIVER PROFILE:   Recent Labs     21   AST 10*   ALT 7*   BILITOT 0.4   ALKPHOS 60     PT/INR: No results for input(s): PROTIME, INR in the last 72 hours. APTT: No results for input(s): APTT in the last 72 hours. UA:No results for input(s): NITRITE, COLORU, PHUR, LABCAST, WBCUA, RBCUA, MUCUS, TRICHOMONAS, YEAST, BACTERIA, CLARITYU, SPECGRAV, LEUKOCYTESUR, UROBILINOGEN, BILIRUBINUR, BLOODU, GLUCOSEU, AMORPHOUS in the last 72 hours. Invalid input(s): Olivia Carrillo  Recent Labs     21  0046   PHART 7.397 7.410   CJO3XQL 55.5* 55.8*   PO2ART 50.9* 63.5*       Cultures:  None    PFTs:  Moderately-severe obstruction        ECHO:   Pending    AB    Chest X-ray:  Chest imaging was reviewed by me and showed vascular congestion possible LLL effusion vs atelectasis       I reviewed all the above labs and studies pertaining to this visit. ASSESSMENT/PLAN:  · Acute on Chronic Hypoxic Respiratory Failure  Titrate oxygen for saturations greater than or equal to 90%  · PRN bpap if he runs into trouble   · Abnormal CXR with findings suggesting edema  · Recommend diuresis as is  · ECHO pending   · Weight up 10 lbs from last month   · Severe COPD at baseline without apparent exacerbation   · Symbicort as substitute for Breo  · Continue with Spiriva  · Add scheduled albuterol q 8 hours   · ROSENDO, untreated  · He no longer uses CPAP  · Consider using it if he runs into more orthopnea   · Pleural plaque in the LLL  · BMI 39       DVT prophylaxis  Lovenox     Weight gain of 10 lbs and CXR with edema suggests fluid is the main issue.   He will retain fluid with gabapentin too     Tucson Ramming, [address] Pulmonary

## 2021-08-09 NOTE — RT PROTOCOL NOTE
RT Nebulizer Bronchodilator Protocol Note    There is a bronchodilator order in the chart from a provider indicating to follow the RT Bronchodilator Protocol and there is an Initiate RT Bronchodilator Protocol order as well (see protocol at bottom of note). The findings from the last RT Protocol Assessment were as follows:  Smoking: >15 Pack years  Surgical Status: No surgery  Xray: Multiple lobe infiltrates/atelectasis/pleural effusion/edema  Respiratory Pattern: RR 12-20  Mental Status: Alert and Oriented  Breath Sounds: Diminished and/or crackles  Cough: Weak, non-productive  Activity Level: Bedridden, unresponsive or quadriplegic  Oxygen Requirement: 29% or 3LNC - 5LNC/40%  Indication for Bronchodilator Therapy: Decreased or absent breath sounds  Bronchodilator Assessment Score: 9    Aerosolized bronchodilator medication orders have been revised according to the RT Bronchodilator Protocol. RT Bronchodilator Protocol:    Respiratory Therapist to perform RT Therapy Protocol Assessment then follow the protocol. No Indications - adjust the frequency to every 6 hours PRN wheezing or bronchospasm, if no treatments needed after 48 hours then discontinue using Per Protocol order mode. If indication present, adjust the RT bronchodilator orders based on the Bronchodilator Assessment Score as follows:    0-6 - enter or revise RT Bronchodilator order to Albuterol Nebulizer order with frequency of every 2 hours PRN for wheezing or increased work of breathing using Per Protocol order mode. Repeat RT Therapy Protocol Assessment as needed. 7-10 - discontinue any other Inpatient aerosolized bronchodilator medication orders and enter or revise two Albuterol Nebulizer orders, one with BID frequency and one with frequency of every 2 hours PRN wheezing or increased work of breathing using Per Protocol order mode. Repeat RT Therapy Protocol Assessment with second treatment then BID and as needed.       11-13 - discontinue any other Inpatient aerosolized bronchodilator medication orders and enter DuoNeb Nebulizer order with QID frequency and an Albuterol Nebulizer order with frequency of every 2 hours PRN wheezing or increased work of breathing using Per Protocol order mode. Repeat RT Therapy Protocol Assessment with second treatment then QID and as needed. Greater than 13 - discontinue any other Inpatient aerosolized bronchodilator medication orders and enter a DuoNeb Nebulizer order with every 4 hours frequency and an Albuterol Nebulizer order with frequency of every 2 hours PRN wheezing or increased work of breathing using Per Protocol order mode. Repeat RT Therapy Protocol Assessment with second treatment then every 4 hours and as needed. RT to enter RT Home Evaluation for COPD & MDI Assessment order using Per Protocol order mode.     Electronically signed by Diann Chamorro RCP on 8/9/2021 at 4:13 PM

## 2021-08-09 NOTE — CONSULTS
NOTE IS FOR TEACHING PURPOSES ONLY. SEE FULL NOTE BY DO Eugenia Kruse Che Pulmonary, Sleep and Critical Care  [phone]         PATIENT IS SEEN AT THE REQUEST OF DR. Orlin Inman for pulmonology consult. CONSULTING PHYSICIAN: Dr. Jimmy Brown:  This is a 81 y.o. male who presented to the ED on 8/7/21 with a CC of shortness of breath. Patient reports worsening shortness of breath for about 1 week prior to ED arrival. He is on home O2 3L NC at baseline. He states he has been increasing his oxygen to 4L for the past week, which made some improvement in his shortness of breath. He also reports ~10 lb weight gain within the past 1-2 weeks and orthopnea. He denies lower extremity swelling. He states his wife organizes his medications and is unsure if he has been taking Lasix as prescribed. He has CPAP at home, and does not use it regularly. He has not used it within the past two weeks. He does not feel receiving Lasix yesterday has made an improvement in his breathing. Denies chest pain, abdominal pain, fevers, chills or any other concerns at this time.      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 2008    Fall 9/2015    Fatty liver     Hypertension     Mixed hyperlipidemia     Morbid obesity ([address]     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 Mother passed at age 80, due to pneumonia. SOCIAL HISTORY:  Quit smoking in 2005 (16 years ago). He has a 100.0 pack-year smoking history. He has never used smokeless tobacco.  He used to work as a , unknown asbestos exposure. Scheduled Meds:   budesonide-formoterol  2 puff Inhalation BID    isosorbide mononitrate  30 mg Oral Daily    labetalol  100 mg Oral BID    atorvastatin  40 mg Oral Daily    tiotropium  2 puff Inhalation Daily    sodium chloride flush  5-40 mL Intravenous 2 times per day    enoxaparin  40 mg Subcutaneous Daily    furosemide  40 mg Intravenous BID    glipiZIDE  10 mg Oral QAM AC    gabapentin  300 mg Oral TID    escitalopram  10 mg Oral Daily    doxazosin  8 mg Oral Daily       Continuous Infusions:   sodium chloride         PRN Meds:  sodium chloride flush, sodium chloride, ondansetron **OR** ondansetron, perflutren lipid microspheres, potassium chloride, magnesium sulfate, albuterol, morphine, acetaminophen, hydrALAZINE    ALLERGIES:  Patient has No Known Allergies. REVIEW OF SYSTEMS:  Constitutional: weight gain   HENT: Negative for sore throat  Eyes: Negative for redness   Respiratory: positive for dyspnea, orthopnea, using more oxygen at home   Cardiovascular: Negative for chest pain  Gastrointestinal: Negative for vomiting, diarrhea   Genitourinary: Negative for hematuria, negative for dysuria  Musculoskeletal: Negative for arthralgias   Skin: Negative for rash  Neurological: Negative for syncope  Hematological: Negative for adenopathy  Extremities:  Negative for swelling    PHYSICAL EXAM:  Blood pressure 127/77, pulse 90, temperature 99.2 °F (37.3 °C), temperature source Oral, resp.  rate 20, height 5' 10\" (1.778 m), weight 275 lb 5.7 oz (124.9 kg), SpO2 91 %.'  Gen: Mild distress  Eyes: PERRL. No sclera icterus. No conjunctival injection. ENT: No discharge. Pharynx clear. Neck: No obvious mass. Resp: Tachypnea. No crackles. No wheezes. No rhonchi. CV: Regular rate. Regular rhythm. No murmur or rub. GI: Mild distension. Non-tender. No hernia. BS present. Skin: Warm and dry. No nodule on exposed extremities. Lymph: No cervical LAD. No supraclavicular LAD. M/S: No cyanosis. No joint deformity. Neuro: Awake. Alert. Moves all four extremities. EXT:   no edema, no clubbing    LABS:  CBC:   Recent Labs     21   WBC 7.5   HGB 12.1*   HCT 36.2*   MCV 86.2   *     BMP:   Recent Labs     21  22321  0544 21  0525    141 142   K 4.3 3.9 4.2    100 98*   CO2 32 30 31   BUN 16 16 23*   CREATININE 1.6* 1.3 1.4*     LIVER PROFILE:   Recent Labs     21   AST 10*   ALT 7*   BILITOT 0.4   ALKPHOS 60     PT/INR: No results for input(s): PROTIME, INR in the last 72 hours. APTT: No results for input(s): APTT in the last 72 hours. UA:No results for input(s): NITRITE, COLORU, PHUR, LABCAST, WBCUA, RBCUA, MUCUS, TRICHOMONAS, YEAST, BACTERIA, CLARITYU, SPECGRAV, LEUKOCYTESUR, UROBILINOGEN, BILIRUBINUR, BLOODU, GLUCOSEU, AMORPHOUS in the last 72 hours. Invalid input(s): Addi Brine  Recent Labs     21  2247 21  0046   PHART 7.397 7.410   DLX4FRJ 55.5* 55.8*   PO2ART 50.9* 63.5*          ECHO: pending     AB.410/55.8/63.5    I/O's: Net positive 350 mL     Chest X-ray:  Chest imaging was reviewed by me and showed enlarged cardiac silhouette. Bilateral pleural effusions and bibasilar volume loss, left greater than right. XR Right Shoulder:  Degenerative changes in right shoulder with no acute abnormality      I reviewed all the above labs and studies pertaining to this visit. ASSESSMENT/PLAN:    1. Acute on chronic hypoxic respiratory failure  -AB/410/55.8/63.5  -He wears 3L NC at home.  Worsening shortness of breath for 1 week   -He is on 5L with SpO2 of 91    2. Severe COPD at baseline, not ready to say exacerbation   -Bronchodilators:  Symbicort, spirva, albuterol    3. Abnormal CXR with pulmonary edema   -Patient reports orthopnea and recent 10 lb weight gain   -Pro-BNP elevated at 3,065   -CXR indicated enlarged cardiac silhouette, with bilateral effusions, L greater than R  -Pending ECHO  -Continue Lasix 40mg IV BID  -Cardiology following    3. Obstructive sleep apnea  -Has CPAP at home. Does not regularly use CPAP and has not used it within the past two weeks  -Could also contribute to worsening shortness of breath   -may benefit from using it here, should he worsen    DVT prophylaxis: Lovenox    I have seen and examined the above patient. I have personally staffed the case with Student Doctor Hiral Voss.   Please my separate note and/or additions to current note for my full assessment/plan    DO Luis Espinoza Pulmonary, Sleep and Critical Care  [phone]      DO Luis Espinoza Pulmonary

## 2021-08-09 NOTE — PROGRESS NOTES
(124.9 kg)   SpO2 93%   BMI 39.51 kg/m²         Intake/Output Summary (Last 24 hours) at 8/9/2021 1136  Last data filed at 8/9/2021 0950  Gross per 24 hour   Intake 850 ml   Output 500 ml   Net 350 ml     I/O since adm: +350- incontinent    WEIGHT:Admit Weight: 271 lb 6.2 oz (123.1 kg)         Today  Weight: 275 lb 5.7 oz (124.9 kg)   DRY WEIGHT:  Wt Readings from Last 3 Encounters:   08/09/21 275 lb 5.7 oz (124.9 kg)   07/09/21 265 lb (120.2 kg)   01/14/21 260 lb 2.3 oz (118 kg)       Physical Exam:  GEN: Appears obese, no acute distress  SKIN: Pink, warm, dry. Nails without clubbing. HEENT: PERRLA. Normocephalic, atraumatic. Neck supple. No adenopathy. LUNG: AP diameter normal. Crackles bilateral bases. Few exp wheeze and ronchi. Respiratory effort increased. HEART: S1S2 A/R. No JVD. No carotid bruit. No murmur, rub or gallop. ABD: Soft, nontender. +BS X 4 quads. No hepatomegaly. EXT: Radial and pedal pulses 2+ and symmetric. Without varicosities. 1+ pedal to knee edema. MUSCSKEL: Good ROM X4 extremities. No deformity. NEURO: A/O X3. Calm and cooperative. Telemetry: NSR HR 83. Medications:    albuterol  2.5 mg Nebulization Q8H    budesonide-formoterol  2 puff Inhalation BID    isosorbide mononitrate  30 mg Oral Daily    labetalol  100 mg Oral BID    atorvastatin  40 mg Oral Daily    tiotropium  2 puff Inhalation Daily    sodium chloride flush  5-40 mL Intravenous 2 times per day    enoxaparin  40 mg Subcutaneous Daily    furosemide  40 mg Intravenous BID    glipiZIDE  10 mg Oral QAM AC    gabapentin  300 mg Oral TID    escitalopram  10 mg Oral Daily    doxazosin  8 mg Oral Daily      sodium chloride       sodium chloride flush, sodium chloride, ondansetron **OR** ondansetron, perflutren lipid microspheres, potassium chloride, magnesium sulfate, albuterol, morphine, acetaminophen, hydrALAZINE    Lab Data: I have reviewed all labs below today.    CBC:   Recent Labs     08/07/21  2239 WBC 7.5   HGB 12.1*   HCT 36.2*   MCV 86.2   *     BMP:   Recent Labs     08/07/21  2230 08/08/21  0544 08/09/21  0525    141 142   K 4.3 3.9 4.2    100 98*   CO2 32 30 31   BUN 16 16 23*   CREATININE 1.6* 1.3 1.4*     GLUCOSE:   Recent Labs     08/07/21  2230 08/08/21  0544 08/09/21  0525   GLUCOSE 120* 107* 115*     LIVER PROFILE:   Lab Results   Component Value Date    AST 10 08/07/2021    ALT 7 08/07/2021    LIPASE 47.0 10/30/2020    LABALBU 3.7 08/07/2021    BILIDIR <0.2 06/12/2020    BILITOT 0.4 08/07/2021    ALKPHOS 60 08/07/2021     PT/INR:   Lab Results   Component Value Date    PROTIME 12.3 06/13/2018    INR 1.08 06/13/2018     APTT:   Lab Results   Component Value Date    APTT 32.4 03/06/2018     Pro-BNP:    Lab Results   Component Value Date    PROBNP 3,065 08/07/2021    PROBNP 1,263 01/09/2020    PROBNP 1,541 11/29/2019     Parameters:   > 450 pg/mL under age 48  > 900 pg/mL ages 54-65  > 1800 pg/mL over age 76    ENZYMES:  Lab Results   Component Value Date    CKTOTAL 176 08/28/2016    TROPONINI 0.05 08/07/2021     FASTING LIPID PANEL:  Lab Results   Component Value Date    CHOL 126 07/09/2021    HDL 34 07/09/2021    HDL 52 12/02/2011    LDLCALC 66 07/09/2021    TRIG 131 07/09/2021       Diagnostics:    EKG: NSR LAFB, incomplete RBB    ECHO:    8/9/2021   Summary   Ejection fraction is visually estimated to be 55-60%. Grade I diastolic dysfunction with elevated LV filling pressures. Mildly dilated right ventricle. Right ventricular systolic function is normal   No significant valvular heart disease    5/15/2019 Echocardiogram: Normal left ventricle size and systolic function with an estimated ejection   fraction of 55-60%. No regional wall motion abnormalities are seen.  There is   mildly increased left ventricular wall thickness.   The right ventricle appears mildly dilated with normal systolic function.   The left and right atria appear moderately dilated.   Mild mitral regurgitation.   Trivial tricuspid regurgitation.   The ascending aorta is mildly dilated measuring 3.6 cm.       CXR 8/8/2021  Impression   Stable findings most consistent with CHF.  Interstitial edema with bilateral   pleural effusions and left basilar volume loss. Assessment/Plan:  1.) Acute on chronic diastolic heart failure: EF 55-60%. He appears to be hypervolemic- SOB, ?wt gain, abd distension, edema although BUN/Creat slightly - may be cardiorenal. CXR with pulm edema. NYHA Class III   Stage C  Diuretic: lasix 40mg IV BID- get extra dose of lasix 40mg IV now  Beta Blocker: Not indicated for EF>35%  ACEi/ARB/ARNI: Not indicated for EF>35%  Aldosterone Antagonist:Not indicated for EF>35%  SGLT2 Inhibitor: Not indicated for EF>35%  2gm Na diet, daily weight, 64 oz fluid restriction  Avoid NSAIDS and other nephrotoxic meds  Cardiac Rehab: Not indicated for EF>35%  ICD:Not indicated for EF>35%  [facility] Referral: OP  HF RN referral for education  Palliative Care consult for re admit <30 days or Stage IV: NA  RHC tomorrow with Dr. Isabel Olvera. 2.) Hypertension: /87 on admission. Improved today. Continue antihypertensives  Goal BP <130/80.   Non pharmacologic interventions include:   -weight loss  -heart healthy low sodium and low fat diet that consist of mostly fruits, vegetables and grains (Dash diet)  -limited amount of alcohol (no more than 1 drink/day for women, 2 drinks/day for men)  -regular physical activity  -no smoking  -stress reduction    Electronically signed by SANAZ Alegria CNP on 8/9/2021 at 11:36 AM

## 2021-08-09 NOTE — CONSULTS
Department of Podiatry Consult Note  Resident       Reason for Consult: Diabetic foot care  Requesting Physician:  Sayda Whitman MD    CHIEF COMPLAINT: Painful toenails    HISTORY OF PRESENT ILLNESS:                The patient is a 81 y.o. male with significant past medical history of type 2 diabetes mellitus, moderate COPD, hypertension, dyslipidemia who presented to the emergency department on 8/11/2021 for shortness of breath. Podiatry was consulted for diabetic foot care. Patient reports pain to nails 1 through 5 bilateral, rates it as 6/10 and stabbing. Reports he has seen podiatrist in the past but has not seen one for some time. Reports numbness to bilateral foot. Denies burning/tingling sensations to bilateral lower extremity. Patient denies fever, chills, nausea, vomiting, shortness of breath, chest pain. Patient has no other pedal complaints at this time. Past Medical History:        Diagnosis Date    Atherosclerosis of aorta (HCC)     [facility]) [facility])     CKD (chronic kidney disease) stage 3, GFR 30-59 ml/min ([facility])     COPD (chronic obstructive pulmonary disease) ([address]     CVA, old, ataxia 2007    DM type 2 with diabetic peripheral neuropathy ([address] 10/23/2014    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 2008    Fall 9/2015    Fatty liver     Hypertension     Mixed hyperlipidemia     Morbid obesity ([address]     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 ([address] 2008    Obesity, diabetes, and hypertension syndrome ([address] August, 2015    Psoriasis     Risk for falls     Type 2 diabetes mellitus with microalbuminuria or microproteinuria     Vitamin D deficiency 2011       Past Surgical History:        Procedure Laterality Date    [facility]    TURP  8/14/12    [facility]       Allergies:   Patient has no known allergies. Medications:   Home Meds  No current facility-administered medications on file prior to encounter.      Current Outpatient Medications on File Prior to Encounter   Medication Sig Dispense Refill    furosemide (LASIX) 40 MG tablet TAKE 1 TABLET BY MOUTH  DAILY 90 tablet 1    doxazosin (CARDURA) 8 MG tablet TAKE 1 TABLET BY MOUTH EVERY NIGHT AT BEDTIME FOR BLOOD PRESSURE 90 tablet 1    labetalol (NORMODYNE) 100 MG tablet TAKE 1 TABLET BY MOUTH TWICE DAILY 180 tablet 3    escitalopram (LEXAPRO) 10 MG tablet Take 1 tablet by mouth daily 90 tablet 3    simvastatin (ZOCOR) 40 MG tablet TAKE 1 TABLET BY MOUTH EVERY EVENING 90 tablet 3    glipiZIDE (GLUCOTROL) 10 MG tablet TAKE 1 TABLET BY MOUTH DAILY WITH BREAKFAST 90 tablet 3    gabapentin (NEURONTIN) 300 MG capsule TAKE 1 CAPSULE BY MOUTH THREE TIMES DAILY (Patient taking differently: Take 300 mg by mouth See Admin Instructions.  300 mg QAM and 600 mg QPM) 270 capsule 1    isosorbide mononitrate (IMDUR) 30 MG extended release tablet TAKE 1 TABLET BY MOUTH TWICE DAILY FOR BLOOD PRESSURE 180 tablet 3    Fluticasone furoate-vilanterol (BREO ELLIPTA) 200-25 MCG/INH AEPB inhaler Inhale 1 puff into the lungs daily 1 each 11    tiotropium (SPIRIVA HANDIHALER) 18 MCG inhalation capsule INHALE THE CONTENTS OF 1 CAPSULE VIA INHALATION DEVICE DAILY FOR BREATHING 90 capsule 3    VENTOLIN  (90 Base) MCG/ACT inhaler INHALE 2 PUFFS INTO THE LUNGS EVERY 6 HOURS AS NEEDED FOR WHEEZING 1 Inhaler 5    albuterol (PROVENTIL) (2.5 MG/3ML) 0.083% nebulizer solution Take 3 mLs by nebulization every 6 hours as needed for Wheezing 120 vial 3    nitroGLYCERIN (NITROSTAT) 0.4 MG SL tablet Place 1 tablet under the tongue every 5 minutes as needed for Chest pain (chest pain) 25 tablet 3    Cholecalciferol (VITAMIN D-3) 5000 UNITS TABS Take 5,000 Units by mouth daily       aspirin 81 MG EC tablet Take 81 mg by mouth nightly       Nebulizers (VIOS LC SPRINT) MISC USE UTD  0    Compression Stockings MISC by Does not apply route 15-20 mmHg knee high 1 each 0    Respiratory Therapy Supplies (NEBULIZER/TUBING/MOUTHPIECE) KIT 1 kit by Does not apply route daily as needed (sob) 1 kit 0    Spacer/Aero-Holding Chambers KOSTA 1 Device by Does not apply route daily as needed (with Albuterol inhaler) 1 Device 0       Current Meds  albuterol (PROVENTIL) nebulizer solution 2.5 mg, Q8H  furosemide (LASIX) injection 40 mg, Once  budesonide-formoterol (SYMBICORT) 160-4.5 MCG/ACT inhaler 2 puff, BID  isosorbide mononitrate (IMDUR) extended release tablet 30 mg, Daily  labetalol (NORMODYNE) tablet 100 mg, BID  atorvastatin (LIPITOR) tablet 40 mg, Daily  tiotropium (SPIRIVA RESPIMAT) 2.5 MCG/ACT inhaler 2 puff, Daily  sodium chloride flush 0.9 % injection 5-40 mL, 2 times per day  sodium chloride flush 0.9 % injection 5-40 mL, PRN  0.9 % sodium chloride infusion, PRN  ondansetron (ZOFRAN-ODT) disintegrating tablet 4 mg, Q8H PRN   Or  ondansetron (ZOFRAN) injection 4 mg, Q6H PRN  enoxaparin (LOVENOX) injection 40 mg, Daily  perflutren lipid microspheres (DEFINITY) injection 1.65 mg, ONCE PRN  potassium chloride 10 mEq/100 mL IVPB (Peripheral Line), PRN  magnesium sulfate 2000 mg in 50 mL IVPB premix, PRN  furosemide (LASIX) injection 40 mg, BID  albuterol (PROVENTIL) nebulizer solution 2.5 mg, Q6H PRN  morphine (PF) injection 2 mg, Q3H PRN  acetaminophen (TYLENOL) tablet 650 mg, Q4H PRN  glipiZIDE (GLUCOTROL) tablet 10 mg, QAM AC  gabapentin (NEURONTIN) capsule 300 mg, TID  escitalopram (LEXAPRO) tablet 10 mg, Daily  doxazosin (CARDURA) tablet 8 mg, Daily  hydrALAZINE (APRESOLINE) injection 10 mg, Q6H PRN        Family History:   History reviewed. No pertinent family history. Social History:   TOBACCO:   reports that he quit smoking about 14 years ago. His smoking use included cigarettes. He started smoking about 70 years ago. He has a 100.00 pack-year smoking history. He has never used smokeless tobacco.  ETOH:   reports current alcohol use of about 7.0 standard drinks of alcohol per week.   DRUGS:   reports no history of drug use. REVIEW OF SYSTEMS:    As above. PHYSICAL EXAM:      Vitals:    /65   Pulse 76   Temp 98.6 °F (37 °C) (Oral)   Resp 22   Ht 5' 10\" (1.778 m)   Wt 275 lb 5.7 oz (124.9 kg)   SpO2 95%   BMI 39.51 kg/m²     LABS:   Recent Labs     08/07/21  2230   WBC 7.5   HGB 12.1*   HCT 36.2*   *     Recent Labs     08/09/21  0525      K 4.2   CL 98*   CO2 31   BUN 23*   CREATININE 1.4*     Recent Labs     08/07/21  2230   PROT 6.8         VASCULAR: DP and PT pulses palpable 2/4 bilateral. CFT is brisk to the digits of the foot b/l. Skin temperature is warm to cool from proximal to distal with no focal calor noted. +2 pitting edema noted bilateral lower extremity. No pain with calf compression b/l. NEUROLOGIC: Gross and epicritic sensation is diminished b/l. Protective sensation is absent at all pedal sites b/l. DERMATOLOGIC: Diffuse xerosis noted to bilateral lower extremity. Nails 1-5 b/l are elongated, thickened, discolored with subungual debris. Webspaces 1-4 b/l are clean, dry, and intact. No hyperkeratosis noted. No open wounds noted. No subcutaneous nodules, rashes, or other skin lesions noted. MUSCULOSKELETAL: Muscle strength is 5/5 for all pedal groups tested. Diffuse tenderness with palpation of nails 1 through 5 bilateral.  Ankle joint ROM is decreased in dorsiflexion with the knee extended. No obvious biomechanical abnormalities. IMPRESSION/RECOMMENDATIONS:      -Onychomycosis, nails 1 through 5 bilateral  -Xerosis, bilateral lower extremity,  -Edema, bilateral lower extremity  -Diabetes mellitus type 2 with peripheral neuropathy    -Nails 1 through 5 bilateral sharply debrided in length and thickness using sterile nail nippers. Patient tolerated without incident.  -Applied lotion to bilateral lower extremity.   Recommend applying lotion to bilateral lower extremity daily.  -Order for MAURISIO hose to be applied daily  -We will sign off on patient at this time.  Please reconsult us if any other foot or ankle condition occurs. DISPO: Onychomycosis, nails 1 through 5 bilateral.  Recommend lotion to bilateral lower extremity daily. Recommend MAURISIO hose daily. Will sign off at this time.     - Patient examined and evaluated at the bedside with Dr. Oni Saeed DPM.    Demarcus Wood DPM  08/09/21  2:08 PM

## 2021-08-09 NOTE — PROGRESS NOTES
Hospitalist Progress Note      PCP: Asif Reilly MD    Date of Admission: 8/7/2021    Subjective: Improved, no chest pain, no dyspnea at rest, denies muscle cramps, no fever or chills. Medications:  Reviewed    Infusion Medications    sodium chloride       Scheduled Medications    albuterol  2.5 mg Nebulization Q8H    budesonide-formoterol  2 puff Inhalation BID    isosorbide mononitrate  30 mg Oral Daily    labetalol  100 mg Oral BID    atorvastatin  40 mg Oral Daily    tiotropium  2 puff Inhalation Daily    sodium chloride flush  5-40 mL Intravenous 2 times per day    enoxaparin  40 mg Subcutaneous Daily    furosemide  40 mg Intravenous BID    glipiZIDE  10 mg Oral QAM AC    gabapentin  300 mg Oral TID    escitalopram  10 mg Oral Daily    doxazosin  8 mg Oral Daily     PRN Meds: sodium chloride flush, sodium chloride, ondansetron **OR** ondansetron, perflutren lipid microspheres, potassium chloride, magnesium sulfate, albuterol, morphine, acetaminophen, hydrALAZINE      Intake/Output Summary (Last 24 hours) at 8/9/2021 1107  Last data filed at 8/9/2021 0950  Gross per 24 hour   Intake 850 ml   Output 500 ml   Net 350 ml       Physical Exam Performed:    /77   Pulse 90   Temp 99.2 °F (37.3 °C) (Oral)   Resp 20   Ht 5' 10\" (1.778 m)   Wt 275 lb 5.7 oz (124.9 kg)   SpO2 91%   BMI 39.51 kg/m²     General appearance: No apparent distress  Neck: Supple  Respiratory: Scattered expiratory wheezes and bilateral basilar fine crackles  Cardiovascular: Regular rate and rhythm with normal S1/S2 without murmurs, rubs or gallops. Abdomen: Soft, non-tender, non-distended  Musculoskeletal: No clubbing, cyanosis   Skin: Skin color, texture, turgor normal.  No rashes or lesions.   Neurologic:  No focal weakness   Psychiatric: Alert and oriented  Capillary Refill: Brisk,3 seconds, normal   Peripheral Pulses: +2 palpable, equal bilaterally       Labs:   Recent Labs     08/07/21  2230   WBC 7.5 HGB 12.1*   HCT 36.2*   *     Recent Labs     08/07/21  2230 08/08/21  0544 08/09/21  0525    141 142   K 4.3 3.9 4.2    100 98*   CO2 32 30 31   BUN 16 16 23*   CREATININE 1.6* 1.3 1.4*   CALCIUM 8.9 8.9 8.8     Recent Labs     08/07/21  2230   AST 10*   ALT 7*   BILITOT 0.4   ALKPHOS 60     No results for input(s): INR in the last 72 hours. Recent Labs     08/07/21  2230   TROPONINI 0.05*       Urinalysis:      Lab Results   Component Value Date    NITRU Negative 09/25/2020    WBCUA 1 09/25/2020    BACTERIA 2+ 10/26/2013    RBCUA 1 09/25/2020    BLOODU Negative 09/25/2020    SPECGRAV 1.015 09/25/2020    GLUCOSEU 250 09/25/2020       Radiology:  XR CHEST (2 VW)   Final Result   Stable findings most consistent with CHF. Interstitial edema with bilateral   pleural effusions and left basilar volume loss. XR SHOULDER RIGHT (MIN 2 VIEWS)   Final Result   Degenerative change in the right shoulder with no acute abnormality. XR CHEST PORTABLE   Final Result   Cardiomegaly with diffuse interstitial changes that suggest underlying   pulmonary edema. A chronic history of interstitial lung disease in the   setting of COPD cannot be excluded. Assessment/Plan:    Active Hospital Problems    Diagnosis     COPD, severe ([facility] [J44.9]      Priority: Medium    Acute diastolic (congestive) heart failure (HCC) [I50.31]     Acute on chronic respiratory failure with hypoxia (HCC) [J96.21]      1. Acute on chronic respiratory failure with hypoxia, due to diastolic heart failure, diuretics. Cardiology following  2. Acute on chronic diastolic heart failure, as above  3. COPD, continue home inhalers, pulmonary has been consulted  4. Chronic pain, continue home regimen  5. Diabetes mellitus, sliding scale  6. Essential hypertension, p.o. medications    Diet: ADULT DIET; Regular; Low Fat/Low Chol/High Fiber/2 gm Na;  Low Sodium (2 gm); 2000 ml  Code Status: Full Code      Gonzalo Brisa Whiting MD

## 2021-08-09 NOTE — PLAN OF CARE
Problem: Falls - Risk of:  Goal: Will remain free from falls  Description: Will remain free from falls  8/9/2021 0106 by Lisseth Silva RN  Outcome: Ongoing  8/8/2021 1626 by Carine Orellana RN  Outcome: Ongoing     Problem: Skin Integrity:  Goal: Will show no infection signs and symptoms  Description: Will show no infection signs and symptoms  8/9/2021 0106 by Lisseth Silva RN  Outcome: Ongoing  8/8/2021 1626 by Carine Orellana RN  Outcome: Ongoing     Problem: OXYGENATION/RESPIRATORY FUNCTION  Goal: Patient will maintain patent airway  8/9/2021 0106 by Lisseth Silva RN  Outcome: Ongoing  8/8/2021 1626 by Carine Orellana RN  Outcome: Ongoing     Problem: HEMODYNAMIC STATUS  Goal: Patient has stable vital signs and fluid balance  8/9/2021 0106 by Lisseth Silva RN  Outcome: Ongoing  8/8/2021 1626 by Carine Orellana RN  Outcome: Ongoing     Problem: FLUID AND ELECTROLYTE IMBALANCE  Goal: Fluid and electrolyte balance are achieved/maintained  8/9/2021 0106 by Lisseth Silva RN  Outcome: Ongoing  8/8/2021 1626 by Carine Orellana RN  Outcome: Ongoing

## 2021-08-09 NOTE — ACP (ADVANCE CARE PLANNING)
Advance Care Planning     Advance Care Planning Activator (Inpatient)  Conversation Note      Date of ACP Conversation: 8/9/2021     Conversation Conducted with: Patient with Decision Making Capacity    ACP Activator: Phil Carey RN      Health Care Decision Maker:     Current Designated Health Care Decision Maker:     Primary Decision Maker: Tian Rosario - [phone]    Secondary Decision Maker: Eddie Call - [place] - [phone]    Care Preferences    Ventilation: \"If you were in your present state of health and suddenly became very ill and were unable to breathe on your own, what would your preference be about the use of a ventilator (breathing machine) if it were available to you? \"      Would the patient desire the use of ventilator (breathing machine)?: yes    \"If your health worsens and it becomes clear that your chance of recovery is unlikely, what would your preference be about the use of a ventilator (breathing machine) if it were available to you? \"     Would the patient desire the use of ventilator (breathing machine)?: No      Resuscitation  \"CPR works best to restart the heart when there is a sudden event, like a heart attack, in someone who is otherwise healthy. Unfortunately, CPR does not typically restart the heart for people who have serious health conditions or who are very sick. \"    \"In the event your heart stopped as a result of an underlying serious health condition, would you want attempts to be made to restart your heart (answer \"yes\" for attempt to resuscitate) or would you prefer a natural death (answer \"no\" for do not attempt to resuscitate)? \" yes     **Messaged Dr Nathanael Stinson because code status was changed to [facility] shortly after my discussion with patient. Asked MD to clarify code status with patient. **       [] Yes   [] No   Educated Patient / Decision Maker regarding differences between Advance Directives and portable DNR orders.     Length of ACP Conversation in

## 2021-08-09 NOTE — CARE COORDINATION
INITIAL CASE MANAGEMENT ASSESSMENT    Reviewed chart, met with patient to assess possible discharge needs. Explained Case Management role/services. Living Situation: Confirmed address, lives with wife, 1 story condo with 2 steps to enter    ADLs: Patient able to walk only short distances with walker, gets assistance with dressing/bathing from wife -- bathes at sink, wife & son assist with homemaking duties     DME: 4 wheeled walker, lift chair, shower chair (doesn't use - bathes at sink), nebulizer, CPAP, oxygen @ 3L baseline -- has portability (provider currently unknown)    PT/OT Recs: Requested order from MD     Active Services: RN once monthly through [employer] (provided by insurance)     Transportation: Patient does not drive, family transports PRN     Medications: Uses [facility] - no issues    PCP: Ericka Alcantara MD      HD/PD: n/a    PLAN/COMMENTS:  Plan home w/ home care vs SNF. Patient would be agreeable to SNF if needed. Home care (hx with [facility]) & SNF lists given. Requested PT/OT eval from MD for DC recs. CM provided contact information for patient or family to call with any questions. CM will follow and assist as needed. Electronically signed by Allan Ayala RN Case Management [phone] on 8/9/2021 at 1:01 PM     The Plan for Transition of Care is related to the following treatment goals: strengthening    The patient was provided with a choice of provider and agrees   with the discharge plan. [x] Yes [] No    Freedom of choice list was provided with basic dialogue that supports the patient's individualized plan of care/goals, treatment preferences and shares the quality data associated with the providers.  [x] Yes [] No

## 2021-08-09 NOTE — H&P
[address] [facility]                              HISTORY AND PHYSICAL    PATIENT NAME: Maryam Goodwin                  :        1935  MED REC NO:   [id number]                          ROOM:       6274  ACCOUNT NO:   [de-identified]                           ADMIT DATE: 2021  PROVIDER:     Sally Gutierrez MD    I obtained the history and performed the physical exam on the patient on  the Medical floor on 2021. CHIEF COMPLAINT:  Shortness of breath. HISTORY OF PRESENT ILLNESS:  An 81-year-old obese  male,  presenting to the hospital with chief complaints of one-week history of  subacute onset of gradually progressive, initially exertional increasing  shortness of breath, eventually shortness of breath at rest, along with  orthopnea and PND, without nausea, vomiting, fevers, or chills. At the  time of my exam, the patient notes that he is not feeling much better  after he got the Lasix but he did urinate significantly. PAST MEDICAL/PAST SURGICAL HISTORY:  1. Type 2 diabetes. 2.  Moderate COPD. 3.  Hypertension. 4.  Dyslipidemia. PAST SURGICAL HISTORY:  No major recent surgical procedures. ALLERGIC HISTORY:  No known drug allergies. FAMILY HISTORY:  Reviewed by me and is currently noncontributory. SOCIAL HISTORY:  Lives at home. No illicit substance use. Used to  smoke. Stopped smoking in . MEDICATIONS:  The patient's home medication list has been documented in  the EMR. REVIEW OF SYSTEMS:  Significant for the shortness of breath and per the  history of present illness. All other systems have been reviewed and  are negative except for the history of present illness. PHYSICAL EXAMINATION:  The patient was examined by me on the Medical  floor.   VITAL SIGNS:  Temperature is 98.3, respiratory rate is 21, pulse 93,  blood pressure 198/94, saturating 93%.  CNS:  Alert and oriented. PSYCH:  The patient is cooperative, answering questions appropriately. HEENT:  Eyes:  Pupils are reactive to light. ENT:  Extraocular muscle  movements are intact. RESPIRATORY:  The patient has got bibasilar posterior rales. I could  not hear any anterior wheezes. CARDIOVASCULAR:  S1, S2 are heard. No obvious murmurs or rubs. ABDOMEN:  Nondistended. MUSCULOSKELETAL:  No acute deformities. SKIN:  Appears well hydrated without rashes or lesions. DIAGNOSTIC DATA:  BNP 3065. Troponin 0.05. Chest x-ray shows cardiomegaly with interstitial pulmonary edema. BUN 16, creatinine 1.6. EKG shows normal sinus rhythm with first degree AV block. CONSULTATIONS REQUESTED:  Cardiology. REVIEW OF PREVIOUS MEDICAL RECORDS:  Shows an echo from 05/15/2019 that  shows an LVEF of 55-60%. ASSESSMENT:  1. Acute diastolic congestive heart failure exacerbation with pulmonary  edema. 2.  Hypertensive urgency. 3.  COPD.  4.  Obesity with a BMI of _____ kg/m2. 5.  Type 2 diabetes. PLAN OF CARE:  The patient is admitted to Internal Medicine service. Diuresis has been initiated and will be continued. Cardiology consult  requested. A 2D echo of the heart has been ordered. I's and O's will  be monitored. Further management will be per Cardiology  recommendations. CODE STATUS:  Full. EXPECTED LENGTH OF STAY:  More than two midnights based on the plan of  care above. RISK:  High due to the patient's presentation with the acute what  appears to be diastolic CHF exacerbation. DISPOSITION:  Admitted to Internal Medicine Service.         Susu Cornelius MD    D: 08/08/2021 22:53:08       T: 08/09/2021 0:36:03     [email or web address]  Job#: [id number]     Doc#: [id number]    CC:

## 2021-08-10 NOTE — PRE SEDATION
Sedation Pre-Procedure Note    Patient Name: Ani Monet   YOB: 1935  Room/Bed: L7U-7402/5265-01  Medical Record Number: [id number]  Date: 8/10/2021   Time: 4:17 PM       Indication:  Diastolic HF    Consent: I have discussed with the patient and/or the patient representative the indication, alternatives, and the possible risks and/or complications of the planned procedure and the anesthesia methods. The patient and/or patient representative appear to understand and agree to proceed. 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 Past Surgical History:   has a past surgical history that includes TURP (8/14/12) and Cataract removal (Right, 7/3/13).     Medications:   Scheduled Meds:    heparin (porcine)  5,000 Units Subcutaneous 3 times per day    albuterol  2.5 mg Nebulization Q8H    sodium chloride flush  5-40 mL Intravenous 2 times per day    budesonide-formoterol  2 puff Inhalation BID    isosorbide mononitrate  30 mg Oral Daily    labetalol  100 mg Oral BID    atorvastatin  40 mg Oral Daily    tiotropium  2 puff Inhalation Daily    [Held by provider] furosemide  40 mg Intravenous BID    glipiZIDE  10 mg Oral QAM AC    gabapentin  300 mg Oral TID    escitalopram  10 mg Oral Daily    doxazosin  8 mg Oral Daily     Continuous Infusions:    sodium chloride       PRN Meds: sodium chloride flush, sodium chloride, ondansetron **OR** ondansetron, perflutren lipid microspheres, potassium chloride, magnesium sulfate, albuterol, morphine, acetaminophen, hydrALAZINE  Home Meds:   Prior to Admission medications    Medication Sig Start Date End Date Taking? Authorizing Provider   furosemide (LASIX) 40 MG tablet TAKE 1 TABLET BY MOUTH  DAILY 7/9/21  Yes Elissa Vaughn MD   doxazosin (CARDURA) 8 MG tablet TAKE 1 TABLET BY MOUTH EVERY NIGHT AT BEDTIME FOR BLOOD PRESSURE 7/9/21  Yes Elissa Vaughn MD   labetalol (NORMODYNE) 100 MG tablet TAKE 1 TABLET BY MOUTH TWICE DAILY 7/9/21  Yes Elissa Vaughn MD   escitalopram (LEXAPRO) 10 MG tablet Take 1 tablet by mouth daily 7/9/21  Yes Elissa Vaughn MD   simvastatin (ZOCOR) 40 MG tablet TAKE 1 TABLET BY MOUTH EVERY EVENING 7/9/21  Yes Elissa Vaughn MD   glipiZIDE (GLUCOTROL) 10 MG tablet TAKE 1 TABLET BY MOUTH DAILY WITH BREAKFAST 7/9/21  Yes Elissa Vaughn MD   gabapentin (NEURONTIN) 300 MG capsule TAKE 1 CAPSULE BY MOUTH THREE TIMES DAILY  Patient taking differently: Take 300 mg by mouth See Admin Instructions.  300 mg QAM and 600 mg QPM 5/6/21 8/8/21 Yes Elissa Vaughn MD   isosorbide mononitrate (IMDUR) 30 MG extended release tablet TAKE 1 TABLET BY MOUTH TWICE DAILY FOR BLOOD PRESSURE 3/18/21  Yes Elissa Vaughn MD   Fluticasone furoate-vilanterol (BREO ELLIPTA) 200-25 MCG/INH AEPB inhaler Inhale 1 puff into the lungs daily 2/16/21  Yes Jc Juárez MD   tiotropium (SPIRIVA HANDIHALER) 18 MCG inhalation capsule INHALE THE CONTENTS OF 1 CAPSULE VIA INHALATION DEVICE DAILY FOR BREATHING 6/24/19  Yes Jc Juárez MD   VENTOLIN  (77 Base) MCG/ACT inhaler INHALE 2 PUFFS INTO THE LUNGS EVERY 6 HOURS AS NEEDED FOR WHEEZING 3/26/19  Yes Jc Juárez MD   albuterol (PROVENTIL) (2.5 MG/3ML) 0.083% nebulizer solution Take 3 mLs by nebulization every 6 hours as needed for Wheezing 11/19/18  Yes Gilmer Oro MD   nitroGLYCERIN (NITROSTAT) 0.4 MG SL tablet Place 1 tablet under the tongue every 5 minutes as needed for Chest pain (chest pain) 6/22/18  Yes Marino Corley MD   Cholecalciferol (VITAMIN D-3) 5000 UNITS TABS Take 5,000 Units by mouth daily    Yes Historical Provider, MD   aspirin 81 MG EC tablet Take 81 mg by mouth nightly    Yes Historical Provider, MD   Nebulizers (VIOS LC SPRINT) MISC USE UTD 11/21/18   Historical Provider, MD   Compression Stockings MISC by Does not apply route 15-20 mmHg knee high 11/27/18   SANAZ Spencer CNP   Respiratory Therapy Supplies (NEBULIZER/TUBING/MOUTHPIECE) KIT 1 kit by Does not apply route daily as needed (sob) 11/19/18   Gilmer Oro MD   Spacer/Aero-Holding Oris Rout 1 Device by Does not apply route daily as needed (with Albuterol inhaler) 9/19/16   SANAZ Leos CNP     Coumadin Use Last 7 Days:  no  Antiplatelet drug therapy use last 7 days: no  Other anticoagulant use last 7 days: no  Additional Medication Information:  n/a      Pre-Sedation Documentation and Exam:   I have personally completed a history, physical exam & review of systems for this patient (see notes).     Mallampati Airway Assessment:  Mallampati Class I - (soft palate, fauces, uvula & anterior/posterior tonsillar pillars are visible)    Prior History of Anesthesia Complications:   none    ASA Classification:  Class 3 - A patient with severe systemic disease that limits activity but is not incapacitating    Sedation/ Anesthesia Plan:   intravenous sedation    Medications Planned:   midazolam (Versed) intravenously    Patient is an appropriate candidate for plan of sedation: yes    Electronically signed by Hailey Ocampo MD on 8/10/2021 at 4:17 PM

## 2021-08-10 NOTE — PROGRESS NOTES
Spoke with Dr Jazz Joiner regarding [address] results. Wedge was low after diuresis (unknown output but lost 5 lbs). Procal elevated. Will start rocephin and doxy for presumed infection.   Will get CT Chest to evaluate lungs in more detail    Trudy Spaulding DO  [facility] Pulmonary, Sleep and Critical Care  [phone]

## 2021-08-10 NOTE — ACP (ADVANCE CARE PLANNING)
ADVANCED CARE PLANNING    Name:Deon Szymanski       :  1935              MRN:  [id number]      Purpose of Encounter: Advanced care planning in light of respiratory failure. Parties in attendance: :Gabi Mensah MD, Family members:none. Decisional Capacity:Yes    Diagnosis: Active Problems:    COPD, severe (HCC)    Acute diastolic (congestive) heart failure (HCC)    Acute on chronic respiratory failure with hypoxia (HCC)  Resolved Problems:    * No resolved hospital problems. *    Patients Medical Story: Admitted with respiratory failure multifactorial, on oxygen, receiving weakness, developed acute kidney injury. Goals of Care Determinations: Patient wishes to focus on aggressive measures  Plan: Will notify Obie Moore MD of change in care plan. Will look at further interventions as needed. Code Status: At this time patient wishes to be Full Code  Time Spent with Patient: 18 minutes      Electronically signed by Dina Ruiz MD on 8/10/2021 at 10:52 AM  Thank you Obie Moore MD for the opportunity to be involved in this patient's care.

## 2021-08-10 NOTE — PROGRESS NOTES
Pulmonary Progress Note    CC:  Follow up hypoxia, COPD, presumed CHF    Subjective:  Says he is better today  Remains on oxygen at 5 liters  Legs are painful today  Dry cough  Not much help with nebs    ROS  Less SOB  Dry coughing  Leg pain         Intake/Output Summary (Last 24 hours) at 8/10/2021 0950  Last data filed at 8/9/2021 1801  Gross per 24 hour   Intake 420 ml   Output --   Net 420 ml         PHYSICAL EXAM:  Blood pressure (!) 154/91, pulse 73, temperature 100.4 °F (38 °C), temperature source Oral, resp. rate 18, height 5' 10\" (1.778 m), weight 268 lb 1.3 oz (121.6 kg), SpO2 93 %.'  Gen: No distress. Chronically ill   Eyes: PERRL. No sclera icterus. No conjunctival injection. ENT: No discharge. Pharynx clear. External appearance of ears and nose normal.  Neck: Trachea midline. No obvious mass. Resp: Very poor air movement  CV: Regular rate. Regular rhythm. No murmur or rub. Distant heart sounds   GI: Non-tender. Non-distended. No hernia. Skin: Venous stasis changes   Lymph: No cervical LAD. No supraclavicular LAD. M/S: No cyanosis. No clubbing. No joint deformity. Neuro: Moves all four extremities. CN 2-12 tested, no defect noted.   Ext:   + edema    Medications:    Scheduled Meds:   albuterol  2.5 mg Nebulization Q8H    sodium chloride flush  5-40 mL Intravenous 2 times per day    budesonide-formoterol  2 puff Inhalation BID    isosorbide mononitrate  30 mg Oral Daily    labetalol  100 mg Oral BID    atorvastatin  40 mg Oral Daily    tiotropium  2 puff Inhalation Daily    enoxaparin  40 mg Subcutaneous Daily    [Held by provider] furosemide  40 mg Intravenous BID    glipiZIDE  10 mg Oral QAM AC    gabapentin  300 mg Oral TID    escitalopram  10 mg Oral Daily    doxazosin  8 mg Oral Daily       Continuous Infusions:   sodium chloride         PRN Meds:  sodium chloride flush, sodium chloride, ondansetron **OR** ondansetron, perflutren lipid microspheres, potassium chloride, magnesium sulfate, albuterol, morphine, acetaminophen, hydrALAZINE    Labs:  CBC:   Recent Labs     08/07/21  2230 08/10/21  0601   WBC 7.5 9.3   HGB 12.1* 11.5*   HCT 36.2* 34.7*   MCV 86.2 87.4   * 487*     BMP:   Recent Labs     08/08/21  0544 08/09/21  0525 08/10/21  0601    142 136   K 3.9 4.2 4.0    98* 93*   CO2 30 31 27   BUN 16 23* 29*   CREATININE 1.3 1.4* 2.1*     LIVER PROFILE:   Recent Labs     08/07/21  2230   AST 10*   ALT 7*   BILITOT 0.4   ALKPHOS 60     PT/INR: No results for input(s): PROTIME, INR in the last 72 hours. APTT: No results for input(s): APTT in the last 72 hours. UA:No results for input(s): NITRITE, COLORU, PHUR, LABCAST, WBCUA, RBCUA, MUCUS, TRICHOMONAS, YEAST, BACTERIA, CLARITYU, SPECGRAV, LEUKOCYTESUR, UROBILINOGEN, BILIRUBINUR, BLOODU, GLUCOSEU, AMORPHOUS in the last 72 hours. Invalid input(s): Karlene Shirts  No results for input(s): PH, PCO2, PO2 in the last 72 hours. Films:  Chest imaging reports were reviewed and imaging was reviewed by me and showed no new films    ABG:  None    Cultures:  None    I reviewed the labs and images listed above    ASSESSMENT/PLAN:  · Acute on Chronic Hypoxic Respiratory Failure  · Titrate oxygen for saturations greater than or equal to 90%  ? PRN bpap if he runs into trouble   · Abnormal CXR with findings suggesting edema  ? Diuresis per Cardiology   ? Weight down today   ? May need CT Chest.    ? Check procal   · Severe COPD at baseline without apparent exacerbation   ? Symbicort as substitute for Breo  ? Continue with Spiriva  ? Scheduled albuterol q 8 hours   · ROSENDO, untreated  ? He no longer uses CPAP  ?  Consider using it if he runs into more orthopnea   · Pleural plaque in the LLL  · BMI 39        DVT prophylaxis  Change lovenox to heparin due to renal injury      Ivonne Chau, DO  [facility] Pulmonary

## 2021-08-10 NOTE — PROGRESS NOTES
NOTE IS FOR TEACHING PURPOSES ONLY. SEE FULL NOTE BY DR. Hali Pichardo,   [facility] Pulmonary, Sleep and Critical Care  [phone]        Pulmonary Progress Note    CC:  Shortness of breath     Subjective: Patient is resting in the chair upon exam. He states his breathing has improved since admission. He mentions worsening bilateral lower extremity pain. Gabapentin is helping somewhat, however the pain is still present. He is net positive 1L since admission, however per RN notes, there is uncertainty about I/O's due to incontinence. He weighs 268 lbs today, compared to 275 lbs yesterday. He denies chest pain, fevers, chills or any other concerns at this time. Intake/Output Summary (Last 24 hours) at 8/10/2021 0849  Last data filed at 8/9/2021 1801  Gross per 24 hour   Intake 660 ml   Output --   Net 660 ml         PHYSICAL EXAM:  Blood pressure (!) 154/91, pulse 73, temperature 100.4 °F (38 °C), temperature source Oral, resp. rate 20, height 5' 10\" (1.778 m), weight 268 lb 1.3 oz (121.6 kg), SpO2 93 %.'  Gen: Mild distress. Eyes:  No sclera icterus. No conjunctival injection. ENT: No discharge. Pharynx clear. External appearance of nose normal.  Neck: No obvious mass. Resp: Chronic cough with clear sputum production. No crackles. No wheezes. No rhonchi. CV: Regular rate. Regular rhythm. No murmur or rub. GI: Mild distension. Non-tender. No hernia. Skin: Warm, dry, normal texture and turgor. No nodule on exposed extremities. Lymph: No cervical LAD. No supraclavicular LAD. M/S: No cyanosis. No clubbing. No joint deformity. Neuro: Moves all four extremities.    Ext:   Mild BLE edema      Medications:    Scheduled Meds:   albuterol  2.5 mg Nebulization Q8H    sodium chloride flush  5-40 mL Intravenous 2 times per day    budesonide-formoterol  2 puff Inhalation BID    isosorbide mononitrate  30 mg Oral Daily    labetalol  100 mg Oral BID    atorvastatin  40 mg Oral Daily    tiotropium  2 puff Inhalation Daily    enoxaparin  40 mg Subcutaneous Daily    furosemide  40 mg Intravenous BID    glipiZIDE  10 mg Oral QAM AC    gabapentin  300 mg Oral TID    escitalopram  10 mg Oral Daily    doxazosin  8 mg Oral Daily       Continuous Infusions:   sodium chloride         PRN Meds:  sodium chloride flush, sodium chloride, ondansetron **OR** ondansetron, perflutren lipid microspheres, potassium chloride, magnesium sulfate, albuterol, morphine, acetaminophen, hydrALAZINE    Labs:  CBC:   Recent Labs     08/07/21  2230 08/10/21  0601   WBC 7.5 9.3   HGB 12.1* 11.5*   HCT 36.2* 34.7*   MCV 86.2 87.4   * 487*     BMP:   Recent Labs     08/08/21  0544 08/09/21  0525 08/10/21  0601    142 136   K 3.9 4.2 4.0    98* 93*   CO2 30 31 27   BUN 16 23* 29*   CREATININE 1.3 1.4* 2.1*     LIVER PROFILE:   Recent Labs     08/07/21  2230   AST 10*   ALT 7*   BILITOT 0.4   ALKPHOS 60     PT/INR: No results for input(s): PROTIME, INR in the last 72 hours. APTT: No results for input(s): APTT in the last 72 hours. UA:No results for input(s): NITRITE, COLORU, PHUR, LABCAST, WBCUA, RBCUA, MUCUS, TRICHOMONAS, YEAST, BACTERIA, CLARITYU, SPECGRAV, LEUKOCYTESUR, UROBILINOGEN, BILIRUBINUR, BLOODU, GLUCOSEU, AMORPHOUS in the last 72 hours. Invalid input(s): Percilla Radar  No results for input(s): PH, PCO2, PO2 in the last 72 hours. Films:  Chest imaging reports were reviewed and imaging was reviewed by me and showed CXR 8/8/21: enlarged cardiac silhouette. Bilateral pleural effusions and bibasilar volume loss, left greater than right. ECHO:  8/9/21: EF estimate of 55-60%. Grade I diastolic dysfunction with elevated LV filling pressures. Mildly dilated R ventricle. Normal right ventricle systolic function. I reviewed the labs and images listed above    Assessment/Plan:   1.  Acute on chronic hypoxic respiratory failure   -He wears 3L NC at home and had worsening shortness of breath for one week prior to admission  -Reports improvement in his breathing   -On 5L with SpO2 of 90    2. Severe COPD at baseline   -Continue bronchodilators: symbicort, spiriva, albuterol     3. Abnormal chest XR with pulmonary edema  -Patient notes orthopnea and 10-lb weight gain   -Pro-BNP elevated at 3,065  -CXR: enlarged cardiac silhouette with bilateral effusions, L greater than R  -ECHO: EF of 42-50%, grade I diastolic dysfunction with elevated LV filling pressures   -Continue IV Lasix 40mg BID   -Cardiology following     4. Obstructive sleep apnea   -Has CPAP at home but does not regularly use it  -Could be contributing to shortness of breath   -Consider addition of CPAP to treatment if patient worsens     5. Bilateral lower extremity pain, chronic   -Reports worsening of pain   -Normal potassium of 4.0 and normal magnesium of 1.90   -He is receiving gabapentin 300mg TID   -Lasix could be contributing to the pain  -Continue to monitor         DVT prophylaxis  lovenox    I have seen and examined the above patient. I have personally staffed the case with Student Doctor Ulysess Lyme.   Please my separate note and/or additions to current note for my full assessment/plan    Magaly Luciano DO  [facility] Pulmonary, Sleep and Critical Care  [phone]      Magaly Luciano DO  [facility] Pulmonary

## 2021-08-10 NOTE — OP NOTE
[address]   Procedure Note    CLINICAL HISTORY:       Christy Hernandes is a 81 y.o. male with a history of CHF who presents with acute  diastolic heart failure       Patient Active Problem List   Diagnosis    COPD, [facility]    Psoriasis    Mixed hyperlipidemia    Vitamin D deficiency    Urinary frequency    Fatty liver    DM type 2 with diabetic peripheral neuropathy ([facility]    Stage 3 chronic kidney disease ([facility]    CVA, old, ataxia    [facility]    Type 2 diabetes mellitus with microalbuminuria or microproteinuria    Hypoxemia    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    SOB (shortness of breath)    Chronic respiratory failure with hypoxia and hypercapnia (HCC)    Orthopnea    Abnormal CXR    Chest pain    ROSENDO (obstructive sleep apnea)    Chronic vasomotor rhinitis    Shortness of breath    Bilateral pleural effusion    Diastolic dysfunction    Elevated brain natriuretic peptide (BNP) level    Essential hypertension    Atrial fibrillation (HCC)    Chronic respiratory failure with hypoxia (HCC)    Sinus tachycardia    Multifocal pneumonia    Pulmonary nodule seen on imaging study    Major depressive disorder, recurrent, mild    Acute diastolic (congestive) heart failure (HCC)    Acute on chronic respiratory failure with hypoxia (HCC)       Prior to Admission medications    Medication Sig Start Date End Date Taking?  Authorizing Provider   furosemide (LASIX) 40 MG tablet TAKE 1 TABLET BY MOUTH  DAILY 7/9/21  Yes Calvin Hernandez MD   doxazosin (CARDURA) 8 MG tablet TAKE 1 TABLET BY MOUTH EVERY NIGHT AT BEDTIME FOR BLOOD PRESSURE 7/9/21  Yes Calvin Hernandez MD   labetalol (NORMODYNE) 100 MG tablet TAKE 1 TABLET BY MOUTH TWICE DAILY 7/9/21  Yes Calvin Hernandez MD   escitalopram (LEXAPRO) 10 MG tablet Take 1 tablet by mouth daily 7/9/21  Yes Calvin Hernandez MD   simvastatin (ZOCOR) 40 MG tablet TAKE 1 TABLET BY MOUTH EVERY EVENING 7/9/21  Yes Calvin Hernandez MD glipiZIDE (GLUCOTROL) 10 MG tablet TAKE 1 TABLET BY MOUTH DAILY WITH BREAKFAST 7/9/21  Yes Obie Moore MD   gabapentin (NEURONTIN) 300 MG capsule TAKE 1 CAPSULE BY MOUTH THREE TIMES DAILY  Patient taking differently: Take 300 mg by mouth See Admin Instructions.  300 mg QAM and 600 mg QPM 5/6/21 8/8/21 Yes Obie Moore MD   isosorbide mononitrate (IMDUR) 30 MG extended release tablet TAKE 1 TABLET BY MOUTH TWICE DAILY FOR BLOOD PRESSURE 3/18/21  Yes Obie Moore MD   Fluticasone furoate-vilanterol (BREO ELLIPTA) 200-25 MCG/INH AEPB inhaler Inhale 1 puff into the lungs daily 2/16/21  Yes Marixa Fierro MD   tiotropium (SPIRIVA HANDIHALER) 18 MCG inhalation capsule INHALE THE CONTENTS OF 1 CAPSULE VIA INHALATION DEVICE DAILY FOR BREATHING 6/24/19  Yes Marixa Fierro MD   VENTOLIN  (55 Base) MCG/ACT inhaler INHALE 2 PUFFS INTO THE LUNGS EVERY 6 HOURS AS NEEDED FOR WHEEZING 3/26/19  Yes Marixa Fierro MD   albuterol (PROVENTIL) (2.5 MG/3ML) 0.083% nebulizer solution Take 3 mLs by nebulization every 6 hours as needed for Wheezing 11/19/18  Yes Marixa Fierro MD   nitroGLYCERIN (NITROSTAT) 0.4 MG SL tablet Place 1 tablet under the tongue every 5 minutes as needed for Chest pain (chest pain) 6/22/18  Yes Obie Moore MD   Cholecalciferol (VITAMIN D-3) 5000 UNITS TABS Take 5,000 Units by mouth daily    Yes Historical Provider, MD   aspirin 81 MG EC tablet Take 81 mg by mouth nightly    Yes Historical Provider, MD   Nebulizers (VIOS LC SPRINT) MISC USE UTD 11/21/18   Historical Provider, MD   Compression Stockings MISC by Does not apply route 15-20 mmHg knee high 11/27/18   Omid Roman, APRN - CNP   Respiratory Therapy Supplies (NEBULIZER/TUBING/MOUTHPIECE) KIT 1 kit by Does not apply route daily as needed (sob) 11/19/18   Marixa Fierro MD   Spacer/Aero-Holding Pattricia Idol 1 Device by Does not apply route daily as needed (with Albuterol inhaler) 9/19/16   Raquel Pino, APRN - CNP          The risks, benefits, and details of the procedure were explained to the patient. The patient verbalized understanding and wanted to proceed. Informed written consent was obtained. INDICATION:  Acute diastolic HF     PROCEDURES PERFORMED:   RHC     PROCEDURE TECHNIQUE:  The patient was approached from the right brachial vein using a 4-5 slender sheath      CONTRAST:  Total of 0 cc. COMPLICATIONS:  None. EBL: 5 cc      HEMODYNAMICS:  Aortic pressure was 159/80/100    RA 10  RV 34/11  PA 40/16/28  PCWP 8   PA % 61  AO % 98  CO/CI 5.4 L/min 2.3 L/min/m2  SVR 1333 dynes . Sec/cm-5   dynes . Sec/cm-5  TPG 20    INTERVENTION  1.  None     SUMMARY:   Low normal left sided filling pressures  Normal cardiac output  Mild elevation in right sided filling pressures      RECOMMENDATION:      - hold diuretics  - BP control, room for additional  afterload reduction     Sarah Guerrero MD [address], Interventional Cardiology, and Peripheral Vascular [address] [facility]   (C): [phone]  (O): [phone]

## 2021-08-10 NOTE — PROGRESS NOTES
Physical Therapy  Facility/Department: [address] PROGRESSIVE CARE  Daily Treatment Note- COTX with OT  NAME: Ermelinda Barrera  : 1935  MRN: [id number]    Date of Service: 8/10/2021    Discharge Recommendations:  Patient would benefit from continued therapy after discharge, 3-5 sessions per week   PT Equipment Recommendations  Equipment Needed: No  Other: defer to next level of care     Ermelinda Barrera scored a 9/24 on the AM-PAC short mobility form. Current research shows that an AM-PAC score of 17 or less is typically not associated with a discharge to the patient's home setting. Based on the patient's AM-PAC score and their current functional mobility deficits, it is recommended that the patient have 3-5 sessions per week of Physical Therapy at d/c to increase the patient's independence. Please see assessment section for further patient specific details. If patient discharges prior to next session this note will serve as a discharge summary. Please see below for the latest assessment towards goals. Assessment   Body structures, Functions, Activity limitations: Decreased functional mobility ; Increased pain;Decreased ROM; Decreased strength;Decreased balance;Decreased posture;Decreased endurance;Decreased ADL status  Assessment: Pt is an 81 y.o. M. admitted  for acute on chronic respiratory failure, COPD. He presents moderately SOB on 5 L. O2 (uses 3 L. at baseline), and c/o severe pain in (B) feet d/t neuropathy. Today, pt limited by severe pain in nayeli feet and unable to ambulate. He required maxAx2 to stand to RW and osiris stedy. He is far below his functional baseline and will benefit from continued skilled inpatient PT at a low-moderate intensity to improve safety and independence with functional mobility. Specific instructions for Next Treatment: cotx  Prognosis: Fair  PT Education: Goals; General Safety;Gait Training;PT Role;Plan of Care; Functional Mobility Training;Equipment;Transfer Training  Patient Education: max cues for pursed lip breathing throughout the session  REQUIRES PT FOLLOW UP: Yes  Activity Tolerance  Activity Tolerance: Patient limited by fatigue;Patient limited by endurance; Patient limited by pain  Activity Tolerance: Sp02 90-91% throughout most of session, briefly dropped to the mid 80's seated EOB     Patient Diagnosis(es): The primary encounter diagnosis was Acute on chronic respiratory failure with hypoxia ([facility] A diagnosis of Acute congestive heart failure, unspecified heart failure type [facility]) was also pertinent to this visit. has a past medical history of Atherosclerosis of aorta ([facility]), CHF (congestive heart failure) ([address] CKD (chronic kidney disease) stage 3, GFR 30-59 ml/min ([facility]), COPD (chronic obstructive pulmonary disease) ([address] CVA, old, ataxia, DM type 2 with diabetic peripheral neuropathy ([address] 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, Fall, Fatty liver, Hypertension, Mixed hyperlipidemia, Morbid obesity ([address] 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 ([address] Obesity, diabetes, and hypertension syndrome ([address] Psoriasis, Risk for falls, Type 2 diabetes mellitus with microalbuminuria or microproteinuria, and Vitamin D deficiency. has a past surgical history that includes TURP (8/14/12) and Cataract removal (Right, 7/3/13). Restrictions  Restrictions/Precautions  Restrictions/Precautions: Fall Risk  Position Activity Restriction  Other position/activity restrictions: 5 L. O2 per nc (uses 3 L. at baseline)     Social/Functional History  Lives With: Spouse  Type of Home: Apartment (Condo)  Home Layout: One level  Home Access: Stairs to enter without rails  Entrance Stairs - Number of Steps: 2  Bathroom Shower/Tub: Tub/Shower unit, Walk-in shower  Bathroom Toilet: Handicap height  Bathroom Equipment: Grab bars around toilet  Home Equipment: [employer], Electric scooter, 4 wheeled walker  ADL Assistance: Needs assistance (Wife assists with sponge bathing.   Pt is independent with toileting, and dressing.)  Homemaking Assistance:  (Wife cooks and cleans)  Ambulation Assistance: Independent (Uses 4WW at all times)  Transfer Assistance: Independent  Active : No  Patient's  Info: wife, Eduardo Guajardo drives patient  Additional Comments: Pt reports a fall 1 month ago. Subjective   General  Chart Reviewed: Yes  Additional Pertinent Hx: Per Dr. Ban Boyer, \"This is a 81 y.o. male who presented to the ED on 8/7 with a CC of worsening SOB for the past week. Uses 3 liters at baseline. He was increasing oxygen at home with some help. Also gained 10 lbs with more orthopnea. He denies feeling sick. Says he is urinating a lot and feeling better. He uses Breo, Spiriva and nebs at home. He stopped using CPAP machine in the past for unknown reasons. \"  Response To Previous Treatment: Patient with no complaints from previous session. Family / Caregiver Present: No  Referring Practitioner: Dr. Mattie Ceja  Subjective  Subjective: Pt agreeable to PT tx but reports severe pain in nayeli feet from neuropathy. Fast respiration rate throughout session.                 Objective   Bed mobility  Supine to Sit: Moderate assistance;Maximum assistance;2 Person assistance  Sit to Supine: Unable to assess (up in chair at end of session)     Transfers  Sit to Stand: Maximum Assistance;2 Person Assistance (to  and to [facility] from elevated bed height)  Stand to sit: Moderate Assistance;2 Person Assistance (from [address] [place] [address] and [facility])  Bed to Chair: Dependent/Total (via [facility])  Comment: took 2 attempts to come to full stand at [address] [place] [address] and 3 attempts to come to full stand at [address] [facility] due to severe pain in nayeli feet; very poor standing tolerance     Ambulation  Ambulation?: No (unable to attempt due to poor standing tolerance due to severe pain in nayeli feet)     Balance  Posture: Fair  Comments: modAx2 for brief static standing balance; SBA for sitting balance EOB            Other Activities: Other (see comment)  Comment: pt positioned for comfort in recliner chair at end of session            AM-PAC Score  AM-PAC Inpatient Mobility Raw Score : 9 (08/10/21 0844)  AM-PAC Inpatient T-Scale Score : 30.55 (08/10/21 0844)  Mobility Inpatient CMS 0-100% Score: 81.38 (08/10/21 0844)  Mobility Inpatient CMS G-Code Modifier : CM (08/10/21 8308)          Goals  Short term goals  Time Frame for Short term goals: In 2-3 days pt will perform (goals ongoing as of 8/10)  Short term goal 1: Bed mobility CGA  Short term goal 2: Transfer to walker Min A  Short term goal 3: Ambulation 8' with walker, Min A  Long term goals  Time Frame for Long term goals : LTG = STG  Patient Goals   Patient goals : To get stronger    Plan    Plan  Times per week: 2-3x  Specific instructions for Next Treatment: cotx  Current Treatment Recommendations: Strengthening, ROM, Balance Training, Endurance Training, Functional Mobility Training, Transfer Training, Gait Training, Stair training, Equipment Evaluation, Education, & procurement, Patient/Caregiver Education & Training, Safety Education & Training, Neuromuscular Re-education, Home Exercise Program  Safety Devices  Type of devices:  All fall risk precautions in place, Call light within reach, Chair alarm in place, Gait belt, Left in chair, Nurse notified (WINSTON Townsend notified)  Restraints  Initially in place: No     Therapy Time   Individual Concurrent Group Co-treatment   Time In \[facility] [place] [facility]\"" 43.         Time Out 0845         Minutes 34         Timed Code Treatment Minutes: 34 Minutes         Electronically signed by Daniela Monae on 8/10/2021 at 8:48 AM

## 2021-08-10 NOTE — PROGRESS NOTES
Pt has home meds in pt belongings bag in closet of pt's room. Pt had [place] and Klonopin home meds in his possession as well. These medications were counted and documented with Gamaliel. Medications will be stored in the pharmacy during pt's admission and will need to be returned at discharge.

## 2021-08-10 NOTE — RT PROTOCOL NOTE
of every 2 hours PRN wheezing or increased work of breathing using Per Protocol order mode. Repeat RT Therapy Protocol Assessment with second treatment then BID and as needed. If Albuterol Inhaler not tolerated or not effective, then discontinue the Albuterol Inhaler orders and enter two Albuterol Nebulizer orders with same frequencies and PRN reasons. 11-13 - discontinue any other Inpatient aerosolized bronchodilator medication orders and enter DuoNeb Nebulizer orders QID frequency and an Albuterol Nebulizer order every 2 hours PRN wheezing or increased work of breathing using Per Protocol order mode. Repeat RT Therapy Protocol Assessment with second treatment then QID and as needed. Greater than 13 - discontinue any other Inpatient bronchodilator aerosolized medication orders and enter DuoNeb Nebulizer order every 4 hours frequency and Albuterol Nebulizer every 2 hours PRN wheezing or increased work of breathing using Per Protocol order mode. Repeat RT Therapy Protocol Assessment with second treatment then every 4 hours and as needed. RT to enter RT Home Evaluation for COPD & MDI Assessment order using Per Protocol order mode.     Electronically signed by Randeen Moritz, RCP on 8/10/2021 at 12:01 AM

## 2021-08-10 NOTE — CONSULTS
Nutrition Assessment     Type and Reason for Visit: Initial, Consult, Patient Education    Nutrition Recommendations/Plan:   No nutrition concerns     Nutrition Assessment:  RD consult for heart failure diet education. RD provided written and verbal education on low sodium and fluid restriction. Education included not adding salt to foods, using herbs and spices to flavor foods, avoiding high salt meats (smith, sausage, hot dogs, lunch meat), and choosing low sodium snacks. Education also went over not exceeding 64 oz of fluid in a day. Pt states wife cooks for him using \"no salt\". RD advised to just use herbs and spices as no salt is high in potassium that can affect the heart. RD also with consult for gali score. No wt loss per EMR. Hx CHF, CKD, COPD, and diabetes. Currently NPO although when on oral diet, intakes greater than 50% at all meals. No further needs at this time. Malnutrition Assessment:  Malnutrition Status: No malnutrition    Nutrition Related Findings: +2 pitting BLE edema.       Current Nutrition Therapies:    Diet NPO Exceptions are: Sips of Water with Meds    Anthropometric Measures:  · Height: 5' 10\" (177.8 cm)  · Current Body Wt: 268 lb (121.6 kg)   · BMI: 38.5    Nutrition Diagnosis:   · Food & Nutrition-related knowledge deficit related to cardiac dysfunction as evidenced by other (comment) (CHF diagnosis)    Nutrition Interventions:   Food and/or Nutrient Delivery:  Continue Current Diet (Resume diet as appropriate)  Nutrition Education/Counseling:  Education completed (CHF 10 min)   Coordination of Nutrition Care:  No recommendation at this time    Goals:  PO intake greater than 50%       Nutrition Monitoring and Evaluation:   Behavioral-Environmental Outcomes:  None Identified   Food/Nutrient Intake Outcomes:  Diet Advancement/Tolerance, Food and Nutrient Intake  Physical Signs/Symptoms Outcomes:  Biochemical Data, GI Status, Fluid Status or Edema, Skin, Weight     Discharge Planning:    No discharge needs at this time     Electronically signed by Bernard Goodman RD, LD on 8/10/21 at 10:32 AM EDT    Contact: [phone]

## 2021-08-10 NOTE — PROGRESS NOTES
HGB 12.1* 11.5*   HCT 36.2* 34.7*   * 487*     Recent Labs     08/08/21  0544 08/09/21  0525 08/10/21  0601    142 136   K 3.9 4.2 4.0    98* 93*   CO2 30 31 27   BUN 16 23* 29*   CREATININE 1.3 1.4* 2.1*   CALCIUM 8.9 8.8 9.3     Recent Labs     08/07/21  2230   AST 10*   ALT 7*   BILITOT 0.4   ALKPHOS 60     No results for input(s): INR in the last 72 hours. Recent Labs     08/07/21  2230   TROPONINI 0.05*       Urinalysis:      Lab Results   Component Value Date    NITRU Negative 09/25/2020    WBCUA 1 09/25/2020    BACTERIA 2+ 10/26/2013    RBCUA 1 09/25/2020    BLOODU Negative 09/25/2020    SPECGRAV 1.015 09/25/2020    GLUCOSEU 250 09/25/2020       Radiology:  XR CHEST (2 VW)   Final Result   Stable findings most consistent with CHF. Interstitial edema with bilateral   pleural effusions and left basilar volume loss. XR SHOULDER RIGHT (MIN 2 VIEWS)   Final Result   Degenerative change in the right shoulder with no acute abnormality. XR CHEST PORTABLE   Final Result   Cardiomegaly with diffuse interstitial changes that suggest underlying   pulmonary edema. A chronic history of interstitial lung disease in the   setting of COPD cannot be excluded. Assessment/Plan:    Active Hospital Problems    Diagnosis     COPD, severe ([facility] [J44.9]      Priority: Medium    Acute diastolic (congestive) heart failure (HCC) [I50.31]     Acute on chronic respiratory failure with hypoxia (HCC) [J96.21]      1. Acute on chronic respiratory failure with hypoxia, due to diastolic heart failure, diuretics. Cardiology consulted. 2.  Acute on chronic diastolic heart failure, continue same management for now. 3.  COPD, continue home inhalers, pulmonary has been consulted  4. Chronic pain, continue home regimen. 5.  Diabetes mellitus, sliding scale. 6.  Essential hypertension, p.o. medications. 7.  LULU, I will hold Lasix today, repeat BMP in a.m.         Diet: Diet NPO Exceptions are: Sips of Water with Meds  Code Status: Full Code        Archie Wood MD

## 2021-08-10 NOTE — CONSULTS
PALLIATIVE MEDICINE CONSULTATION     Patient name:Deon Sawyer   EYP:[id number]    :1935  Room/Bed:U2U-8467/5265-01   LOS: 2 days         Date of consult:8/10/2021    Consult Information  Palliative Medicine Consult performed by: Robbin Palma, SANAZ - CNP  CNP  Requested by: Dr Guero Orozco  Reason for consult: Code status    Number of admissions past 12 months: 0    ASSESSMENT/RECOMMENDATIONS     81 y.o. male with hypoxia and debility      Symptom Management:  1. Hypoxia- pt on 5L today he is on 3L 24/7 at baseline he is drowsy today and difficult to arouse  2. Debility- Pt is falling frequently at home and his wife is unable to safely care for him. 3. Goals of Care- wife and daughter both agree that pt is [facility] and when I asked pt he agrees and thought we had that paperwork on file already. [facility] is for rehab stay that hopefull will turn into LTC. Pt has LTC insurance and family wants  as their first choice. Patient/Family Goals of Care :    wife and daughter both agree that pt is [facility] and when I asked pt he agrees and thought we had that paperwork on file already. [facility] is for rehab stay that hopefull will turn into LTC. Pt has LTC insurance and family wants  as their first choice. Disposition/Discharge Plan:   pending    Advance Directives:  · Surrogate Decision Maker: Clement Baez  · Code status:  DNR-CCA    Case discussed with: patient, floor RN, Stacey-spouse  Thank you for allowing us to participate in the care of this patient. HISTORY     CC: Hypoxia  HPI: The patient is a 81 y.o. male with history of diabetes, COPD and CVA states he follows with Dr. Lincoln Sandoval and uses 3 L of oxygen via nasal cannula at baseline. Palliative Medicine SymptomScreening/ROS:  Review of Systems - History obtained from chart review and unobtainable from patient due to mental status    Patient unable to complete full ROS due to current cognitive status. Information that is obtained from nursing and chart.      Pain:    Home med list and hospital medications reviewed in chart as of 8/10/2021     EXAM     Vitals:    08/10/21 1111   BP: (!) 150/72   Pulse: 84   Resp: 20   Temp: 98.7 °F (37.1 °C)   SpO2: 96%       Physical Examination: General appearance - chronically ill appearing  Neck - supple, no significant adenopathy  Chest - no tachypnea, retractions or cyanosis, wheezing noted , decreased air entry noted   Abdomen - soft, nontender, nondistended, no masses or organomegaly  Musculoskeletal - no joint tenderness, deformity or swelling  Skin - normal coloration and turgor, no rashes, no suspicious skin lesions noted       Current labs in the epic chart reviewed as of 8/10/2021   Review of previous notes, admits, labs, radiology and testing relevant to this consult done in this chart today 8/10/2021    Signed By: Electronically signed by SANAZ Esqueda CNP on 8/10/2021 at 1:30 PM  Palliative Medicine   [phone]    August 10, 2021

## 2021-08-10 NOTE — PROGRESS NOTES
Occupational Therapy   Occupational Therapy Initial Assessment and Tentative D/C    Date: 8/10/2021   Patient Name: Tayler Garnett  MRN: [id number]     : 1935    Date of Service: 8/10/2021    Discharge Recommendations: Tayler Garnett scored a 13/24 on the AM-PAC ADL Inpatient form. Current research shows that an AM-PAC score of 17 or less is typically not associated with a discharge to the patient's home setting. Based on the patient's AM-PAC score and their current ADL deficits, it is recommended that the patient have 3-5 sessions per week of Occupational Therapy at d/c to increase the patient's independence. Please see assessment section for further patient specific details. If patient discharges prior to next session this note will serve as a discharge summary. Please see below for the latest assessment towards goals. Continue to assess pending progress, 3-5 sessions per week, Patient would benefit from continued therapy after discharge  OT Equipment Recommendations  Equipment Needed: No  Other: defer    Assessment   Performance deficits / Impairments: Decreased functional mobility ; Decreased strength;Decreased endurance;Decreased ADL status; Decreased balance;Decreased high-level IADLs  Assessment: Tayler Garnett is a 81 y.o. male who presents to the emergency department via EMS from home where he lives with his wife complaining of shortness of breath. He denies productive cough or known fevers. He states he gained about 10 pounds in the last week or 2. He has history of diabetes, COPD and CVA states he follows with Dr. Anabelle Power and uses 3 L of oxygen via nasal cannula at baseline. Not aware of any fevers. EMS reported the patient was hypoxic at 84% on his home oxygen. He is increasing his oxygen to 4 L via nasal cannula at home. He is vaccinated for COVID-19 by the [address] [place] [address] approximately a month ago. He denies chest pain.  PTA pt from home with wife where pt was Ind with mobility with use of 4WW and needing PRN assist for ADLs. Pt currently functioning below baseline completing bed mobility with Mod/Max Ax2. Pt completes tranfers with Max Ax2 and use of stedy from elevated bed. Pt needing multiple attempts to achieve stand. Anticipate pt needing up to Max/Total A for ADLs. Pt will benefit from skilled OT services at this time. Anticipate pt with need for ongoing OT at time of D/C. Prognosis: Fair  Decision Making: Medium Complexity  Exam: see above  Assistance / Modification: jeovannydy  OT Education: OT Role;Plan of Care;Transfer Training  REQUIRES OT FOLLOW UP: Yes  Activity Tolerance  Activity Tolerance: Patient limited by fatigue;Patient limited by pain  Safety Devices  Safety Devices in place: Yes  Type of devices: Chair alarm in place;Call light within reach;Nurse notified; Left in chair;Gait belt           Patient Diagnosis(es): The primary encounter diagnosis was Acute on chronic respiratory failure with hypoxia ([facility] [address] A diagnosis of Acute congestive heart failure, unspecified heart failure type [facility]) was also pertinent to this visit. 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 has a past surgical history that includes TURP (8/14/12) and Cataract removal (Right, 7/3/13).            Restrictions  Restrictions/Precautions  Restrictions/Precautions: Fall Risk  Position Activity Restriction  Other Steps: 2  Bathroom Shower/Tub: Tub/Shower unit, Walk-in shower  Bathroom Toilet: Handicap height  Bathroom Equipment: Grab bars around toilet  Home Equipment: [address], Electric scooter, 4 wheeled walker  ADL Assistance: Needs assistance (Wife assists with sponge bathing. Pt is independent with toileting, and dressing.)  Homemaking Assistance:  (Wife cooks and cleans)  Ambulation Assistance: Independent (Uses 4WW at all times)  Transfer Assistance: Independent  Active : No  Patient's  Info: wife, Luis Shields drives patient  Additional Comments: Pt reports a fall 1 month ago. Objective   Vision: Impaired  Vision Exceptions: Wears glasses for reading  Hearing: Within functional limits    Orientation  Overall Orientation Status: Within Functional Limits     Balance  Sitting Balance: Stand by assistance (seated EOB)  Standing Balance:  Moderate assistance (in stance in stedy)  Functional Mobility  Functional Mobility Comments: unable to take steps this date  Wheelchair Bed Transfers  Wheelchair/Bed - Technique:  (stedy)  Equipment Used: Bed;Other (bed to chair)  Level of Asssistance: Dependent/Total  ADL  Toileting: Dependent/Total (kate)  Additional Comments: Anticipate pt needing up to Max/Total A for ADLs including dressing, bathing, and toileting based on ROM, strength, and balance  Tone RUE  RUE Tone: Normotonic  Tone LUE  LUE Tone: Normotonic  Coordination  Movements Are Fluid And Coordinated: Yes     Bed mobility  Supine to Sit: Moderate assistance;Maximum assistance;2 Person assistance  Sit to Supine: Unable to assess (up in chair at end of session)  Transfers  Sit to stand: 2 Person assistance;Maximum assistance  Stand to sit: 2 Person assistance;Maximum assistance  Transfer Comments: to/from RW and stedy from elevated bed; pt reports limited due to pain in B feet; pt requires multiple attempts x3 to achieve standing in stedy from elevated EOB     Cognition  Overall Cognitive Status: Exceptions  Arousal/Alertness: Appropriate responses to stimuli  Following Commands: Follows one step commands with increased time  Initiation: Requires cues for some  Sequencing: Requires cues for some        Sensation  Overall Sensation Status: Impaired  Light Touch: Severe deficits in the LLE; Severe deficits in the RLE        LUE AROM (degrees)  LUE AROM : Exceptions  LUE General AROM: decreased shoulder AROM  RUE AROM (degrees)  RUE AROM : WFL  LUE Strength  Gross LUE Strength: WFL  RUE Strength  Gross RUE Strength: WFL                   Plan   Plan  Times per week: 3-5x  Current Treatment Recommendations: Strengthening, Functional Mobility Training, Endurance Training, Balance Training, Safety Education & Training, Self-Care / ADL, Patient/Caregiver Education & Training, Gait Training      AM-PAC Score        AM-PAC Inpatient Daily Activity Raw Score: 13 (08/10/21 0847)  AM-PAC Inpatient ADL T-Scale Score : 32.03 (08/10/21 0847)  ADL Inpatient CMS 0-100% Score: 63.03 (08/10/21 0847)  ADL Inpatient CMS G-Code Modifier : CL (08/10/21 0847)    Goals  Short term goals  Time Frame for Short term goals: prior to D/C  Short term goal 1: complete functional mobility and transfers with CGA  Short term goal 2: complete bed mobility with CGA  Short term goal 3: complete bathing and dressing with Min A  Short term goal 4: complete toileting with CGA  Long term goals  Time Frame for Long term goals : STG=LTG  Patient Goals   Patient goals : no stated goals       Therapy Time   Individual Concurrent Group Co-treatment   Time In 0811         Time Out 0845         Minutes 34         Timed Code Treatment Minutes: 19 Minutes (15 minute eval)       JARET Johnson/SHARON

## 2021-08-11 NOTE — RT PROTOCOL NOTE
RT Inhaler-Nebulizer Bronchodilator Protocol Note    There is a bronchodilator order in the chart from a provider indicating to follow the RT Bronchodilator Protocol and there is an Initiate RT Bronchodilator Protocol order as well (see protocol at bottom of note). The findings from the last RT Protocol Assessment were as follows:  Smoking: >15 Pack years  Surgical Status: No surgery  Xray: Multiple lobe infiltrates/atelectasis/pleural effusion/edema  Respiratory Pattern: RR 12-20  Mental Status: Alert and Oriented  Breath Sounds: Diminished and/or crackles  Cough: Weak, non-productive  Activity Level: Bedridden, unresponsive or quadriplegic  Oxygen Requirement: 29% or 3LNC - 5LNC/40%  Indication for Bronchodilator Therapy: Decreased or absent breath sounds  Bronchodilator Assessment Score: 9    Aerosolized bronchodilator medication orders have been revised according to the RT Bronchodilator Protocol. RT Inhaler-Nebulizer Bronchodilator Protocol:    Respiratory Therapist to perform RT Therapy Protocol Assessment then follow the protocol. No Indications - adjust the frequency to every 6 hours PRN wheezing or bronchospasm, if no treatments needed after 48 hours then discontinue using Per Protocol order mode. If indication present, adjust the RT bronchodilator orders based on the Bronchodilator Assessment Score as follows:    0-6 - enter or revise RT bronchodilator order to Albuterol Inhaler order with frequency of every 2 hours PRN for wheezing or increased work of breathing using Per Protocol order mode. If Albuterol Inhaler not tolerated or not effective, then discontinue the Albuterol Inhaler order and enter Albuterol Nebulizer order with same frequency and PRN reasons. Repeat RT Therapy Protocol Assessment as needed.     7-10 - discontinue any other Inpatient aerosolized bronchodilator medication orders and enter or revise two Albuterol Inhaler orders, one with BID frequency and one with frequency of every 2 hours PRN wheezing or increased work of breathing using Per Protocol order mode. Repeat RT Therapy Protocol Assessment with second treatment then BID and as needed. If Albuterol Inhaler not tolerated or not effective, then discontinue the Albuterol Inhaler orders and enter two Albuterol Nebulizer orders with same frequencies and PRN reasons. 11-13 - discontinue any other Inpatient aerosolized bronchodilator medication orders and enter DuoNeb Nebulizer orders QID frequency and an Albuterol Nebulizer order every 2 hours PRN wheezing or increased work of breathing using Per Protocol order mode. Repeat RT Therapy Protocol Assessment with second treatment then QID and as needed. Greater than 13 - discontinue any other Inpatient bronchodilator aerosolized medication orders and enter DuoNeb Nebulizer order every 4 hours frequency and Albuterol Nebulizer every 2 hours PRN wheezing or increased work of breathing using Per Protocol order mode. Repeat RT Therapy Protocol Assessment with second treatment then every 4 hours and as needed. RT to enter RT Home Evaluation for COPD & MDI Assessment order using Per Protocol order mode.     Electronically signed by Daria Infante RCP on 8/10/2021 at 11:56 PM

## 2021-08-11 NOTE — PROGRESS NOTES
Physical Therapy  Facility/Department: [address] PROGRESSIVE CARE  Daily Treatment Note- COTX with OT  NAME: Tanvir Benitez  : 1935  MRN: [id number]    Date of Service: 2021    Discharge Recommendations:  Patient would benefit from continued therapy after discharge, 3-5 sessions per week   PT Equipment Recommendations  Equipment Needed: No  Other: defer to next level of care   Tanvir Benitez scored a 9/24 on the AM-PAC short mobility form. Current research shows that an AM-PAC score of 17 or less is typically not associated with a discharge to the patient's home setting. Based on the patient's AM-PAC score and their current functional mobility deficits, it is recommended that the patient have 3-5 sessions per week of Physical Therapy at d/c to increase the patient's independence. Please see assessment section for further patient specific details. If patient discharges prior to next session this note will serve as a discharge summary. Please see below for the latest assessment towards goals. Assessment   Body structures, Functions, Activity limitations: Decreased functional mobility ; Increased pain;Decreased ROM; Decreased strength;Decreased balance;Decreased posture;Decreased endurance;Decreased ADL status  Assessment: Pt is an 81 y.o. M. admitted  for acute on chronic respiratory failure, COPD. He presents mildly SOB on 4 L. O2 (uses 3 L. at baseline), and c/o severe pain in (B) feet d/t neuropathy. Today, pt continues to be limited by severe pain in nayeli feet and unable to ambulate. He required maxAx2 for osiris stedy transfer from elevated bed. He is far below his functional baseline and will benefit from continued skilled inpatient PT at a low-moderate intensity to improve safety and independence with functional mobility. Specific instructions for Next Treatment: cotx  Prognosis: Fair  PT Education: Goals; General Safety;PT Role;Plan of Care; Functional Mobility Training;Equipment;Transfer Training  REQUIRES PT FOLLOW UP: Yes  Activity Tolerance  Activity Tolerance: Patient limited by fatigue;Patient limited by endurance; Patient limited by pain     Patient Diagnosis(es): The primary encounter diagnosis was Acute on chronic respiratory failure with hypoxia ([facility] [address] A diagnosis of Acute congestive heart failure, unspecified heart failure type [facility]) was also pertinent to this visit. 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 has a past surgical history that includes TURP (8/14/12) and Cataract removal (Right, 7/3/13). Restrictions  Restrictions/Precautions  Restrictions/Precautions: Fall Risk  Position Activity Restriction  Other position/activity restrictions: 4 L. O2 per nc (uses 3 L. at baseline)     Social/Functional History  Lives With: Spouse  [facility]  Home Layout: One level  Home Access: Stairs to enter without rails  Entrance Stairs - Number of Steps: 2  Bathroom Shower/Tub: Tub/Shower unit, Walk-in shower  Bathroom Toilet: Handicap height  Bathroom Equipment: Grab bars around toilet  Home Equipment: [employer], Electric scooter, 4 wheeled walker  ADL Assistance: Needs assistance (Wife assists with sponge bathing.   Pt is independent with toileting, and dressing.)  Homemaking Assistance:  (Wife cooks and cleans)  Ambulation Assistance: Independent (Uses 4WW at all times)  Transfer Assistance: Independent  Active : No  Patient's  Info: wife, Ольга Kemp drives patient  Additional Comments: Pt reports a fall 1 month ago. Subjective   General  Chart Reviewed: Yes  Additional Pertinent Hx: Per Dr. Joelle Hernandez, \"This is a 81 y.o. male who presented to the ED on 8/7 with a CC of worsening SOB for the past week. Uses 3 liters at baseline. He was increasing oxygen at home with some help. Also gained 10 lbs with more orthopnea. He denies feeling sick. Says he is urinating a lot and feeling better. He uses Breo, Spiriva and nebs at home. He stopped using CPAP machine in the past for unknown reasons. \"  Response To Previous Treatment: Patient with no complaints from previous session. Family / Caregiver Present: No  Referring Practitioner: Dr. Riya White  Subjective  Subjective: Pt agreeable to PT treatment but continues to report severe pain in nayeli feet from neuropathy.   Supine in bed upon arrival.                Objective   Bed mobility  Supine to Sit: Maximum assistance;2 Person assistance (HOB elevated)  Sit to Supine: Unable to assess (up in chair at end of session)     Transfers  Sit to Stand: Maximum Assistance;2 Person Assistance (from elevated bed)  Stand to sit: Moderate Assistance;2 Person Assistance  Bed to Chair: Dependent/Total (via osiris stedy)     Ambulation  Ambulation?: No (poor standing tolerance due to nayeli foot pain)     Balance  Posture: Fair  Comments: sitting balance EOB CGA-Guerrero; brief standing balance in osiris stedy maxA     Exercises  Ankle Pumps: pt instructed in ankle pumps and encouraged to complete throughout the day         Other Activities: Other (see comment)  Comment: pt positioned for comfort in recliner chair at end of session and set up with breakfast tray            AM-PAC Score  AM-PAC Inpatient Mobility Raw Score : 9 (08/11/21 0850)  AM-PAC Inpatient T-Scale Score : 30.55 (08/11/21 0850)  Mobility Inpatient CMS 0-100% Score: 81.38 (08/11/21 0850)  Mobility Inpatient CMS G-Code Modifier : CM (08/11/21 1596)          Goals  Short term goals  Time Frame for Short term goals: In 2-3 days pt will perform (goals ongoing as of 8/11)  Short term goal 1: Bed mobility CGA  Short term goal 2: Transfer to walker Min A  Short term goal 3: Ambulation 8' with walker, Min A  Long term goals  Time Frame for Long term goals : LTG = STG  Patient Goals   Patient goals : To get stronger    Plan    Plan  Times per week: 2-3x  Specific instructions for Next Treatment: cotx  Current Treatment Recommendations: Strengthening, ROM, Balance Training, Endurance Training, Functional Mobility Training, Transfer Training, Gait Training, Stair training, Equipment Evaluation, Education, & procurement, Patient/Caregiver Education & Training, Safety Education & Training, Neuromuscular Re-education, Home Exercise Program  Safety Devices  Type of devices:  All fall risk precautions in place, Call light within reach, Chair alarm in place, Gait belt, Left in chair, Nurse notified (RN Dawood Kimble notified)  Restraints  Initially in place: No     Therapy Time   Individual Concurrent Group Co-treatment   Time In 0818         Time Out 0850         Minutes 32         Timed Code Treatment Minutes: 32 Minutes         Electronically signed by Daniela Acuna on 8/11/2021 at 8:53 AM

## 2021-08-11 NOTE — PROGRESS NOTES
Patient lethargic throughout the day today. Arousable to voice and sternal rub, but quickly closes eyes again. Patient having trouble eating dinner due to falling asleep. Physician notified via secure message. Stat ABG ordered and results sent via secure message to physician.      Electronically signed by Muriel Baugh RN on 8/11/2021 at 7:12 PM

## 2021-08-11 NOTE — PROGRESS NOTES
PALLIATIVE MEDICINE PROGRESS NOTE     Patient name:Deon Clark    POW:[id number] :1935  Room/Bed:A5L-7214/5265-01    LOS: 3 days        ASSESSMENT/RECOMMENDATIONS     81 y.o. male with hypoxia and debility        Symptom Management:  1. Hypoxia- pt on 4L today he is on 3L 24/7 at baseline he is drowsy today and difficult to arouse  2. Debility- Pt is falling frequently at home and his wife is unable to safely care for him. 3. Goals of Care- wife and daughter both agree that pt is [facility] and when I asked pt he agrees and thought we had that paperwork on file already. [place] is for rehab stay that hopefull will turn into LTC. Pt has LTC insurance and family wants  as their first choice.      Patient/Family Goals of Care :    wife and daughter both agree that pt is [facility] and when I asked pt he agrees and thought we had that paperwork on file already. Plan is for rehab stay that hopefull will turn into LTC. Pt has LTC insurance.   Pt respiratory status is improved today. No new issues.         Disposition/Discharge Plan:   pending     Advance Directives:  · Surrogate Decision Maker: Skylar   · Code status:  DNR-CCA     Case discussed with: patient, floor RN, Stacey-spouse  Thank you for allowing us to participate in the care of this patient. SUBJECTIVE     Chief Complaint: Hypoxia    Last 24 hours:   Pt oxygen requirement is down today near baseline he is more alert. ROS:  Review of Systems -  History obtained from chart review and unobtainable from patient due to mental status     Patient unable to complete full ROS due to current cognitive status. Information that is obtained from nursing and chart. OBJECTIVE   /66   Pulse 83   Temp 99 °F (37.2 °C) (Oral)   Resp 22   Ht 5' 10\" (1.778 m)   Wt 268 lb 8.3 oz (121.8 kg)   SpO2 95%   BMI 38.53 kg/m²   I/O last 3 completed shifts:   In: 717.6 [P.O.:660; I.V.:10; IV Piggyback:47.6]  Out: -   I/O this shift:  In: -   Out: 300 [Urine:300]      Physical Examination:  General appearance - chronically ill appearing  Neck - supple, no significant adenopathy  Chest - no tachypnea, retractions or cyanosis, wheezing noted , decreased air entry noted   Abdomen - soft, nontender, nondistended, no masses or organomegaly  Musculoskeletal - no joint tenderness, deformity or swelling  Skin - normal coloration and turgor, no rashes, no suspicious skin lesions noted         Signed By: Electronically signed by SANAZ Murray CNP on 8/11/2021 at 1:35 PM   Palliative Medicine   [phone]    August 11, 2021

## 2021-08-11 NOTE — PROGRESS NOTES
Occupational Therapy  Facility/Department: [address] PROGRESSIVE CARE  Daily Treatment Note  NAME: Nayan Lamar  : 1935  MRN: [id number]    Date of Service: 2021    Discharge Recommendations:  Continue to assess pending progress, 3-5 sessions per week, Patient would benefit from continued therapy after discharge       Assessment   Performance deficits / Impairments: Decreased functional mobility ; Decreased strength;Decreased endurance;Decreased ADL status; Decreased balance;Decreased high-level IADLs  Assessment: Pt tolerated session fair. Pt is limited due to severe pain in both legs with standing. He requires the stedy to transfer to the chair as he would not be able to take any steps at this time. Pt reports pain is not as bad at home. He required max assist x 2 for bed mobility as he did not assist much with the transfer. Pt is functioning well below his baseline and would benefit from continued therapy prior to discharge home. Prognosis: Fair  REQUIRES OT FOLLOW UP: Yes  Activity Tolerance  Activity Tolerance: Patient limited by fatigue;Patient limited by pain  Safety Devices  Type of devices: Call light within reach; Chair alarm in place; Left in chair;Gait belt         Patient Diagnosis(es): The primary encounter diagnosis was Acute on chronic respiratory failure with hypoxia ([address] A diagnosis of Acute congestive heart failure, unspecified heart failure type [facility]) was also pertinent to this visit.       has a past medical history of Atherosclerosis of aorta ([facility]), CHF (congestive heart failure) ([address] CKD (chronic kidney disease) stage 3, GFR 30-59 ml/min ([facility]), COPD (chronic obstructive pulmonary disease) ([address] CVA, old, ataxia, DM type 2 with diabetic peripheral neuropathy ([address] 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, Fall, Fatty liver, Hypertension, Mixed hyperlipidemia, Morbid obesity ([address] 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 ([address] Obesity, diabetes, and hypertension syndrome ([address] Psoriasis, Risk for falls, Type 2 diabetes mellitus with microalbuminuria or microproteinuria, and Vitamin D deficiency. has a past surgical history that includes TURP (8/14/12) and Cataract removal (Right, 7/3/13). Restrictions  Restrictions/Precautions  Restrictions/Precautions: Fall Risk  Position Activity Restriction  Other position/activity restrictions: 4 L. O2 per nc (uses 3 L. at baseline)  Subjective   General  Chart Reviewed: Yes, Progress Notes  Patient assessed for rehabilitation services?: Yes  Additional Pertinent Hx: per ED note, Jocelyn Leslie is a 81 y.o. male who presents to the emergency department via EMS from home where he lives with his wife complaining of shortness of breath. He denies productive cough or known fevers. He states he gained about 10 pounds in the last week or 2. He has history of diabetes, COPD and CVA states he follows with Dr. Antonio Rees and uses 3 L of oxygen via nasal cannula at baseline. Not aware of any fevers. EMS reported the patient was hypoxic at 84% on his home oxygen. He is increasing his oxygen to 4 L via nasal cannula at home. He is vaccinated for COVID-19 by the [address] [place] [address] approximately a month ago. He denies chest pain. Family / Caregiver Present: No  Referring Practitioner: Ken Garcia MD  Subjective  Subjective: Pt seen bedside and agreed to sitting in the recliner. Pt seen as cotx with PT. Pt c/o sever pain in both legs. General Comment  Comments: okay for therapy per RN. Objective    ADL  LE Dressing: Dependent/Total (Assist to change socks. Edema noted in legs so placed XXXL socks.)        Wheelchair Bed Transfers  Wheelchair/Bed - Technique:  [employer])  Equipment Used: Other (recliner)  Level of Asssistance: Dependent/Total  Wheelchair Transfers Comments: Used stedy to transfer to the chair. Pt required max assist x 2 to achieve full upright stand.   Pt with lean to the right making it difficult to place seat on the right. Bed mobility  Supine to Sit: Maximum assistance;2 Person assistance (HOB elevated)  Sit to Supine: Unable to assess (up in chair at end of session)  Transfers  Sit to stand: 2 Person assistance;Maximum assistance  Stand to sit: 2 Person assistance;Maximum assistance                                                                 Plan   Plan  Times per week: 3-5x  Current Treatment Recommendations: Strengthening, Functional Mobility Training, Endurance Training, Balance Training, Safety Education & Training, Self-Care / ADL, Patient/Caregiver Education & Training, Gait Training    AM-PAC Score        AM-PAC Inpatient Daily Activity Raw Score: 13 (08/11/21 0852)  AM-PAC Inpatient ADL T-Scale Score : 32.03 (08/11/21 0852)  ADL Inpatient CMS 0-100% Score: 63.03 (08/11/21 0852)  ADL Inpatient CMS G-Code Modifier : CL (08/11/21 0490)    Goals  Short term goals  Time Frame for Short term goals: prior to D/C  Short term goal 1: complete functional mobility and transfers with CGA  Short term goal 2: complete bed mobility with CGA  Short term goal 3: complete bathing and dressing with Min A  Short term goal 4: complete toileting with CGA  Long term goals  Time Frame for Long term goals : STG=LTG  Patient Goals   Patient goals : no stated goals       Therapy Time   Individual Concurrent Group Co-treatment   Time In 0818         Time Out 0850         Minutes 32              This note to serve as OT d/c summary if pt is d/c-ed from hospital prior to next OT session.       Before the Call, [address]

## 2021-08-11 NOTE — PROGRESS NOTES
Pulmonary Progress Note    CC:  Follow up hypoxia, COPD, presumed CHF    Subjective:  RHC with low wedge. Unclear how much diuresis he had  CT Chest showed small effusion with atelectasis likely  slighlty elevated procal  He is better but not back to baseline    ROS  Slightly less SOB  Coughing with congestion  Shoulder pain          Intake/Output Summary (Last 24 hours) at 8/11/2021 0755  Last data filed at 8/11/2021 0348  Gross per 24 hour   Intake 717.58 ml   Output --   Net 717.58 ml         PHYSICAL EXAM:  Blood pressure (!) 168/84, pulse 105, temperature 98.8 °F (37.1 °C), temperature source Oral, resp. rate 20, height 5' 10\" (1.778 m), weight 268 lb 8.3 oz (121.8 kg), SpO2 90 %.'  Gen: No distress. Chronically ill   Eyes: PERRL. No sclera icterus. No conjunctival injection. ENT: No discharge. Pharynx clear. External appearance of ears and nose normal.  Neck: Trachea midline. No obvious mass. Resp: better aeration but diminished   CV: Regular rate. Regular rhythm. No murmur or rub. Distant heart sounds   GI: Non-tender. Non-distended. No hernia. Skin: Venous stasis changes   Lymph: No cervical LAD. No supraclavicular LAD. M/S: No cyanosis. No clubbing. No joint deformity. Neuro: Moves all four extremities. CN 2-12 tested, no defect noted.   Ext:   + edema    Medications:    Scheduled Meds:   heparin (porcine)  5,000 Units Subcutaneous 3 times per day    sodium chloride flush  5-40 mL Intravenous 2 times per day    cefTRIAXone (ROCEPHIN) IV  2,000 mg Intravenous Q24H    doxycycline hyclate  100 mg Oral 2 times per day    albuterol  2.5 mg Nebulization Q8H    budesonide-formoterol  2 puff Inhalation BID    isosorbide mononitrate  30 mg Oral Daily    labetalol  100 mg Oral BID    atorvastatin  40 mg Oral Daily    tiotropium  2 puff Inhalation Daily    [Held by provider] furosemide  40 mg Intravenous BID    glipiZIDE  10 mg Oral QAM AC    gabapentin  300 mg Oral TID    escitalopram  10 mg Oral Daily    doxazosin  8 mg Oral Daily       Continuous Infusions:   sodium chloride         PRN Meds:  sodium chloride flush, sodium chloride, acetaminophen, ondansetron **OR** ondansetron, perflutren lipid microspheres, potassium chloride, magnesium sulfate, albuterol, morphine, hydrALAZINE    Labs:  CBC:   Recent Labs     08/10/21  0601   WBC 9.3   HGB 11.5*   HCT 34.7*   MCV 87.4   *     BMP:   Recent Labs     08/09/21  0525 08/10/21  0601 08/11/21  0525    136 137   K 4.2 4.0 3.9   CL 98* 93* 92*   CO2 31 27 28   BUN 23* 29* 37*   CREATININE 1.4* 2.1* 1.8*     LIVER PROFILE:   No results for input(s): AST, ALT, LIPASE, BILIDIR, BILITOT, ALKPHOS in the last 72 hours. Invalid input(s): AMYLASE,  ALB  PT/INR: No results for input(s): PROTIME, INR in the last 72 hours. APTT: No results for input(s): APTT in the last 72 hours. UA:No results for input(s): NITRITE, COLORU, PHUR, LABCAST, WBCUA, RBCUA, MUCUS, TRICHOMONAS, YEAST, BACTERIA, CLARITYU, SPECGRAV, LEUKOCYTESUR, UROBILINOGEN, BILIRUBINUR, BLOODU, GLUCOSEU, AMORPHOUS in the last 72 hours. Invalid input(s): Marek Negus  No results for input(s): PH, PCO2, PO2 in the last 72 hours. Films:  Chest imaging reports were reviewed and imaging was reviewed by me and showed small effusion, bilateral atelectasis, mild fibrosis, mucus in airways     ABG:  None    Cultures:  None    I reviewed the labs and images listed above    ASSESSMENT/PLAN:  · Acute on Chronic Hypoxic Respiratory Failure  · Titrate oxygen for saturations greater than or equal to 90%  ? PRN bpap if he runs into trouble   · Abnormal CT Chest:  Small effusion, atelectasis with mucus in airways  ? Rocephin and Doxycycline started yesterday due to elevated procal  ? Check procal tomorrow  ? Add prednisone   · Severe COPD at baseline without apparent exacerbation   ? Symbicort as substitute for Breo  ? Continue with Spiriva  ? Scheduled albuterol q 8 hours   ?  Add prednisone · ROSENDO, untreated  ? He no longer uses CPAP  ?  Consider using it if he runs into more orthopnea   · Pleural plaque in the LLL  · BMI 39        DVT prophylaxis  Heparin       Gerhardt Pupa, DO [facility] Pulmonary

## 2021-08-11 NOTE — PROGRESS NOTES
Inhalation BID    isosorbide mononitrate  30 mg Oral Daily    labetalol  100 mg Oral BID    atorvastatin  40 mg Oral Daily    tiotropium  2 puff Inhalation Daily    [Held by provider] furosemide  40 mg Intravenous BID    glipiZIDE  10 mg Oral QAM AC    gabapentin  300 mg Oral TID    escitalopram  10 mg Oral Daily    doxazosin  8 mg Oral Daily       Continuous Infusions:   sodium chloride         PRN Meds:  sodium chloride flush, sodium chloride, acetaminophen, ondansetron **OR** ondansetron, perflutren lipid microspheres, potassium chloride, magnesium sulfate, albuterol, morphine, hydrALAZINE    Labs:  CBC:   Recent Labs     08/10/21  0601   WBC 9.3   HGB 11.5*   HCT 34.7*   MCV 87.4   *     BMP:   Recent Labs     08/09/21  0525 08/10/21  0601 08/11/21  0525    136 137   K 4.2 4.0 3.9   CL 98* 93* 92*   CO2 31 27 28   BUN 23* 29* 37*   CREATININE 1.4* 2.1* 1.8*     LIVER PROFILE:   No results for input(s): AST, ALT, LIPASE, BILIDIR, BILITOT, ALKPHOS in the last 72 hours. Invalid input(s): AMYLASE,  ALB  PT/INR: No results for input(s): PROTIME, INR in the last 72 hours. APTT: No results for input(s): APTT in the last 72 hours. UA:No results for input(s): NITRITE, COLORU, PHUR, LABCAST, WBCUA, RBCUA, MUCUS, TRICHOMONAS, YEAST, BACTERIA, CLARITYU, SPECGRAV, LEUKOCYTESUR, UROBILINOGEN, BILIRUBINUR, BLOODU, GLUCOSEU, AMORPHOUS in the last 72 hours. Invalid input(s): Fabiola Lopez  No results for input(s): PH, PCO2, PO2 in the last 72 hours. Films:  Chest imaging reports were reviewed and imaging was reviewed by me and showed CXR 8/8/21: enlarged cardiac silhouette. Bilateral pleural effusions and bibasilar volume loss, left greater than right. CT chest 8/10/21: small bilateral pleural effusions, associated with dependent atelectasis. Superimposed pneumonia less likely    ECHO:  8/9/21: EF estimate of 55-60%. Grade I diastolic dysfunction with elevated LV filling pressures.  Mildly dilated R ventricle. Normal right ventricle systolic function. I reviewed the labs and images listed above    Assessment/Plan:   1. Acute on chronic hypoxic respiratory failure   -He wears 3L NC at home and had worsening shortness of breath for one week prior to admission  -Reports improvement in his breathing   -On 4L with SpO2 of 90    2. Severe COPD at baseline   -Continue bronchodilators: symbicort, spiriva, albuterol     3. Abnormal chest XR with pulmonary edema  -Pro-BNP elevated at 3,065  -CXR: enlarged cardiac silhouette with bilateral effusions, L greater than R  -ECHO: EF of 04-48%, grade I diastolic dysfunction with elevated LV filling pressures   -Cardiology following  -Right heart cath performed yesterday indicated low left-sided filling pressures, mild elevation in right sided filling pressures  -Hold lasix due to kidney function  -Pro-pauline of 0.32, rocephin and doxycycline started 8/10 due to possible infectious etiology    4. Obstructive sleep apnea   -Has CPAP at home but does not regularly use it  -Could be contributing to shortness of breath   -Consider addition of CPAP to treatment if patient worsens     5. Bilateral lower extremity pain, chronic   -Reports worsening of pain   -Normal potassium of 4.0 and normal magnesium of 1.90   -He is receiving gabapentin 300mg TID   -Lasix could be contributing to the pain  -Continue to monitor       DVT prophylaxis: heparin    I have seen and examined the above patient. I have personally staffed the case with Student Doctor Betty Frye.   Please my separate note and/or additions to current note for my full assessment/plan    Maximiliano Levine DO  [facility] Pulmonary, Sleep and Critical Care  [phone]      Maximiliano Levine DO  [facility] Pulmonary

## 2021-08-11 NOTE — PROGRESS NOTES
Hospitalist Progress Note      PCP: Jose E Fraser MD    Date of Admission: 8/7/2021      Subjective: Improved dyspnea, less cough, denies fever or chills, no muscle cramps      Medications:  Reviewed    Infusion Medications    sodium chloride       Scheduled Medications    heparin (porcine)  5,000 Units Subcutaneous 3 times per day    sodium chloride flush  5-40 mL Intravenous 2 times per day    cefTRIAXone (ROCEPHIN) IV  2,000 mg Intravenous Q24H    doxycycline hyclate  100 mg Oral 2 times per day    albuterol  2.5 mg Nebulization Q8H    budesonide-formoterol  2 puff Inhalation BID    isosorbide mononitrate  30 mg Oral Daily    labetalol  100 mg Oral BID    atorvastatin  40 mg Oral Daily    tiotropium  2 puff Inhalation Daily    [Held by provider] furosemide  40 mg Intravenous BID    glipiZIDE  10 mg Oral QAM AC    gabapentin  300 mg Oral TID    escitalopram  10 mg Oral Daily    doxazosin  8 mg Oral Daily     PRN Meds: sodium chloride flush, sodium chloride, acetaminophen, ondansetron **OR** ondansetron, perflutren lipid microspheres, potassium chloride, magnesium sulfate, albuterol, morphine, hydrALAZINE      Intake/Output Summary (Last 24 hours) at 8/11/2021 0653  Last data filed at 8/11/2021 0348  Gross per 24 hour   Intake 717.58 ml   Output --   Net 717.58 ml       Physical Exam Performed:    BP (!) 160/78   Pulse 90   Temp 99.5 °F (37.5 °C) (Oral)   Resp 18   Ht 5' 10\" (1.778 m)   Wt 268 lb 8.3 oz (121.8 kg)   SpO2 91%   BMI 38.53 kg/m²     General appearance: No apparent distress  Neck: Supple  Respiratory:  Normal respiratory effort. Clear to auscultation, bilaterally without Rales/Wheezes/Rhonchi. Cardiovascular: Regular rate and rhythm with normal S1/S2 without murmurs, rubs or gallops. Abdomen: Soft, non-tender, non-distended with normal bowel sounds. Musculoskeletal: No clubbing, cyanosis. Skin: Skin color, texture, turgor normal.  No rashes or lesions.   Neurologic: No focal weakness  Psychiatric: Alert and oriented  Capillary Refill: Brisk,3 seconds, normal   Peripheral Pulses: +2 palpable, equal bilaterally       Labs:   Recent Labs     08/10/21  0601   WBC 9.3   HGB 11.5*   HCT 34.7*   *     Recent Labs     08/09/21  0525 08/10/21  0601 08/11/21  0525    136 137   K 4.2 4.0 3.9   CL 98* 93* 92*   CO2 31 27 28   BUN 23* 29* 37*   CREATININE 1.4* 2.1* 1.8*   CALCIUM 8.8 9.3 8.7     No results for input(s): AST, ALT, BILIDIR, BILITOT, ALKPHOS in the last 72 hours. No results for input(s): INR in the last 72 hours. No results for input(s): Zahida Comment in the last 72 hours. Urinalysis:      Lab Results   Component Value Date    NITRU Negative 09/25/2020    WBCUA 1 09/25/2020    BACTERIA 2+ 10/26/2013    RBCUA 1 09/25/2020    BLOODU Negative 09/25/2020    SPECGRAV 1.015 09/25/2020    GLUCOSEU 250 09/25/2020       Radiology:  CT CHEST WO CONTRAST   Final Result   Small bilateral pleural effusions, associated with dependent atelectasis. Superimposed pneumonia is less likely. Subsegmental atelectasis in the right middle lobe. Emphysema and mild peripheral fibrosis. Cardiomegaly and trace pericardial effusion. XR CHEST (2 VW)   Final Result   Stable findings most consistent with CHF. Interstitial edema with bilateral   pleural effusions and left basilar volume loss. XR SHOULDER RIGHT (MIN 2 VIEWS)   Final Result   Degenerative change in the right shoulder with no acute abnormality. XR CHEST PORTABLE   Final Result   Cardiomegaly with diffuse interstitial changes that suggest underlying   pulmonary edema. A chronic history of interstitial lung disease in the   setting of COPD cannot be excluded.                  Assessment/Plan:    Active Hospital Problems    Diagnosis     COPD, severe ([address] [J44.9]      Priority: Medium    Acute diastolic (congestive) heart failure (HCC) [I50.31]     Acute on chronic respiratory failure with hypoxia ([facility]) [J96.21]      1.  Acute on chronic respiratory failure with hypoxia, appears multifactorial, felt to have diastolic heart failure, diuresed well, pulmonary also following, and started on antibiotics for potential infection  2.  Acute on chronic diastolic heart failure, continue same management for now. 3.  COPD, continue home inhalers, pulmonary has been consulted, procalcitonin mildly elevated, added antibiotics, left traction and doxycycline, CT scan chest without obvious infiltrate but with bilateral pleural effusion likely due to heart failure along with atelectatic changes. 4.  Chronic pain, continue home regimen. 5.  Diabetes mellitus, sliding scale. 6.  Essential hypertension, p.o. medications. 7.  LULU, IV diuretics on hold, creatinine improved to 1.8      Diet: ADULT DIET; Regular; Low Fat/Low Chol/High Fiber/2 gm Na;  Low Sodium (2 gm); 1500 ml  Code Status: DNR-CCA        Gonzalo Richardson MD

## 2021-08-11 NOTE — DISCHARGE INSTR - COC
Continuity of Care Form    Patient Name: Soledad Zamora   :  1935  MRN:  [id number]    Admit date:  2021  Discharge date:  8/15    Code Status Order: DNR-CCA   Advance Directives:      Admitting Physician:  Andrei Shetty MD  PCP: Ben Myers MD    Discharging Nurse: [facility] Unit/Room#: [id number]-2877/4048-63  Discharging Unit Phone Number: [phone]    Emergency Contact:   Extended Emergency Contact Information  Primary Emergency Contact: [facility]  Address: [address],  [address]           [facility], [address] [place] [address],[address] Vince Kocher of [address] Phone: [phone]  Relation: Spouse  Secondary Emergency Contact: Suzy Bess, [address] Phone: [phone]  Relation: Child    Past Surgical History:  Past Surgical History:   Procedure Laterality Date    CATARACT REMOVAL Right 7/3/13    Cam Luciano TURP  12    Teja       Immunization History:   Immunization History   Administered Date(s) Administered    COVID-19, J&J, PF, 0.5 mL 2021    Influenza Virus Vaccine 10/07/2009, 2010    Influenza, High Dose (Fluzone 65 yrs and older) 2011, 10/01/2012, 10/29/2013, 10/23/2014, 2015, 2016, 2017, 2018, 2020    Influenza, Triv, inactivated, subunit, adjuvanted, IM (Fluad 65 yrs and older) 2019    Pneumococcal Conjugate 13-valent ([id number]) 2015    Pneumococcal Polysaccharide ([id number]) 2002, 2016    Td, unspecified formulation 2001, 2012    Tdap (Boostrix, Adacel) 2015, 2017    Zoster Live (Zostavax) 2014, 2015       Active Problems:  Patient Active Problem List   Diagnosis Code    COPD, severe ([facility] [address] J44.9   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    Stage 3 chronic kidney disease ([facility] [address] N18.30    CVA, old, ataxia I69.993  Atherosclerosis of aorta (HCC) I70.0    Type 2 diabetes mellitus with microalbuminuria or microproteinuria E11.29, R80.9    Hypoxemia R09.02    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 D75.1    SOB (shortness of breath) R06.02    Chronic respiratory failure with hypoxia and hypercapnia (HCC) J96.11, J96.12    Orthopnea R06.01    Abnormal CT of the chest R93.89    Chest pain R07.9    ROSENDO (obstructive sleep apnea) G47.33    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    Shortness of breath R06.02    Bilateral pleural effusion P80    Diastolic dysfunction L76.61    Elevated brain natriuretic peptide (BNP) level R79.89    Essential hypertension I10    Atrial fibrillation (HCC) I48.91    Chronic respiratory failure with hypoxia (HCC) J96.11    Sinus tachycardia R00.0    Multifocal pneumonia J18.9    Pulmonary nodule seen on imaging study R91.1    Major depressive disorder, recurrent, 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    Acute diastolic (congestive) heart failure (HCC) I50.31    Acute on chronic respiratory failure with hypoxia (HCC) J96.21       Isolation/Infection:   Isolation            No Isolation          Patient Infection Status       Infection Onset Added Last Indicated Last Indicated By Review Planned Expiration Resolved Resolved By    None active    Resolved    COVID-19 Rule Out 08/08/21 08/08/21 08/08/21 COVID-19, Rapid (Ordered)   08/08/21 Rule-Out Test Resulted            Nurse Assessment:  Last Vital Signs: /66   Pulse 83   Temp 99 °F (37.2 °C) (Oral)   Resp 22   Ht 5' 10\" (1.778 m)   Wt 268 lb 8.3 oz (121.8 kg)   SpO2 95%   BMI 38.53 kg/m²     Last documented pain score (0-10 scale): Pain Level: 8  Last Weight:   Wt Readings from Last 1 Encounters:   08/11/21 268 lb 8.3 oz (121.8 kg)     Mental Status:  oriented and alert    IV Access:  - None    Nursing Mobility/ADLs:  Walking   Dependent  Transfer  Assisted  Bathing  Assisted  Dressing  Assisted  Toileting  Assisted  Feeding  Independent  Med Admin intake to 48 - 64 ounces ( 1.5 - 2 liters) per day.  Call facility MD and [facility] [phone] and/or Marcos Kalpana Yuclaudia @ [phone] with any questions or concerns.  Please continue heart failure education to patient and family/support system.  See After Visit Summary for hospital follow up appointment details.  Consider spiritual care referral for support and/or completion of advance directives [phone].  Consider: having the facility MD complete required 7 day follow up, [facility]  telehealth program if patient agreeable and able to participate, palliative care consult for ongoing goals of care, end of life, and/or chronic disease management discussions and referral to [facility] [phone]) once SNF/HHC complete.       Rehab Therapies: Physical Therapy and Occupational Therapy  Weight Bearing Status/Restrictions: No weight bearing restirctions  Other Medical Equipment (for information only, NOT a DME order):  stedy  Other Treatments:     Patient's personal belongings (please select all that are sent with patient):  Simon    RN SIGNATURE:  Electronically signed by Dann Castleman, RN on 8/15/21 at 1:06 PM EDT    CASE MANAGEMENT/SOCIAL WORK SECTION    Inpatient Status Date: 8/8/21    Readmission Risk Assessment Score:  Readmission Risk              Risk of Unplanned Readmission:  19         Discharging to Facility/ Agency   · Name: [facility]  · Address:  [place] [address]   · Phone:  [phone]  · Fax:  [phone]    / signature: Electronically signed by Erin Renteria on 8/15/21 at 1:20 PM EDT    PHYSICIAN SECTION    Prognosis: Good    Condition at Discharge: Stable    Rehab Potential (if transferring to Rehab): Good    Recommended Labs or Other Treatments After Discharge: PT, OT, follow up with pulmo in 2 week, follow up with cardiology in 1 week,

## 2021-08-11 NOTE — CARE COORDINATION
Spoke to patient & wife Denice Jeffrey. Both agreeable to SNF at discharge. Wife wants [facility]. Explained I had also sent a referral to [facility] as was mentioned as their SNF choice in Palliative care's note, wife says changed mind and would like [facility]. Referral sent.   Electronically signed by Dean Alcantara RN Case Management [phone] on 8/11/2021 at 9:46 AM

## 2021-08-11 NOTE — PROGRESS NOTES
[facility]   Daily Progress Note      Admit Date:  8/7/2021    CC: SOB    HPI:   Bebe Ball is a 81 y.o. male with PMH HTN, COPD/chronic hypoxemia (Dr. Gus Matias), atherosclerosis of aorta, HF, CKD stage 3 (Dr. Mariah Lainez), CVA, ROSENDO-not using CPAP, DM2, diabetic peripheral neuropathy, fatty liver, HLP, morbid obesity. Remote smoking hx. Admitted to W with progressively worsening SOB X2-3 days. Worsened with exertion, improved with rest. Associated with wt gain, orthopnea and BLE edema. Per Dr. Sariah Thomas PCP note- lasix was reduced from 80mg/day to 40mg/day on 7/9. Also increased gabapentin at that office visit. Started on IV lasix 40mg BID. Pulm following, does not think it is COPD exac. Attempted to wean O2 . High flow NC 8-9L>3L Now on 5L NC. I/O inaccurate-pt incontinent. He continues to feel SOB with any activity. Associated with orthopnea and fatigue. Gets OOB with assistance with steady. Denies CP. Abd is firm and distended but he states this is typical for him. Weights have not been taken consistently on same scale. His wt appears to be up 266>275>268lbs? Working with PT to get OOB to chair. Underwent RHC yesterday - see ressults below. Review of Systems:   General: Denies fever, chills  Skin: Denies skin changes, rash, itching, lesions. HEENT: Denies headache, dizziness, vision changes, nosebleeds, sore throat, nasal drainage  RESP: See HPI.   CARD: Denies palpitations,  murmur  GI:Denies nausea, vomiting, heartburn, loss of appetite, change in bowels  : Denies frequency, pain, incontinence, polyuria  VASC: Denies claudication, leg cramps, clots  MUSC/SKEL: Denies pain, stiffness, arthritis  PSYCH: Denies anxiety, depression, stress  NEURO: Denies numbness, tingling, weakness,change in mood or memory  HEME: Denies abn bruising, bleeding, anemia  ENDO: Denies intolerance to heat, cold, excessive thirst or hunger, hx thyroid disease    Objective:   BP (!) 168/84 Pulse 105   Temp 98.8 °F (37.1 °C) (Oral)   Resp 20   Ht 5' 10\" (1.778 m)   Wt 268 lb 8.3 oz (121.8 kg)   SpO2 90%   BMI 38.53 kg/m²           Intake/Output Summary (Last 24 hours) at 8/11/2021 0852  Last data filed at 8/11/2021 0803  Gross per 24 hour   Intake 717.58 ml   Output 300 ml   Net 417.58 ml     I/O since adm: +1.4- incontinent    WEIGHT:Admit Weight: 271 lb 6.2 oz (123.1 kg)         Today  Weight: 268 lb 8.3 oz (121.8 kg)   DRY WEIGHT:  Wt Readings from Last 3 Encounters:   08/11/21 268 lb 8.3 oz (121.8 kg)   07/09/21 265 lb (120.2 kg)   01/14/21 260 lb 2.3 oz (118 kg)       Physical Exam:  GEN: Appears obese, no acute distress  SKIN: Pink, warm, dry. Nails without clubbing. HEENT: PERRLA. Normocephalic, atraumatic. Neck supple. No adenopathy. LUNG: AP diameter normal. Crackles bilateral bases. Few exp wheeze and ronchi. Respiratory effort increased. HEART: S1S2 A/R. No JVD. No carotid bruit. No murmur, rub or gallop. ABD: Soft, nontender. +BS X 4 quads. No hepatomegaly. EXT: Radial and pedal pulses 2+ and symmetric. Without varicosities. 1+ pedal to knee edema. MUSCSKEL: Good ROM X4 extremities. No deformity. NEURO: A/O X3. Calm and cooperative. Telemetry: NSR HR 83.     Medications:    heparin (porcine)  5,000 Units Subcutaneous 3 times per day    sodium chloride flush  5-40 mL Intravenous 2 times per day    cefTRIAXone (ROCEPHIN) IV  2,000 mg Intravenous Q24H    doxycycline hyclate  100 mg Oral 2 times per day    albuterol  2.5 mg Nebulization Q8H    budesonide-formoterol  2 puff Inhalation BID    isosorbide mononitrate  30 mg Oral Daily    labetalol  100 mg Oral BID    atorvastatin  40 mg Oral Daily    tiotropium  2 puff Inhalation Daily    [Held by provider] furosemide  40 mg Intravenous BID    glipiZIDE  10 mg Oral QAM AC    gabapentin  300 mg Oral TID    escitalopram  10 mg Oral Daily    doxazosin  8 mg Oral Daily      sodium chloride       sodium chloride flush, wall motion abnormalities are seen. There is   mildly increased left ventricular wall thickness.   The right ventricle appears mildly dilated with normal systolic function.   The left and right atria appear moderately dilated.   Mild mitral regurgitation.   Trivial tricuspid regurgitation.   The ascending aorta is mildly dilated measuring 3.6 cm.     RHC 8/10/2021  HEMODYNAMICS:  Aortic pressure was 159/80/100  RA 10  RV 34/11  PA 40/16/28  PCWP 8   PA % 61  AO % 98  CO/CI 5.4 L/min 2.3 L/min/m2  SVR 1333 dynes . Sec/cm-5   dynes . Sec/cm-5  TPG 20  INTERVENTION  1. None   SUMMARY:   Low normal left sided filling pressures  Normal cardiac output  Mild elevation in right sided filling pressures  RECOMMENDATION:    - hold diuretics  - BP control, room for additional  afterload reduction       CXR 8/8/2021  Impression   Stable findings most consistent with CHF.  Interstitial edema with bilateral   pleural effusions and left basilar volume loss. Assessment/Plan:  1.) Acute on chronic diastolic heart failure: EF 55-60%. Initially treated for hypervolemia and pulm edema with lasix. Diuresed>LULU. Underwent RHC yesterday. Normal left side filling pressures, right side filling pressures slightly elevated. LULU from diuresis. Now held. SOB multifactorial with COPD, pulm HTN (LANI 28) and now being treated for PNA. NYHA Class III   Stage C  Diuretic:held - will likely need small dose restarted when creat normalized  Beta Blocker: Not indicated for EF>35%  ACEi/ARB/ARNI: Not indicated for EF>35%  Aldosterone Antagonist:Not indicated for EF>35%  SGLT2 Inhibitor: Not indicated for EF>35%  2gm Na diet, daily weight, 64 oz fluid restriction  Avoid NSAIDS and other nephrotoxic meds  Cardiac Rehab: Not indicated for EF>35%  ICD:Not indicated for EF>35%  [facility] Referral: OP  HF RN referral for education  Palliative Care consult for re admit <30 days or Stage IV: NA    2.) Hypertension: /87 on admission. Improved today. Continue antihypertensives- adding hydralzine for afterload reduction  Goal BP <130/80. Non pharmacologic interventions include:   -weight loss  -heart healthy low sodium and low fat diet that consist of mostly fruits, vegetables and grains (Dash diet)  -limited amount of alcohol (no more than 1 drink/day for women, 2 drinks/day for men)  -regular physical activity  -no smoking  -stress reduction    3.) Pulm HTN: Most likely from chronic lung disease. LANI 28 with PCWP 8. Needs more afterload reduction- on Imdur/will add hydralzine. 4.) Acute hypoxic resp failure:   Multifactorial with COPD and PNA. HF improved.    Tx per Pulm- Dr. Dillon Quintanilla    Electronically signed by MONA Hernandez, APRN - CNP on 8/11/2021 at 8:52 AM

## 2021-08-12 NOTE — PROGRESS NOTES
Pulmonary Progress Note    CC:  Follow up hypoxia, COPD, presumed CHF    Subjective:  4 liters of oxygen   Had increased in lethargy yesterday but no evidence of hypercapnia   Says he is back to baseline breathing  Has not been up out of bed but said he is going to rehab  Less SOB  Less coughing       Intake/Output Summary (Last 24 hours) at 8/12/2021 0701  Last data filed at 8/11/2021 2105  Gross per 24 hour   Intake 250 ml   Output 600 ml   Net -350 ml         PHYSICAL EXAM:  Blood pressure (!) 140/78, pulse 71, temperature 98 °F (36.7 °C), temperature source Oral, resp. rate 18, height 5' 10\" (1.778 m), weight 266 lb 15.6 oz (121.1 kg), SpO2 93 %.'  Gen: No distress. Chronically ill   Eyes: PERRL. No sclera icterus. No conjunctival injection. ENT: No discharge. Pharynx clear. External appearance of ears and nose normal.  Neck: Trachea midline. No obvious mass. Resp: Clear but diminished   CV: Regular rate. Regular rhythm. No murmur or rub. Distant heart sounds   GI: Non-tender. Non-distended. No hernia. Skin: Venous stasis changes   Lymph: No cervical LAD. No supraclavicular LAD. M/S: No cyanosis. No clubbing. No joint deformity. Neuro: Moves all four extremities. CN 2-12 tested, no defect noted.   Ext:   + edema    Medications:    Scheduled Meds:   hydrALAZINE  10 mg Oral 3 times per day    predniSONE  40 mg Oral Daily    heparin (porcine)  5,000 Units Subcutaneous 3 times per day    sodium chloride flush  5-40 mL Intravenous 2 times per day    cefTRIAXone (ROCEPHIN) IV  2,000 mg Intravenous Q24H    doxycycline hyclate  100 mg Oral 2 times per day    albuterol  2.5 mg Nebulization Q8H    budesonide-formoterol  2 puff Inhalation BID    isosorbide mononitrate  30 mg Oral Daily    labetalol  100 mg Oral BID    atorvastatin  40 mg Oral Daily    tiotropium  2 puff Inhalation Daily    [Held by provider] furosemide  40 mg Intravenous BID    glipiZIDE  10 mg Oral QAM AC    gabapentin  300 mg Oral TID    escitalopram  10 mg Oral Daily    doxazosin  8 mg Oral Daily       Continuous Infusions:   sodium chloride         PRN Meds:  sodium chloride flush, sodium chloride, acetaminophen, ondansetron **OR** ondansetron, perflutren lipid microspheres, potassium chloride, magnesium sulfate, albuterol, morphine, hydrALAZINE    Labs:  CBC:   Recent Labs     08/10/21  0601   WBC 9.3   HGB 11.5*   HCT 34.7*   MCV 87.4   *     BMP:   Recent Labs     08/10/21  0601 21  0525    137   K 4.0 3.9   CL 93* 92*   CO2 27 28   BUN 29* 37*   CREATININE 2.1* 1.8*     LIVER PROFILE:   No results for input(s): AST, ALT, LIPASE, BILIDIR, BILITOT, ALKPHOS in the last 72 hours. Invalid input(s): AMYLASE,  ALB  PT/INR: No results for input(s): PROTIME, INR in the last 72 hours. APTT: No results for input(s): APTT in the last 72 hours. UA:No results for input(s): NITRITE, COLORU, PHUR, LABCAST, WBCUA, RBCUA, MUCUS, TRICHOMONAS, YEAST, BACTERIA, CLARITYU, SPECGRAV, LEUKOCYTESUR, UROBILINOGEN, BILIRUBINUR, BLOODU, GLUCOSEU, AMORPHOUS in the last 72 hours. Invalid input(s): Marianna Ivanoff  No results for input(s): PH, PCO2, PO2 in the last 72 hours. Films:  Chest imaging reports were reviewed and imaging was reviewed by me and showed small effusion, bilateral atelectasis, mild fibrosis, mucus in airways     AB.42/50/75    Cultures:  None    I reviewed the labs and images listed above    ASSESSMENT/PLAN:  · Acute on Chronic Hypoxic Respiratory Failure  · Titrate oxygen for saturations greater than or equal to 90%  · Abnormal CT Chest:  Small effusion, atelectasis with mucus in airways  ? Rocephin and Doxycycline to continue. Can change to omnicef and doxycycline at DC.  7 days   ? Prednisone for 5 days   · Severe COPD at baseline without apparent exacerbation   ? Symbicort as substitute for Breo  ? Continue with Spiriva  ? Scheduled albuterol q 8 hours   ? Prednisone for 5 days   · ROSENDO, untreated  ?  He no longer uses CPAP  · Pleural plaque in the LLL  · BMI 39        DVT prophylaxis  Heparin   Probably ok to DC from pulmonary standpoint     Magaly Santos DO  [facility]

## 2021-08-12 NOTE — PROGRESS NOTES
Physical Therapy  Facility/Department: [address] PROGRESSIVE CARE  Daily Treatment Note- COTX with OT  NAME: Jacques Jolly  : 1935  MRN: [id number]    Date of Service: 2021    Discharge Recommendations:  Patient would benefit from continued therapy after discharge, 3-5 sessions per week   PT Equipment Recommendations  Equipment Needed: No  Other: defer to next level of care     Jacques Jolly scored a 9/ on the AM-PAC short mobility form. Current research shows that an AM-PAC score of 17 or less is typically not associated with a discharge to the patient's home setting. Based on the patient's AM-PAC score and their current functional mobility deficits, it is recommended that the patient have 3-5 sessions per week of Physical Therapy at d/c to increase the patient's independence. Please see assessment section for further patient specific details. If patient discharges prior to next session this note will serve as a discharge summary. Please see below for the latest assessment towards goals. Assessment   Body structures, Functions, Activity limitations: Decreased functional mobility ; Increased pain;Decreased ROM; Decreased strength;Decreased balance;Decreased posture;Decreased endurance;Decreased ADL status  Assessment: Pt is an 81 y.o. M. admitted  for acute on chronic respiratory failure, COPD. He presents mildly SOB on 4 L. O2 (uses 3 L. at baseline), and c/o severe pain in (B) feet d/t neuropathy. Today, pain in nayeli feet beginning to improve and pt able to stand to osiris stedy multiple trials with min-modAx2. He continues to be far below his functional baseline and will benefit from continued skilled inpatient PT at a low-moderate intensity to improve safety and independence with functional mobility. Specific instructions for Next Treatment: cotx  Prognosis: Fair  PT Education: Goals; General Safety;PT Role;Plan of Care; Functional Mobility Training;Equipment;Transfer Training  REQUIRES PT FOLLOW UP: Yes  Activity Tolerance  Activity Tolerance: Patient limited by fatigue;Patient limited by endurance; Patient limited by pain  Activity Tolerance: Sp02 90-93% throughout session     Patient Diagnosis(es): The primary encounter diagnosis was Acute on chronic respiratory failure with hypoxia ([address] A diagnosis of Acute congestive heart failure, unspecified heart failure type [facility]) was also pertinent to this visit. 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 has a past surgical history that includes TURP (8/14/12) and Cataract removal (Right, 7/3/13). Restrictions  Restrictions/Precautions  Restrictions/Precautions: Fall Risk  Position Activity Restriction  Other position/activity restrictions: 4 L. O2 per nc (uses 3 L. at baseline)     Social/Functional History  Lives With: Spouse  Type of Home: Apartment ([facility])  Home Layout: One level  Home Access: Stairs to enter without rails  Entrance Stairs - Number of Steps: 2  Bathroom Shower/Tub: Tub/Shower unit, Walk-in shower  Bathroom Toilet: Handicap height  Bathroom Equipment: Grab bars around toilet  Home Equipment: [employer], Electric scooter, 4 wheeled walker  ADL Assistance: Needs assistance (Wife assists with sponge bathing.   Pt is independent with toileting, and dressing.)  Homemaking Assistance:  (Wife cooks and cleans)  Ambulation Assistance: Independent (Uses 4WW at all times)  Transfer Assistance: Independent  Active : No  Patient's  Info: wife, Jocelyn Tate drives patient  Additional Comments: Pt reports a fall 1 month ago. Subjective   General  Chart Reviewed: Yes  Additional Pertinent Hx: Per Dr. Javier Clarke, \"This is a 81 y.o. male who presented to the ED on 8/7 with a CC of worsening SOB for the past week. Uses 3 liters at baseline. He was increasing oxygen at home with some help. Also gained 10 lbs with more orthopnea. He denies feeling sick. Says he is urinating a lot and feeling better. He uses Breo, Spiriva and nebs at home. He stopped using CPAP machine in the past for unknown reasons. \"  Response To Previous Treatment: Patient with no complaints from previous session. Family / Caregiver Present: No  Referring Practitioner: Dr. Nathanael Stinson  Subjective  Subjective: Pt agreeable to PT treatment. Reports fatigue and did not sleep well. Reports continued pain in nayeli feet but not as severe. Objective   Bed mobility  Supine to Sit: Moderate assistance;2 Person assistance (HOB elevated)  Sit to Supine: Unable to assess (up in chair at end of session)     Transfers  Sit to Stand: Minimal Assistance; Moderate Assistance;2 Person Assistance (to osiris ste from elevated bed and from recliner chair)  Stand to sit: Minimal Assistance; Moderate Assistance;2 Person Assistance  Bed to Chair: Dependent/Total (via osiris stedy)     Ambulation  Ambulation?: No     Balance  Posture: Fair  Comments: SBA for sitting balance EOB and CGA for static standing balance in osiris stedy; pt stood 30 sec and then 45 sec in osiris stedy and completed several lateral weight shifts     Exercises  Quad Sets: 3 with 5 sec hold nayeli  Ankle Pumps: x20 nayeli  Comments: ther ex in recliner chair         Comment: pt positioned for comfort in recliner chair at end of session            AM-PAC Score  AM-PAC Inpatient Mobility Raw Score : 9 (08/12/21 1148)  AM-PAC Inpatient T-Scale Score : 30.55 (08/12/21 1148)  Mobility Inpatient CMS 0-100% Score: 81.38 (08/12/21 1148)  Mobility Inpatient CMS G-Code Modifier : CM (08/12/21 1148)          Goals  Short term goals  Time Frame for Short term goals: In 2-3 days pt will perform (goals ongoing as of 8/12)  Short term goal 1: Bed mobility CGA  Short term goal 2: Transfer to walker Min A  Short term goal 3: Ambulation 8' with walker, Min A  Long term goals  Time Frame for Long term goals : LTG = STG  Patient Goals   Patient goals : To get stronger    Plan    Plan  Times per week: 2-3x  Specific instructions for Next Treatment: cotx  Current Treatment Recommendations: Strengthening, ROM, Balance Training, Endurance Training, Functional Mobility Training, Transfer Training, Gait Training, Stair training, Equipment Evaluation, Education, & procurement, Patient/Caregiver Education & Training, Safety Education & Training, Neuromuscular Re-education, Home Exercise Program  Safety Devices  Type of devices:  All fall risk precautions in place, Call light within reach, Chair alarm in place, Gait belt, Left in chair, Nurse notified (WINSTON Stevenson notified)  Restraints  Initially in place: No     Therapy Time   Individual Concurrent Group Co-treatment   Time In 1115         Time Out 1145         Minutes 30         Timed Code Treatment Minutes: 30 Minutes         Electronically signed by Daniela Matute on 8/12/2021 at 11:51 AM

## 2021-08-12 NOTE — CARE COORDINATION
Gladis is able to accept (can't do IV lasix), requests a covid PCR test (3-5 days before discharge). If MD won't order PCR, rapid is okay. Will need pre cert.   Electronically signed by Lucita Osborn RN Case Management [phone] on 8/12/2021 at 4:08 PM

## 2021-08-12 NOTE — PROGRESS NOTES
[facility], [place], operated by [facility]   Daily Progress Note      Admit Date:  8/7/2021    CC: SOB    HPI:   Gladys Rubin is a 81 y.o. male with PMH HTN, COPD/chronic hypoxemia (Dr. Fallon Groves), atherosclerosis of aorta, HF, CKD stage 3 (Dr. Emiliano Covington), CVA, ROSENDO-not using CPAP, DM2, diabetic peripheral neuropathy, fatty liver, HLP, morbid obesity. Remote smoking hx. Admitted to [facility] with progressively worsening SOB X2-3 days. Worsened with exertion, improved with rest. Associated with wt gain, orthopnea and BLE edema. Per Dr. Rehana Borges PCP note- lasix was reduced from 80mg/day to 40mg/day on 7/9. Also increased gabapentin at that office visit. Started on IV lasix 40mg BID. Pulm following    Attempted to wean O2 . High flow NC 8-9L>3L Now on 4L NC. I/O inaccurate-pt incontinent. He continues to feel SOB but it is improved. He says it is back to baseline. Associated with orthopnea and fatigue. Gets OOB with assistance with steady. Denies CP. Abd distended but less firm and he states this is typical for him. Weights have not been taken consistently on same scale. His wt appears to be up 266>275>266lbs? Working with PT to get OOB to chair. Underwent RHC- see ressults below. Review of Systems:   General: Denies fever, chills  Skin: Denies skin changes, rash, itching, lesions. HEENT: Denies headache, dizziness, vision changes, nosebleeds, sore throat, nasal drainage  RESP: See HPI.   CARD: Denies palpitations,  murmur  GI:Denies nausea, vomiting, heartburn, loss of appetite, change in bowels  : Denies frequency, pain, incontinence, polyuria  VASC: Denies claudication, leg cramps, clots  MUSC/SKEL: Denies pain, stiffness, arthritis  PSYCH: Denies anxiety, depression, stress  NEURO: Denies numbness, tingling, weakness,change in mood or memory  HEME: Denies abn bruising, bleeding, anemia  ENDO: Denies intolerance to heat, cold, excessive thirst or hunger, hx thyroid disease    Objective:   BP (!) 148/74   Pulse 72 Temp 97.6 °F (36.4 °C) (Axillary)   Resp 24   Ht 5' 10\" (1.778 m)   Wt 266 lb 15.6 oz (121.1 kg)   SpO2 91%   BMI 38.31 kg/m²           Intake/Output Summary (Last 24 hours) at 8/12/2021 1141  Last data filed at 8/11/2021 2105  Gross per 24 hour   Intake 250 ml   Output 300 ml   Net -50 ml     I/O since adm: +1.3- incontinent    WEIGHT:Admit Weight: 271 lb 6.2 oz (123.1 kg)         Today  Weight: 266 lb 15.6 oz (121.1 kg)   DRY WEIGHT:  Wt Readings from Last 3 Encounters:   08/12/21 266 lb 15.6 oz (121.1 kg)   07/09/21 265 lb (120.2 kg)   01/14/21 260 lb 2.3 oz (118 kg)       Physical Exam:  GEN: Appears obese, no acute distress  SKIN: Pink, warm, dry. Nails without clubbing. HEENT: PERRLA. Normocephalic, atraumatic. Neck supple. No adenopathy. LUNG: AP diameter normal. Diminished bilateral bases. Few exp ronchi. Respiratory effort increased. HEART: S1S2 A/R. No JVD. No carotid bruit. No murmur, rub or gallop. ABD: Soft, nontender. +BS X 4 quads. No hepatomegaly. EXT: Radial and pedal pulses 2+ and symmetric. Without varicosities. 1+ pedal to knee edema. MUSCSKEL: Good ROM X4 extremities. No deformity. NEURO: A/O X3. Calm and cooperative. Telemetry: NSR HR 83.     Medications:    gabapentin  300 mg Oral BID    hydrALAZINE  10 mg Oral 3 times per day    predniSONE  40 mg Oral Daily    heparin (porcine)  5,000 Units Subcutaneous 3 times per day    sodium chloride flush  5-40 mL Intravenous 2 times per day    cefTRIAXone (ROCEPHIN) IV  2,000 mg Intravenous Q24H    doxycycline hyclate  100 mg Oral 2 times per day    albuterol  2.5 mg Nebulization Q8H    budesonide-formoterol  2 puff Inhalation BID    isosorbide mononitrate  30 mg Oral Daily    labetalol  100 mg Oral BID    atorvastatin  40 mg Oral Daily    tiotropium  2 puff Inhalation Daily    [Held by provider] furosemide  40 mg Intravenous BID    glipiZIDE  10 mg Oral QAM AC    escitalopram  10 mg Oral Daily    doxazosin  8 mg Oral Daily      sodium chloride       sodium chloride flush, sodium chloride, acetaminophen, ondansetron **OR** ondansetron, perflutren lipid microspheres, potassium chloride, magnesium sulfate, albuterol, morphine, hydrALAZINE    Lab Data: I have reviewed all labs below today. CBC:   Recent Labs     08/10/21  0601   WBC 9.3   HGB 11.5*   HCT 34.7*   MCV 87.4   *     BMP:   Recent Labs     08/10/21  0601 08/11/21  0525 08/12/21  0540    137 134*   K 4.0 3.9 4.0   CL 93* 92* 90*   CO2 27 28 28   BUN 29* 37* 48*   CREATININE 2.1* 1.8* 2.1*     GLUCOSE:   Recent Labs     08/10/21  0601 08/11/21  0525 08/12/21  0540   GLUCOSE 127* 100* 138*     LIVER PROFILE:   Lab Results   Component Value Date    AST 10 08/07/2021    ALT 7 08/07/2021    LIPASE 47.0 10/30/2020    LABALBU 3.7 08/07/2021    BILIDIR <0.2 06/12/2020    BILITOT 0.4 08/07/2021    ALKPHOS 60 08/07/2021     PT/INR:   Lab Results   Component Value Date    PROTIME 12.3 06/13/2018    INR 1.08 06/13/2018     APTT:   Lab Results   Component Value Date    APTT 32.4 03/06/2018     Pro-BNP:    Lab Results   Component Value Date    PROBNP 1,968 08/10/2021    PROBNP 3,065 08/07/2021    PROBNP 1,263 01/09/2020     Parameters:   > 450 pg/mL under age 48  > 900 pg/mL ages 54-65  > 1800 pg/mL over age 76    ENZYMES:  Lab Results   Component Value Date    CKTOTAL 176 08/28/2016    TROPONINI 0.04 08/11/2021     FASTING LIPID PANEL:  Lab Results   Component Value Date    CHOL 126 07/09/2021    HDL 34 07/09/2021    HDL 52 12/02/2011    LDLCALC 66 07/09/2021    TRIG 131 07/09/2021       Diagnostics:    EKG: NSR LAFB, incomplete RBB    ECHO:    8/9/2021   Summary   Ejection fraction is visually estimated to be 55-60%. Grade I diastolic dysfunction with elevated LV filling pressures. Mildly dilated right ventricle.    Right ventricular systolic function is normal   No significant valvular heart disease    5/15/2019 Normal left ventricle size and systolic function with an estimated ejection   fraction of 55-60%. No regional wall motion abnormalities are seen. There is   mildly increased left ventricular wall thickness.   The right ventricle appears mildly dilated with normal systolic function.   The left and right atria appear moderately dilated.   Mild mitral regurgitation.   Trivial tricuspid regurgitation.   The ascending aorta is mildly dilated measuring 3.6 cm.     RHC 8/10/2021  HEMODYNAMICS:  Aortic pressure was 159/80/100  RA 10  RV 34/11  PA 40/16/28  PCWP 8   PA % 61  AO % 98  CO/CI 5.4 L/min 2.3 L/min/m2  SVR 1333 dynes . Sec/cm-5   dynes . Sec/cm-5  TPG 20  INTERVENTION  1. None   SUMMARY:   Low normal left sided filling pressures  Normal cardiac output  Mild elevation in right sided filling pressures  RECOMMENDATION:    - hold diuretics  - BP control, room for additional  afterload reduction       CXR 8/8/2021  Impression   Stable findings most consistent with CHF.  Interstitial edema with bilateral   pleural effusions and left basilar volume loss. Assessment/Plan:  1.) Acute on chronic diastolic heart failure: EF 55-60%. Initially treated for hypervolemia and pulm edema with lasix. Diuresed>LULU. Underwent RHC . Normal left side filling pressures, right side filling pressures slightly elevated. LULU from diuresis. Now held. SOB multifactorial with COPD, pulm HTN ([address] 28) and now being treated for PNA.    NYHA Class III   Stage C  Diuretic:held - will likely need small dose restarted when creat normalized  Beta Blocker: Not indicated for EF>35%  ACEi/ARB/ARNI: Not indicated for EF>35%  Aldosterone Antagonist:Not indicated for EF>35%  SGLT2 Inhibitor: Not indicated for EF>35%  2gm Na diet, daily weight, 64 oz fluid restriction  Avoid NSAIDS and other nephrotoxic meds  Cardiac Rehab: Not indicated for EF>35%  ICD:Not indicated for EF>35%  [facility] Referral: OP  HF RN referral for education  Palliative Care consult for re admit <30 days or Stage IV: NA    2.) Hypertension: /87 on admission. Improved today. Continue antihypertensives- adding hydralzine for afterload reduction  Goal BP <130/80. Non pharmacologic interventions include:   -weight loss  -heart healthy low sodium and low fat diet that consist of mostly fruits, vegetables and grains (Dash diet)  -limited amount of alcohol (no more than 1 drink/day for women, 2 drinks/day for men)  -regular physical activity  -no smoking  -stress reduction    3.) Pulm HTN: Most likely from chronic lung disease. LANI 28 with PCWP 8. Needs more afterload reduction- on Imdur/will add hydralzine. 4.) Acute hypoxic resp failure:   Multifactorial with COPD and PNA. HF improved.    Tx per Pulm- Dr. Bimal Gupta    Electronically signed by SANAZ Obando - CNP on 8/12/2021 at 11:41 AM

## 2021-08-12 NOTE — PROGRESS NOTES
Occupational Therapy  Facility/Department: [address] PROGRESSIVE CARE  Daily Treatment Note  NAME: Nish Hernandez  : 1935  MRN: [id number]    Date of Service: 2021    Discharge Recommendations:  3-5 sessions per week, Patient would benefit from continued therapy after discharge     Nish Hernandez scored a 13/24 on the AM-PAC ADL Inpatient form. Current research shows that an AM-PAC score of 17 or less is typically not associated with a discharge to the patient's home setting. Based on the patient's AM-PAC score and their current ADL deficits, it is recommended that the patient have 3-5 sessions per week of Occupational Therapy at d/c to increase the patient's independence. Please see assessment section for further patient specific details. If patient discharges prior to next session this note will serve as a discharge summary. Please see below for the latest assessment towards goals. Assessment: Discussed with OTR am pac score is 13 which indicates need for continued skilled OT to increase [place] and decrease caregiver burden. Patient completed bed mobility with Mod A of 2. Min/Mod A of 2 for sit<>stand from elevated height of bed to osiris stedy to recliner chair. Use of osiris stedy for safe mobility from bed to recliner chair. Patient is unable to return home at this time due to assist level requirements. Cont with POC. Patient Diagnosis(es): The primary encounter diagnosis was Acute on chronic respiratory failure with hypoxia ([address] A diagnosis of Acute congestive heart failure, unspecified heart failure type [facility]) was also pertinent to this visit.       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 has a past surgical history that includes TURP (8/14/12) and Cataract removal (Right, 7/3/13). Restrictions  Restrictions/Precautions  Restrictions/Precautions: Fall Risk  Position Activity Restriction  Other position/activity restrictions: 4 L. O2 per nc (uses 3 L. at baseline)  Subjective   General  Chart Reviewed: Yes  Patient assessed for rehabilitation services?: Yes  Additional Pertinent Hx: per ED note, Simba Abraham is a 81 y.o. male who presents to the emergency department via EMS from home where he lives with his wife complaining of shortness of breath. He denies productive cough or known fevers. He states he gained about 10 pounds in the last week or 2. He has history of diabetes, COPD and CVA states he follows with Dr. Wilman Estrella and uses 3 L of oxygen via nasal cannula at baseline. Not aware of any fevers. EMS reported the patient was hypoxic at 84% on his home oxygen. He is increasing his oxygen to 4 L via nasal cannula at home. He is vaccinated for COVID-19 by the [address] [place] [place] [address] approximately a month ago. He denies chest pain. Response to previous treatment: Patient reporting fatigue but able to participate  Family / Caregiver Present: No  Referring Practitioner: Katelyn Tate MD  Subjective  Subjective: Patient supine in bed upon arrival to room with PT. Mild reports of pain, cues to open eyes  General Comment  Comments: okay for therapy per RN.       Orientation  Orientation  Overall Orientation Status: Within Functional Limits  Objective    ADL  Grooming: Setup (seated in recliner chair to wash face and hands)  Toileting: Dependent/Total        Balance  Sitting Balance: Stand by assistance  Standing Balance  Time: 30 seconds, 45 seconds  Activity: Static standing in osiris stedy  Functional Mobility  Functional Mobility Comments: Use of osiris stedy for safe mobility  Bed mobility  Supine to Sit: Moderate assistance;2 Person assistance (HOB elevated)  Sit to Supine: Unable to assess (up in chair at end of session)  Transfers  Sit to stand: Minimal assistance; Moderate assistance;2 Person assistance  Stand to sit: Minimal assistance; Moderate assistance;2 Person assistance  Transfer Comments: Min/Mod A of 2 for sit to stand from elevated height of bed to osiris stedy to recliner chair. Use of osiris stedy for for transfer from bed to recliner chair     Cognition  Overall Cognitive Status: Exceptions  Arousal/Alertness: Delayed responses to stimuli  Following Commands: Follows one step commands with increased time  Attention Span: Difficulty attending to directions  Memory: Decreased recall of precautions  Safety Judgement: Decreased awareness of need for assistance  Initiation: Requires cues for some  Sequencing: Requires cues for some     Assessment   Performance deficits / Impairments: Decreased functional mobility ; Decreased strength;Decreased endurance;Decreased ADL status; Decreased balance;Decreased high-level IADLs  Assessment: Discussed with OTR am pac score is 13 which indicates need for continued skilled OT to increase [place] and decrease caregiver burden. Patient completed bed mobility with Mod A of 2. Min/Mod A of 2 for sit<>stand from elevated height of bed to osiris stedy to recliner chair. Use of osiris stedy for safe mobility from bed to recliner chair. Patient is unable to return home at this time due to assist level requirements. Cont with POC.   OT Education: OT Role;Plan of Care;Transfer Training  REQUIRES OT FOLLOW UP: Yes  Activity Tolerance  Activity Tolerance: Patient limited by fatigue  Safety Devices  Safety Devices in place: Yes  Type of devices: Call light within reach;Chair alarm in place; Left in chair;Gait belt        Plan   Plan  Times per week: 3-5x  Current Treatment Recommendations: Strengthening, Functional Mobility Training, Endurance Training, Balance Training, Safety Education & Training, Self-Care / ADL, Patient/Caregiver Education & Training, Gait Training    AM-PAC Score        AM-PAC Inpatient Daily Activity Raw Score: 13 (08/12/21 1142)  AM-PAC Inpatient ADL T-Scale Score : 32.03 (08/12/21 1142)  ADL Inpatient CMS 0-100% Score: 63.03 (08/12/21 1142)  ADL Inpatient CMS G-Code Modifier : CL (08/12/21 1142)    Goals  Short term goals  Time Frame for Short term goals: prior to D/C: all goals ongoing  Short term goal 1: complete functional mobility and transfers with CGA  Short term goal 2: complete bed mobility with CGA  Short term goal 3: complete bathing and dressing with Min A  Short term goal 4: complete toileting with CGA  Long term goals  Time Frame for Long term goals : STG=LTG  Patient Goals   Patient goals : no stated goals       Therapy Time   Individual Concurrent Group Co-treatment   Time In 1115         Time Out 1145         Minutes 30                 Electronically signed by Tyree Hu [id number] on 8/12/2021 at 11:52 AM

## 2021-08-12 NOTE — PROGRESS NOTES
NOTE IS FOR TEACHING PURPOSES ONLY. SEE FULL NOTE BY DR. Aileen Gordon DO  [facility] Pulmonary, Sleep and Critical Care  [phone]        Pulmonary Progress Note    CC:  Shortness of breath     Subjective: Patient is resting in the chair upon exam. He states his breathing has improved since admission. Bilateral lower extremity pain has improved He is net positive 1.3L since admission, however per RN notes, there is uncertainty about I/O's due to incontinence. Per RN notes, patient was lethargic last night and an ABG was performed. He does not remember being lethargic but states he feels he is back to normal. No fevers, chills, abdominal pain or any other concerns at this time. He states he uses a walker at home and his wife has arranged rehab after discharge. Intake/Output Summary (Last 24 hours) at 8/12/2021 0841  Last data filed at 8/11/2021 2105  Gross per 24 hour   Intake 250 ml   Output 300 ml   Net -50 ml         PHYSICAL EXAM:  Blood pressure (!) 148/74, pulse 72, temperature 97.6 °F (36.4 °C), temperature source Axillary, resp. rate 16, height 5' 10\" (1.778 m), weight 266 lb 15.6 oz (121.1 kg), SpO2 97 %.'  Gen: Mild distress. Appears better than yesterday. Eyes:  No sclera icterus. No conjunctival injection. ENT: No discharge. Pharynx clear. External appearance of nose normal.  Neck: No obvious mass. Resp: Chronic cough with clear sputum production. No crackles. No wheezes. No rhonchi. CV: Regular rate. Regular rhythm. No murmur or rub. GI: Mild distension. Non-tender. No hernia. Skin: Warm, dry, normal texture and turgor. No nodule on exposed extremities. Lymph: No cervical LAD. No supraclavicular LAD. M/S: No cyanosis. No clubbing. No joint deformity. Neuro: Moves all four extremities.    Ext:   No BLE edema      Medications:    Scheduled Meds:   hydrALAZINE  10 mg Oral 3 times per day    predniSONE  40 mg Oral Daily    heparin (porcine)  5,000 Units Subcutaneous pleural effusions and bibasilar volume loss, left greater than right. CT chest 8/10/21: small bilateral pleural effusions, associated with dependent atelectasis. Superimposed pneumonia less likely    ECHO:  8/9/21: EF estimate of 55-60%. Grade I diastolic dysfunction with elevated LV filling pressures. Mildly dilated R ventricle. Normal right ventricle systolic function. I reviewed the labs and images listed above    Assessment/Plan:   1. Acute on chronic hypoxic respiratory failure   -He wears 3L NC at home and had worsening shortness of breath for one week prior to admission  -Reports improvement in his breathing   -On 4L with SpO2 of 93    2. Severe COPD at baseline   -Continue bronchodilators: symbicort, spiriva, albuterol     3. Abnormal chest XR with pulmonary edema  -Pro-BNP elevated at 3,065  -CXR: enlarged cardiac silhouette with bilateral effusions, L greater than R  -ECHO: EF of 76-27%, grade I diastolic dysfunction with elevated LV filling pressures   -Cardiology following  -Right heart cath  indicated low left-sided filling pressures, mild elevation in right sided filling pressures  -Hold lasix due to kidney function  -Pro-pauline of 0.32 on 8/10, Pro-pauline of 0.42 on 8/12  -rocephin and doxycycline started 8/10 due to possible infectious etiology; however this could also be due to CKD     4. Obstructive sleep apnea   -Has CPAP at home but does not regularly use it  -Could be contributing to shortness of breath   -Consider addition of CPAP to treatment if patient worsens     5. Bilateral lower extremity pain, chronic   -Improved pain  -Normal potassium of 4.0 and normal magnesium of 2.10  -He is receiving gabapentin 300mg TID   -Lasix could have been contributing to the pain   -Continue to monitor     6. Deconditioning secondary to being sedentary   -Per , referral to [facility] sent      DVT prophylaxis: heparin    I have seen and examined the above patient.  I have personally staffed the

## 2021-08-12 NOTE — CONSULTS
[address] [place] [address]  HEART FAILURE PROGRAM      NAME:  Bubba Bahena  MEDICAL RECORD NUMBER:  [id number]  AGE: 81 y.o. GENDER: male  : 1935  TODAY'S DATE:  2021    Subjective:     VISIT TYPE: evaluation     ADMITTING PHYSICIAN:  Israel Flowers MD    PAST MEDICAL HISTORY        Diagnosis Date    [facility]rosclerosis of [facility] ([facility]     CH[facility]congestive heart failure) [facility]     CKD (chronic kidney disease) stage 3, GFR 30-59 ml/min ([facility])     COPD (chronic obstructive pulmonary disease) ([address]     CVA, old, ataxia     DM type 2 with diabetic peripheral neuropathy ([facility] [facility] 10/23/2014    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     Fall 2015    Fatty liver     Hypertension     Mixed hyperlipidemia     Morbid obesity ([address]     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 ([facility]     Obesity, diabetes, and hypertension syndrome ([facility] [facility]     Psoriasis     Risk for falls     Type 2 diabetes mellitus with microalbuminuria or microproteinuria     Vitamin D deficiency 2011       SOCIAL HISTORY    Social History     Tobacco Use    Smoking status: Former Smoker     Packs/day: 2.00     Years: 50.00     Pack years: 100.00     Types: Cigarettes     Start date: 1951     Quit date: 2006     Years since quittin.7    Smokeless tobacco: Never Used    Tobacco comment: don't resume cigs   Vaping Use    Vaping Use: Never used   Substance Use Topics    Alcohol use:  Yes     Alcohol/week: 7.0 standard drinks     Types: 7 Standard drinks or equivalent per week     Comment: occas    Drug use: No       ALLERGIES    No Known Allergies    MEDICATIONS  Scheduled Meds:   gabapentin  300 mg Oral BID    hydrALAZINE  10 mg Oral 3 times per day    predniSONE  40 mg Oral Daily    heparin (porcine)  5,000 Units Subcutaneous 3 times per day    sodium chloride flush  5-40 mL Intravenous 2 times per day    cefTRIAXone (ROCEPHIN) IV  2,000 mg Intravenous Q24H    doxycycline hyclate  100 mg Oral 2 times per day    albuterol  2.5 mg Nebulization Q8H    budesonide-formoterol  2 puff Inhalation BID    isosorbide mononitrate  30 mg Oral Daily    labetalol  100 mg Oral BID    atorvastatin  40 mg Oral Daily    tiotropium  2 puff Inhalation Daily    [Held by provider] furosemide  40 mg Intravenous BID    glipiZIDE  10 mg Oral QAM AC    escitalopram  10 mg Oral Daily    doxazosin  8 mg Oral Daily       ADMIT DATE: 8/7/2021      Objective:     ADMISSION DIAGNOSIS:   Acute on chronic respiratory failure with hypoxia (HCC) [N79.56]  Acute diastolic (congestive) heart failure (HCC) [I50.31]  Acute congestive heart failure, unspecified heart failure type (HCC) [I50.9]     /62   Pulse 73   Temp 97.6 °F (36.4 °C) (Oral)   Resp 20   Ht 5' 10\" (1.778 m)   Wt 266 lb 15.6 oz (121.1 kg)   SpO2 94%   BMI 38.31 kg/m²     ADMIT:  Weight: 271 lb 6.2 oz (123.1 kg)    TODAY: Weight: 266 lb 15.6 oz (121.1 kg)    Wt Readings from Last 10 Encounters:   08/12/21 266 lb 15.6 oz (121.1 kg)   07/09/21 265 lb (120.2 kg)   01/14/21 260 lb 2.3 oz (118 kg)   12/26/20 261 lb 3.9 oz (118.5 kg)   10/30/20 271 lb 13.2 oz (123.3 kg)   10/29/20 261 lb (118.4 kg)   01/23/20 269 lb (122 kg)   12/03/19 271 lb 9.7 oz (123.2 kg)   11/07/19 277 lb (125.6 kg)   08/08/19 271 lb (122.9 kg)          Intake/Output Summary (Last 24 hours) at 8/12/2021 1545  Last data filed at 8/12/2021 1502  Gross per 24 hour   Intake 670 ml   Output 1000 ml   Net -330 ml       LABS  BMP:   Lab Results   Component Value Date     08/12/2021    K 4.0 08/12/2021    K 4.0 11/30/2019    CL 90 08/12/2021    CO2 28 08/12/2021    BUN 48 08/12/2021    LABALBU 3.7 08/07/2021    CREATININE 2.1 08/12/2021    CALCIUM 8.8 08/12/2021    GFRAA 36 08/12/2021    GFRAA >60 04/15/2013    LABGLOM 30 08/12/2021    GLUCOSE 138 08/12/2021     CBC:   Recent Labs     08/10/21  0601   WBC 9.3   HGB 11.5*   HCT 34.7*   MCV 87.4   *     BNP:   Lab Results Component Value Date    .0 04/15/2013       ECHOCARDIOGRAM: 8/9/21 Summary   Ejection fraction is visually estimated to be 55-60%. Grade I diastolic dysfunction with elevated LV filling pressures. Mildly dilated right ventricle. Right ventricular systolic function is normal   No significant valvular heart disease      Assessment:     CONSULTS:   IP CONSULT TO HOSPITALIST  IP CONSULT TO HEART FAILURE NURSE/COORDINATOR  IP CONSULT TO DIETITIAN  IP CONSULT TO CARDIOLOGY  IP CONSULT TO DIETITIAN  IP CONSULT TO SOCIAL WORK  IP CONSULT TO PODIATRY  IP CONSULT TO PULMONOLOGY  IP CONSULT TO HEART FAILURE NURSE/COORDINATOR  IP CONSULT TO PALLIATIVE CARE    Patient has a CARDIOLOGY CONSULT: Yes      Patient taking an ACEI/ARB:  No: preserved EF       Patient taking a BETA BLOCKER:  Yes    SCALE AVAILABLE:  Yes     EDUCATION STATUS: Patient   [x]  Provided both written and verbal education on Heart Failure signs/symptoms. []  Provided instructions on daily medications. [x]  Provided instructions to monitor and record weight daily. [x]  Provided instructions to call if weight increases 3 lbs in one day or 5 lbs in one week. [x]  Received verbal acknowledgment/understanding of Heart Failure related causes. [x]  Provided instructions on how to maintain a low sodium diet. []  Provided recommendations for smoking cessation programs  [x]  Provided recommendations on activity and exercise    [x]  Other:Met with pt in his room. Pt awake, a/o, receptive to talking to HF RN. Pt has a history of ROSENDO,  But has not been on Cpap for about 5 years. He is on Oxygen at home. He saw his PCP in July, and had his meds adjusted--Lasix decreased from 80mg, to 40mg, and increased his gabapentin. He presented to the ED with shortness of breath, ROSALES, +orthopnea, weight gain. He is feeling better, 02 down from  8-9Lhiflo to 3lnc  Reviewed his new diagnosis of diastolic heart failure.   Pt has a scale at home and weighs himself qod. Encouraged him to place his zone papers in the bathroom so he can keep track of his daily weights. Explained when he should call the cardiology offce--wt gain >3lbs in a day, 5lbs/wk. Swelling, trouble sleeping or breathing--any of the s/s in yellow zone. Pt endorses drinking a 12oz coke, a 12oz highball, and the rest water. Encouraged him to measure 36 ounces of water, and keep in a pitcher to help track his fluid intake. Reviewed with him that 64 ounces is the maximum amount of fluid ,and 32ounces would be the minimum intake. Explained he could use pudding or applesauce for taking  his meds to save on fluid intake. Explained the role sleep apnea has in heart failure and explained that he may be encouraged to consider a sleep study as an outpatient. Lives at home with wife who does the cooking. She also has \"heart problems\" encouraged pt to have his wife review the handouts and call the HF RN with any questions or concerns. He stated they use no table salt. Reviewed that convenient foods are usually high in sodium and to be aware of such things if his weight goes up. Pt is going to an ECF for REHAB at discharge. CURRENT DIET: ADULT DIET; Regular; Low Fat/Low Chol/High Fiber/2 gm Na; Low Sodium (2 gm); 1500 ml    EDUCATIONAL PACKETS PROVIDED- PRINTED FROM [employer]. Titles and material given:  Yes   [x]  What is Heart Failure?   [x]  Heart Failure: Warning Signs of a Flare-Up  [x]  Heart Failure: Making Changes to Your Diet  [x]  Heart Failure: Medications to Help Your Heart   [x]  Other: Weight Log, AHA Zone tool, MHI folder    PATIENT/CAREGIVER TEACHING:   Level of patient/caregiver understanding able to:   [x] Verbalize understanding   [x] Demonstrate understanding       [x] Teach back        [x] Needs reinforcement     []  Other:      TEACHING TIME:  60 minutes       Plan:       DISCHARGE PLAN:  Placement for patient upon discharge: skilled nursing   Hospice Care:  no  Code Status: DNR-CCA  Discharge appointment scheduled: Yes     RECOMMENDATIONS:   [x]  Encourage to call Heart Failure Resource Line with any questions or concerns. [x]  Educate further Mr. Leonard Xie on fluid restriction 48 oz- 64 oz during inpatient stay so he can understand how to measure intake at home. [x]  Continue to educate on S/S of Heart Failure.   [x]  Emphasize daily weights, diet, and if changes, to call Heart Failure Resource Line  [x]  Other:  Cardiac Rehab declined--not covered by insurance, [address] not scheduled-pt going to Rehab          Electronically signed by Shauna Freedman, RN, BSN CHFN  on 8/12/2021 at 3:45 PM

## 2021-08-12 NOTE — PLAN OF CARE
Sherita Almonte RN  Outcome: Ongoing  8/12/2021 1830 by Maninder Brower RN  Outcome: Ongoing     Problem: Discharge Planning:  Goal: Discharged to appropriate level of care  Description: Discharged to appropriate level of care  8/12/2021 1830 by Maninder Brower RN  Outcome: Ongoing  8/12/2021 1830 by Maninder Brower RN  Outcome: Ongoing     Problem: Airway Clearance - Ineffective:  Goal: Ability to maintain a clear airway will improve  Description: Ability to maintain a clear airway will improve  8/12/2021 1830 by Maninder Brower RN  Outcome: Ongoing  8/12/2021 1830 by Maninder Brower RN  Outcome: Ongoing     Problem: Breathing Pattern - Ineffective:  Goal: Ability to achieve and maintain a regular respiratory rate will improve  Description: Ability to achieve and maintain a regular respiratory rate will improve  8/12/2021 1830 by Maninder Brower RN  Outcome: Ongoing  8/12/2021 1830 by Maninder Brower RN  Outcome: Ongoing     Problem: Gas Exchange - Impaired:  Goal: Levels of oxygenation will improve  Description: Levels of oxygenation will improve  8/12/2021 1830 by Maninder Brower RN  Outcome: Ongoing  8/12/2021 1830 by Maninder Brower RN  Outcome: Ongoing     Problem: Pain:  Goal: Pain level will decrease  Description: Pain level will decrease  8/12/2021 1830 by Maninder Brower RN  Outcome: Ongoing  8/12/2021 1830 by Maninder Brower RN  Outcome: Ongoing  Goal: Control of acute pain  Description: Control of acute pain  8/12/2021 1830 by Maninder Brower RN  Outcome: Ongoing  8/12/2021 1830 by Maninder Brower RN  Outcome: Ongoing  Goal: Control of chronic pain  Description: Control of chronic pain  8/12/2021 1830 by Maninder Brower RN  Outcome: Ongoing  8/12/2021 1830 by Maninder Brower RN  Outcome: Ongoing

## 2021-08-12 NOTE — PROGRESS NOTES
Hospitalist Progress Note      PCP: Edwin Serrano MD    Date of Admission: 8/7/2021        Subjective: Feels tired, still having cough no nausea vomiting no palpitation no muscle cramps      Medications:  Reviewed    Infusion Medications    sodium chloride       Scheduled Medications    hydrALAZINE  10 mg Oral 3 times per day    predniSONE  40 mg Oral Daily    heparin (porcine)  5,000 Units Subcutaneous 3 times per day    sodium chloride flush  5-40 mL Intravenous 2 times per day    cefTRIAXone (ROCEPHIN) IV  2,000 mg Intravenous Q24H    doxycycline hyclate  100 mg Oral 2 times per day    albuterol  2.5 mg Nebulization Q8H    budesonide-formoterol  2 puff Inhalation BID    isosorbide mononitrate  30 mg Oral Daily    labetalol  100 mg Oral BID    atorvastatin  40 mg Oral Daily    tiotropium  2 puff Inhalation Daily    [Held by provider] furosemide  40 mg Intravenous BID    glipiZIDE  10 mg Oral QAM AC    gabapentin  300 mg Oral TID    escitalopram  10 mg Oral Daily    doxazosin  8 mg Oral Daily     PRN Meds: sodium chloride flush, sodium chloride, acetaminophen, ondansetron **OR** ondansetron, perflutren lipid microspheres, potassium chloride, magnesium sulfate, albuterol, morphine, hydrALAZINE      Intake/Output Summary (Last 24 hours) at 8/12/2021 1384  Last data filed at 8/11/2021 2105  Gross per 24 hour   Intake 250 ml   Output 300 ml   Net -50 ml       Physical Exam Performed:    BP (!) 148/74   Pulse 72   Temp 97.6 °F (36.4 °C) (Axillary)   Resp 24   Ht 5' 10\" (1.778 m)   Wt 266 lb 15.6 oz (121.1 kg)   SpO2 91%   BMI 38.31 kg/m²     General appearance: No apparent distress  Neck: Supple  Respiratory:  Normal respiratory effort. Clear to auscultation, bilaterally without Rales/Wheezes/Rhonchi. Cardiovascular: Regular rate and rhythm with normal S1/S2 without murmurs, rubs or gallops.   Abdomen: Soft, non-tender, non-distended  Musculoskeletal: No clubbing, cyanosis  Skin: Skin color, texture, turgor normal.  No rashes or lesions. Neurologic:  No focal weakness   Psychiatric: Alert and oriented  Capillary Refill: Brisk,3 seconds, normal   Peripheral Pulses: +2 palpable, equal bilaterally       Labs:   Recent Labs     08/10/21  0601   WBC 9.3   HGB 11.5*   HCT 34.7*   *     Recent Labs     08/10/21  0601 08/11/21  0525 08/12/21  0540    137 134*   K 4.0 3.9 4.0   CL 93* 92* 90*   CO2 27 28 28   BUN 29* 37* 48*   CREATININE 2.1* 1.8* 2.1*   CALCIUM 9.3 8.7 8.8     No results for input(s): AST, ALT, BILIDIR, BILITOT, ALKPHOS in the last 72 hours. No results for input(s): INR in the last 72 hours. Recent Labs     08/11/21  1327 08/11/21  1850   TROPONINI 0.06* 0.04*       Urinalysis:      Lab Results   Component Value Date    NITRU Negative 09/25/2020    WBCUA 1 09/25/2020    BACTERIA 2+ 10/26/2013    RBCUA 1 09/25/2020    BLOODU Negative 09/25/2020    SPECGRAV 1.015 09/25/2020    GLUCOSEU 250 09/25/2020       Radiology:  CT CHEST WO CONTRAST   Final Result   Small bilateral pleural effusions, associated with dependent atelectasis. Superimposed pneumonia is less likely. Subsegmental atelectasis in the right middle lobe. Emphysema and mild peripheral fibrosis. Cardiomegaly and trace pericardial effusion. XR CHEST (2 VW)   Final Result   Stable findings most consistent with CHF. Interstitial edema with bilateral   pleural effusions and left basilar volume loss. XR SHOULDER RIGHT (MIN 2 VIEWS)   Final Result   Degenerative change in the right shoulder with no acute abnormality. XR CHEST PORTABLE   Final Result   Cardiomegaly with diffuse interstitial changes that suggest underlying   pulmonary edema. A chronic history of interstitial lung disease in the   setting of COPD cannot be excluded.                  Assessment/Plan:    Active Hospital Problems    Diagnosis     COPD, severe ([facility] [J44.9]      Priority: Medium    Acute diastolic (congestive) heart failure (HCC) [I50.31]     Acute on chronic respiratory failure with hypoxia ([facility]) [J96.21]     Abnormal CT of the chest [R93.89]      1.  Acute on chronic respiratory failure with hypoxia, appears multifactorial, felt to have diastolic heart failure, diuresed well, pulmonary also following, and started on antibiotics for abnormal lung imaging. Pulmonary following  2.  Acute on chronic diastolic heart failure, received Lasix, cardiology following. 3.  COPD, continue home inhalers, pulmonary has been consulted, procalcitonin mildly elevated, added antibiotics, ceftriaxone and doxycycline. 4.  Acute metabolic encephalopathy, multifactorial, continue to monitor for now, ABG drawn yesterday no acute changes. 5.  Diabetes mellitus, sliding scale. 6.  Essential hypertension, p.o. medications. 7.  LULU, IV diuretics on hold, creatinine increased to 2.1 this a.m.  8. Chronic pain, continue home regimen. Diet: ADULT DIET; Regular; Low Fat/Low Chol/High Fiber/2 gm Na;  Low Sodium (2 gm); 1500 ml  Code Status: DNR-SAULO Gonzalez MD

## 2021-08-13 NOTE — CARE COORDINATION
PCR covid test pending. [facility] can accept pending PCR results. Pre cert to be started today.   Electronically signed by Sofia Milton RN Case Management [phone] on 8/13/2021 at 12:02 PM

## 2021-08-13 NOTE — PROGRESS NOTES
Physical Therapy  Facility/Department: [address] PROGRESSIVE CARE  Daily Treatment Note  NAME: Tanvir Benitez  : 1935  MRN: [id number]    Date of Service: 2021    Discharge Recommendations:  Patient would benefit from continued therapy after discharge, 3-5 sessions per week   PT Equipment Recommendations  Equipment Needed: No  Other: defer to next level of care     Tanvir Benitez scored a 10/ on the AM-PAC short mobility form. Current research shows that an AM-PAC score of 17 or less is typically not associated with a discharge to the patient's home setting. Based on the patient's AM-PAC score and their current functional mobility deficits, it is recommended that the patient have 3-5 sessions per week of Physical Therapy at d/c to increase the patient's independence. Please see assessment section for further patient specific details. If patient discharges prior to next session this note will serve as a discharge summary. Please see below for the latest assessment towards goals. Assessment   Body structures, Functions, Activity limitations: Decreased functional mobility ; Increased pain;Decreased ROM; Decreased strength;Decreased balance;Decreased posture;Decreased endurance;Decreased ADL status  Assessment: Pt is an 81 y.o. M. admitted  for acute on chronic respiratory failure, COPD. He presents mildly SOB on 3 L. O2 (uses 3 L. at baseline), and c/o pain in (B) feet d/t neuropathy but improving. Today, pt able to stand to RW with minAx2 and able to remain standing for approx 2 min with CGA for standing balance. He continues to be far below his functional baseline and will benefit from continued skilled inpatient PT at a low-moderate intensity to improve safety and independence with functional mobility. Specific instructions for Next Treatment: cotx  Prognosis: Fair  PT Education: Goals; General Safety;PT Role;Plan of Care; Functional Mobility Training;Equipment;Transfer Training;Family Education  Patient Education: pursed lip breathing  REQUIRES PT FOLLOW UP: Yes  Activity Tolerance  Activity Tolerance: Patient limited by fatigue;Patient limited by endurance; Patient limited by pain  Activity Tolerance: Sp02 briefly dropped to 87% on 3L after standing at RW but up to 91% after approx 30 sec of pursed lip breathing     Patient Diagnosis(es): The primary encounter diagnosis was Acute on chronic respiratory failure with hypoxia ([facility] [address] A diagnosis of Acute congestive heart failure, unspecified heart failure type [facility]) was also pertinent to this visit. 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 has a past surgical history that includes TURP (8/14/12) and Cataract removal (Right, 7/3/13). Restrictions  Restrictions/Precautions  Restrictions/Precautions: Fall Risk  Position Activity Restriction  Other position/activity restrictions: 3 L.  O2 per nc (uses 3 L. at baseline)     Social/Functional History  Lives With: Spouse  Type of Home: Apartment (Condo)  Home Layout: One level  Home Access: Stairs to enter without rails  Entrance Stairs - Number of Steps: 2  Bathroom Shower/Tub: Tub/Shower unit, Walk-in shower  Bathroom Toilet: Handicap height  Bathroom Equipment: Grab bars around toilet  Home Equipment: [employer], Electric scooter, 4 wheeled walker  ADL Assistance: Needs assistance (Wife assists with sponge bathing. Pt is independent with toileting, and dressing.)  Homemaking Assistance:  (Wife cooks and cleans)  Ambulation Assistance: Independent (Uses 4WW at all times)  Transfer Assistance: Independent  Active : No  Patient's  Info: wife, Shellie Whitt drives patient  Additional Comments: Pt reports a fall 1 month ago. Subjective   General  Chart Reviewed: Yes  Additional Pertinent Hx: Per Dr. Katie Mooney, \"This is a 81 y.o. male who presented to the ED on 8/7 with a CC of worsening SOB for the past week. Uses 3 liters at baseline. He was increasing oxygen at home with some help. Also gained 10 lbs with more orthopnea. He denies feeling sick. Says he is urinating a lot and feeling better. He uses Breo, Spiriva and nebs at home. He stopped using CPAP machine in the past for unknown reasons. \"  Response To Previous Treatment: Patient with no complaints from previous session. Family / Caregiver Present: Yes (wife and daughter)  Referring Practitioner: Dr. Mary Ellen Hopkins: Pt agreeable to PT treatment. Reports pain and swelling in nayeli feet.   In bed upon arrival.                Objective   Bed mobility  Supine to Sit: Minimal assistance (HOB partially elevated)  Sit to Supine: Unable to assess (up in chair at end of session)     Transfers  Sit to Stand: Minimal Assistance;2 Person Assistance (to [facility] and to )  Stand to sit: Minimal Assistance;2 Person Assistance  Bed to Chair: Dependent/Total (via osiris lashawn)  Comment: cues for hand placement     Ambulation  Ambulation?: No     Balance  Posture: Fair  Comments: SBA for sitting balance EOB and CGA for static standing balance in [facility] and at ; pt stood at [address] [place] [address] for approx 2 min and completed lateral weight shifting     Exercises  Ankle Pumps: x15 toe raises nayeli  Comments: ther ex seated EOB         Other Activities: Other (see comment)  Comment: pt positioned for comfort in recliner chair at end of session and assisted with changing gown           AM-PAC Score  AM-PAC Inpatient Mobility Raw Score : 10 (08/13/21 1140)  AM-PAC Inpatient T-Scale Score : 32.29 (08/13/21 1140)  Mobility Inpatient CMS 0-100% Score: 76.75 (08/13/21 1140)  Mobility Inpatient CMS G-Code Modifier : CL (08/13/21 1140)          Goals  Short term goals  Time Frame for Short term goals: In 2-3 days pt will perform (goals ongoing as of 8/13)  Short term goal 1: Bed mobility CGA  Short term goal 2: Transfer to walker Min A  Short term goal 3: Ambulation 8' with walker, Min A  Long term goals  Time Frame for Long term goals : LTG = STG  Patient Goals   Patient goals : To get stronger    Plan    Plan  Times per week: 2-3x  Specific instructions for Next Treatment: cotx  Current Treatment Recommendations: Strengthening, ROM, Balance Training, Endurance Training, Functional Mobility Training, Transfer Training, Gait Training, Stair training, Equipment Evaluation, Education, & procurement, Patient/Caregiver Education & Training, Safety Education & Training, Neuromuscular Re-education, Home Exercise Program  Safety Devices  Type of devices:  All fall risk precautions in place, Call light within reach, Chair alarm in place, Gait belt, Left in chair, Nurse notified (RN Trista notified)  Restraints  Initially in place: No     Therapy Time   Individual Concurrent Group Co-treatment   Time In 1100         Time Out 1140         Minutes 40         Timed Code Treatment Minutes: 40 Minutes         Electronically signed by Daniela Morales on 8/13/2021 at 11:45 AM

## 2021-08-13 NOTE — PROGRESS NOTES
Occupational Therapy  Facility/Department: [address] PROGRESSIVE CARE  Daily Treatment Note  NAME: Jocelyn Leslie  : 1935  MRN: [id number]    Date of Service: 2021    Discharge Recommendations:  3-5 sessions per week, Patient would benefit from continued therapy after discharge     Jocelyn Leslie scored a 14/24 on the AM-PAC ADL Inpatient form. Current research shows that an AM-PAC score of 17 or less is typically not associated with a discharge to the patient's home setting. Based on the patient's AM-PAC score and their current ADL deficits, it is recommended that the patient have 3-5 sessions per week of Occupational Therapy at d/c to increase the patient's independence. Please see assessment section for further patient specific details. If patient discharges prior to next session this note will serve as a discharge summary. Please see below for the latest assessment towards goals. Assessment: Discussed with OTR am pac score is 14 which indicates need for continued skilled OT to increase [place] and decrease caregiver burden. Patient completed bed mobility with Min A . Min A of 2 for sit<>stand from bed to osiris stedy to recliner chair. Min A of 2 for sit<>stand from recliner chair to RW to recliner chair with cues for hand placement. Use of osiris stedy for safe mobility from bed to recliner chair. Patient is unable to return home at this time due to assist level requirements. Cont with POC. Patient Diagnosis(es): The primary encounter diagnosis was Acute on chronic respiratory failure with hypoxia ([facility] [address] A diagnosis of Acute congestive heart failure, unspecified heart failure type [facility]) was also pertinent to this visit.       has a past medical history of Atherosclerosis of aorta (HCC), CHF (congestive heart failure) ([address] CKD (chronic kidney disease) stage 3, GFR 30-59 ml/min (HCC), COPD (chronic obstructive pulmonary disease) ([address] CVA, old, ataxia, DM type 2 with hands)  UE Dressing: Moderate assistance (don hosptial gown)  LE Dressing: Dependent/Total (don slipper socks)  Toileting: Dependent/Total        Balance  Sitting Balance: Stand by assistance  Standing Balance: Contact guard assistance  Standing Balance  Time: ~2:00  Activity: Static standing in osiris stedy and with RW  Functional Mobility  Functional Mobility Comments: Use of osiris stedy for safe mobility  Bed mobility  Supine to Sit: Minimal assistance (HOB partially elevated)  Sit to Supine: Unable to assess (up in chair at end of session)  Transfers  Sit to stand: Minimal assistance;2 Person assistance  Stand to sit: Minimal assistance;2 Person assistance  Transfer Comments: Min A of 2 for sit<>stand to osiris stedy to recliner chair. Min A of 2 for sit<>stand from recliner chair to RW to recliner chair with cues for hand placement     Cognition  Overall Cognitive Status: Exceptions  Arousal/Alertness: Delayed responses to stimuli  Following Commands: Follows one step commands with increased time  Attention Span: Attends with cues to redirect  Memory: Decreased recall of precautions  Safety Judgement: Decreased awareness of need for assistance  Initiation: Requires cues for some  Sequencing: Requires cues for some       Assessment   Performance deficits / Impairments: Decreased functional mobility ; Decreased strength;Decreased endurance;Decreased ADL status; Decreased balance;Decreased high-level IADLs  Assessment: Discussed with OTR am pac score is 14 which indicates need for continued skilled OT to increase [place] and decrease caregiver burden. Patient completed bed mobility with Min A . Min A of 2 for sit<>stand from bed to osiris stedy to recliner chair. Min A of 2 for sit<>stand from recliner chair to RW to recliner chair with cues for hand placement. Use of osiris stedy for safe mobility from bed to recliner chair. Patient is unable to return home at this time due to assist level requirements.  Cont with POC.  OT Education: OT Role;Plan of Care;Transfer Training  REQUIRES OT FOLLOW UP: Yes  Activity Tolerance  Activity Tolerance: Patient Tolerated treatment well  Safety Devices  Safety Devices in place: Yes  Type of devices: Call light within reach; Chair alarm in place; Left in chair;Gait belt       Plan   Plan  Times per week: 3-5x  Current Treatment Recommendations: Strengthening, Functional Mobility Training, Endurance Training, Balance Training, Safety Education & Training, Self-Care / ADL, Patient/Caregiver Education & Training, Gait Training    AM-PAC Score        AM-[facility] Inpatient Daily Activity Raw Score: 14 (08/13/21 1133)  AM-PAC Inpatient ADL T-Scale Score : 33.39 (08/13/21 1133)  ADL Inpatient CMS 0-100% Score: 59.67 (08/13/21 1133)  ADL Inpatient CMS G-Code Modifier : CK (08/13/21 1133)    Goals  Short term goals  Time Frame for Short term goals: prior to D/C: all goals ongoing  Short term goal 1: complete functional mobility and transfers with CGA  Short term goal 2: complete bed mobility with CGA  Short term goal 3: complete bathing and dressing with Min A  Short term goal 4: complete toileting with CGA  Long term goals  Time Frame for Long term goals : STG=LTG  Patient Goals   Patient goals : no stated goals       Therapy Time   Individual Concurrent Group Co-treatment   Time In 1100         Time Out 1140         Minutes 40                 Electronically signed by SUSANA [id number] on 8/13/2021 at 11:41 AM

## 2021-08-13 NOTE — RT PROTOCOL NOTE
hours PRN wheezing or increased work of breathing using Per Protocol order mode. Repeat RT Therapy Protocol Assessment with second treatment then BID and as needed. If Albuterol Inhaler not tolerated or not effective, then discontinue the Albuterol Inhaler orders and enter two Albuterol Nebulizer orders with same frequencies and PRN reasons. 11-13 - discontinue any other Inpatient aerosolized bronchodilator medication orders and enter DuoNeb Nebulizer orders QID frequency and an Albuterol Nebulizer order every 2 hours PRN wheezing or increased work of breathing using Per Protocol order mode. Repeat RT Therapy Protocol Assessment with second treatment then QID and as needed. Greater than 13 - discontinue any other Inpatient bronchodilator aerosolized medication orders and enter DuoNeb Nebulizer order every 4 hours frequency and Albuterol Nebulizer every 2 hours PRN wheezing or increased work of breathing using Per Protocol order mode. Repeat RT Therapy Protocol Assessment with second treatment then every 4 hours and as needed. RT to enter RT Home Evaluation for COPD & MDI Assessment order using Per Protocol order mode.     Electronically signed by Flor Nielsen RCP on 8/12/2021 at 8:14 PM

## 2021-08-13 NOTE — PROGRESS NOTES
Hospitalist Progress Note      PCP: Margaret Brewer MD    Date of Admission: 8/7/2021      Subjective: Feels better, still with some dry cough, no nausea vomiting abdominal pain no muscle cramps      Medications:  Reviewed    Infusion Medications    sodium chloride 100 mL/hr at 08/12/21 1901     Scheduled Medications    gabapentin  300 mg Oral BID    albuterol  2.5 mg Nebulization BID    hydrALAZINE  10 mg Oral 3 times per day    predniSONE  40 mg Oral Daily    heparin (porcine)  5,000 Units Subcutaneous 3 times per day    sodium chloride flush  5-40 mL Intravenous 2 times per day    cefTRIAXone (ROCEPHIN) IV  2,000 mg Intravenous Q24H    doxycycline hyclate  100 mg Oral 2 times per day    budesonide-formoterol  2 puff Inhalation BID    isosorbide mononitrate  30 mg Oral Daily    labetalol  100 mg Oral BID    atorvastatin  40 mg Oral Daily    tiotropium  2 puff Inhalation Daily    [Held by provider] furosemide  40 mg Intravenous BID    glipiZIDE  10 mg Oral QAM AC    escitalopram  10 mg Oral Daily    doxazosin  8 mg Oral Daily     PRN Meds: albuterol, sodium chloride flush, sodium chloride, acetaminophen, ondansetron **OR** ondansetron, perflutren lipid microspheres, potassium chloride, magnesium sulfate, morphine, hydrALAZINE      Intake/Output Summary (Last 24 hours) at 8/13/2021 0935  Last data filed at 8/13/2021 0909  Gross per 24 hour   Intake 759.36 ml   Output 1800 ml   Net -1040.64 ml       Physical Exam Performed:    BP (!) 173/81   Pulse 72   Temp 97.8 °F (36.6 °C) (Oral)   Resp 18   Ht 5' 10\" (1.778 m)   Wt 269 lb 13.5 oz (122.4 kg)   SpO2 94%   BMI 38.72 kg/m²     General appearance: No apparent distress  Neck: Supple  Respiratory:  Normal respiratory effort. Clear to auscultation, bilaterally without Rales/Wheezes/Rhonchi. Cardiovascular: Regular rate and rhythm with normal S1/S2 without murmurs, rubs or gallops.   Abdomen: Soft, non-tender, non-distended with normal bowel sounds. Musculoskeletal: No clubbing, cyanosis  Skin: Skin color, texture, turgor normal.  No rashes or lesions. Neurologic: No drifting or focal weakness  Psychiatric: Alert and oriented  Capillary Refill: Brisk,3 seconds, normal   Peripheral Pulses: +2 palpable, equal bilaterally       Labs:   No results for input(s): WBC, HGB, HCT, PLT in the last 72 hours. Recent Labs     08/11/21  0525 08/12/21  0540    134*   K 3.9 4.0   CL 92* 90*   CO2 28 28   BUN 37* 48*   CREATININE 1.8* 2.1*   CALCIUM 8.7 8.8     No results for input(s): AST, ALT, BILIDIR, BILITOT, ALKPHOS in the last 72 hours. No results for input(s): INR in the last 72 hours. Recent Labs     08/11/21  1327 08/11/21  1850   TROPONINI 0.06* 0.04*       Urinalysis:      Lab Results   Component Value Date    NITRU Negative 09/25/2020    WBCUA 1 09/25/2020    BACTERIA 2+ 10/26/2013    RBCUA 1 09/25/2020    BLOODU Negative 09/25/2020    SPECGRAV 1.015 09/25/2020    GLUCOSEU 250 09/25/2020       Radiology:  CT CHEST WO CONTRAST   Final Result   Small bilateral pleural effusions, associated with dependent atelectasis. Superimposed pneumonia is less likely. Subsegmental atelectasis in the right middle lobe. Emphysema and mild peripheral fibrosis. Cardiomegaly and trace pericardial effusion. XR CHEST (2 VW)   Final Result   Stable findings most consistent with CHF. Interstitial edema with bilateral   pleural effusions and left basilar volume loss. XR SHOULDER RIGHT (MIN 2 VIEWS)   Final Result   Degenerative change in the right shoulder with no acute abnormality. XR CHEST PORTABLE   Final Result   Cardiomegaly with diffuse interstitial changes that suggest underlying   pulmonary edema. A chronic history of interstitial lung disease in the   setting of COPD cannot be excluded.                  Assessment/Plan:    Active Hospital Problems    Diagnosis     COPD, severe ([facility] [address] [J44.9]      Priority: Medium    Acute diastolic (congestive) heart failure (HCC) [I50.31]     Acute on chronic respiratory failure with hypoxia (HCC) [J96.21]     Abnormal CT of the chest [R93.89]        1.  Acute on chronic respiratory failure with hypoxia, appears multifactorial, felt to have diastolic heart failure, diuresed well, pulmonary also following, and started on antibiotics for abnormal lung imaging. [place] improving  2.  Acute on chronic diastolic heart failure, received Lasix, cardiology following. Lasix on hold as creatinine increased. 3.  COPD, continue home inhalers, pulmonary has been consulted, procalcitonin mildly elevated, added antibiotics, ceftriaxone and doxycycline. 4.  Acute metabolic encephalopathy, multifactorial, improved. 5.  Diabetes mellitus, sliding scale. 6.  Essential hypertension, p.o. medications. 7.  LULU, IV diuretics on hold, creatinine increased yesterday, morning labs pending today. 8. Chronic pain, continue home regimen. Diet: ADULT DIET; Regular; Low Fat/Low Chol/High Fiber/2 gm Na;  Low Sodium (2 gm); 1500 ml  Code Status: DNR-SAULO Whiting MD

## 2021-08-13 NOTE — PLAN OF CARE
Problem: Falls - Risk of:  Goal: Will remain free from falls  Description: Will remain free from falls  Outcome: Ongoing     Problem: Skin Integrity:  Goal: Absence of new skin breakdown  Description: Absence of new skin breakdown  Outcome: Ongoing     Problem: OXYGENATION/RESPIRATORY FUNCTION  Goal: Patient will achieve/maintain normal respiratory rate/effort  Description: Respiratory rate and effort will be within normal limits for the patient  Outcome: Ongoing     Problem: FLUID AND ELECTROLYTE IMBALANCE  Goal: Fluid and electrolyte balance are achieved/maintained  Outcome: Ongoing     Problem: ACTIVITY INTOLERANCE/IMPAIRED MOBILITY  Goal: Mobility/activity is maintained at optimum level for patient  Outcome: Ongoing

## 2021-08-13 NOTE — CARE COORDINATION
Received call from [place]. [facility] Authorization Received:    Plan Auth ID:  Not generated until next review date  Rufino Monday ID:  [id number]  Service:  [address] [place] [address]  Approval Dates:  8/13/2021 approved for 5 days  Next Review Date:  8/17/2021  Fax: [phone], Jacques Kohli agent to follow at [facility].     Electronically signed by Chaya Reyes RN Case Management [phone] on 8/13/2021 at 1:09 PM

## 2021-08-13 NOTE — PROGRESS NOTES
Pulmonary Progress Note    CC:  Follow up hypoxia, COPD, presumed CHF    Subjective:  3 liters of oxygen (baseline)  He has improved. Less SOB    Intake/Output Summary (Last 24 hours) at 8/13/2021 0629  Last data filed at 8/12/2021 2129  Gross per 24 hour   Intake 579.36 ml   Output 1200 ml   Net -620.64 ml         PHYSICAL EXAM:  Blood pressure 133/66, pulse 73, temperature 97.5 °F (36.4 °C), temperature source Oral, resp. rate 16, height 5' 10\" (1.778 m), weight 269 lb 13.5 oz (122.4 kg), SpO2 94 %.'  Gen: No distress. Chronically ill   Eyes: PERRL. No sclera icterus. No conjunctival injection. ENT: No discharge. Pharynx clear. External appearance of ears and nose normal.  Neck: Trachea midline. No obvious mass. Resp: Clearer  CV: Regular rate. Regular rhythm. No murmur or rub. Distant heart sounds   GI: Non-tender. Non-distended. No hernia. Skin: Venous stasis changes   Lymph: No cervical LAD. No supraclavicular LAD. M/S: No cyanosis. No clubbing. No joint deformity. Neuro: Moves all four extremities. CN 2-12 tested, no defect noted.   Ext:   + edema    Medications:    Scheduled Meds:   gabapentin  300 mg Oral BID    albuterol  2.5 mg Nebulization BID    hydrALAZINE  10 mg Oral 3 times per day    predniSONE  40 mg Oral Daily    heparin (porcine)  5,000 Units Subcutaneous 3 times per day    sodium chloride flush  5-40 mL Intravenous 2 times per day    cefTRIAXone (ROCEPHIN) IV  2,000 mg Intravenous Q24H    doxycycline hyclate  100 mg Oral 2 times per day    budesonide-formoterol  2 puff Inhalation BID    isosorbide mononitrate  30 mg Oral Daily    labetalol  100 mg Oral BID    atorvastatin  40 mg Oral Daily    tiotropium  2 puff Inhalation Daily    [Held by provider] furosemide  40 mg Intravenous BID    glipiZIDE  10 mg Oral QAM AC    escitalopram  10 mg Oral Daily    doxazosin  8 mg Oral Daily       Continuous Infusions:   sodium chloride 100 mL/hr at 08/12/21 1901       PRN Meds:  albuterol, sodium chloride flush, sodium chloride, acetaminophen, ondansetron **OR** ondansetron, perflutren lipid microspheres, potassium chloride, magnesium sulfate, morphine, hydrALAZINE    Labs:  CBC:   No results for input(s): WBC, HGB, HCT, MCV, PLT in the last 72 hours. BMP:   Recent Labs     21  0525 21  0540    134*   K 3.9 4.0   CL 92* 90*   CO2 28 28   BUN 37* 48*   CREATININE 1.8* 2.1*     LIVER PROFILE:   No results for input(s): AST, ALT, LIPASE, BILIDIR, BILITOT, ALKPHOS in the last 72 hours. Invalid input(s): AMYLASE,  ALB  PT/INR: No results for input(s): PROTIME, INR in the last 72 hours. APTT: No results for input(s): APTT in the last 72 hours. UA:No results for input(s): NITRITE, COLORU, PHUR, LABCAST, WBCUA, RBCUA, MUCUS, TRICHOMONAS, YEAST, BACTERIA, CLARITYU, SPECGRAV, LEUKOCYTESUR, UROBILINOGEN, BILIRUBINUR, BLOODU, GLUCOSEU, AMORPHOUS in the last 72 hours. Invalid input(s): Jorden Urban  No results for input(s): PH, PCO2, PO2 in the last 72 hours. Films:  Chest imaging reports were reviewed and imaging was reviewed by me and showed small effusion, bilateral atelectasis, mild fibrosis, mucus in airways     AB.42/50/75    Cultures:  None    I reviewed the labs and images listed above    ASSESSMENT/PLAN:  · Acute on Chronic Hypoxic Respiratory Failure  · Titrate oxygen for saturations greater than or equal to 90%  · At baseline oxygen   · Abnormal CT Chest:  Small effusion, atelectasis with mucus in airways  ? Rocephin and Doxycycline to continue. Can change to omnicef and doxycycline at DC.  7 days   ? Prednisone for 5 days   · Severe COPD at baseline without apparent exacerbation   ? Symbicort as substitute for Breo  ? Continue with Spiriva  ? Scheduled albuterol q 8 hours   ? Prednisone for 5 days   · ROSENDO, untreated  ?  He no longer uses CPAP  · Pleural plaque in the LLL  · BMI 39        DVT prophylaxis  Heparin   Probably ok to DC from pulmonary standpoint     Will sign off     Uri Conner, DO [facility]

## 2021-08-13 NOTE — PROGRESS NOTES
Renal Cons dictated . Pt seen and examined  . LULU etiology Hemodynamic   CKD 4 follows up with me . Poor compliance    COPD  DHF  . HTN uncontrolled     Will need diuretics soon . Will do w/u . Meds reviewed and adjusted   dw pt and family     Will follow  .         Maria A Chavarria MD,FACP

## 2021-08-13 NOTE — CARE COORDINATION
[facility] PRE-CERT REQUEST SUBMITTED VIA [facility] ACCESS PORTAL    REQUESTED SETTING:  [facility]    ANTICIPATED ADMIT DATE TO SNF:  8/13/21    formerly [facility] AT [place] #:  [phone]    Rose Franco Sr.  Administrative Assist, Case Management  [phone]  Electronically signed by Rose Franco on 8/13/2021 at 12:04 PM

## 2021-08-14 NOTE — PROGRESS NOTES
Hospitalist Progress Note      PCP: Dodie Vuong MD    Date of Admission: 8/7/2021        Subjective: More energetic, feels much better, still having some cough, no fever chills abdominal pain nausea or vomiting      Medications:  Reviewed    Infusion Medications    sodium chloride 25 mL (08/13/21 1714)     Scheduled Medications    gabapentin  100 mg Oral BID    hydrALAZINE  25 mg Oral 3 times per day    albuterol  2.5 mg Nebulization BID    predniSONE  40 mg Oral Daily    heparin (porcine)  5,000 Units Subcutaneous 3 times per day    sodium chloride flush  5-40 mL Intravenous 2 times per day    cefTRIAXone (ROCEPHIN) IV  2,000 mg Intravenous Q24H    doxycycline hyclate  100 mg Oral 2 times per day    budesonide-formoterol  2 puff Inhalation BID    isosorbide mononitrate  30 mg Oral Daily    labetalol  100 mg Oral BID    atorvastatin  40 mg Oral Daily    tiotropium  2 puff Inhalation Daily    [Held by provider] furosemide  40 mg Intravenous BID    glipiZIDE  10 mg Oral QAM AC    escitalopram  10 mg Oral Daily    doxazosin  8 mg Oral Daily     PRN Meds: albuterol, sodium chloride flush, sodium chloride, acetaminophen, ondansetron **OR** ondansetron, perflutren lipid microspheres, potassium chloride, magnesium sulfate, morphine, hydrALAZINE      Intake/Output Summary (Last 24 hours) at 8/14/2021 0902  Last data filed at 8/14/2021 0016  Gross per 24 hour   Intake 820 ml   Output 1650 ml   Net -830 ml       Physical Exam Performed:    BP (!) 178/75   Pulse 84   Temp 97.6 °F (36.4 °C) (Axillary)   Resp 18   Ht 5' 10\" (1.778 m)   Wt 267 lb 3.2 oz (121.2 kg)   SpO2 93%   BMI 38.34 kg/m²     General appearance: No apparent distress  Neck: Supple  Respiratory: Scattered rhonchi  Cardiovascular: Regular rate and rhythm with normal S1/S2 without murmurs, rubs or gallops.   Abdomen: Soft, non-tender, non-distended   Musculoskeletal: No clubbing, cyanosis  Skin: Skin color, texture, turgor normal. No rashes or lesions. Neurologic: No focal drifting or weakness  Psychiatric: Alert ight  Capillary Refill: Brisk,3 seconds, normal   Peripheral Pulses: +2 palpable, equal bilaterally       Labs:   Recent Labs     08/14/21  0634   WBC 7.9   HGB 10.6*   HCT 31.3*   *     Recent Labs     08/12/21  0540 08/13/21  0835 08/14/21  0634   * 134* 137   K 4.0 3.6 3.6   CL 90* 92* 95*   CO2 28 30 31   BUN 48* 62* 61*   CREATININE 2.1* 1.8* 1.6*   CALCIUM 8.8 8.6 8.8   PHOS  --   --  4.2     No results for input(s): AST, ALT, BILIDIR, BILITOT, ALKPHOS in the last 72 hours. No results for input(s): INR in the last 72 hours. Recent Labs     08/11/21  1327 08/11/21  1850   TROPONINI 0.06* 0.04*       Urinalysis:      Lab Results   Component Value Date    NITRU Negative 08/14/2021    WBCUA 18 08/14/2021    BACTERIA 2+ 10/26/2013    RBCUA 3 08/14/2021    BLOODU Negative 08/14/2021    SPECGRAV 1.015 08/14/2021    GLUCOSEU Negative 08/14/2021       Radiology:  CT CHEST WO CONTRAST   Final Result   Small bilateral pleural effusions, associated with dependent atelectasis. Superimposed pneumonia is less likely. Subsegmental atelectasis in the right middle lobe. Emphysema and mild peripheral fibrosis. Cardiomegaly and trace pericardial effusion. XR CHEST (2 VW)   Final Result   Stable findings most consistent with CHF. Interstitial edema with bilateral   pleural effusions and left basilar volume loss. XR SHOULDER RIGHT (MIN 2 VIEWS)   Final Result   Degenerative change in the right shoulder with no acute abnormality. XR CHEST PORTABLE   Final Result   Cardiomegaly with diffuse interstitial changes that suggest underlying   pulmonary edema. A chronic history of interstitial lung disease in the   setting of COPD cannot be excluded.                  Assessment/Plan:    Active Hospital Problems    Diagnosis     COPD, severe ([facility] [address] [J44.9]      Priority: Medium    Acute diastolic (congestive) heart failure ([facility]) [I50.31]     Acute on chronic respiratory failure with hypoxia ([facility]) [J96.21]     Abnormal CT of the chest [R93.89]        1.  Acute on chronic respiratory failure with hypoxia, appears multifactorial, felt to have diastolic heart failure, diuresed well, pulmonary also following, and started on antibiotics for abnormal lung imaging. Clinically continued to improve  2.  Acute on chronic diastolic heart failure, received Lasix, cardiology following. Lasix on hold as creatinine increased. Currently creatinine improving expectorants to start diuretics in the next 24 hours. 3.  COPD, continue home inhalers, pulmonary has been consulted, procalcitonin mildly elevated, added antibiotics, ceftriaxone and doxycycline. 4.  Acute metabolic encephalopathy, multifactorial, improved. 5.  Diabetes mellitus, sliding scale. 6.  Essential hypertension, p.o. medications. Uncontrolled this morning, will increase labetalol to 3 times daily  7.  LULU, IV diuretics on hold, continue to improve, nephrology consulted, expecting to restart diuretics in the next 24 hours  8. Chronic pain, continue home regimen. 9.  Pyuria, suspecting present on admission, patient already on antibiotics        Diet: ADULT DIET; Regular; Low Fat/Low Chol/High Fiber/2 gm Na;  Low Sodium (2 gm); 1500 ml  Code Status: DNR-SAULO Whiting MD

## 2021-08-14 NOTE — CONSULTS
[facility] [place] [address]                                  CONSULTATION    PATIENT NAME: Maryam Goodwin                  :        1935  MED REC NO:   [id number]                          ROOM:       0714  ACCOUNT NO:   [de-identified]                           ADMIT DATE: 2021  PROVIDER:     Alfie Garcia MD    CONSULT DATE:  2021    REASON FOR CONSULTATION:  Acute kidney injury. HISTORY OF PRESENTING ILLNESS:  He is an 21-year-old  gentleman  with past medical history significant for morbid obesity, hypertension,  diabetes mellitus type 2, chronic kidney disease stage IIIB, coronary  artery disease, diastolic heart failure, moderate COPD, came to ER  complaining of shortness of breath and dyspnea on exertion with weight  gain. The patient was initially diagnosed with congestive heart failure  and possible pneumonia. Started on diuretics as well as antibiotics. Also had a pulmonary workup. Renal consultation has been called for  gradual decline in the renal function. At the time of consultation, the patient was feeling and breathing  better. Denies any chest pain but still has some shortness of breath  and dyspnea on exertion. Denies any urinary symptoms. Feeling weak,  tired, decreased level of energy and overall decline in his physical  condition. The patient follows up with me but _____ he was not able to  keep the appointment and follow up for a year because of physical  decline. Case discussed in detail with the patient about his overall  decline and need of followup. PAST MEDICAL HISTORY:  1. Longstanding history of diabetes mellitus type 2.  2.  Sleep apnea. 3.  COPD. 4.  Diastolic heart failure. 5.  Hypertension. 6.  Hypercholesterolemia. 8.  Chronic kidney disease stage IIIB, follows up with Dr. Chaim Rg. PAST SURGICAL HISTORY:  None per record.     ALLERGIES: None.    FAMILY HISTORY:  Not significant for kidney disease. SOCIAL HISTORY:  Quit smoking long time ago. Lives at home with his  wife. MEDICATIONS:  Include Proventil, Lipitor, Symbicort, Rocephin, Cardura,  Lexapro, Neurontin, Glucotrol, hydralazine, Imdur, Normodyne,  prednisone, Spiriva. REVIEW OF SYSTEMS:  Hard of hearing. No vision problem. No nausea,  vomiting, diarrhea. No chest pain. Complaining of shortness of breath  and dyspnea on exertion with orthopnea and PND [improved overall]. Feeling weak, tired, decreased level of energy. PHYSICAL EXAMINATION:  GENERAL:  Lying in bed, looks chronically ill. VITAL SIGNS:  Blood pressure 173/81, pulse 72, temperature 97.8. HEENT EXAMINATION:  Pupils are reactive to light. CHEST:  Decreased breath sounds on both bases. Few rhonchi. CVS:  S1, S2 audible. Regular rate and rhythm. ABDOMEN:  Distended. Positive bowel sounds. No guarding. No rigidity. EXTREMITIES:  1+ edema. CNS:  Nonfocal.    LABORATORY DATA:  Sodium 134, potassium 3.6, chloride 92, bicarb 30, BUN  62, creatinine 1.8. Calcium 8.6. WBC 9.3, hemoglobin 11.3. IMPRESSION:  1. Acute kidney injury, most likely secondary to hemodynamics [off of  diuretics now]. 2.  Shortness of breath, history of moderate COPD. 3.  Rule out pneumonia. 4.  Obstructive sleep apnea. 5.  Pulmonary hypertension. 6.  Anemia of chronic kidney disease. 7.  COPD. 8.  Diabetes mellitus type 2.  9.  History of chronic kidney disease stage IV. PLAN:  1. Daily weight. 2.  Strict I's and O's.  3.  Off of diuretics, will need diuretics soon. 4.  Uncontrolled blood pressure, medicine adjusted. 5.  Continue with antibiotic. Outpatient followup suggested. Repeat  all the labs in the morning tomorrow. Case discussed in detail with the patient as well as the family present  at the bedside. We will follow. OVERALL PROGNOSIS:  Guarded.     Thank you very much for the consultation.         Ciara Veras MD    D: 08/13/2021 14:39:47       T: 08/13/2021 17:59:52     [email or web address]  Job#: [id number]     Doc#: [id number]    CC:

## 2021-08-14 NOTE — PROGRESS NOTES
[address]   Daily Progress Note      Admit Date:  8/7/2021    CC: SOB    HPI:   Jose Alonzo is a 81 y.o. male with PMH HTN, COPD/chronic hypoxemia (Dr. Brittanie Cuello), atherosclerosis of aorta, HF, CKD stage 3 (Dr. Daniel Benson), CVA, ROSENDO-not using CPAP, DM2, diabetic peripheral neuropathy, fatty liver, HLP, morbid obesity. Remote smoking hx. Admitted to [facility] with progressively worsening SOB X2-3 days. Worsened with exertion, improved with rest. Associated with wt gain, orthopnea and BLE edema. Per Dr. Seth Miller PCP note- lasix was reduced from 80mg/day to 40mg/day on 7/9. Also increased gabapentin at that office visit. Started on IV lasix 40mg BID. Pulm following  Diuresed then developed LULU. Underwent RHC. Diuretics being held. Today has more energy. Getting out of bed with PT. Plan is for discharge to [facility]CF. Sob is at baseline. On 4L NC. Review of Systems:   General: Denies fever, chills  Skin: Denies skin changes, rash, itching, lesions. HEENT: Denies headache, dizziness, vision changes, nosebleeds, sore throat, nasal drainage  RESP: See HPI.   CARD: Denies palpitations,  murmur  GI:Denies nausea, vomiting, heartburn, loss of appetite, change in bowels  : Denies frequency, pain, incontinence, polyuria  VASC: Denies claudication, leg cramps, clots  MUSC/SKEL: Denies pain, stiffness, arthritis  PSYCH: Denies anxiety, depression, stress  NEURO: Denies numbness, tingling, weakness,change in mood or memory  HEME: Denies abn bruising, bleeding, anemia  ENDO: Denies intolerance to heat, cold, excessive thirst or hunger, hx thyroid disease    Objective:   /71   Pulse 74   Temp 97.5 °F (36.4 °C) (Oral)   Resp 16   Ht 5' 10\" (1.778 m)   Wt 267 lb 3.2 oz (121.2 kg)   SpO2 94%   BMI 38.34 kg/m²           Intake/Output Summary (Last 24 hours) at 8/14/2021 0886  Last data filed at 8/14/2021 0016  Gross per 24 hour   Intake 820 ml   Output 1650 ml   Net -830 ml     I/O since adm: incontinent    WEIGHT:Admit Weight: 271 lb 6.2 oz (123.1 kg)         Today  Weight: 267 lb 3.2 oz (121.2 kg)   DRY WEIGHT:  Wt Readings from Last 3 Encounters:   08/14/21 267 lb 3.2 oz (121.2 kg)   07/09/21 265 lb (120.2 kg)   01/14/21 260 lb 2.3 oz (118 kg)       Physical Exam:  GEN: Appears obese, no acute distress  SKIN: Pink, warm, dry. Nails without clubbing. HEENT: PERRLA. Normocephalic, atraumatic. Neck supple. No adenopathy. LUNG: AP diameter normal. Diminished bilateral bases. Few exp ronchi. Respiratory effort increased. HEART: S1S2 A/R. No JVD. No carotid bruit. No murmur, rub or gallop. ABD: Soft, nontender. +BS X 4 quads. No hepatomegaly. EXT: Radial and pedal pulses 2+ and symmetric. Without varicosities. 1+ pedal to knee edema. MUSCSKEL: Good ROM X4 extremities. No deformity. NEURO: A/O X3. Calm and cooperative. Telemetry: NSR HR 83. Medications:    gabapentin  100 mg Oral BID    hydrALAZINE  25 mg Oral 3 times per day    albuterol  2.5 mg Nebulization BID    predniSONE  40 mg Oral Daily    heparin (porcine)  5,000 Units Subcutaneous 3 times per day    sodium chloride flush  5-40 mL Intravenous 2 times per day    cefTRIAXone (ROCEPHIN) IV  2,000 mg Intravenous Q24H    doxycycline hyclate  100 mg Oral 2 times per day    budesonide-formoterol  2 puff Inhalation BID    isosorbide mononitrate  30 mg Oral Daily    labetalol  100 mg Oral BID    atorvastatin  40 mg Oral Daily    tiotropium  2 puff Inhalation Daily    [Held by provider] furosemide  40 mg Intravenous BID    glipiZIDE  10 mg Oral QAM AC    escitalopram  10 mg Oral Daily    doxazosin  8 mg Oral Daily      sodium chloride 25 mL (08/13/21 1714)     albuterol, sodium chloride flush, sodium chloride, acetaminophen, ondansetron **OR** ondansetron, perflutren lipid microspheres, potassium chloride, magnesium sulfate, morphine, hydrALAZINE    Lab Data: I have reviewed all labs below today.    CBC:   Recent Labs 08/14/21  0634   WBC 7.9   HGB 10.6*   HCT 31.3*   MCV 84.9   *     BMP:   Recent Labs     08/12/21  0540 08/13/21  0835 08/14/21  0634   * 134* 137   K 4.0 3.6 3.6   CL 90* 92* 95*   CO2 28 30 31   PHOS  --   --  4.2   BUN 48* 62* 61*   CREATININE 2.1* 1.8* 1.6*     GLUCOSE:   Recent Labs     08/12/21  0540 08/13/21  0835 08/14/21  0634   GLUCOSE 138* 143* 95     LIVER PROFILE:   Lab Results   Component Value Date    AST 10 08/07/2021    ALT 7 08/07/2021    LIPASE 47.0 10/30/2020    LABALBU 3.3 08/14/2021    BILIDIR <0.2 06/12/2020    BILITOT 0.4 08/07/2021    ALKPHOS 60 08/07/2021     PT/INR:   Lab Results   Component Value Date    PROTIME 12.3 06/13/2018    INR 1.08 06/13/2018     APTT:   Lab Results   Component Value Date    APTT 32.4 03/06/2018     Pro-BNP:    Lab Results   Component Value Date    PROBNP 1,968 08/10/2021    PROBNP 3,065 08/07/2021    PROBNP 1,263 01/09/2020     Parameters:   > 450 pg/mL under age 48  > 900 pg/mL ages 54-65  > 1800 pg/mL over age 76    ENZYMES:  Lab Results   Component Value Date    CKTOTAL 176 08/28/2016    TROPONINI 0.04 08/11/2021     FASTING LIPID PANEL:  Lab Results   Component Value Date    CHOL 126 07/09/2021    HDL 34 07/09/2021    HDL 52 12/02/2011    LDLCALC 66 07/09/2021    TRIG 131 07/09/2021       Diagnostics:    EKG: NSR LAFB, incomplete RBB    ECHO:    8/9/2021   Summary   Ejection fraction is visually estimated to be 55-60%. Grade I diastolic dysfunction with elevated LV filling pressures. Mildly dilated right ventricle. Right ventricular systolic function is normal   No significant valvular heart disease    5/15/2019 Normal left ventricle size and systolic function with an estimated ejection   fraction of 55-60%. No regional wall motion abnormalities are seen.  There is   mildly increased left ventricular wall thickness.   The right ventricle appears mildly dilated with normal systolic function.   The left and right atria appear moderately dilated.   Mild mitral regurgitation.   Trivial tricuspid regurgitation.   The ascending aorta is mildly dilated measuring 3.6 cm.     RHC 8/10/2021  HEMODYNAMICS:  Aortic pressure was 159/80/100  RA 10  RV 34/11  PA 40/16/28  PCWP 8   PA % 61  AO % 98  CO/CI 5.4 L/min 2.3 L/min/m2  SVR 1333 dynes . Sec/cm-5   dynes . Sec/cm-5  TPG 20  INTERVENTION  1. None   SUMMARY:   Low normal left sided filling pressures  Normal cardiac output  Mild elevation in right sided filling pressures  RECOMMENDATION:    - hold diuretics  - BP control, room for additional  afterload reduction       CXR 8/8/2021  Impression   Stable findings most consistent with CHF.  Interstitial edema with bilateral   pleural effusions and left basilar volume loss. Assessment/Plan:  1.) Acute on chronic diastolic heart failure: EF 55-60%. Initially treated for hypervolemia and pulm edema with lasix. Diuresed>LULU. Underwent RHC . Normal left side filling pressures, right side filling pressures slightly elevated. LULU from diuresis. Now held. SOB multifactorial with COPD, pulm HTN (LANI 28) and now being treated for PNA. NYHA Class III   Stage C  Diuretic:held - will likely need small dose restarted when creat normalized  Beta Blocker: Not indicated for EF>35%  ACEi/ARB/ARNI: Not indicated for EF>35%  Aldosterone Antagonist:Not indicated for EF>35%  SGLT2 Inhibitor: Not indicated for EF>35%  2gm Na diet, daily weight, 64 oz fluid restriction  Avoid NSAIDS and other nephrotoxic meds  Cardiac Rehab: Not indicated for EF>35%  ICD:Not indicated for EF>35%  [facility] Referral: OP  HF RN referral for education  Palliative Care consult for re admit <30 days or Stage IV: NA    2.) Hypertension: /87 on admission. Improved today. Continue antihypertensives- adding hydralzine for afterload reduction  Goal BP <130/80.   Non pharmacologic interventions include:   -weight loss  -heart healthy low sodium and low fat diet that consist of mostly

## 2021-08-14 NOTE — PROGRESS NOTES
NEPHROLOGY PROGRESS NOTE    CC: LULU on CKD  HPI  CKD followed by dr Yancy Iglesias  LULU in setting of overdiuresis    SUBJECTIVE  Feels fine today,  No CP,  No new c/o    SOC: wife at bedside      Scheduled Meds:   gabapentin  100 mg Oral BID    hydrALAZINE  25 mg Oral 3 times per day    albuterol  2.5 mg Nebulization BID    predniSONE  40 mg Oral Daily    heparin (porcine)  5,000 Units Subcutaneous 3 times per day    sodium chloride flush  5-40 mL Intravenous 2 times per day    cefTRIAXone (ROCEPHIN) IV  2,000 mg Intravenous Q24H    doxycycline hyclate  100 mg Oral 2 times per day    budesonide-formoterol  2 puff Inhalation BID    isosorbide mononitrate  30 mg Oral Daily    labetalol  100 mg Oral BID    atorvastatin  40 mg Oral Daily    tiotropium  2 puff Inhalation Daily    [Held by provider] furosemide  40 mg Intravenous BID    glipiZIDE  10 mg Oral QAM AC    escitalopram  10 mg Oral Daily    doxazosin  8 mg Oral Daily     Continuous Infusions:   sodium chloride 25 mL (08/13/21 1714)     PRN Meds:albuterol, sodium chloride flush, sodium chloride, acetaminophen, ondansetron **OR** ondansetron, perflutren lipid microspheres, potassium chloride, magnesium sulfate, morphine, hydrALAZINE      Objective:      Physical Exam  Wt Readings from Last 3 Encounters:   08/14/21 267 lb 3.2 oz (121.2 kg)   07/09/21 265 lb (120.2 kg)   01/14/21 260 lb 2.3 oz (118 kg)     Temp Readings from Last 3 Encounters:   08/14/21 97.6 °F (36.4 °C) (Axillary)   01/14/21 98.4 °F (36.9 °C) (Oral)   12/26/20 99 °F (37.2 °C) (Oral)     BP Readings from Last 3 Encounters:   08/14/21 (!) 178/75   07/09/21 (!) 146/84   01/14/21 (!) 165/77     Pulse Readings from Last 3 Encounters:   08/14/21 84   07/09/21 102   01/14/21 99   GENERAL: NAD   HEENT EXAMINATION:  Pupils are reactive to light. CHEST:  Decreased breath sounds on both bases. Few rhonchi. CVS:  S1, S2 audible. Regular rate and rhythm. ABDOMEN:  Distended.   Positive bowel sounds. No guarding. No rigidity. EXTREMITIES:  1+ edema. CNS:  Nonfocal.           Lab Review   Lab Results   Component Value Date    WBC 7.9 08/14/2021    HGB 10.6 (L) 08/14/2021    HCT 31.3 (L) 08/14/2021    MCV 84.9 08/14/2021     (H) 08/14/2021     Lab Results   Component Value Date     08/14/2021    K 3.6 08/14/2021    K 4.0 11/30/2019    CL 95 08/14/2021    CO2 31 08/14/2021    BUN 61 08/14/2021    CREATININE 1.6 08/14/2021    GLUCOSE 95 08/14/2021    CALCIUM 8.8 08/14/2021            Patient Active Problem List    Diagnosis Date Noted    Hypoxemia     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     Stage 3 chronic kidney disease (HCC)     Atherosclerosis of aorta (HCC)     Type 2 diabetes mellitus with microalbuminuria or microproteinuria     DM type 2 with diabetic peripheral neuropathy ([address] 10/23/2014    Fatty liver     Mixed hyperlipidemia     CVA, old, ataxia     COPD, severe ([address]     Urinary frequency 07/23/2012    Vitamin D deficiency     Psoriasis     Acute diastolic (congestive) heart failure ([address] 08/08/2021    Acute on chronic respiratory failure with hypoxia (HCC)     Major depressive disorder, recurrent, mild 07/09/2021    Pulmonary nodule seen on imaging study 02/16/2021    Sinus tachycardia     Multifocal pneumonia     Chronic respiratory failure with hypoxia (HCC)     Atrial fibrillation (HCC)     Shortness of breath 05/14/2019    Bilateral pleural effusion     Diastolic dysfunction     Elevated brain natriuretic peptide (BNP) level     Essential hypertension     Chronic vasomotor rhinitis 06/21/2018    ROSENDO (obstructive sleep apnea) 06/20/2018    Chest pain 03/09/2018    Chronic respiratory failure with hypoxia and hypercapnia (HCC) 03/07/2018    Orthopnea 03/07/2018    Abnormal CT of the chest 03/07/2018    SOB (shortness of breath)        ASSESSMENT AND PLAN     1.   Acute kidney injury, most likely secondary to hemodynamics [off of  diuretics now].  -improving, creatinine 1.6 today  2. CKD stage 4- followed by dr Yeny Carter in the office, will schedule appointment after dischargbe  3. Obstructive sleep apnea. 4.  Pulmonary hypertension. 5.  Anemia of chronic kidney disease.   6.  Diabetes mellitus type 2.     DISPO- awaiting for SNF, okay from our standpoint

## 2021-08-14 NOTE — CARE COORDINATION
Call rec'd from [facility] [phone] to inquire if pt was to discharge. Called her back; no dc at this time.   [place], [place]     Case Management   [phone]    8/14/2021  1:59 PM

## 2021-08-14 NOTE — PLAN OF CARE
Problem: Falls - Risk of:  Goal: Will remain free from falls  Description: Will remain free from falls  Outcome: Ongoing  Goal: Absence of physical injury  Description: Absence of physical injury  Outcome: Ongoing     Problem: Skin Integrity:  Goal: Will show no infection signs and symptoms  Description: Will show no infection signs and symptoms  Outcome: Ongoing  Goal: Absence of new skin breakdown  Description: Absence of new skin breakdown  Outcome: Ongoing     Problem: OXYGENATION/RESPIRATORY FUNCTION  Goal: Patient will maintain patent airway  Outcome: Ongoing  Goal: Patient will achieve/maintain normal respiratory rate/effort  Description: Respiratory rate and effort will be within normal limits for the patient  Outcome: Ongoing     Problem: HEMODYNAMIC STATUS  Goal: Patient has stable vital signs and fluid balance  Outcome: Ongoing     Problem: FLUID AND ELECTROLYTE IMBALANCE  Goal: Fluid and electrolyte balance are achieved/maintained  Outcome: Ongoing     Problem: ACTIVITY INTOLERANCE/IMPAIRED MOBILITY  Goal: Mobility/activity is maintained at optimum level for patient  Outcome: Ongoing     Problem: Airway Clearance - Ineffective:  Goal: Ability to maintain a clear airway will improve  Description: Ability to maintain a clear airway will improve  Outcome: Ongoing     Problem: Breathing Pattern - Ineffective:  Goal: Ability to achieve and maintain a regular respiratory rate will improve  Description: Ability to achieve and maintain a regular respiratory rate will improve  Outcome: Ongoing     Problem: Gas Exchange - Impaired:  Goal: Levels of oxygenation will improve  Description: Levels of oxygenation will improve  Outcome: Ongoing     Problem: Pain:  Description: Pain management should include both nonpharmacologic and pharmacologic interventions.   Goal: Pain level will decrease  Description: Pain level will decrease  Outcome: Ongoing  Goal: Control of acute pain  Description: Control of acute pain  Outcome: Ongoing  Goal: Control of chronic pain  Description: Control of chronic pain  Outcome: Ongoing

## 2021-08-15 NOTE — PLAN OF CARE
Problem: Falls - Risk of:  Goal: Will remain free from falls  Description: Will remain free from falls  8/15/2021 0033 by Arash Riley RN  Outcome: Ongoing  8/14/2021 1732 by Jenna Perez RN  Outcome: Ongoing     Problem: Skin Integrity:  Goal: Will show no infection signs and symptoms  Description: Will show no infection signs and symptoms  8/15/2021 0033 by Arash Riley RN  Outcome: Ongoing  8/14/2021 1732 by Jenna Perez RN  Outcome: Ongoing     Problem: OXYGENATION/RESPIRATORY FUNCTION  Goal: Patient will maintain patent airway  8/15/2021 0033 by Arash Riley RN  Outcome: Ongoing  8/14/2021 1732 by Jenna Perez RN  Outcome: Ongoing     Problem: HEMODYNAMIC STATUS  Goal: Patient has stable vital signs and fluid balance  8/15/2021 0033 by Arash Riley RN  Outcome: Ongoing  8/14/2021 1732 by Jenna Perez RN  Outcome: Ongoing

## 2021-08-15 NOTE — PROGRESS NOTES
[address]   Daily Progress Note      Admit Date:  8/7/2021    CC: SOB    HPI:   Zeferino Donis is a 81 y.o. male with PMH HTN, COPD/chronic hypoxemia (Dr. Krishna Campoverde), atherosclerosis of aorta, HF, CKD stage 3 (Dr. Yina Calderon), CVA, ROSENDO-not using CPAP, DM2, diabetic peripheral neuropathy, fatty liver, HLP, morbid obesity. Remote smoking hx. Admitted to [facility] with progressively worsening SOB X2-3 days. Worsened with exertion, improved with rest. Associated with wt gain, orthopnea and BLE edema. Per Dr. Rima Morse PCP note- lasix was reduced from 80mg/day to 40mg/day on 7/9. Also increased gabapentin at that office visit. Started on IV lasix 40mg BID. Pulm following  Diuresed then developed LULU. Underwent RHC. Diuretics being held. Today has more energy. Getting out of bed with PT. Plan is for discharge to [facility] today. .   Sob is at baseline. On 4L NC. BNP rising, creat stable. Review of Systems:   General: Denies fever, chills  Skin: Denies skin changes, rash, itching, lesions. HEENT: Denies headache, dizziness, vision changes, nosebleeds, sore throat, nasal drainage  RESP: See HPI.   CARD: Denies palpitations,  murmur  GI:Denies nausea, vomiting, heartburn, loss of appetite, change in bowels  : Denies frequency, pain, incontinence, polyuria  VASC: Denies claudication, leg cramps, clots  MUSC/SKEL: Denies pain, stiffness, arthritis  PSYCH: Denies anxiety, depression, stress  NEURO: Denies numbness, tingling, weakness,change in mood or memory  HEME: Denies abn bruising, bleeding, anemia  ENDO: Denies intolerance to heat, cold, excessive thirst or hunger, hx thyroid disease    Objective:   BP (!) 163/66   Pulse 66   Temp 98.1 °F (36.7 °C) (Oral)   Resp 18   Ht 5' 10\" (1.778 m)   Wt 267 lb 6.7 oz (121.3 kg)   SpO2 94%   BMI 38.37 kg/m²           Intake/Output Summary (Last 24 hours) at 8/15/2021 1231  Last data filed at 8/15/2021 1031  Gross per 24 hour   Intake --   Output 200 ml   Net -200 ml     I/O since adm: incontinent    WEIGHT:Admit Weight: 271 lb 6.2 oz (123.1 kg)         Today  Weight: 267 lb 6.7 oz (121.3 kg)   DRY WEIGHT:  Wt Readings from Last 3 Encounters:   08/15/21 267 lb 6.7 oz (121.3 kg)   07/09/21 265 lb (120.2 kg)   01/14/21 260 lb 2.3 oz (118 kg)       Physical Exam:  GEN: Appears obese, no acute distress  SKIN: Pink, warm, dry. Nails without clubbing. HEENT: PERRLA. Normocephalic, atraumatic. Neck supple. No adenopathy. LUNG: AP diameter normal. Diminished bilateral bases. Few exp ronchi. Respiratory effort increased. HEART: S1S2 A/R. No JVD. No carotid bruit. No murmur, rub or gallop. ABD: Soft, nontender. +BS X 4 quads. No hepatomegaly. EXT: Radial and pedal pulses 2+ and symmetric. Without varicosities. 1+ pedal to knee edema. MUSCSKEL: Good ROM X4 extremities. No deformity. NEURO: A/O X3. Calm and cooperative. Telemetry: NSR HR 83. Medications:    furosemide  40 mg Oral BID    labetalol  100 mg Oral TID    gabapentin  100 mg Oral BID    hydrALAZINE  25 mg Oral 3 times per day    albuterol  2.5 mg Nebulization BID    heparin (porcine)  5,000 Units Subcutaneous 3 times per day    sodium chloride flush  5-40 mL Intravenous 2 times per day    cefTRIAXone (ROCEPHIN) IV  2,000 mg Intravenous Q24H    doxycycline hyclate  100 mg Oral 2 times per day    budesonide-formoterol  2 puff Inhalation BID    isosorbide mononitrate  30 mg Oral Daily    atorvastatin  40 mg Oral Daily    tiotropium  2 puff Inhalation Daily    [Held by provider] furosemide  40 mg Intravenous BID    glipiZIDE  10 mg Oral QAM AC    escitalopram  10 mg Oral Daily    doxazosin  8 mg Oral Daily      sodium chloride 25 mL/hr at 08/14/21 1803     albuterol, sodium chloride flush, sodium chloride, acetaminophen, ondansetron **OR** ondansetron, perflutren lipid microspheres, potassium chloride, magnesium sulfate, morphine, hydrALAZINE    Lab Data: I have reviewed all labs below today. CBC:   Recent Labs     08/14/21  0634   WBC 7.9   HGB 10.6*   HCT 31.3*   MCV 84.9   *     BMP:   Recent Labs     08/13/21  0835 08/14/21  0634 08/15/21  0905   * 137 138   K 3.6 3.6 4.0   CL 92* 95* 96*   CO2 30 31 27   PHOS  --  4.2  --    BUN 62* 61* 53*   CREATININE 1.8* 1.6* 1.7*     GLUCOSE:   Recent Labs     08/13/21  0835 08/14/21  0634 08/15/21  0905   GLUCOSE 143* 95 80     LIVER PROFILE:   Lab Results   Component Value Date    AST 10 08/07/2021    ALT 7 08/07/2021    LIPASE 47.0 10/30/2020    LABALBU 3.3 08/14/2021    BILIDIR <0.2 06/12/2020    BILITOT 0.4 08/07/2021    ALKPHOS 60 08/07/2021     PT/INR:   Lab Results   Component Value Date    PROTIME 12.3 06/13/2018    INR 1.08 06/13/2018     APTT:   Lab Results   Component Value Date    APTT 32.4 03/06/2018     Pro-BNP:    Lab Results   Component Value Date    PROBNP 3,938 08/15/2021    PROBNP 1,968 08/10/2021    PROBNP 3,065 08/07/2021     Parameters:   > 450 pg/mL under age 48  > 900 pg/mL ages 54-65  > 1800 pg/mL over age 76    ENZYMES:  Lab Results   Component Value Date    CKTOTAL 176 08/28/2016    TROPONINI 0.04 08/11/2021     FASTING LIPID PANEL:  Lab Results   Component Value Date    CHOL 126 07/09/2021    HDL 34 07/09/2021    HDL 52 12/02/2011    LDLCALC 66 07/09/2021    TRIG 131 07/09/2021       Diagnostics:    EKG: NSR LAFB, incomplete RBB    ECHO:    8/9/2021   Summary   Ejection fraction is visually estimated to be 55-60%. Grade I diastolic dysfunction with elevated LV filling pressures. Mildly dilated right ventricle. Right ventricular systolic function is normal   No significant valvular heart disease    5/15/2019 Normal left ventricle size and systolic function with an estimated ejection   fraction of 55-60%. No regional wall motion abnormalities are seen.  There is   mildly increased left ventricular wall thickness.   The right ventricle appears mildly dilated with normal systolic function.   The left and right atria appear moderately dilated.   Mild mitral regurgitation.   Trivial tricuspid regurgitation.   The ascending aorta is mildly dilated measuring 3.6 cm.     RHC 8/10/2021  HEMODYNAMICS:  Aortic pressure was 159/80/100  RA 10  RV 34/11  PA 40/16/28  PCWP 8   PA % 61  AO % 98  CO/CI 5.4 L/min 2.3 L/min/m2  SVR 1333 dynes . Sec/cm-5   dynes . Sec/cm-5  TPG 20  INTERVENTION  1. None   SUMMARY:   Low normal left sided filling pressures  Normal cardiac output  Mild elevation in right sided filling pressures  RECOMMENDATION:    - hold diuretics  - BP control, room for additional  afterload reduction       CXR 8/8/2021  Impression   Stable findings most consistent with CHF.  Interstitial edema with bilateral   pleural effusions and left basilar volume loss. Assessment/Plan:  1.) Acute on chronic diastolic heart failure: EF 55-60%. Initially treated for hypervolemia and pulm edema with lasix. Diuresed>LULU. Underwent RHC . Normal left side filling pressures, right side filling pressures slightly elevated. LULU from diuresis. Now held. SOB multifactorial with COPD, pulm HTN (LANI 28) and now being treated for PNA. NYHA Class III   Stage C  Diuretic:held - restart lasix   Beta Blocker: Not indicated for EF>35%  ACEi/ARB/ARNI: Not indicated for EF>35%  Aldosterone Antagonist:Not indicated for EF>35%  SGLT2 Inhibitor: Not indicated for EF>35%  2gm Na diet, daily weight, 64 oz fluid restriction  Avoid NSAIDS and other nephrotoxic meds  Cardiac Rehab: Not indicated for EF>35%  ICD:Not indicated for EF>35%  [facility]erral: OP  HF RN referral for education  Palliative Care consult for re admit <30 days or Stage IV: NA    2.) Hypertension: /87 on admission. Improved today. Continue antihypertensives- adding hydralzine for afterload reduction  Goal BP <130/80.   Non pharmacologic interventions include:   -weight loss  -heart healthy low sodium and low fat diet that consist of mostly fruits, vegetables and grains

## 2021-08-15 NOTE — CARE COORDINATION
DISCHARGE SUMMARY     DATE OF DISCHARGE: 8/15/21    DISCHARGE DESTINATION: SNF-[facility]    FACILITY    Level of Care: Skilled    Report Number: [phone]    Fax Number:  [phone]    Precert Obtained: yes    Hens Completed: yes    PASARR: N/A    Notified: N/A, RN, Family and Facility/Agency    Prescriptions Faxed:yes      TRANSPORTATION: [place] [id number] Name: [facility] Time: 2:00 PM    Phone Number: [phone]    NEW DME ORDERED: N/A    Electronically signed by Prashanth Jacobs on 8/15/2021 at 1:18 PM  #[phone]

## 2021-08-15 NOTE — PROGRESS NOTES
Discharge orders acknowledged by RN . Discharge teaching completed with pt and family. AVS reviewed and all questions answered. New prescriptions and current medication regimen reviewed and pt understands schedule. Follow up appointments also reviewed with pt and resources given for discharge. Pt was given written prescriptions to be filled and understands schedule. IV removed. Telemonitor removed and returned to [facility]. 60+ minutes of chf education completed with pt. All other education completed. Required core measures completed. Pt vitals WDL. Pt discharged with all belongings to [facility] with [facility] transport. Pt transported off of unit via wheelchair. No complications.        Electronically signed by Dante Diaz RN on 8/15/2021 at 2:30 PM

## 2021-08-15 NOTE — PROGRESS NOTES
NEPHROLOGY PROGRESS NOTE    CC: LULU on CKD  HPI  CKD followed by dr Betina Delgado  LULU in setting of overdiuresis    SUBJECTIVE  Feels fine today,  No CP,  No new c/o    SOC: no visitors      Scheduled Meds:   furosemide  40 mg Oral BID    labetalol  100 mg Oral TID    gabapentin  100 mg Oral BID    hydrALAZINE  25 mg Oral 3 times per day    albuterol  2.5 mg Nebulization BID    heparin (porcine)  5,000 Units Subcutaneous 3 times per day    sodium chloride flush  5-40 mL Intravenous 2 times per day    cefTRIAXone (ROCEPHIN) IV  2,000 mg Intravenous Q24H    doxycycline hyclate  100 mg Oral 2 times per day    budesonide-formoterol  2 puff Inhalation BID    isosorbide mononitrate  30 mg Oral Daily    atorvastatin  40 mg Oral Daily    tiotropium  2 puff Inhalation Daily    [Held by provider] furosemide  40 mg Intravenous BID    glipiZIDE  10 mg Oral QAM AC    escitalopram  10 mg Oral Daily    doxazosin  8 mg Oral Daily     Continuous Infusions:   sodium chloride 25 mL/hr at 08/14/21 1803     PRN Meds:albuterol, sodium chloride flush, sodium chloride, acetaminophen, ondansetron **OR** ondansetron, perflutren lipid microspheres, potassium chloride, magnesium sulfate, morphine, hydrALAZINE      Objective:      Physical Exam  Wt Readings from Last 3 Encounters:   08/15/21 267 lb 6.7 oz (121.3 kg)   07/09/21 265 lb (120.2 kg)   01/14/21 260 lb 2.3 oz (118 kg)     Temp Readings from Last 3 Encounters:   08/15/21 98.1 °F (36.7 °C) (Oral)   01/14/21 98.4 °F (36.9 °C) (Oral)   12/26/20 99 °F (37.2 °C) (Oral)     BP Readings from Last 3 Encounters:   08/15/21 (!) 163/66   07/09/21 (!) 146/84   01/14/21 (!) 165/77     Pulse Readings from Last 3 Encounters:   08/15/21 66   07/09/21 102   01/14/21 99   GENERAL: NAD   HEENT EXAMINATION:  Pupils are reactive to light. CHEST:  Decreased breath sounds on both bases. Few rhonchi. CVS:  S1, S2 audible. Regular rate and rhythm. ABDOMEN:  Distended. Positive bowel sounds. No guarding. No rigidity. EXTREMITIES:  1+ edema. CNS:  Nonfocal.           Lab Review   Lab Results   Component Value Date    WBC 7.9 08/14/2021    HGB 10.6 (L) 08/14/2021    HCT 31.3 (L) 08/14/2021    MCV 84.9 08/14/2021     (H) 08/14/2021     Lab Results   Component Value Date     08/15/2021    K 4.0 08/15/2021    K 4.0 11/30/2019    CL 96 08/15/2021    CO2 27 08/15/2021    BUN 53 08/15/2021    CREATININE 1.7 08/15/2021    GLUCOSE 80 08/15/2021    CALCIUM 9.2 08/15/2021            Patient Active Problem List    Diagnosis Date Noted    Hypoxemia     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     Stage 3 chronic kidney disease (HCC)     Atherosclerosis of aorta (HCC)     Type 2 diabetes mellitus with microalbuminuria or microproteinuria     DM type 2 with diabetic peripheral neuropathy ([address] 10/23/2014    Fatty liver     Mixed hyperlipidemia     CVA, old, ataxia     COPD, severe ([address]     Urinary frequency 07/23/2012    Vitamin D deficiency     Psoriasis     Acute diastolic (congestive) heart failure ([address] 08/08/2021    Acute on chronic respiratory failure with hypoxia (HCC)     Major depressive disorder, recurrent, mild 07/09/2021    Pulmonary nodule seen on imaging study 02/16/2021    Sinus tachycardia     Multifocal pneumonia     Chronic respiratory failure with hypoxia (HCC)     Atrial fibrillation (HCC)     Shortness of breath 05/14/2019    Bilateral pleural effusion     Diastolic dysfunction     Elevated brain natriuretic peptide (BNP) level     Essential hypertension     Chronic vasomotor rhinitis 06/21/2018    ROSENDO (obstructive sleep apnea) 06/20/2018    Chest pain 03/09/2018    Chronic respiratory failure with hypoxia and hypercapnia (HCC) 03/07/2018    Orthopnea 03/07/2018    Abnormal CT of the chest 03/07/2018    SOB (shortness of breath)        ASSESSMENT AND PLAN     1. Acute kidney injury, most likely secondary to hemodynamics [off of  diuretics now].   -improving, creatinine 1.6 yesterday, today at 1.7  2. CKD stage 4- followed by dr Malena Huntley in the office, will schedule appointment after dischargbe  3. Obstructive sleep apnea. 4.  Pulmonary hypertension. 5.  Anemia of chronic kidney disease.   6.  Diabetes mellitus type 2.     DISPO- awaiting for SNF, okay from our standpoint

## 2021-08-15 NOTE — PLAN OF CARE
Problem: Falls - Risk of:  Goal: Will remain free from falls  Description: Will remain free from falls  8/15/2021 0819 by Kevin Collins RN  Outcome: Ongoing     Problem: Falls - Risk of:  Goal: Absence of physical injury  Description: Absence of physical injury  8/15/2021 0819 by Kevin Collins RN  Outcome: Ongoing     Problem: Skin Integrity:  Goal: Will show no infection signs and symptoms  Description: Will show no infection signs and symptoms  8/15/2021 0819 by Kevin Collins RN  Outcome: Ongoing     Problem: Skin Integrity:  Goal: Absence of new skin breakdown  Description: Absence of new skin breakdown  8/15/2021 0819 by Kevin Collins RN  Outcome: Ongoing     Problem: OXYGENATION/RESPIRATORY FUNCTION  Goal: Patient will maintain patent airway  8/15/2021 0819 by Kevin Collins RN  Outcome: Ongoing

## 2021-08-15 NOTE — DISCHARGE SUMMARY
Hospital Medicine Discharge Summary    Patient ID: Deniz Maciel      Patient's PCP: Edwin Serrano MD    Admit Date: 8/7/2021     Discharge Date:   08/15/2021    Admitting Physician: Vijay Covington MD     Discharge Physician: Arthur Burdick MD     Discharge Diagnoses: Active Hospital Problems    Diagnosis     COPD, severe ([facility] [address] [J44.9]      Priority: Medium    Acute diastolic (congestive) heart failure (HCC) [I50.31]     Acute on chronic respiratory failure with hypoxia (HCC) [J96.21]     Abnormal CT of the chest [R93.89]        The patient was seen and examined on day of discharge and this discharge summary is in conjunction with any daily progress note from day of discharge. Hospital Course:     From HPI:\"An 62-year-old obese  male,  presenting to the hospital with chief complaints of one-week history of  subacute onset of gradually progressive, initially exertional increasing  shortness of breath, eventually shortness of breath at rest, along with  orthopnea and PND, without nausea, vomiting, fevers, or chills. At the  time of my exam, the patient notes that he is not feeling much better  after he got the Lasix but he did urinate significantly. \"      1.  Acute on chronic respiratory failure with hypoxia, appears multifactorial, felt to have diastolic heart failure, diuresed well, pulmonary also following, and started on antibiotics for abnormal lung imaging.   Clinically continued to improve, will discharge to skilled nursing facility today, patient will need to follow-up with cardiology and pulmonary as outpatient  2.  Acute on chronic diastolic heart failure, received Lasix, cardiology following.  Lasix on hold as creatinine increased. Currently creatinine relatively stable, restarted on p.o.  Lasix follow-up with cardiology as outpatient  3.  COPD, continue home inhalers, pulmonary has been consulted, procalcitonin mildly elevated, added antibiotics, ceftriaxone and doxycycline as inpatient, 2 more days of doxycycline and Omnicef as outpatient  4.  Acute metabolic encephalopathy, multifactorial, improved. 5.  Diabetes mellitus, sliding scale. 6.  Essential hypertension, p.o. medications. Uncontrolled this morning, will increase labetalol to 3 times daily  7.  LULU, IV diuretics on hold, continue to improve, nephrology consulted, expecting to restart diuretics in the next 24 hours  8. Chronic pain, continue home regimen. Gabapentin at home dose, as kidney function improved, follow-up as outpatient to adjust doses if deemed necessary  9. Pyuria, suspecting present on admission, patient already on antibiotics          Physical Exam Performed:     BP (!) 163/66   Pulse 66   Temp 98.1 °F (36.7 °C) (Oral)   Resp 18   Ht 5' 10\" (1.778 m)   Wt 267 lb 6.7 oz (121.3 kg)   SpO2 94%   BMI 38.37 kg/m²       General appearance:  No apparent distress  HEENT:  Normal cephalic  Neck: Supple  Respiratory:  Normal respiratory effort. Clear to auscultation, bilaterally without Rales/Wheezes/Rhonchi. Cardiovascular:  Regular rate and rhythm with normal S1/S2 without murmurs, rubs or gallops. Abdomen: Soft, non-tender, non-distended with normal bowel sounds. Musculoskeletal:  No clubbing, cyanosis or edema bilaterally. Full range of motion without deformity. Skin: Skin color, texture, turgor normal.  No rashes or lesions. Neurologic:  NO FOCAL WEAKNESS   Psychiatric:  Alert and oriented  Capillary Refill: Brisk,< 3 seconds   Peripheral Pulses: +2 palpable, equal bilaterally       Labs:  For convenience and continuity at follow-up the following most recent labs are provided:      CBC:    Lab Results   Component Value Date    WBC 7.9 08/14/2021    HGB 10.6 08/14/2021    HCT 31.3 08/14/2021     08/14/2021       Renal:    Lab Results   Component Value Date     08/15/2021    K 4.0 08/15/2021    K 4.0 11/30/2019    CL 96 08/15/2021    CO2 27 08/15/2021    BUN 53 08/15/2021 CREATININE 1.7 08/15/2021    CALCIUM 9.2 08/15/2021    PHOS 4.2 08/14/2021         Significant Diagnostic Studies    Radiology:   CT CHEST WO CONTRAST   Final Result   Small bilateral pleural effusions, associated with dependent atelectasis. Superimposed pneumonia is less likely. Subsegmental atelectasis in the right middle lobe. Emphysema and mild peripheral fibrosis. Cardiomegaly and trace pericardial effusion. XR CHEST (2 VW)   Final Result   Stable findings most consistent with CHF. Interstitial edema with bilateral   pleural effusions and left basilar volume loss. XR SHOULDER RIGHT (MIN 2 VIEWS)   Final Result   Degenerative change in the right shoulder with no acute abnormality. XR CHEST PORTABLE   Final Result   Cardiomegaly with diffuse interstitial changes that suggest underlying   pulmonary edema. A chronic history of interstitial lung disease in the   setting of COPD cannot be excluded.                 Consults:     IP CONSULT TO HOSPITALIST  IP CONSULT TO HEART FAILURE NURSE/COORDINATOR  IP CONSULT TO DIETITIAN  IP CONSULT TO CARDIOLOGY  IP CONSULT TO DIETITIAN  IP CONSULT TO SOCIAL WORK  IP CONSULT TO PODIATRY  IP CONSULT TO PULMONOLOGY  IP CONSULT TO HEART FAILURE NURSE/COORDINATOR  IP CONSULT TO PALLIATIVE CARE  IP CONSULT TO NEPHROLOGY    Disposition:  SNF     Condition at Discharge: Stable    Discharge Instructions/Follow-up:  PCP, PULMO, CARDIO     Code Status:  DNR-CCA     Activity: activity as tolerated    Diet: cardiac diet      Discharge Medications:     Current Discharge Medication List           Details   hydrALAZINE (APRESOLINE) 25 MG tablet Take 1 tablet by mouth every 8 hours  Qty: 90 tablet, Refills: 0      doxycycline hyclate (VIBRA-TABS) 100 MG tablet Take 1 tablet by mouth every 12 hours for 2 days  Qty: 4 tablet, Refills: 0      cefdinir (OMNICEF) 300 MG capsule Take 1 capsule by mouth 2 times daily for 2 days  Qty: 4 capsule, Refills: 0 Details   labetalol (NORMODYNE) 100 MG tablet Take 1 tablet by mouth 3 times daily  Qty: 60 tablet, Refills: 0      furosemide (LASIX) 40 MG tablet Take 1 tablet by mouth 2 times daily  Qty: 60 tablet, Refills: 0              Details   doxazosin (CARDURA) 8 MG tablet TAKE 1 TABLET BY MOUTH EVERY NIGHT AT BEDTIME FOR BLOOD PRESSURE  Qty: 90 tablet, Refills: 1      escitalopram (LEXAPRO) 10 MG tablet Take 1 tablet by mouth daily  Qty: 90 tablet, Refills: 3      simvastatin (ZOCOR) 40 MG tablet TAKE 1 TABLET BY MOUTH EVERY EVENING  Qty: 90 tablet, Refills: 3      glipiZIDE (GLUCOTROL) 10 MG tablet TAKE 1 TABLET BY MOUTH DAILY WITH BREAKFAST  Qty: 90 tablet, Refills: 3      gabapentin (NEURONTIN) 300 MG capsule TAKE 1 CAPSULE BY MOUTH THREE TIMES DAILY  Qty: 270 capsule, Refills: 1      isosorbide mononitrate (IMDUR) 30 MG extended release tablet TAKE 1 TABLET BY MOUTH TWICE DAILY FOR BLOOD PRESSURE  Qty: 180 tablet, Refills: 3      Fluticasone furoate-vilanterol (BREO ELLIPTA) 200-25 MCG/INH AEPB inhaler Inhale 1 puff into the lungs daily  Qty: 1 each, Refills: 11    Associated Diagnoses: Pulmonary nodule seen on imaging study      tiotropium (SPIRIVA HANDIHALER) 18 MCG inhalation capsule INHALE THE CONTENTS OF 1 CAPSULE VIA INHALATION DEVICE DAILY FOR BREATHING  Qty: 90 capsule, Refills: 3    Associated Diagnoses: COPD, moderate (HCC)      VENTOLIN  (90 Base) MCG/ACT inhaler INHALE 2 PUFFS INTO THE LUNGS EVERY 6 HOURS AS NEEDED FOR WHEEZING  Qty: 1 Inhaler, Refills: 5    Associated Diagnoses: COPD, moderate (HCC)      albuterol (PROVENTIL) (2.5 MG/3ML) 0.083% nebulizer solution Take 3 mLs by nebulization every 6 hours as needed for Wheezing  Qty: 120 vial, Refills: 3      nitroGLYCERIN (NITROSTAT) 0.4 MG SL tablet Place 1 tablet under the tongue every 5 minutes as needed for Chest pain (chest pain)  Qty: 25 tablet, Refills: 3      Cholecalciferol (VITAMIN D-3) 5000 UNITS TABS Take 5,000 Units by mouth daily       aspirin 81 MG EC tablet Take 81 mg by mouth nightly       Nebulizers (VIOS LC SPRINT) MISC USE UTD  Refills: 0      Compression Stockings MISC by Does not apply route 15-20 mmHg knee high  Qty: 1 each, Refills: 0    Associated Diagnoses: Bilateral leg edema      Respiratory Therapy Supplies (NEBULIZER/TUBING/MOUTHPIECE) KIT 1 kit by Does not apply route daily as needed (sob)  Qty: 1 kit, Refills: 0      Spacer/Aero-Holding Chambers KOSTA 1 Device by Does not apply route daily as needed (with Albuterol inhaler)  Qty: 1 Device, Refills: 0    Associated Diagnoses: COPD, severe ([address]             Time Spent on discharge is more than 1 hour in the examination, evaluation, counseling and review of medications and discharge plan. Signed:    Leeanna Billy MD   8/15/2021      Thank you Jose E Fraser MD for the opportunity to be involved in this patient's care. If you have any questions or concerns please feel free to contact me at [phone].

## 2021-08-26 NOTE — PATIENT INSTRUCTIONS
Instructions:   1. Medications: Continue current medications  2. Labs: bmp, bnp weekly X4 weeks  3. Follow up: 6 weeks  4. Daily weight: Call for increase 3 lbs/day or 5 lbs/week. 5. 2 gm sodium diet:  6. Fluid Restriction: 64 oz.

## 2021-08-26 NOTE — PROGRESS NOTES
The [facility], [address] [place] [address] [address] [address]  [phone]    PrimaryCare Doctor:  Parth Tom MD  Primary Cardiologist:     Chief Complaint   Patient presents with    Check-Up     no cc         History of Present Illness:  Shelia Her is a 81 y.o. male with PMH HTN, COPD/chronic hypoxemia (Dr. Hair Roy), atherosclerosis of aorta, HF, CKD stage 3 (Dr. Andry Gray), CVA, ROSENDO-not using CPAP, DM2, diabetic peripheral neuropathy, fatty liver, HLP, morbid obesity. Remote smoking hx.     Admitted to [facility] with progressively worsening SOB X2-3 days. Worsened with exertion, improved with rest. Associated with wt gain, orthopnea and BLE edema. Per Dr. Estefany Acosta PCP note- lasix was reduced from 80mg/day to 40mg/day on 7/9. Also increased gabapentin at that office visit. Started on IV lasix 40mg BID. Pulm following  Diuresed then developed LULU. Underwent RHC. Euvolemic. Diuretics held.       Patient presents to [facility] cardiology for follow up for heart failure. He has been at [facility]. He is getting therapy. He states he has been tolerating well. He is getting stronger and anticipating going home. His SOB is at baseline. He remains on 4L NC. Denies CP, LH, dizziness, syncope, palpitations. Review of Systems:   General: Denies fever, chills  Skin: Denies skin changes, rash, itching, lesions. HEENT: Denies headache, dizziness, vision changes, nosebleeds, sore throat, nasal drainage  RESP: See HPI.   CARD: Denies palpitations,  murmur  GI:Denies nausea, vomiting, heartburn, loss of appetite, change in bowels  : Denies frequency, pain, incontinence, polyuria  VASC: Denies claudication, leg cramps, clots  MUSC/SKEL: Denies pain, stiffness, arthritis  PSYCH: Denies anxiety, depression, stress  NEURO: Denies numbness, tingling, weakness,change in mood or memory  HEME: Denies abn bruising, bleeding, anemia  ENDO: Denies intolerance to heat, cold, excessive thirst or hunger, hx thyroid disease     /68   Pulse 78   Ht 5' 10\" (1.778 m)   Wt 273 lb (123.8 kg)   SpO2 94%   BMI 39.17 kg/m²   Wt Readings from Last 3 Encounters:   08/26/21 273 lb (123.8 kg)   08/15/21 267 lb 6.7 oz (121.3 kg)   07/09/21 265 lb (120.2 kg)       Physical Exam:  GEN: Appears obese, no acute distress  SKIN: Pink, warm, dry. Nails without clubbing. HEENT: PERRLA. Normocephalic, atraumatic. Neck supple. No adenopathy. LUNG: AP diameter normal. Diminished bilateral bases. Few exp ronchi. Respiratory effort increased. HEART: S1S2 A/R. No JVD. No carotid bruit. No murmur, rub or gallop. ABD: Soft, nontender. +BS X 4 quads. No hepatomegaly. EXT: Radial and pedal pulses 2+ and symmetric. Without varicosities. 1+ pedal to knee edema. MUSCSKEL: Good ROM X4 extremities. No deformity. NEURO: A/O X3. Calm and cooperative. 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 Surgical History:   has a past surgical history that includes TURP (8/14/12) and Cataract removal (Right, 7/3/13). Social History:   reports that he quit smoking about 14 years ago. His smoking use included cigarettes. He started smoking about 70 years ago. He has a 100.00 pack-year smoking history. He has never used smokeless tobacco. He reports current alcohol use of about 7.0 standard drinks of alcohol per week.  He reports that he does not use drugs. Family History:   History reviewed. No pertinent family history. HomeMedications:  Prior to Admission medications    Medication Sig Start Date End Date Taking? Authorizing Provider   acetaminophen (TYLENOL) 325 MG tablet Take 650 mg by mouth every 4 hours as needed for Pain   Yes Historical Provider, MD   hydrALAZINE (APRESOLINE) 25 MG tablet Take 1 tablet by mouth every 8 hours 8/15/21  Yes Aditya Theodore MD   labetalol (NORMODYNE) 100 MG tablet Take 1 tablet by mouth 3 times daily 8/15/21  Yes Gonzalo Hanley MD   furosemide (LASIX) 40 MG tablet Take 1 tablet by mouth 2 times daily 8/15/21  Yes Gonzalo Tolbert MD   doxazosin (CARDURA) 8 MG tablet TAKE 1 TABLET BY MOUTH EVERY NIGHT AT BEDTIME FOR BLOOD PRESSURE 7/9/21  Yes Elissa Vaughn MD   escitalopram (LEXAPRO) 10 MG tablet Take 1 tablet by mouth daily 7/9/21  Yes Elissa Vaughn MD   simvastatin (ZOCOR) 40 MG tablet TAKE 1 TABLET BY MOUTH EVERY EVENING 7/9/21  Yes Elissa Vaughn MD   glipiZIDE (GLUCOTROL) 10 MG tablet TAKE 1 TABLET BY MOUTH DAILY WITH BREAKFAST 7/9/21  Yes Elissa Vaughn MD   gabapentin (NEURONTIN) 300 MG capsule TAKE 1 CAPSULE BY MOUTH THREE TIMES DAILY  Patient taking differently: Take 300 mg by mouth See Admin Instructions.  300 mg QAM and 600 mg QPM 5/6/21 8/26/21 Yes Elissa Vaughn MD   isosorbide mononitrate (IMDUR) 30 MG extended release tablet TAKE 1 TABLET BY MOUTH TWICE DAILY FOR BLOOD PRESSURE 3/18/21  Yes Elissa Vaughn MD   Fluticasone furoate-vilanterol (BREO ELLIPTA) 200-25 MCG/INH AEPB inhaler Inhale 1 puff into the lungs daily 2/16/21  Yes Jc Juárez MD   tiotropium (SPIRIVA HANDIHALER) 18 MCG inhalation capsule INHALE THE CONTENTS OF 1 CAPSULE VIA INHALATION DEVICE DAILY FOR BREATHING 6/24/19  Yes Jc Juárez MD   VENTOLIN  (53 Base) MCG/ACT inhaler INHALE 2 PUFFS INTO THE LUNGS EVERY 6 HOURS AS NEEDED FOR WHEEZING 3/26/19  Yes Jc Juárez MD   Nebulizers (VIOS LC SPRINT) MISC USE UTD 11/21/18  Yes Historical Provider, MD   Respiratory Therapy Supplies (NEBULIZER/TUBING/MOUTHPIECE) KIT 1 kit by Does not apply route daily as needed (sob) 11/19/18  Yes Kei Potter MD   albuterol (PROVENTIL) (2.5 MG/3ML) 0.083% nebulizer solution Take 3 mLs by nebulization every 6 hours as needed for Wheezing 11/19/18  Yes Kei Potter MD   nitroGLYCERIN (NITROSTAT) 0.4 MG SL tablet Place 1 tablet under the tongue every 5 minutes as needed for Chest pain (chest pain) 6/22/18  Yes Jose E Fraser MD   Spacer/Aero-Holding Read Banana 1 Device by Does not apply route daily as needed (with Albuterol inhaler) 9/19/16  Yes SANAZ Ambrose CNP   Cholecalciferol (VITAMIN D-3) 5000 UNITS TABS Take 5,000 Units by mouth daily    Yes Historical Provider, MD   aspirin 81 MG EC tablet Take 81 mg by mouth daily    Yes Historical Provider, MD   Compression Stockings MISC by Does not apply route 15-20 mmHg knee high  Patient not taking: Reported on 8/26/2021 11/27/18   SANAZ Mccullough CNP        Allergies:  Patient has no known allergies. LABS: Results reviewed with patient today.     CBC:   Lab Results   Component Value Date    WBC 7.9 08/14/2021    WBC 9.3 08/10/2021    WBC 7.5 08/07/2021    RBC 3.69 08/14/2021    RBC 3.97 08/10/2021    RBC 4.19 08/07/2021    HGB 10.6 08/14/2021    HGB 11.5 08/10/2021    HGB 12.1 08/07/2021    HCT 31.3 08/14/2021    HCT 34.7 08/10/2021    HCT 36.2 08/07/2021    MCV 84.9 08/14/2021    MCV 87.4 08/10/2021    MCV 86.2 08/07/2021    RDW 15.8 08/14/2021    RDW 15.9 08/10/2021    RDW 16.0 08/07/2021     08/14/2021     08/10/2021     08/07/2021     BMP:  Lab Results   Component Value Date     08/15/2021     08/14/2021     08/13/2021    K 4.0 08/15/2021    K 3.6 08/14/2021    K 3.6 08/13/2021    K 4.0 11/30/2019    K 4.6 11/29/2019    K 4.1 05/14/2019    CL 96 08/15/2021    CL 95 08/14/2021    CL 92 08/13/2021    CO2 27 08/15/2021    CO2 31 08/14/2021    CO2 30 08/13/2021    PHOS 4.2 08/14/2021    PHOS 3.0 09/25/2020    PHOS 2.8 06/12/2020    BUN 53 08/15/2021    BUN 61 08/14/2021    BUN 62 08/13/2021    CREATININE 1.7 08/15/2021    CREATININE 1.6 08/14/2021    CREATININE 1.8 08/13/2021     BNP:   Lab Results   Component Value Date    PROBNP 3,938 08/15/2021    PROBNP 1,968 08/10/2021    PROBNP 3,065 08/07/2021       Parameters:   > 450 pg/mL under age 48  > 900 pg/mL ages 54-65  > 1800 pg/mL over age 76    Iron Studies:    Lab Results   Component Value Date    TIBC 229 08/14/2021    TIBC 293 07/09/2021    FERRITIN 234.0 08/14/2021    FERRITIN 114.8 07/09/2021     GLUCOSE: No results for input(s): GLUCOSE in the last 72 hours. LIVER PROFILE:   Lab Results   Component Value Date    AST 10 08/07/2021    ALT 7 08/07/2021    LIPASE 47.0 10/30/2020    LABALBU 3.3 08/14/2021    BILIDIR <0.2 06/12/2020    BILITOT 0.4 08/07/2021    ALKPHOS 60 08/07/2021     PT/INR:   Lab Results   Component Value Date    PROTIME 12.3 06/13/2018    INR 1.08 06/13/2018     Cardiac Enzymes:  Lab Results   Component Value Date    CKTOTAL 176 08/28/2016    TROPONINI 0.04 08/11/2021     FASTING LIPID PANEL:  Lab Results   Component Value Date    CHOL 126 07/09/2021    HDL 34 07/09/2021    HDL 52 12/02/2011    LDLCALC 66 07/09/2021    TRIG 131 07/09/2021       Cardiac Imaging: Reports reviewed with patient today. EKG:    ECHO:   8/9/2021   Summary   Ejection fraction is visually estimated to be 55-60%.  Grade I diastolic dysfunction with elevated LV filling pressures.   Mildly dilated right ventricle.   Right ventricular systolic function is normal   No significant valvular heart disease     5/15/2019 Normal left ventricle size and systolic function with an estimated ejection   fraction of 55-60%. No regional wall motion abnormalities are seen.  There is   mildly increased left ventricular wall thickness.   The right ventricle appears mildly dilated with normal systolic function.   The left and right atria appear moderately dilated.   Mild mitral regurgitation.   Trivial tricuspid regurgitation.   The ascending aorta is mildly dilated measuring 3.6 cm.     RHC 8/10/2021  HEMODYNAMICS:  Aortic pressure was 159/80/100  RA 10  RV 34/11  PA 40/16/28  PCWP 8   PA % 61  AO % 98  CO/CI 5.4 L/min 2.3 L/min/m2  PBT 1239 QJRGA . Sec/cm-5  CHQ 343 LHSQJ . Sec/cm-5  TPG 20  INTERVENTION  1. None   SUMMARY:   Low normal left sided filling pressures  Normal cardiac output  Mild elevation in right sided filling pressures  RECOMMENDATION:    - hold diuretics  - BP control, room for additional  afterload reduction         CXR 8/8/2021  Impression   Stable findings most consistent with CHF.  Interstitial edema with bilateral   pleural effusions and left basilar volume loss.         Assessment/Plan:  1.) Acute on chronic diastolic heart failure: EF 55-60%. Initially treated for hypervolemia and pulm edema with lasix. Diuresed>LULU. Underwent RHC . Normal left side filling pressures, right side filling pressures slightly elevated. LULU from diuresis. Held lasix. SOB multifactorial with COPD, pulm HTN (LANI 28) and now being treated for PNA. Today appears stable, will continue GDMT, monitor labs, wt, S/S. NYHA Class III              Stage C  Diuretic: continue lasix  Beta Blocker: Not indicated for EF>35%  ACEi/ARB/ARNI: Not indicated for EF>35%  Aldosterone Antagonist:Not indicated for EF>35%  SGLT2 Inhibitor: Not indicated for EF>35%  2gm Na diet, daily weight, 64 oz fluid restriction  Avoid NSAIDS and other nephrotoxic meds  Cardiac Rehab: Not indicated for EF>35%  ICD:Not indicated for EF>35%  [facility] Referral: OP  HF RN referral for education  Palliative Care consult for re admit <30 days or Stage IV: NA     2.) Hypertension:   Goal BP <130/80. Met  Non pharmacologic interventions include:   -weight loss  -heart healthy low sodium and low fat diet that consist of mostly fruits, vegetables and grains (Dash diet)  -limited amount of alcohol (no more than 1 drink/day for women, 2 drinks/day for men)  -regular physical activity  -no smoking  -stress reduction     3.) Pulm HTN: Most likely from chronic lung disease. LANI 28 with PCWP 8.    -Anahie, pulm support, O2         Instructions:   1. Medications: Continue current medications  2. Labs: bmp, bnp weekly X4 weeks  3. Follow up: 6 weeks  4. Daily weight: Call for increase 3 lbs/day or 5 lbs/week. 5. 2 gm sodium diet:  6. Fluid Restriction: 64 oz.       I appreciate the opportunity of cooperating in the care of this individual.    SANAZ Santos - CNP, CNP, 8/26/2021,10:49 AM

## 2021-09-02 NOTE — TELEPHONE ENCOUNTER
ANTOINETTE  ----- Message from Raimundo Pineda sent at 9/1/2021  4:45 PM EDT -----  Subject: Hospital Follow Up    QUESTIONS  What hospital was the Patient Discharged from? [facility]  Date of Discharge? 2021-09-01  Discharge Location? Nursing Facility  Reason for hospitalization as patient stated? Patient was seen for acute   respiratory issues and will be d/c on 9/3/2021. Spoke with [facility] who   is  for this patient. Please return call to her once patient   has been scheduled. Thank you   What question does the patient have, if applicable?   ---------------------------------------------------------------------------  --------------  CALL BACK INFO  What is the best way for the office to contact you? OK to leave message on   voicemail  Preferred Call Back Phone Number? [phone]  ---------------------------------------------------------------------------  --------------  SCRIPT ANSWERS  Relationship to Patient? Third Party  Representative Name? Joselin deepika augustinbronson from [employer]   (Patient requests to see provider urgently. )? No  (Has the patient been discharged from the hospital within 2 business days   AND does not have a Telephone Encounter - Follow Up From Hospital   documented in [address]?)? Yes  Do you have any questions for your primary care provider that need to be   answered prior to your appointment? (Use RN Triage if question pertains to   anything on the red flag list)? No  (Patient needs follow up visit after hospital discharge) Book first   available appointment within 7 days OF DISCHARGE, if no appt, proceed to   book the next available time slot within 14 days OF DISCHARGE AND Send   Message to Provider. [address] Follow Up appointment cannot be booked   beyond 14 Days and should result in a Message to Provider. ?  Yes

## 2021-09-02 NOTE — TELEPHONE ENCOUNTER
SEE PHONE NOTE FROM TODAY  COMPLETED  Future Appointments   Date Time Provider Heriberto Franco   9/10/2021 11:50 AM SANAZ Viera CNP [facility]   10/11/2021 10:40 AM SANAZ Hamlin CNP [facility]   10/12/2021  1:20 PM Lynsey Loredo MD [facility]   10/28/2021  2:00 PM Venancio Cho MD [facility]

## 2021-09-07 NOTE — TELEPHONE ENCOUNTER
[address] Jimmy @  [phone]      Needs Verbal OT    Fall yesterday - no injuries    Oxygen saturation is low -  Rest - 90 and 93 % on 3 liters    With activity it is dropping to 82% and taking 4 to 5 mins to recover

## 2021-09-07 NOTE — TELEPHONE ENCOUNTER
Called and spoke to Zana Umana her Dr Michelle Leggett recommendation and gave the number to schedule the 6MWT

## 2021-09-07 NOTE — TELEPHONE ENCOUNTER
Patricio Meeks, occupational therapist  with [facility] calls. Nonda Goodell walked about 50 feet this morning, on 3L continuous oxgen and O2 dropped down to 82. Took between 4-5 minutes to recover back up to 88-93. At rest O2 continued to fluctuate between 88-93. Per wife, Nonda Goodell c/o of dizziness yesterday, Monday. \"He did fall yesterday. Got caught up in his oxygen tubing, \" no injuries. \"    Vitals today per Patricio Meeks:  Temp: 98.2  Resp: 16  Pulse: 78. Activities 85  B/P: 114/59  FYI:  Home Care nurse is due for a visit tomorrow. Patricio Meeks is wanting to know if patient might benefit from increase of 02? Please advise on low b/p as well.  114/59

## 2021-09-09 PROBLEM — J44.1 COPD EXACERBATION (HCC): Status: ACTIVE | Noted: 2021-01-01

## 2021-09-09 PROBLEM — R94.31 ABNORMAL EKG: Status: ACTIVE | Noted: 2021-01-01

## 2021-09-09 PROBLEM — I25.10 CAD (CORONARY ARTERY DISEASE): Status: ACTIVE | Noted: 2021-01-01

## 2021-09-09 NOTE — ED NOTES
Patient presents to ED via EMS from home for complaints of increased sob past 2 days. Pt wears 3 L NC at home but had to increase NC to 5 L NC today. Pt 88% on 3 L NC, NC increased to 5 L, SpO2 90-92 %. Per EMS, duoneb given en route. Pt is alert and oriented x4. Respirations even and easy but does have audible wheezing. No signs of acute distress noted. Telemetry monitoring placed on pt. Kacy MARROQUIN made aware.       Twila Wang, RN  09/09/21 [facility] Kristal MONTERO  09/09/21 136

## 2021-09-09 NOTE — ED PROVIDER NOTES
[facility] Emergency Department    CHIEF COMPLAINT  Shortness of Breath (recently discharged from nursing home/ wears 3 L NC but had to increase to 5 L NC due to sob for past 2 days)      SHARED SERVICE VISIT  I have seen and evaluated this patient with my supervising physician, Dr. Jose Alejandro Mejia. HISTORY OF PRESENT ILLNESS  Simba Abraham is a 81 y.o. 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 Denies chest pain, nausea, vomiting, presyncope, fever, chills, sweats, changes militates, hemoptysis, leg/calf pain or swelling, abdominal pain, diarrhea. Patient was discharged from [facility] after he was therefore rehab secondary to deconditioning for 2.5 weeks. He wears 3 L of O2 per nasal cannula at baseline but today has required 5 L per nasal cannula to maintain his oxygen saturation in the 90-92% range as he was 88% on his baseline 3 L. No other complaints, modifying factors or associated symptoms. Nursing notes reviewed.    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 No pertinent family history. Social History     Socioeconomic History    Marital status:      Spouse name: Not on file    Number of children: 11    Years of education: Not on file    Highest education level: Not on file   Occupational History    Occupation: , navarro     Employer:  [employer] [address]: retired    Tobacco Use    Smoking status: Former Smoker     Packs/day: 2.00     Years: 50.00     Pack years: 100.00     Types: Cigarettes     Start date: 1951     Quit date: 2006     Years since quittin.7    Smokeless tobacco: Never Used    Tobacco comment: don't resume cigs   Vaping Use    Vaping Use: Never used   Substance and Sexual Activity    Alcohol use:  Yes     Alcohol/week: 7.0 standard drinks     Types: 7 Standard drinks or equivalent per week     Comment: occas    Drug use: No    Sexual activity: Never     Partners: Female   Other Topics Concern    Not on file   Social History Narrative    Not on file     Social Determinants of Health     Financial Resource Strain: Unknown    Difficulty of Paying Living Expenses: Patient refused   Food Insecurity: Unknown    Worried About Running Out of Food in the Last Year: Patient refused    [address] in the Last Year: Patient refused   Transportation Needs: Unknown    Lack of Transportation (Medical): Patient refused    Lack of Transportation (Non-Medical): Patient refused   Physical Activity:     Days of Exercise per Week:     Minutes of Exercise per Session:    Stress:     Feeling of Stress :    Social Connections:     Frequency of Communication with Friends and Family:     Frequency of Social Gatherings with Friends and Family:     Stockings MISC by Does not apply route 15-20 mmHg knee high (Patient not taking: Reported on 8/26/2021) 1 each 0    Respiratory Therapy Supplies (NEBULIZER/TUBING/MOUTHPIECE) KIT 1 kit by Does not apply route daily as needed (sob) 1 kit 0    albuterol (PROVENTIL) (2.5 MG/3ML) 0.083% nebulizer solution Take 3 mLs by nebulization every 6 hours as needed for Wheezing 120 vial 3    nitroGLYCERIN (NITROSTAT) 0.4 MG SL tablet Place 1 tablet under the tongue every 5 minutes as needed for Chest pain (chest pain) 25 tablet 3    Spacer/Aero-Holding Chambers KOSTA 1 Device by Does not apply route daily as needed (with Albuterol inhaler) 1 Device 0    Cholecalciferol (VITAMIN D-3) 5000 UNITS TABS Take 5,000 Units by mouth daily       aspirin 81 MG EC tablet Take 81 mg by mouth daily        No Known Allergies    REVIEW OF SYSTEMS  10 systems reviewed, pertinent positives per HPI otherwise noted to be negative    PHYSICAL EXAM  BP (!) 142/47   Pulse 82   Temp 98.5 °F (36.9 °C) (Oral)   Resp 16   Ht 5' 9\" (1.753 m)   Wt 268 lb 15.4 oz (122 kg)   SpO2 97%   BMI 39.72 kg/m²   GENERAL APPEARANCE: Awake and alert. Cooperative.  + Distress. Non-- toxic in appearance. HEAD: Normocephalic. Atraumatic. EYES: PERRL. EOM's grossly intact. ENT: Mucous membranes are moist.   NECK: Supple. HEART: RRR. No murmurs, rubs, or gallops.  + S1-S2.  LUNGS: Respirations unlabored. + diffuse crackles and wheezes. Poor air exchange. Not speaking comfortably in full sentences. ABDOMEN: Soft. Non-distended. Non-tender. No guarding or rebound.  + Bowel sounds x 4 quadrants. No masses. No organomegaly. EXTREMITIES: No peripheral edema. Moves all extremities equally. All extremities neurovascularly intact. SKIN: Warm and dry. No acute rashes. NEUROLOGICAL: Alert and oriented. CN's 2-12 intact. No gross facial drooping. Strength 5/5, sensation intact. PSYCHIATRIC: Normal mood and affect.     RADIOLOGY  CT CHEST WO CONTRAST    Result Date: 8/10/2021  EXAMINATION: CT OF THE CHEST WITHOUT CONTRAST 8/10/2021 5:59 pm TECHNIQUE: CT of the chest was performed without the administration of intravenous contrast. Multiplanar reformatted images are provided for review. Dose modulation, iterative reconstruction, and/or weight based adjustment of the mA/kV was utilized to reduce the radiation dose to as low as reasonably achievable. COMPARISON: None. HISTORY: ORDERING SYSTEM PROVIDED HISTORY: LLL effusion and/or infiltrate TECHNOLOGIST PROVIDED HISTORY: Reason for exam:->LLL effusion and/or infiltrate Reason for Exam: LLL effusion and/or infiltrate Acuity: Acute Type of Exam: Initial FINDINGS: Mediastinum: There is mild cardiomegaly, with a trace pericardial effusion. Old granulomatous disease is noted. Lungs/pleura: There are small, dependently layered bilateral pleural effusions. Left basilar pleural calcifications are present. There is dependent atelectasis in the lower lobes. Subsegmental atelectasis is noted in the right middle lobe. Emphysema and mild peripheral fibrosis is noted. Upper Abdomen: The adrenal glands are unremarkable. Cholecystectomy clips are present. Soft Tissues/Bones: No acute osseous abnormality is appreciated. There are old left-sided rib fractures. The chest wall is unremarkable. Small bilateral pleural effusions, associated with dependent atelectasis. Superimposed pneumonia is less likely. Subsegmental atelectasis in the right middle lobe. Emphysema and mild peripheral fibrosis. Cardiomegaly and trace pericardial effusion.      XR CHEST PORTABLE    Result Date: 9/9/2021  EXAMINATION: ONE XRAY VIEW OF THE CHEST 9/9/2021 2:48 pm COMPARISON: Chest radiograph 08/08/2021 HISTORY: ORDERING SYSTEM PROVIDED HISTORY: SOB TECHNOLOGIST PROVIDED HISTORY: Reason for exam:->SOB Reason for Exam: SOB Acuity: Acute Type of Exam: Initial FINDINGS: There are bilateral pulmonary interstitial opacities extending to the periphery with small curvilinear blunting of the left costophrenic angle. There is trace blunting of the right costophrenic angle. Cardiomediastinal silhouette is prominent but unchanged with calcification in the aortic arch. Pulmonary interstitial edema with left greater than right bilateral pleural fluid. ED COURSE  Patient is not requiring occasional emergency department. Triage vitals stable but with systolic hypertension at 348/74 mmHg. A cardiac/respiratory workup was initiated including troponin, BMP, BNP, CBC, VBG, EKG, and CXR. Consulted Dr. Richmond-cardiology. Discussed patient's HPI, ED work-up, results, and treatment. Dr. Melody Chavez recommends initiating heparin therapy, serial troponins, n.p.o. after midnight. Orders for heparin and n.p.o. after midnight replaced here in the emergency department and high [employer] Dr. Kanu Davidson regarding serial troponins specifically but also mentioned starting heparin and n.p.o. order.         Labs Ordered:  I have reviewed and interpreted all of the currently available lab results from this visit:  Results for orders placed or performed during the hospital encounter of 09/09/21   CBC Auto Differential   Result Value Ref Range    WBC 4.3 4.0 - 11.0 K/uL    RBC 3.97 (L) 4.20 - 5.90 M/uL    Hemoglobin 11.2 (L) 13.5 - 17.5 g/dL    Hematocrit 34.0 (L) 40.5 - 52.5 %    MCV 85.6 80.0 - 100.0 fL    MCH 28.4 26.0 - 34.0 pg    MCHC 33.1 31.0 - 36.0 g/dL    RDW 15.3 12.4 - 15.4 %    Platelets 696 897 - 452 K/uL    MPV 7.1 5.0 - 10.5 fL    Neutrophils % 65.8 %    Lymphocytes % 15.6 %    Monocytes % 11.3 %    Eosinophils % 6.7 %    Basophils % 0.6 %    Neutrophils Absolute 2.8 1.7 - 7.7 K/uL    Lymphocytes Absolute 0.7 (L) 1.0 - 5.1 K/uL    Monocytes Absolute 0.5 0.0 - 1.3 K/uL    Eosinophils Absolute 0.3 0.0 - 0.6 K/uL    Basophils Absolute 0.0 0.0 - 0.2 K/uL   Basic Metabolic Panel   Result Value Ref Range    Sodium 140 136 - 145 mmol/L    Potassium 4.2 3.5 - 5.1 Imaging ordered:  XR CHEST PORTABLE    Result Date: 9/9/2021  EXAMINATION: ONE XRAY VIEW OF THE CHEST 9/9/2021 2:48 pm COMPARISON: Chest radiograph 08/08/2021 HISTORY: ORDERING SYSTEM PROVIDED HISTORY: SOB TECHNOLOGIST PROVIDED HISTORY: Reason for exam:->SOB Reason for Exam: SOB Acuity: Acute Type of Exam: Initial FINDINGS: There are bilateral pulmonary interstitial opacities extending to the periphery with small curvilinear blunting of the left costophrenic angle. There is trace blunting of the right costophrenic angle. Cardiomediastinal silhouette is prominent but unchanged with calcification in the aortic arch. Pulmonary interstitial edema with left greater than right bilateral pleural fluid. Reevaluation:  On reevaluation I find the 21-year-old male resting comfortably on stretcher with eyes open with stable vital signs in the semi-Garcia's position. A discussion was had with Mr. Kristi Go regarding elevated troponin, acute EKG changes, shortness of breath, increased oxygen demand, and intention to admit. Risk management discussed and shared decision making had with patient and/or surrogate. All questions were answered. Patient will be admitted to the hospital for further evaluation and treatment. Patient is agreeable to plan for admission. CRITICAL CARE TIME  26 Minutes of critical care time spent not including separately billable procedures. Critical Care  There was a high probability of life-threatening clinical deterioration in the patient's condition requiring my urgent intervention. Total critical care time with the patient was 26 minutes excluding separately reportable procedures. Critical care required due to patients frequent reassessments due to intermittent oxygen desaturations, need for chart review, and consultation with cardiology. MDM  Patient presents to the emergency department with shortness of breath.   Alternate diagnoses are less likely based on history and physical. Considered acute coronary syndrome, pulmonary embolism, COPD/asthma, pneumonia, sepsis, pericardial tamponade, pneumothorax, CHF, thoracic aortic dissection, anxiety, among others less likely based on history and physical.        Work-up reveals:  (Abnormal labs and imaging noted otherwise  normal)      COVID-19: Process    BMP: Chloride reduced at 95, CO2 elevated at 38, creatinine elevated at 1.8, GFR reduced at 36, otherwise unremarkable    BNP: [email or web address]    CBC: Negative for leukocytosis, mild anemia with RBC 3.9,, hemoglobin 11.2, hematocrit 34.0, lymphocytes absolute 0.7, otherwise unremarkable    ABG: Mildly acidotic at 7.328, PCO2 elevated at 79.4, bicarb 42, otherwise unremarkable    EKG: Sinus rhythm with prolonged AV conduction with a ventricular rate of 83 bpm, as interpreted by EMD    CXR: Pulmonary interstitial edema with left greater than right bilateral pleural fluid. Interventions: Patient was given albuterol and ipratropium inhalers 2 puffs each, Solu-Medrol 125, and was started on heparin while here in the emergency department per instructions by cardiology-Dr. So Mendiola. He will have serial troponins and be n.p.o. after midnight. Therefore:    My attending physician, Dr. So Mendiola, nor I feel that the patient is safe or appropriate for discharged home as he has an elevated [email or web address] which is greater than his baseline with renal dysfunction and has EKG changes. Therefore, the patient will be admitted to the hospital for further evaluation and treatment by cardiology. Clinical Impression:  Elevated troponin  Acute electrocardiogram changes  Shortness of breath  Increased oxygen demand      Disposition:  Admitted      Patient will be admitted to hospital for further evaluation and treatment.        Hospitalist: Dr. Nicolás Taylor      Discussed patients HPI, ED work-up, results, treatment, and response with my attending physician Dr. Oracio Stiles and the Hospitalist - Luigi Prader who agrees to admit the patient to the hospital.            Alfonzo Castillo CNP   [facility] [place]    This chart was created using Dragon dictation. Every effort was made by myself to ensure accuracy, however due to limitations of this technology errors may be present. =SANAZ Saldana - CNP  09/10/21 0619

## 2021-09-09 NOTE — ED PROVIDER NOTES
I independently evaluated and obtained a history and physical on Prashant Dejesus. All diagnostic, treatment, and disposition assistants were made to myself in conjunction the advanced practice provider. For further details of this patient's emergency department encounter, please see the advanced practice provider's documentation. History: 27-year-old male presenting for shortness of breath from home. Patient recently discharged from nursing facility where he was out for PT OT. Patient normally wears 3 L nasal cannula oxygen at home but have increased to 5 L nasal cannula oxygen today secondary to desaturation into the 80s on his normal 3 L. Physician Exam: Alert and oriented x4, normocephalic, atraumatic, moist mucous membranes, regular rate and rhythm, lungs with crackles in the bases bilaterally, faint expiratory wheezing, speaking in full sentences, obese abdomen, soft nontender nondistended, 2+ edema bilateral lower extremities, skin warm and dry    The Ekg interpreted by me shows  Sinus rhythm with first-degree AV block and a rate of 83, left axis deviation, , , QTc 484, right bundle branch block, no ST elevations, T wave inversions now present in V2 and V3 when compared EKG from 8/11/2021      Patient seen and evaluated. History and physical as above. Patient nontoxic and afebrile. Patient requiring extra nasal cannula oxygen above his 3 L to keep him above 90%. Patient currently tolerating 5 L nasal cannula. Patient has elevated troponin at 0.09. Patient's baseline 0.04. BNP elevated at 2124. Creatinine close to patient's baseline of 1.8. Electrolytes within normal limits. CO2 elevated at 38.  pH 7.328 with CO2 of 79.4 and a bicarb of 42. Cardiology consulted and recommends heparin drip and keep patient n.p.o. after midnight. Plan for admission. CRITICAL CARE TIME   Total Critical Care time was 10 minutes, excluding separately reportable procedures.   There was a

## 2021-09-09 NOTE — ED NOTES
Pt transferred to  9 at this time. Report received from [employer].       Fernie Hawkins RN  09/09/21 7709

## 2021-09-09 NOTE — ED NOTES
ED SBAR report provider to [facility]. Patient to be transported to Room 5255 via stretcher by transport tech. Patient transported with bedside cardiac monitor and with IV medications infusing. IV site clean, dry, and intact. MEWS score and pain assessed and documented. Updated patient and family on plan of care.      Wali Terry RN  09/09/21 9722

## 2021-09-09 NOTE — ED NOTES
Report given to receiving RN Peace Lares, all questions answered.       Sam Finney RN  09/09/21 0441

## 2021-09-09 NOTE — ED NOTES
Bed: A-16  Expected date:   Expected time:   Means of arrival: [facility]  Comments:  86M JOHN Foster RN  09/09/21 6164

## 2021-09-09 NOTE — TELEPHONE ENCOUNTER
[facility] - LEANNA LYNCHJANINE @  [phone]    5 LITERS OF OXYGEN - IT IS 70    CONGESTION, SNOT,     /77    NURSE FROM [facility] COMING THIS AFTERNOON

## 2021-09-09 NOTE — TELEPHONE ENCOUNTER
Noted. As long as able to keep >90% that is good. Keep me posted with what's going on with him.   Do we need to do cxr?

## 2021-09-09 NOTE — PROGRESS NOTES
Clinical Pharmacy Note  Heparin Dosing Consult    Lefty Keller is a 81 y.o. male ordered heparin per low dose nomogram by Dr. Mary Deluna. Lab Results   Component Value Date    APTT 32.4 03/06/2018     Lab Results   Component Value Date    HGB 11.2 09/09/2021    HCT 34.0 09/09/2021     09/09/2021    INR 1.08 06/13/2018       Ht Readings from Last 1 Encounters:   09/09/21 5' 9\" (1.753 m)        Wt Readings from Last 1 Encounters:   09/09/21 268 lb 15.4 oz (122 kg)        Assessment/Plan:  Initial bolus: 4000 units  Initial infusion rate: 1000 units/hr  Next aPTT: 0130 tomorrow    Pharmacy will continue to monitor adjust heparin based on aPTT results using nomogram below:     LOW DOSE HEPARIN PROTOCOL (ACS/STEMI/A FIB)     Initial Bolus: 60 units/kg Max Bolus: 4,000 units       Initial Rate: 12 units/kg/hr Max Initial Rate: 1,000 units/hr     aPTT < 45   Heparin 60 units/kg bolus Increase infusion by 4 units/kg/hr       (maximum 4,000 units)   aPTT 45-59.9   Heparin 30 units/kg bolus Increase infusion by 2 units/kg/hr       (maximum 2,000 units)   aPTT 60-90   No bolus   No change   aPTT 90.1-97.5 No bolus   Decrease infusion by 1 units/kg/hr   aPTT 97.6-105  No bolus     Decrease infusion by 2 units/kg/hr   aPTT > 105   Hold heparin for 1 hour Decrease infusion by 3 units/kg/hr     Obtain aPTT 6 hours after initial bolus and 6 hours after any dose change until two consecutive therapeutic aPTTs are achieved - then daily.

## 2021-09-10 NOTE — H&P
Hospital Medicine  History and Physical    PCP: Lynsey Loredo MD  Patient Name: Jocelyn Leslie    Date of Service: Pt seen/examined on 9/9/21 and admitted to Inpatient with expected LOS greater than two midnights due to medical therapy    CHIEF COMPLAINT:  Pt c/o worsening shortness of breath and dizziness  HISTORY OF PRESENT ILLNESS: Pt is an 81y.o. year-old male with a history of hypertension, hyperlipidemia, diabetes mellitus type II, coronary artery disease, severe COPD with chronic respiratory with a baseline O2 requirement of 3 liters O2 via nasal canula, A fib, CKD3 and morbid obesity. He presents to the emergency room for evaluation following a 1 to 2-day history of increasing shortness of breath and increased oxygen requirement. He is normally on 3 L of oxygen via nasal cannula. Today his oxygen saturation began to drift lower into the 80s and he had to increase his oxygen to 5 L/min via nasal cannula. At baseline he has elevated troponin levels. On evaluation today his troponin level is higher than normal.  On evaluation he was found to have a COPD exacerbation. Cardiology was consulted and asked that the patient be started on a heparin drip. He will be admitted for further evaluation and treatment. Associated signs and symptoms do not include typical chest pain, diaphoresis, edema, orthopnea, paroxysmal nocturnal dyspnea, fever or chills.         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 Medications Prior to Admission:    Prior to Admission medications    Medication Sig Start Date End Date Taking? Authorizing Provider   labetalol (NORMODYNE) 100 MG tablet Take 1 tablet by mouth 3 times daily 8/15/21  Yes Gonzalo Gonzalez MD   furosemide (LASIX) 40 MG tablet Take 1 tablet by mouth 2 times daily 8/15/21  Yes Gonzalo Gonzalez MD   escitalopram (LEXAPRO) 10 MG tablet Take 1 tablet by mouth daily 7/9/21  Yes Kyaw Waggoner MD   simvastatin (ZOCOR) 40 MG tablet TAKE 1 TABLET BY MOUTH EVERY EVENING 7/9/21  Yes Kyaw Waggoner MD   glipiZIDE (GLUCOTROL) 10 MG tablet TAKE 1 TABLET BY MOUTH DAILY WITH BREAKFAST 7/9/21  Yes Kyaw Waggoner MD   isosorbide mononitrate (IMDUR) 30 MG extended release tablet TAKE 1 TABLET BY MOUTH TWICE DAILY FOR BLOOD PRESSURE 3/18/21  Yes Kyaw Waggoner MD   Fluticasone furoate-vilanterol (BREO ELLIPTA) 200-25 MCG/INH AEPB inhaler Inhale 1 puff into the lungs daily 2/16/21  Yes Adry Tay MD   acetaminophen (TYLENOL) 325 MG tablet Take 650 mg by mouth every 4 hours as needed for Pain    Historical Provider, MD   hydrALAZINE (APRESOLINE) 25 MG tablet Take 1 tablet by mouth every 8 hours 8/15/21   Gonzalo Gonzalez MD   doxazosin (CARDURA) 8 MG tablet TAKE 1 TABLET BY MOUTH EVERY NIGHT AT BEDTIME FOR BLOOD PRESSURE 7/9/21   Kyaw Waggoner MD   gabapentin (NEURONTIN) 300 MG capsule TAKE 1 CAPSULE BY MOUTH THREE TIMES DAILY  Patient taking differently: Take 300 mg by mouth See Admin Instructions.  300 mg QAM and 600 mg QPM 5/6/21 8/26/21  Kyaw aWggoner MD   tiotropium (SPIRIVA HANDIHALER) 18 MCG inhalation capsule INHALE THE CONTENTS OF 1 CAPSULE VIA INHALATION DEVICE DAILY FOR BREATHING 6/24/19   Lizette Chau MD   VENTOLIN  (26 Base) MCG/ACT inhaler INHALE 2 PUFFS INTO THE LUNGS EVERY 6 HOURS AS NEEDED FOR WHEEZING 3/26/19   Lizette Chau MD   Nebulizers (VIOS LC SPRINT) MISC USE UTD 11/21/18   Historical Provider, MD   Compression Stockings MISC by Does not apply route 15-20 mmHg knee high  Patient not taking: Reported on 8/26/2021 11/27/18   SAANZ Waters CNP   Respiratory Therapy Supplies (NEBULIZER/TUBING/MOUTHPIECE) KIT 1 kit by Does not apply route daily as needed (sob) 11/19/18   Lizette Chau MD   albuterol (PROVENTIL) (2.5 MG/3ML) 0.083% nebulizer solution Take 3 mLs by nebulization every 6 hours as needed for Wheezing 11/19/18   Lizette Chau MD   nitroGLYCERIN (NITROSTAT) 0.4 MG SL tablet Place 1 tablet under the tongue every 5 minutes as needed for Chest pain (chest pain) 6/22/18   Raciel Gloria MD   Spacer/Aero-Holding [place]mond Fail 1 Device by Does not apply route daily as needed (with Albuterol inhaler) 9/19/16   SANAZ Santoro CNP   Cholecalciferol (VITAMIN D-3) 5000 UNITS TABS Take 5,000 Units by mouth daily     Historical Provider, MD   aspirin 81 MG EC tablet Take 81 mg by mouth daily     Historical Provider, MD       Family History:   Family history is negative for accelerated coronary artery disease, diabetes or malignancies. Social History:   TOBACCO:   reports that he quit smoking about 14 years ago. His smoking use included cigarettes. He started smoking about 70 years ago. He has a 100.00 pack-year smoking history. He has never used smokeless tobacco.  ETOH:   reports current alcohol use of about 7.0 standard drinks of alcohol per week.   OCCUPATION:      REVIEW OF SYSTEMS:  A full review of systems was performed and is negative except for that which appears in the HPI    Physical Exam:    Vitals: BP (!) 126/50   Pulse 90   Temp 98.5 °F (36.9 °C) UROBILINOGEN 0.2 08/14/2021    BILIRUBINUR Negative 08/14/2021    BILIRUBINUR neg 04/10/2015    BLOODU Negative 08/14/2021    GLUCOSEU Negative 08/14/2021    PROTEINU 30 08/14/2021         RAD:   I have independently reviewed and interpreted the imaging studies below and based my recommendations to the patient on those findings. XR CHEST PORTABLE    Result Date: 9/9/2021  EXAMINATION: ONE XRAY VIEW OF THE CHEST 9/9/2021 2:48 pm COMPARISON: Chest radiograph 08/08/2021 HISTORY: ORDERING SYSTEM PROVIDED HISTORY: SOB TECHNOLOGIST PROVIDED HISTORY: Reason for exam:->SOB Reason for Exam: SOB Acuity: Acute Type of Exam: Initial FINDINGS: There are bilateral pulmonary interstitial opacities extending to the periphery with small curvilinear blunting of the left costophrenic angle. There is trace blunting of the right costophrenic angle. Cardiomediastinal silhouette is prominent but unchanged with calcification in the aortic arch. Pulmonary interstitial edema with left greater than right bilateral pleural fluid. EKG:   Read by ER in the absence of a Cardiologist shows sinus rhythm with first-degree AV block and a rate of 83, left axis deviation, , , QTc 484, right bundle branch block, no ST elevations, T wave inversions now present in V2 and V3 when compared EKG from 8/11/2021      Assessment:   Principal Problem:    COPD exacerbation (HCC)  Active Problems:    Mixed hyperlipidemia    Stage 3 chronic kidney disease (HCC)    DMII (diabetes mellitus, type 2) ([facility]    Morbid obesity due to excess calories (HCC)    Elevated troponin    Essential hypertension    Atrial fibrillation (HCC)    Acute on chronic respiratory failure with hypoxia and hypercapnia (HCC)    Abnormal EKG    CAD (coronary artery disease)  Resolved Problems:    * No resolved hospital problems.  *      Plan:     COPD (acute exacerbation) - Patient has been started on Nebulizer treatments to be given on a scheduled basis every 4 Prophylaxis: Heparin gtt  Diet: ADULT DIET; Regular; 5 carb choices (75 gm/meal)  Diet NPO  Code Status: Full Code  (Advanced care planning has been discussed with patient and/or responsible family member and is reflected in the code status.  Further orders associated with this have been entered if appropriate)    Disposition: Anticipate that patient will remain in the hospital for 2 to 3+ days depending on further evaluation and clinical course     Please note that over 50 minutes was spent in evaluating the patient, review of records and results, discussion with staff/family, etc.    Teresita Michel MD, MD

## 2021-09-10 NOTE — RT PROTOCOL NOTE
RT Nebulizer Bronchodilator Protocol Note    There is a bronchodilator order in the chart from a provider indicating to follow the RT Bronchodilator Protocol and there is an Initiate RT Bronchodilator Protocol order as well (see protocol at bottom of note). The findings from the last RT Protocol Assessment were as follows:  Smoking: >15 Pack years  Surgical Status: No surgery  Xray: Multiple lobe infiltrates/atelectasis/pleural effusion/edema  Respiratory Pattern: Dyspnea with exertion  Mental Status: Alert and Oriented  Breath Sounds: Wheezing and/or rhonchi  Cough: Weak, non-productive  Activity Level: Walking with assistance  Oxygen Requirement: 41% or 6LNC - 60%  Indication for Bronchodilator Therapy: Wheezing associated with pulm disorder  Bronchodilator Assessment Score: 13    Aerosolized bronchodilator medication orders have been revised according to the RT Bronchodilator Protocol. RT Bronchodilator Protocol:    Respiratory Therapist to perform RT Therapy Protocol Assessment then follow the protocol. No Indications - adjust the frequency to every 6 hours PRN wheezing or bronchospasm, if no treatments needed after 48 hours then discontinue using Per Protocol order mode. If indication present, adjust the RT bronchodilator orders based on the Bronchodilator Assessment Score as follows:    0-6 - enter or revise RT Bronchodilator order to Albuterol Nebulizer order with frequency of every 2 hours PRN for wheezing or increased work of breathing using Per Protocol order mode. Repeat RT Therapy Protocol Assessment as needed. 7-10 - discontinue any other Inpatient aerosolized bronchodilator medication orders and enter or revise two Albuterol Nebulizer orders, one with BID frequency and one with frequency of every 2 hours PRN wheezing or increased work of breathing using Per Protocol order mode. Repeat RT Therapy Protocol Assessment with second treatment then BID and as needed.       11-13 - discontinue any other Inpatient aerosolized bronchodilator medication orders and enter DuoNeb Nebulizer order with QID frequency and an Albuterol Nebulizer order with frequency of every 2 hours PRN wheezing or increased work of breathing using Per Protocol order mode. Repeat RT Therapy Protocol Assessment with second treatment then QID and as needed. Greater than 13 - discontinue any other Inpatient aerosolized bronchodilator medication orders and enter a DuoNeb Nebulizer order with every 4 hours frequency and an Albuterol Nebulizer order with frequency of every 2 hours PRN wheezing or increased work of breathing using Per Protocol order mode. Repeat RT Therapy Protocol Assessment with second treatment then every 4 hours and as needed. RT to enter RT Home Evaluation for COPD & MDI Assessment order using Per Protocol order mode.     Electronically signed by Yong Cota RCP on 9/10/2021 at 7:41 AM

## 2021-09-10 NOTE — CARE COORDINATION
09/10/21 1039   Readmission Assessment   Number of Days since last admission? 8-30 days   Previous Disposition SNF   Who is being Interviewed Patient   What was the patient's/caregiver's perception as to why they think they needed to return back to the hospital? Other (Comment)  (shortness of breath and couch)   Did you visit your Primary Care Physician after you left the hospital, before you returned this time? Yes  (tele visit)   Did you see a specialist, such as Cardiac, Pulmonary, Orthopedic Physician, etc. after you left the hospital? Yes   Who advised the patient to return to the hospital? [facility] Staff   Does the patient report anything that got in the way of taking their medications? No   In our efforts to provide the best possible care to you and others like you, can you think of anything that we could have done to help you after you left the hospital the first time, so that you might not have needed to return so soon?  Other (Comment)  (patient says \"no\")

## 2021-09-10 NOTE — RT PROTOCOL NOTE
RT Nebulizer Bronchodilator Protocol Note    There is a bronchodilator order in the chart from a provider indicating to follow the RT Bronchodilator Protocol and there is an Initiate RT Bronchodilator Protocol order as well (see protocol at bottom of note). The findings from the last RT Protocol Assessment were as follows:  Smoking: >15 Pack years  Surgical Status: No surgery  Xray: Multiple lobe infiltrates/atelectasis/pleural effusion/edema  Respiratory Pattern: RR 12-20  Mental Status: Alert and Oriented  Breath Sounds: Wheezing and/or rhonchi  Cough: Strong, spontaneous, non-productive  Activity Level: Walking unassisted  Oxygen Requirement: 41% or 6LNC - 60%  Indication for Bronchodilator Therapy: Wheezing associated with pulm disorder  Bronchodilator Assessment Score: 8. Increased oxygen requirement and exp wheezing bilaterally    Aerosolized bronchodilator medication orders have been revised according to the RT Bronchodilator Protocol. RT Bronchodilator Protocol:    Respiratory Therapist to perform RT Therapy Protocol Assessment then follow the protocol. No Indications - adjust the frequency to every 6 hours PRN wheezing or bronchospasm, if no treatments needed after 48 hours then discontinue using Per Protocol order mode. If indication present, adjust the RT bronchodilator orders based on the Bronchodilator Assessment Score as follows:    0-6 - enter or revise RT Bronchodilator order to Albuterol Nebulizer order with frequency of every 2 hours PRN for wheezing or increased work of breathing using Per Protocol order mode. Repeat RT Therapy Protocol Assessment as needed. 7-10 - discontinue any other Inpatient aerosolized bronchodilator medication orders and enter or revise two Albuterol Nebulizer orders, one with BID frequency and one with frequency of every 2 hours PRN wheezing or increased work of breathing using Per Protocol order mode.   Repeat RT Therapy Protocol Assessment with second treatment then BID and as needed. 11-13 - discontinue any other Inpatient aerosolized bronchodilator medication orders and enter DuoNeb Nebulizer order with QID frequency and an Albuterol Nebulizer order with frequency of every 2 hours PRN wheezing or increased work of breathing using Per Protocol order mode. Repeat RT Therapy Protocol Assessment with second treatment then QID and as needed. Greater than 13 - discontinue any other Inpatient aerosolized bronchodilator medication orders and enter a DuoNeb Nebulizer order with every 4 hours frequency and an Albuterol Nebulizer order with frequency of every 2 hours PRN wheezing or increased work of breathing using Per Protocol order mode. Repeat RT Therapy Protocol Assessment with second treatment then every 4 hours and as needed. RT to enter RT Home Evaluation for COPD & MDI Assessment order using Per Protocol order mode.     Electronically signed by Rhina Adame RCP on 9/10/2021 at 3:22 PM

## 2021-09-10 NOTE — PROGRESS NOTES
Pt arrived to floor via stretcher from ED transfer to bed. Pt on 6L of O2 increased to 8. Telemetry activated NSR. Patient oriented to room and use of call light. External male purewick catheter applied to pt. Call light and personal items within reach. Admission and assessment initiated. POC and education initiated and reviewed with patient Telemetry applied. Denied further needs or questions at this time. Will continue to monitor.

## 2021-09-10 NOTE — PROGRESS NOTES
Occupational Therapy   Occupational Therapy Initial Assessment and Tentative D/C    Date: 9/10/2021   Patient Name: Jacques Jolly  MRN: [id number]     : 1935    Date of Service: 9/10/2021    Discharge Recommendations: Jacques Jolly scored a 16/24 on the AM-PAC ADL Inpatient form. Current research shows that an AM-PAC score of 17 or less is typically not associated with a discharge to the patient's home setting. Based on the patient's AM-PAC score and their current ADL deficits, it is recommended that the patient have 3-5 sessions per week of Occupational Therapy at d/c to increase the patient's independence. Please see assessment section for further patient specific details. If patient discharges prior to next session this note will serve as a discharge summary. Please see below for the latest assessment towards goals. Continue to assess pending progress, 3-5 sessions per week (SNF vs home with 24hr assist and [facility])  OT Equipment Recommendations  Equipment Needed: No    Assessment   Performance deficits / Impairments: Decreased functional mobility ; Decreased strength;Decreased endurance;Decreased ADL status; Decreased balance;Decreased high-level IADLs  Assessment: Jacques Jolly is a 81 y.o. 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 Denies chest pain, nausea, vomiting, presyncope, fever, chills, sweats, changes militates, hemoptysis, leg/calf pain or swelling, abdominal pain, diarrhea. Patient was discharged from [facility] after he was therefore rehab secondary to deconditioning for 2.5 weeks.   He wears 3 L of O2 per nasal cannula Restrictions  Restrictions/Precautions  Restrictions/Precautions: Fall Risk  Position Activity Restriction  Other position/activity restrictions: 6L 02 per NC; external male catheter    Subjective   General  Chart Reviewed: Yes  Patient assessed for rehabilitation services?: Yes  Additional Pertinent Hx: per CNP note, Luiza Kim is a 81 y.o. 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 Denies chest pain, nausea, vomiting, presyncope, fever, chills, sweats, changes militates, hemoptysis, leg/calf pain or swelling, abdominal pain, diarrhea. Patient was discharged from [facility] after he was therefore rehab secondary to deconditioning for 2.5 weeks. He wears 3 L of O2 per nasal cannula at baseline but today has required 5 L per nasal cannula to maintain his oxygen saturation in the 90-92% range as he was 88% on his baseline 3 L. Family / Caregiver Present: No  Referring Practitioner: Marci Freeman MD  Subjective  Subjective: Pt agreeable to OT evaluation. Pt reports no pain. Pt on 6L O2 throughout session. General Comment  Comments: okay for therapy per RN. Social/Functional History  Social/Functional History  Lives With: Spouse  Type of Home:  (Condo)  Home Layout: One level  Home Access: Stairs to enter without rails  Entrance Stairs - Number of Steps: 2  Bathroom Shower/Tub: Tub/Shower unit, Walk-in shower  Bathroom Toilet: Handicap height  Bathroom Equipment: Grab bars around toilet  Home Equipment: [employer], Electric scooter, 4 wheeled walker  ADL Assistance: Needs assistance (Wife assists with sponge bathing.   Pt is independent with toileting, and dressing)  Homemaking Assistance: Needs assistance (Wife cooks and cleans)  Ambulation Assistance: Independent (Uses 4WW at all times)  Transfer Assistance: Independent  Active : No  Patient's  Info: wife, Ja Oiler drives patient  Additional Comments: pt had recent SNF stay and was only home for 2 days prior to this hospital admission; wife would like pt to d/c to assisted living; pt reports one recent fall at SNF       Objective   Vision: Impaired  Vision Exceptions: Wears glasses for reading  Hearing: Within functional limits    Orientation  Overall Orientation Status: Within Functional Limits     Balance  Sitting Balance: Supervision  Standing Balance: Contact guard assistance (RW)  Functional Mobility  Functional - Mobility Device: Rolling Walker  Activity: Other (short household distances in room; ~30ft)  Assist Level: Contact guard assistance  Functional Mobility Comments: no overt LOB; pt's SpO2 decreasing to 80%, pt taking seated rest break; pt needing increased to 8L to return >90%. Wheelchair Bed Transfers  Wheelchair/Bed - Technique: Ambulating (RW)  Equipment Used: Other (chair)  Level of Asssistance: Contact guard assistance  ADL  Additional Comments: Anticipate pt needing up to Min/Mod A for ADLs including dressing, bathing, and toileting based on ROM, strength, balance, and endurance  Tone RUE  RUE Tone: Normotonic  Tone LUE  LUE Tone: Normotonic  Coordination  Movements Are Fluid And Coordinated: Yes     Bed mobility  Supine to Sit: Unable to assess  Sit to Supine: Unable to assess  Scooting: Unable to assess  Transfers  Sit to stand: Contact guard assistance  Stand to sit: Contact guard assistance  Transfer Comments: to/from RW     Cognition  Overall Cognitive Status: Exceptions  Arousal/Alertness: Appropriate responses to stimuli  Following Commands:  Follows one step commands with increased time  Attention Span: Appears intact        Sensation  Overall Sensation Status: Impaired (reports numbness and tingling in nayeli feet)        LUE AROM (degrees)  LUE AROM : Exceptions  LUE General AROM: decreased shoulder AROM  RUE AROM (degrees)  RUE AROM : WFL  LUE Strength  Gross LUE Strength: Exceptions to [facility]  L Hand General: 4/5  LUE Strength Comment: decreased shoulder ROM  RUE Strength  Gross RUE Strength: WFL  R Hand General: 4/5                   Plan   Plan  Times per week: 3-5x  Current Treatment Recommendations: Strengthening, Functional Mobility Training, Gait Training, Endurance Training, Balance Training, Self-Care / ADL, Safety Education & Training, Patient/Caregiver Education & Training      AM-PAC Score        AM-[facility] Inpatient Daily Activity Raw Score: 16 (09/10/21 1434)  AM-PAC Inpatient ADL T-Scale Score : 35.96 (09/10/21 1434)  ADL Inpatient CMS 0-100% Score: 53.32 (09/10/21 1434)  ADL Inpatient CMS G-Code Modifier : CK (09/10/21 1434)    Goals  Short term goals  Time Frame for Short term goals: prior to D/C  Short term goal 1: complete functional mobility and transfers with supervision  Short term goal 2: complete bathing and dressing with Min A  Short term goal 3: complete toileting with supervision  Short term goal 4: complete grooming with setup  Long term goals  Time Frame for Long term goals : STG=LTG  Patient Goals   Patient goals : no stated goals       Therapy Time   Individual Concurrent Group Co-treatment   Time In 1400         Time Out 1425         Minutes 25         Timed Code Treatment Minutes: 10 Minutes (15 minute eval)       Sera Callaway OTR/L

## 2021-09-10 NOTE — PROGRESS NOTES
Pt's wife spoke with this RN expressing concern about patient having falls at home. Wife stated that patient was unable to follow commands and unable to get out of bed so they called the squad. Wife stated that she is unable to care of patient and her children are suggesting that the patient goes to an assisted living home. Secure message sent to physician requesting PT/OT evaluation.      Electronically signed by Bassem Lemos RN on 9/10/2021 at 11:48 AM

## 2021-09-10 NOTE — PROGRESS NOTES
Hospitalist   Progress Note    Patient Name: Nayan Lamar  PCP: Obie Moore MD  Date of Admission: 9/9/2021    Chief Complaint on Admission: Worsening shortness of breath and dizziness  Chief diagnosis after evaluation: COPD exacerbation with acute on chronic respiratory failure with hypoxia and hypercapnia    Brief Synopsis: Patient 81 y.o. man with a history of hypertension, hyperlipidemia, diabetes mellitus type II, coronary artery disease, severe COPD with chronic respiratory with a baseline O2 requirement of 3 liters O2 via nasal canula, A fib, CKD3 and morbid obesity  who was admitted on 9/9/2021 for evaluation and treatment of a COPD exacerbation with acute on chronic respiratory failure with hypoxia and hypercapnia    Pt Seen/Examined and Chart Reviewed. Subjective: Pt has no new complaints. He has had little improvement with breathing since admission    Objective: Allergies  Patient has no known allergies.     Medications    Scheduled Meds:   atorvastatin        furosemide        gabapentin        isosorbide mononitrate        labetalol        atorvastatin  20 mg Oral Daily    labetalol  100 mg Oral TID    isosorbide mononitrate  30 mg Oral Daily    hydrALAZINE  25 mg Oral 3 times per day    gabapentin  300 mg Oral QAM    And    gabapentin  600 mg Oral Nightly    furosemide  40 mg Oral BID    budesonide-formoterol  2 puff Inhalation BID    escitalopram  10 mg Oral Daily    doxazosin  8 mg Oral Nightly    aspirin  81 mg Oral Daily    ipratropium-albuterol  1 ampule Inhalation Q4H WA    methylPREDNISolone  40 mg IntraVENous Q6H    insulin lispro  0-12 Units SubCUTAneous TID WC    insulin lispro  0-6 Units SubCUTAneous Nightly    sodium chloride flush  10 mL IntraVENous 2 times per day     Infusions:   dextrose      sodium chloride       PRN Meds:  ipratropium-albuterol, glucose, dextrose, glucagon (rDNA), dextrose, sodium chloride flush, sodium chloride, ondansetron, polyethylene glycol, acetaminophen **OR** acetaminophen    Physical    VITALS:  /67   Pulse 73   Temp 97.8 °F (36.6 °C) (Oral)   Resp 18   Ht 5' 9\" (1.753 m)   Wt 266 lb 15.6 oz (121.1 kg)   SpO2 90%   BMI 39.43 kg/m²   CONSTITUTIONAL:  Morbidly Obese 81y.o. year-old male who is sitting in a chair by the window awake, alert, cooperative, no apparent distress, and appears stated age  EYES:  Lids and lashes normal, PERRL, EOMI, sclera clear, conjunctiva normal  ENT:  NC/AT, MMM    NECK:  Supple, symmetrical, trachea midline, no adenopathy  HEMATOLOGIC/LYMPHATICS:  no cervical, supraclavicular or axillary lymphadenopathy  LUNGS: Scattered expiratory wheezing on auscultation bilaterally, No increased work of breathing, fair air exchange, no crackles  CARDIOVASCULAR:  Regular rate and rhythm, normal S1 and S2, no S3 or S4, and no significant murmurs, rubs or gallops noted. Normal apical impulse. ABDOMEN:  Normal active bowel sounds, soft, non-tender, non-distended, no masses palpated, no organomegally  MUSCULOSKELETAL:  Full range of motion noted. MENTAL STATUS: Awake, alert, oriented to name, place and time. NEUROLOGIC:  Cranial nerves II-XII are grossly intact. SKIN:  normal skin color, texture, turgor for age.     Data    CBC with Differential:    Lab Results   Component Value Date    WBC 4.9 09/10/2021    HGB 10.7 09/10/2021    HCT 32.5 09/10/2021     09/10/2021    MCV 85.8 09/10/2021    RDW 15.6 09/10/2021    LYMPHOPCT 7.6 09/10/2021    MONOPCT 2.1 09/10/2021    EOSPCT 2.0 12/02/2011    BASOPCT 0.2 09/10/2021    MONOSABS 0.1 09/10/2021    LYMPHSABS 0.4 09/10/2021    EOSABS 0.0 09/10/2021    BASOSABS 0.0 09/10/2021    DIFFTYPE Auto-K 09/28/2012     BMP:    Lab Results   Component Value Date     09/10/2021    K 4.4 09/10/2021    CL 96 09/10/2021    CO2 32 09/10/2021    BUN 24 09/10/2021    CREATININE 1.9 09/10/2021    GLUCOSE 200 09/10/2021    CALCIUM 8.7 09/10/2021    GFRAA 41 09/10/2021    GFRAA >60 04/15/2013    LABGLOM 34 09/10/2021     LFT:   Lab Results   Component Value Date    ALKPHOS 60 08/07/2021    ALT 7 08/07/2021    AST 10 08/07/2021    PROT 6.8 08/07/2021    PROT 7.1 01/15/2013    BILITOT 0.4 08/07/2021    BILIDIR <0.2 06/12/2020    IBILI see below 06/12/2020     Magnesium:    Lab Results   Component Value Date    MG 2.10 08/12/2021     Phosphorus:    Lab Results   Component Value Date    PHOS 4.2 08/14/2021     PT/INR:  No results found for: PTINR  U/A:    Lab Results   Component Value Date    LEUKOCYTESUR Negative 09/10/2021    NITRITE neg 04/10/2015    WBCUA 3 09/10/2021    RBCUA 1 09/10/2021    SPECGRAV 1.012 09/10/2021    UROBILINOGEN 0.2 09/10/2021    BILIRUBINUR Negative 09/10/2021    BILIRUBINUR neg 04/10/2015    BLOODU Negative 09/10/2021    GLUCOSEU Negative 09/10/2021    PROTEINU 100 09/10/2021     ABG:    Lab Results   Component Value Date    PHART 7.416 08/11/2021    QLX2TEH 50.3 08/11/2021    K6ZVVPUR 95.1 08/11/2021    PKT5ZWF 32.3 08/11/2021    BEART 6.7 08/11/2021    PO2ART 74.6 08/11/2021    YNT7CCQ 33.9 08/11/2021           Intake/Output Summary (Last 24 hours) at 9/10/2021 1622  Last data filed at 9/10/2021 1451  Gross per 24 hour   Intake 360 ml   Output 200 ml   Net 160 ml       Consults:  IP CONSULT TO HOSPITALIST  IP CONSULT TO CARDIOLOGY  IP CONSULT TO CARDIOLOGY  IP CONSULT TO [employer] Problems    Diagnosis Date Noted    Stage 3 chronic kidney disease ([facility] [N18.30]      Priority: High    DMII (diabetes mellitus, type 2) ([facility] [E11.9]      Priority: High    Morbid obesity due to excess calories ([facility] [E66.01]      Priority: High    Mixed hyperlipidemia [E78.2]      Priority: High    Demand ischemia ([facility] [I24.8]     COPD exacerbation ([facility] [J44.1] 09/09/2021    Abnormal EKG [R94.31] 09/09/2021    CAD (coronary artery disease) [I25.10] 09/09/2021    Acute on chronic respiratory failure with hypoxia and hypercapnia ([facility] [J96.21, J96.22]     Atrial fibrillation ([facility] [I48.91]     Chronic diastolic heart failure (HCC) [I50.32]     Essential hypertension [I10]     Elevated troponin [R77.8] 03/06/2018    Acute on chronic respiratory failure with hypoxia (HCC) [J96.21]        ASSESSMENT AND PLAN:    COPD (acute exacerbation) - Minimal interval improvement. Continue nebulizer treatments, solumedrol and antibiotics as prescribed. The Solumedrol will be tapered as the patient improves. Patient will be monitored closely, and deep breathing and coughing will be encouraged while awake.      Acute on chronic respiratory failure with hypoxia and hypercapnia (HCC) - as evidenced by + use of accessory muscles, air hunger and an oxygen saturation of less than 90% on his baseline 3 L nasal cannula  - due to above; provide supplemental oxygen as necessary to keep SaO2 92% or greater.     Elevated troponin, Abnormal EKG -patient has an elevated troponin at baseline. His troponin on admission was higher than normal.  In addition to this he has concerning EKG changes. Cardiology has been consulted. \"Demand ischemia. History is not consistent with acute coronary syndrome. Troponin chronically elevated for the last 3 years. There is no significant change in the minimal troponin elevation. Patient likely has underlying coronary artery disease but would not pursue stress testing or angiography with advanced age and comorbid conditions. Will discontinue IV heparin. Continue statin. \"     CAD (coronary artery disease) - as above. Continue beta blocker, statin and aspirin. Monitor on telemetry.      Generalized weakness/fatigue, fall at home - Will ask PT/OT to evaluate and treat patient, and if necessary to provide recommendations for post hospital therapy     Chronic Renal Failure stage III - Renal function is at/near baseline and is stable; Monitor renal function and avoid Nephrotoxic agents as able      Atrial fibrillation ([facility] -currently in first-degree AV block. Continue labetalol and monitor.         Diabetes mellitus II - Lantus, SSI and when able to eat, provide a carb control diet     Essential (primary) hypertension - continue home meds and monitor blood pressure     Hyperlipidemia - No current evidence of Rhabdomyolysis or other adverse effects. Continue statin therapy while in the hospital     Morbid obesity due to excess calories (Body mass index is 39.72 kg/m². ) - Complicating assessment and treatment. Placing patient at risk for multiple co-morbidities as well as early death and contributing to the patient's presentation.  on weight loss when appropriate. DVT Prophylaxis: Heparin  Diet: ADULT DIET; Regular; 5 carb choices (75 gm/meal); Low Sodium (2 gm); 1500 ml  Code Status: DNR-CCA    PT/OT Eval Status: Ordered  PT score on the AM-PAC short mobility form (if applicable) -   OT score on the AM-PAC short mobility form (if applicable) -    Dispo - Anticipated discharge date 3+ days.      Syd Matt MD, MD

## 2021-09-10 NOTE — CONSULTS
Referring Physician: Dr. Zachery Johnson  Reason for Consultation: \"Elevated troponin, ischemic EKG changes per ER report\"  Chief Complaint: Short of breath    Subjective:   History of Present Illness:  Mariella Zarate is a 81 y.o. patient who presented to the hospital with complaints of shortness of breath. The patient is a bit of a challenging historian. At first, he states that he had no changes in his health and that he was told to go to the emergency department by his visiting nurse as his \"heart sounded funny. \" The patient denied palpitations and has maintained sinus rhythm in the hospital. Ultimately after repeated questioning, he states that he has been more short of breath over the past week with a new productive cough. He denies fevers or chills. He wears oxygen at home and is currently wearing above his baseline O2 requirement. He weighs himself at home but is \"not sure\" if he gained weight. He reports compliance with his medications. He denies associated chest pains. Although he is unsure of any specific intervention to make him feel better, he states that he is generally feeling better today. No family members are present bedside and the patient states that he lives at home with his wife. The patient was started on a heparin drip evidently for elevated troponins which is a chronic issue. The reason for consultation also reports \"ischemic EKG changes\" but the ECG appears unchanged from prior.     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 Surgical History:   has a past surgical history that includes TURP (8/14/12) and Cataract removal (Right, 7/3/13). Social History:   reports that he quit smoking about 14 years ago. His smoking use included cigarettes. He started smoking about 70 years ago. He has a 100.00 pack-year smoking history. He has never used smokeless tobacco. He reports current alcohol use of about 7.0 standard drinks of alcohol per week. He reports that he does not use drugs. Family History:  Denies premature coronary atherosclerosis    Home Medications:  Were reviewed and are listed in nursing record and/or below  Prior to Admission medications    Medication Sig Start Date End Date Taking? Authorizing Provider   acetaminophen (TYLENOL) 325 MG tablet Take 650 mg by mouth every 4 hours as needed for Pain   Yes Historical Provider, MD   labetalol (NORMODYNE) 100 MG tablet Take 1 tablet by mouth 3 times daily 8/15/21  Yes Gonzalo Ann MD   furosemide (LASIX) 40 MG tablet Take 1 tablet by mouth 2 times daily 8/15/21  Yes Gonzalo Ann MD   escitalopram (LEXAPRO) 10 MG tablet Take 1 tablet by mouth daily 7/9/21  Yes Renata West MD   simvastatin (ZOCOR) 40 MG tablet TAKE 1 TABLET BY MOUTH EVERY EVENING 7/9/21  Yes Renata West MD   glipiZIDE (GLUCOTROL) 10 MG tablet TAKE 1 TABLET BY MOUTH DAILY WITH BREAKFAST 7/9/21  Yes Renata West MD   gabapentin (NEURONTIN) 300 MG capsule TAKE 1 CAPSULE BY MOUTH THREE TIMES DAILY  Patient taking differently: Take 300 mg by mouth See Admin Instructions.  300 mg QAM and 600 mg QPM 5/6/21 9/10/21 Yes Renata West MD   isosorbide mononitrate (IMDUR) 30 MG extended release tablet TAKE 1 TABLET BY MOUTH TWICE DAILY FOR BLOOD PRESSURE 3/18/21  Yes Renata West MD   Fluticasone furoate-vilanterol (BREO ELLIPTA) 200-25 MCG/INH AEPB inhaler Inhale 1 puff into the lungs daily 2/16/21  Yes Adry Tay MD   tiotropium (Ellie Evelio) 18 MCG inhalation capsule INHALE THE CONTENTS OF 1 CAPSULE VIA INHALATION DEVICE DAILY FOR BREATHING 6/24/19  Yes Adry Tay MD   VENTOLIN  (72 Base) MCG/ACT inhaler INHALE 2 PUFFS INTO THE LUNGS EVERY 6 HOURS AS NEEDED FOR WHEEZING 3/26/19  Yes Adry Tay MD   albuterol (PROVENTIL) (2.5 MG/3ML) 0.083% nebulizer solution Take 3 mLs by nebulization every 6 hours as needed for Wheezing 11/19/18  Yes Adry Tay MD   aspirin 81 MG EC tablet Take 81 mg by mouth daily    Yes Historical Provider, MD   hydrALAZINE (APRESOLINE) 25 MG tablet Take 1 tablet by mouth every 8 hours 8/15/21   Gonzalo Gonzalez MD   doxazosin (CARDURA) 8 MG tablet TAKE 1 TABLET BY MOUTH EVERY NIGHT AT BEDTIME FOR BLOOD PRESSURE 7/9/21   Kyaw Waggoner MD   Nebulizers (VIOS LC SPRINT) MISC USE UTD 11/21/18   Historical Provider, MD   Compression Stockings MISC by Does not apply route 15-20 mmHg knee high  Patient not taking: Reported on 8/26/2021 11/27/18   SANAZ Dye CNP   Respiratory Therapy Supplies (NEBULIZER/TUBING/MOUTHPIECE) KIT 1 kit by Does not apply route daily as needed (sob) 11/19/18   Adry Tay MD   nitroGLYCERIN (NITROSTAT) 0.4 MG SL tablet Place 1 tablet under the tongue every 5 minutes as needed for Chest pain (chest pain) 6/22/18   Kyaw Waggoner MD   Spacer/Aero-Holding Salvatore Che 1 Device by Does not apply route daily as needed (with Albuterol inhaler) 9/19/16   Alfonza Severs, APRN - CNP   Cholecalciferol (VITAMIN D-3) 5000 UNITS TABS Take 5,000 Units by mouth daily     Historical Provider, MD        CURRENT Medications:  heparin (porcine) 5000 UNIT/ML injection,   atorvastatin (LIPITOR) tablet 20 mg, Daily  labetalol (NORMODYNE) tablet 100 mg, TID  isosorbide mononitrate (IMDUR) extended release tablet 30 mg, Daily  hydrALAZINE (APRESOLINE) tablet 25 mg, 3 times per day  gabapentin (NEURONTIN) capsule 300 mg, QAM   And  gabapentin (NEURONTIN) capsule 600 mg, Nightly  furosemide (LASIX) tablet 40 mg, BID  budesonide-formoterol (SYMBICORT) 160-4.5 MCG/ACT inhaler 2 puff, BID  escitalopram (LEXAPRO) tablet 10 mg, Daily  doxazosin (CARDURA) tablet 8 mg, Nightly  aspirin EC tablet 81 mg, Daily  ipratropium-albuterol (DUONEB) nebulizer solution 1 ampule, Q2H PRN  ipratropium-albuterol (DUONEB) nebulizer solution 1 ampule, Q4H WA  methylPREDNISolone sodium (SOLU-MEDROL) injection 40 mg, Q6H  insulin lispro (HUMALOG) injection vial 0-12 Units, TID WC  insulin lispro (HUMALOG) injection vial 0-6 Units, Nightly  glucose (GLUTOSE) 40 % oral gel 15 g, PRN  dextrose 50 % IV solution, PRN  glucagon (rDNA) injection 1 mg, PRN  dextrose 5 % solution, PRN  sodium chloride flush 0.9 % injection 10 mL, 2 times per day  sodium chloride flush 0.9 % injection 10 mL, PRN  0.9 % sodium chloride infusion, PRN  ondansetron (ZOFRAN) injection 4 mg, Q4H PRN  polyethylene glycol (GLYCOLAX) packet 17 g, Daily PRN  acetaminophen (TYLENOL) tablet 650 mg, Q4H PRN   Or  acetaminophen (TYLENOL) suppository 650 mg, Q4H PRN  heparin 25,000 unit in sodium chloride 0.45% 250 mL (premix) infusion, Continuous    Allergies:  Patient has no known allergies. Review of Systems:   · Constitutional: no unanticipated weight loss. There's been no change in energy level, sleep pattern, or activity level. No fevers, chills. · Eyes: No visual changes or diplopia. No scleral icterus. · ENT: No Headaches, hearing loss or vertigo. No mouth sores or sore throat. · Cardiovascular: as reviewed in HPI  · Respiratory: No cough or wheezing, no sputum production. No hemoptysis. · Gastrointestinal: No abdominal pain, appetite loss, blood in stools. No change in bowel or bladder habits. · Genitourinary: No dysuria, trouble voiding, or hematuria. · Musculoskeletal:  No gait disturbance, no joint complaints.   · Integumentary: No rash or pruritis. · Neurological: No headache, diplopia, change in muscle strength, numbness or tingling. · Psychiatric: No anxiety or depression. · Endocrine: No temperature intolerance. No excessive thirst, fluid intake, or urination. No tremor. · Hematologic/Lymphatic: No abnormal bruising or bleeding, blood clots or swollen lymph nodes. · Allergic/Immunologic: No nasal congestion or hives. Objective:   PHYSICAL EXAM:    Vitals:    09/10/21 0736   BP:  114/69   Pulse:  90   Resp: 18   Temp:  99   SpO2: 94% on 6 L    Weight: 266 lb 15.6 oz (121.1 kg)       General Appearance:  Alert, cooperative, no respiratory distress, elderly, obese. Head:  Normocephalic, without obvious abnormality, atraumatic. Eyes:  Pupils equal and round. No scleral icterus. Mouth: Moist mucosa, no pharyngeal erythema. Nose: Nares normal. No drainage or sinus tenderness. Neck: Supple, symmetrical, trachea midline. No adenopathy. No tenderness/mass/nodules. No carotid bruit or elevated JVD. Lungs:   Respirations unlabored. + Diffuse bilateral expiratory wheezing. Chest Wall:  No tenderness or deformity. Heart:  Regular rate. S1/S2 normal. No murmur, rub, or gallop. Abdomen:   Soft, non-tender, bowel sounds active. Musculoskeletal: No muscle wasting or digital clubbing. Extremities: Extremities normal, atraumatic. No cyanosis. Trace BLE edema. Pulses: 2+ radial and carotid pulses, symmetric. Skin: No rashes or lesions. Pysch: Normal mood and affect. Alert and oriented x 3. Neurologic: Moves all extremities. Follows commands.       Labs     CBC:   Lab Results   Component Value Date    WBC 4.9 09/10/2021    RBC 3.79 09/10/2021    HGB 10.7 09/10/2021    HCT 32.5 09/10/2021    MCV 85.8 09/10/2021    RDW 15.6 09/10/2021     09/10/2021     CMP:  Lab Results   Component Value Date     09/10/2021    K 4.4 09/10/2021    CL 96 09/10/2021    CO2 32 09/10/2021    BUN 24 09/10/2021    CREATININE 1.9 09/10/2021 GFRAA 41 09/10/2021    GFRAA >60 04/15/2013    AGRATIO 1.2 08/07/2021    LABGLOM 34 09/10/2021    GLUCOSE 200 09/10/2021    PROT 6.8 08/07/2021    PROT 7.1 01/15/2013    CALCIUM 8.7 09/10/2021    BILITOT 0.4 08/07/2021    ALKPHOS 60 08/07/2021    AST 10 08/07/2021    ALT 7 08/07/2021     PT/INR:  No results found for: PTINR  HgBA1c:  Lab Results   Component Value Date    LABA1C 6.0 07/09/2021     Lab Results   Component Value Date    CKTOTAL 176 08/28/2016    TROPONINI 0.05 (H) 09/10/2021       Cardiac Data     EKG: Personally interpreted. 9/9/2021. Sinus rhythm with first-degree AV block. Left anterior fascicular block. Right bundle branch block. Cannot rule out anterior infarct. No significant change from ECG on 8/7/2021. Echo: 8/9/21 Ejection fraction is visually estimated to be 55-60%. Grade I diastolic dysfunction with elevated LV filling pressures. Mildly dilated right ventricle. Right ventricular systolic function is normal. No significant valvular heart disease. Telemetry: Personally interpreted. Sinus. Assessment and Plan   1) Demand ischemia. History is not consistent with acute coronary syndrome. Troponin chronically elevated for the last 3 years. There is no significant change in the minimal troponin elevation. Patient likely has underlying coronary artery disease but would not pursue stress testing or angiography with advanced age and comorbid conditions. Will discontinue IV heparin. Continue statin. 2) COPD exacerbation/acute on chronic respiratory failure with hypoxia. Currently on IV steroids. Will defer management to primary team.    3) Chronic diastolic heart failure. Patient does not appear grossly volume overloaded and would continue home oral diuretics. Continue attempts at blood pressure control. If accurate, weight is lower now than when seen in the office by Mini Singh NP last month. 4) CKD stage IIIb.  Continue to monitor and renally dose medications when appropriate. 5) Anemia. Hemoglobin stable. Iron studies performed last month. Will defer additional work-up or treatment to primary team.    Overall, the problems requiring hospitalization are high in severity. Will sign off. Call with questions. Thank you for allowing us to participate in the care of Jean Paulbud Krauseakosua. Patricia Hernandez.  Katina Carrion, [address] [place] [address]  9/10/2021 9:18 AM\

## 2021-09-10 NOTE — CARE COORDINATION
[facility]        Patient is ACTIVE with [facility] for home care services. I will follow for needs, and send docs on DC when appropriate. [facility] team aware of admission. Please notify [facility]N when patient is ready for DC.           Ramesh Arteaga mobile: [phone]  [facility] office: [phone]

## 2021-09-10 NOTE — PROGRESS NOTES
Physical Therapy    Facility/Department: [address] PROGRESSIVE CARE  Initial Assessment    NAME: Christy Hernandes  : 1935  MRN: [id number]    Date of Service: 9/10/2021    Discharge Recommendations:  3-5 sessions per week, 24 hour supervision or assist, Patient would benefit from continued therapy after discharge (24 hour supervision vs. SNF)   PT Equipment Recommendations  Equipment Needed: No     Christy Smartain scored a 17/24 on the AM-PAC short mobility form. Current research shows that an AM-PAC score of 17 or less is typically not associated with a discharge to the patient's home setting. Based on the patient's AM-PAC score and their current functional mobility deficits, it is recommended that the patient have 3-5 sessions per week of Physical Therapy at d/c to increase the patient's independence. Please see assessment section for further patient specific details. If patient discharges prior to next session this note will serve as a discharge summary. Please see below for the latest assessment towards goals. Assessment   Body structures, Functions, Activity limitations: Decreased functional mobility ; Decreased endurance  Assessment: Pt is an 82 y/o male recently discharged from SNF who presented to the ED with COPD exacerbation. Pt lives with his spouse in a one level home with 2 MARCE but reports the plan is for him to d/c to assisted living. Reports being indep ambulating with RW PTA but had a recent fall at SNF. Today, pt requiring 6L 02 and desating to 80% with mobility. He required CGA for transfers and household distance ambulation. Recommend continued skilled PT at d/c to address mobility and activity tolerance. Pt will require 24 hour assist at d/c (either at home or assisted living) or continued therapy at SNF. Kirill continue to follow. Treatment Diagnosis: difficulty walking  Prognosis: Fair  Decision Making: Medium Complexity  History: Per Josie Santoyo MD \"Pt is an 81 y.o. year-old male with a history of hypertension, hyperlipidemia, diabetes mellitus type II, coronary artery disease, severe COPD with chronic respiratory with a baseline O2 requirement of 3 liters O2 via nasal canula, A fib, CKD3 and morbid obesity. He presents to the emergency room for evaluation following a 1 to 2-day history of increasing shortness of breath and increased oxygen requirement. He is normally on 3 L of oxygen via nasal cannula. Today his oxygen saturation began to drift lower into the 80s and he had to increase his oxygen to 5 L/min via nasal cannula. At baseline he has elevated troponin levels. On evaluation today his troponin level is higher than normal. On evaluation he was found to have a COPD exacerbation. Cardiology was consulted and asked that the patient be started on a heparin drip. He will be admitted for further evaluation and treatment. Associated signs and symptoms do not include typical chest pain, diaphoresis, edema, orthopnea, paroxysmal nocturnal dyspnea, fever or chills. \" --  Exam: functional mobility, ROM, strength  Clinical Presentation: evolving  PT Education: Goals;PT Role;Plan of Care;Transfer Training;General Safety;Home Exercise Program  Patient Education: pursed lip breathing  Barriers to Learning: none  REQUIRES PT FOLLOW UP: Yes  Activity Tolerance  Activity Tolerance: Patient limited by fatigue;Patient limited by endurance;Treatment limited secondary to medical complications (free text)  Activity Tolerance: limited by 02 desat and ROSALES       Patient Diagnosis(es): The primary encounter diagnosis was Elevated troponin. Diagnoses of Acute electrocardiogram changes, Shortness of breath, and Increased oxygen demand were also pertinent to this visit.      has a past medical history of Atherosclerosis of aorta ([facility]), CHF (congestive heart failure) ([address] CKD (chronic kidney disease) stage 3, GFR 30-59 ml/min (HCC), COPD (chronic obstructive pulmonary disease) ([address] CVA, old, dyspnea, fever or chills. \"  Response To Previous Treatment: Not applicable  Family / Caregiver Present: No  Referring Practitioner: Emily Valencia MD  Referral Date : 09/10/21  Diagnosis: COPD exacerbation  Follows Commands: Within Functional Limits  Subjective  Subjective: Pt pleasant and agreeable to PT eval and treat. In chair upon arrival.  No c/o pain. Pain Screening  Patient Currently in Pain: Denies          Orientation  Orientation  Overall Orientation Status: Within Functional Limits     Social/Functional History  Social/Functional History  Lives With: Spouse  Type of Home:  (Condo)  Home Layout: One level  Home Access: Stairs to enter without rails  Entrance Stairs - Number of Steps: 2  Bathroom Shower/Tub: Tub/Shower unit, Walk-in shower  Bathroom Toilet: Handicap height  Bathroom Equipment: Grab bars around toilet  Home Equipment: [employer], Electric scooter, 4 wheeled walker  ADL Assistance: Needs assistance (Wife assists with sponge bathing.   Pt is independent with toileting, and dressing)  Homemaking Assistance: Needs assistance (Wife cooks and cleans)  Ambulation Assistance: Independent (Uses 4WW at all times)  Transfer Assistance: Independent  Active : No  Patient's  Info: wife, Mary Ragsdale drives patient  Additional Comments: pt had recent SNF stay and was only home for 2 days prior to this hospital admission; wife would like pt to d/c to assisted living; pt reports one recent fall at SNF       Objective          AROM RLE (degrees)  RLE AROM: WFL  AROM LLE (degrees)  LLE AROM : WFL  Strength RLE  Comment: knee ext, hip flex and ankle DF 4/5  Strength LLE  Comment: knee ext, hip flex and ankle DF 4/5  Tone RLE  RLE Tone: Normotonic  Tone LLE  LLE Tone: Normotonic  Motor Control  Gross Motor?: WFL  Sensation  Overall Sensation Status: Impaired (reports numbness and tingling in nayeli feet)     Bed mobility  Comment: not tested- up in chair at beginning and end of session     Transfers  Sit to Stand: Contact guard assistance  Stand to sit: Contact guard assistance  Comment: cues to back up all the way to the chair     Ambulation  Ambulation?: Yes  More Ambulation?: No  Ambulation 1  Surface: level tile  Device: Rolling Walker  Other Apparatus: O2 (therapist managed 02 line)  Assistance: Contact guard assistance  Quality of Gait: slow elliott, mild-moderate ROSALES, no LOB  Gait Deviations: Decreased step length;Decreased step height;Slow Elliott  Distance: approx 36' with multiple turns  Comments: on 6L 02 pt desat to 80% after ambulation and had to be turned up to 8L briefly to return to 89-90%; pt reports ROSALES  Stairs/Curb  Stairs?: No     Balance  Posture: Fair  Sitting - Static: Good  Sitting - Dynamic: Good;-  Standing - Static: Fair  Standing - Dynamic: [address]  Times per week: 3-5/wk  Current Treatment Recommendations: Strengthening, ROM, Balance Training, Functional Mobility Training, Endurance Training, Home Exercise Program, Safety Education & Training, Patient/Caregiver Education & Training, Equipment Evaluation, Education, & procurement, Transfer Training, Gait Training  Safety Devices  Type of devices:  All fall risk precautions in place, Call light within reach, Chair alarm in place, Gait belt, Left in chair, Nurse notified (RN Oscar Orellana notified)  Restraints  Initially in place: No     AM-PAC Score  AM-PAC Inpatient Mobility Raw Score : 17 (09/10/21 1429)  AM-PAC Inpatient T-Scale Score : 42.13 (09/10/21 1429)  Mobility Inpatient CMS 0-100% Score: 50.57 (09/10/21 1429)  Mobility Inpatient CMS G-Code Modifier : CK (09/10/21 1429)          Goals  Short term goals  Time Frame for Short term goals: upon d/c  Short term goal 1: bed mobility SBA  Short term goal 2: transfers SBA  Short term goal 3: ambulate 48' with RW and SBA  Long term goals  Time Frame for Long term goals : LTG=STG  Patient Goals   Patient goals : \"to go to assisted living\"       Therapy Time   Individual Concurrent Group Co-treatment   Time In 1400         Time Out 1430         Minutes 30         Timed Code Treatment Minutes: 15 Minutes         Electronically signed by Kateryna Deiwtt, PT [id number] on 9/10/2021 at 2:34 PM

## 2021-09-10 NOTE — CONSULTS
PALLIATIVE MEDICINE CONSULTATION     Patient name:Deon Rodriguez   OOR:[id number]    :1935  Room/Bed:S6S-8867/5255-01   LOS: 1 day         Date of consult:9/10/2021    Consult Information  Palliative Medicine Consult performed by: Alisa Coronado, SANAZ - CNP  CNP  Requested by: Dr Krystin Kaplan  Reason for consult: Code status    Number of admissions past 12 months: 3    ASSESSMENT/RECOMMENDATIONS     81 y.o. male with hypoxia and debility      Symptom Management:  1. Hypoxia- pt on 5L today he is on 3L 24/7 at baseline he is drowsy today and difficult to arouse  2. Debility- Pt is falling frequently at home and his wife is unable to safely care for him. 3. Goals of Care- wife and daughter both agree that pt is [facility] and when I asked pt he agrees and thought we had that paperwork on file already. Plan is for rehab stay that hopefull will turn into LTC. Pt has LTC insurance     Patient/Family Goals of Care :    wife and daughter both agree that pt is [facility] and when I asked pt he agrees and thought we had that paperwork on file already. [place] is for rehab stay that hopefull will turn into LTC. Pt has LTC insurance      Disposition/Discharge Plan:   pending    Advance Directives:  · Surrogate Decision Maker: SpouseHarris Lorenzo  · Code status:  DNR-CCA    Case discussed with: patient, floor RN, Stacey-spouse  Thank you for allowing us to participate in the care of this patient. HISTORY     CC: Hypoxia  HPI: The patient is a 81 y.o. male with history of diabetes, COPD and CVA states he follows with Dr. Krishna Campoverde and uses 3 L of oxygen via nasal cannula at baseline.     Palliative Medicine SymptomScreening/ROS:  Review of Systems - History obtained from chart review and unobtainable from patient due to mental status    Review of Systems - History obtained from chart review and the patient  General ROS: positive for  - fatigue and sleep disturbance  Respiratory ROS: positive for - cough, shortness of breath and wheezing  Musculoskeletal ROS: positive for - muscular weakness      Pain:    Home med list and hospital medications reviewed in chart as of 9/10/2021     EXAM     Vitals:    09/10/21 1130   BP:    Pulse:    Resp: 18   Temp:    SpO2: 91%       Physical Examination: General appearance - chronically ill appearing  Neck - supple, no significant adenopathy  Chest - no tachypnea, retractions or cyanosis, + wheezing noted , decreased air entry noted   Abdomen - soft, nontender, nondistended, no masses or organomegaly  Musculoskeletal - no joint tenderness, deformity or swelling  Skin - normal coloration and turgor, no rashes, no suspicious skin lesions noted       Current labs in the epic chart reviewed as of 9/10/2021   Review of previous notes, admits, labs, radiology and testing relevant to this consult done in this chart today 9/10/2021    Signed By: Electronically signed by SANAZ Andrew CNP on 9/10/2021 at 2:04 PM  Palliative Medicine   [phone]    September 10, 2021

## 2021-09-10 NOTE — CARE COORDINATION
INITIAL CASE MANAGEMENT ASSESSMENT    Reviewed chart, met with patient to assess possible discharge needs. Explained Case Management role/services. Living Situation: Confirmed address, lives with wife in a 1 level condo, 2 steps to enter    ADLs: Walks short distances with walker, wife or home care assists with bathing; wife completes homemaking duties     DME: 4 wheeled walker, shower chair (doesn't use), nebulizer, CPAP, oxygen 3L O2 baseline -- has portability    PT/OT Recs: Not ordered     Active Services: Active with [facility]     Transportation: Patient does not drive     Medications: Uses [employer] in [place] -- no issues    PCP: Gayla Mann MD      HD/PD: n/a    PLAN/COMMENTS: Patient just recently returned home after getting rehab at [facility]. Patient plans on discharging home from hospital & resume [facility] services. Confirmed [facility] is active. Monitor for home needs and increased oxygen needs--uses 3L O2 baseline. CM provided contact information for patient or family to call with any questions. CM will follow and assist as needed.   Electronically signed by Navarro Pablo RN Case Management [phone] on 9/10/2021 at 11:01 AM

## 2021-09-10 NOTE — ACP (ADVANCE CARE PLANNING)
Advance Care Planning     Advance Care Planning Activator (Inpatient)  Conversation Note      Date of ACP Conversation: 9/10/2021     Conversation Conducted with: Patient with Decision Making Capacity    ACP Activator: Ramiro Whitfield RN    Health Care Decision Maker:     Current Designated Health Care Decision Maker:     Primary Decision Maker: Nigel Carrillo - [phone]    Secondary Decision Maker: Maria C Rooney - Child - [phone]    Care Preferences    Ventilation: \"If you were in your present state of health and suddenly became very ill and were unable to breathe on your own, what would your preference be about the use of a ventilator (breathing machine) if it were available to you? \"      Would the patient desire the use of ventilator (breathing machine)?: yes    \"If your health worsens and it becomes clear that your chance of recovery is unlikely, what would your preference be about the use of a ventilator (breathing machine) if it were available to you? \"     Would the patient desire the use of ventilator (breathing machine)?: No      Resuscitation  \"CPR works best to restart the heart when there is a sudden event, like a heart attack, in someone who is otherwise healthy. Unfortunately, CPR does not typically restart the heart for people who have serious health conditions or who are very sick. \"    \"In the event your heart stopped as a result of an underlying serious health condition, would you want attempts to be made to restart your heart (answer \"yes\" for attempt to resuscitate) or would you prefer a natural death (answer \"no\" for do not attempt to resuscitate)? \" yes       [] Yes   [x] No   Educated Patient / Ellen Barrientos regarding differences between Advance Directives and portable DNR orders.     Length of ACP Conversation in minutes:  5 minutes    Conversation Outcomes:  [x] ACP discussion completed - no changes in previously documented ACP  [] Existing advance directive reviewed with patient; no changes to patient's previously recorded wishes  [] New Advance Directive completed  [] Portable Do Not Rescitate prepared for Provider review and signature  [] POLST/POST/MOLST/MOST prepared for Provider review and signature  Electronically signed by Dago Chapman RN Case Management [phone] on 9/10/2021 at 10:41 AM

## 2021-09-10 NOTE — RT PROTOCOL NOTE
RT Inhaler-Nebulizer Bronchodilator Protocol Note    There is a bronchodilator order in the chart from a provider indicating to follow the RT Bronchodilator Protocol and there is an Initiate RT Bronchodilator Protocol order as well (see protocol at bottom of note). The findings from the last RT Protocol Assessment were as follows:  Smoking: >15 Pack years  Surgical Status: No surgery  Xray: Multiple lobe infiltrates/atelectasis/pleural effusion/edema  Respiratory Pattern: RR 12-20  Mental Status: Alert and Oriented  Breath Sounds: Wheezing and/or rhonchi  Cough: Strong, spontaneous, non-productive  Activity Level: Walking unassisted  Oxygen Requirement: 41% or 6LNC - 60%  Indication for Bronchodilator Therapy: Decreased or absent breath sounds, Wheezing associated with pulm disorder  Bronchodilator Assessment Score: 8    Aerosolized bronchodilator medication orders have been revised according to the RT Bronchodilator Protocol. RT Inhaler-Nebulizer Bronchodilator Protocol:    Respiratory Therapist to perform RT Therapy Protocol Assessment then follow the protocol. No Indications - adjust the frequency to every 6 hours PRN wheezing or bronchospasm, if no treatments needed after 48 hours then discontinue using Per Protocol order mode. If indication present, adjust the RT bronchodilator orders based on the Bronchodilator Assessment Score as follows:    0-6 - enter or revise RT bronchodilator order to Albuterol Inhaler order with frequency of every 2 hours PRN for wheezing or increased work of breathing using Per Protocol order mode. If Albuterol Inhaler not tolerated or not effective, then discontinue the Albuterol Inhaler order and enter Albuterol Nebulizer order with same frequency and PRN reasons. Repeat RT Therapy Protocol Assessment as needed.     7-10 - discontinue any other Inpatient aerosolized bronchodilator medication orders and enter or revise two Albuterol Inhaler orders, one with BID frequency and one with frequency of every 2 hours PRN wheezing or increased work of breathing using Per Protocol order mode. Repeat RT Therapy Protocol Assessment with second treatment then BID and as needed. If Albuterol Inhaler not tolerated or not effective, then discontinue the Albuterol Inhaler orders and enter two Albuterol Nebulizer orders with same frequencies and PRN reasons. 11-13 - discontinue any other Inpatient aerosolized bronchodilator medication orders and enter DuoNeb Nebulizer orders QID frequency and an Albuterol Nebulizer order every 2 hours PRN wheezing or increased work of breathing using Per Protocol order mode. Repeat RT Therapy Protocol Assessment with second treatment then QID and as needed. Greater than 13 - discontinue any other Inpatient bronchodilator aerosolized medication orders and enter DuoNeb Nebulizer order every 4 hours frequency and Albuterol Nebulizer every 2 hours PRN wheezing or increased work of breathing using Per Protocol order mode. Repeat RT Therapy Protocol Assessment with second treatment then every 4 hours and as needed. RT to enter RT Home Evaluation for COPD & MDI Assessment order using Per Protocol order mode.     Electronically signed by Michelle Duke RCP on 9/10/2021 at 7:44 PM

## 2021-09-10 NOTE — PROGRESS NOTES
4 Eyes Skin Assessment     NAME:  Deniz Maciel  YOB: 1935  MEDICAL RECORD NUMBER:  [id number]    The patient is being assess for  Admission    I agree that 2 RN's have performed a thorough Head to Toe Skin Assessment on the patient. ALL assessment sites listed below have been assessed. Areas assessed by both nurses:    Head, Face, Ears, Shoulders, Back, Chest, Arms, Elbows, Hands, Sacrum. Buttock, Coccyx, Ischium and Legs. Feet and Heels        Does the Patient have a Wound?  No noted wound(s)       Alphonse Prevention initiated:  Yes   Wound Care Orders initiated:  No    Pressure Injury (Stage 3,4, Unstageable, DTI, NWPT, and Complex wounds) if present place consult order under [de-identified] NA    New and Established Ostomies if present place consult order under : NA      Nurse 1 eSignature: Electronically signed by Chelle Medley RN on 9/10/21 at 3:48 AM EDT    **SHARE this note so that the co-signing nurse is able to place an eSignature**    Nurse 2 eSignature: Electronically signed by Jeanne Rao RN on 9/10/21 at 3:49 AM EDT

## 2021-09-10 NOTE — PROGRESS NOTES
Clinical Pharmacy Note  Heparin Dosing       Lab Results   Component Value Date    APTT 39.6 09/10/2021     Lab Results   Component Value Date    HGB 11.2 09/09/2021    HCT 34.0 09/09/2021     09/09/2021    INR 1.08 06/13/2018       Current Infusion Rate: 1000 units/hr    Plan:  Bolus: 4000 units  Rate: increase to 1480 units/hr  Next aPTT: 0900 9/10/21    Pharmacy will continue to monitor and adjust based on aPTT results.   Connie Bumpers, CarlyleD

## 2021-09-10 NOTE — DISCHARGE INSTR - COC
Continuity of Care Form    Patient Name: Howie Rey   :  1935  MRN:  [id number]    Admit date:  2021  Discharge date:  21    Code Status Order: DNR-CCA   Advance Directives:      Admitting Physician:  Tia Schrader MD  PCP: Renata West MD    Discharging Nurse: THE [facility] Unit/Room#: K3W-6886/5255-01  Discharging Unit Phone Number: [phone]    Emergency Contact:   Extended Emergency Contact Information  Primary Emergency Contact: [facility]  Address: [facility], [address] [place] [address],[address] Phone: [phone]  Relation: Spouse  Secondary Emergency Contact: Tai Dillon  Home Phone: [phone]  Relation: Child  Preferred language: English   needed?  No    Past Surgical History:  Past Surgical History:   Procedure Laterality Date    CATARACT REMOVAL Right 7/3/13    Carlos Craft TURP  12    Teja       Immunization History:   Immunization History   Administered Date(s) Administered    COVID-19, J&J, PF, 0.5 mL 2021    Influenza Virus Vaccine 10/07/2009, 2010    Influenza, High Dose (Fluzone 65 yrs and older) 2011, 10/01/2012, 10/29/2013, 10/23/2014, 2015, 2016, 2017, 2018, 2020    Influenza, Triv, inactivated, subunit, adjuvanted, IM (Fluad 65 yrs and older) 2019    Pneumococcal Conjugate 13-valent (Ddzccst10) 2015    Pneumococcal Polysaccharide (Manvphras14) 2002, 2016    Td, unspecified formulation 2001, 2012    Tdap (Boostrix, Adacel) 2015, 2017    Zoster Live (Zostavax) 2014, 2015       Active Problems:  Patient Active Problem List   Diagnosis Code    COPD, severe ([facility] J44.9   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    Stage 3 chronic Resulted            Nurse Assessment:  Last Vital Signs: /67   Pulse 73   Temp 97.8 °F (36.6 °C) (Oral)   Resp 18   Ht 5' 9\" (1.753 m)   Wt 266 lb 15.6 oz (121.1 kg)   SpO2 90%   BMI 39.43 kg/m²     Last documented pain score (0-10 scale): Pain Level: 0  Last Weight:   Wt Readings from Last 1 Encounters:   09/10/21 266 lb 15.6 oz (121.1 kg)     Mental Status:  oriented    IV Access:  - None    Nursing Mobility/ADLs:  Walking   Assisted  Transfer  Assisted  Bathing  Assisted  Dressing  Assisted  Toileting  Assisted  Feeding  Independent  Med Admin  Assisted  Med Delivery   whole    Wound Care Documentation and Therapy:        Elimination:  Continence:   · Bowel: Yes  · Bladder: No  Urinary Catheter: None   Colostomy/Ileostomy/Ileal Conduit: No       Date of Last BM: 09/15/21***    Intake/Output Summary (Last 24 hours) at 9/10/2021 1554  Last data filed at 9/10/2021 1451  Gross per 24 hour   Intake 360 ml   Output 200 ml   Net 160 ml     I/O last 3 completed shifts: In: 360 [P.O.:360]  Out: 200 [Urine:200]    Safety Concerns: At Risk for Falls    Impairments/Disabilities:      None    Nutrition Therapy:  Current Nutrition Therapy:   - Oral Diet:  Carb Control 5 carbs/meal (2000kcals/day) and Low Sodium (2gm)    Routes of Feeding: Oral  Liquids: Thin Liquids  Daily Fluid Restriction: yes  Last Modified Barium Swallow with Video (Video Swallowing Test): not done    Treatments at the Time of Hospital Discharge:   Respiratory Treatments: ***  Oxygen Therapy:  is on oxygen at 3 L/min per nasal cannula.   Ventilator:    - BiPAP   IPAP: 18 cmH20, CPAP/EPAP: 6 cmH2O Rate 10-12, 50% when sleeping and PRN    Rehab Therapies: Physical Therapy and Occupational Therapy  Weight Bearing Status/Restrictions: No weight bearing restirctions  Other Medical Equipment (for information only, NOT a DME order):  wheelchair and walker  Other Treatments:     Heart Failure Instructions for Daily Management  Patient was treated for acute on chronic diastolic heart failure. he  will require the following:     Please weigh daily on the same scale and approximately the same time of day. Report weight gain of 3 pounds/day or 5 pounds/week to : Lee [phone].  Please use hospital discharge weight as baseline reference.  Please monitor for signs and symptoms of and report to MD:  o Worsening Heart Failure: sudden weight gain, shortness of breath, lower extremity or general edema/swelling, abdominal bloating/swelling, inability to lie flat, intolerance to usual activity, or cough (especially at night). Report these finding even if no increase in weight.  o Dehydration:  having difficulty or a decrease in urination, dizziness, worsening fatigue, or new onset/worsening of generalized weakness.  Please continue a LOW SODIUM diet and LIMIT fluid intake to 48 - 64 ounces ( 1.5 - 2 liters) per day. [facility] Call Lee [phone] and/or Marcos Urbina @ [phone] with any questions or concerns.  Please continue heart failure education to patient and family/support system.  See After Visit Summary for hospital follow up appointment details.  Consider spiritual care referral for support and/or completion of advance directives [phone].  Consider: having the facility MD complete required 7 day follow up.       Patient's personal belongings (please select all that are sent with patient):  None    RN SIGNATURE:  Electronically signed by Ted Soto RN on 9/16/21 at 2:54 PM EDT    CASE MANAGEMENT/SOCIAL WORK SECTION    Inpatient Status Date: 9/16/2021    Readmission Risk Assessment Score:  Readmission Risk              Risk of Unplanned Readmission:  24           Discharging to Facility/ Agency   · [facility]  · [facility]   · Phone:  [phone]  · Fax:  [phone]      /Social Worker signature: Electronically signed by David Victoria RN Case Management [phone] on 9/16/2021 at 1:02 PM      PHYSICIAN SECTION    Prognosis: Fair    Condition at Discharge: Stable    Rehab Potential (if transferring to Rehab): Fair    Recommended Labs or Other Treatments After Discharge: pt/ot  BIPAP     Physician Certification: I certify the above information and transfer of Mac Mendoza  is necessary for the continuing treatment of the diagnosis listed and that he requires [facility] for greater 30 days.      Update Admission H&P: No change in H&P    PHYSICIAN SIGNATURE:  Electronically signed by Emely Palacios MD on 9/16/21 at 10:47 AM EDT

## 2021-09-11 NOTE — PROGRESS NOTES
GFRAA >60 04/15/2013    LABGLOM 32 09/11/2021     LFT:   Lab Results   Component Value Date    ALKPHOS 60 08/07/2021    ALT 7 08/07/2021    AST 10 08/07/2021    PROT 6.8 08/07/2021    PROT 7.1 01/15/2013    BILITOT 0.4 08/07/2021    BILIDIR <0.2 06/12/2020    IBILI see below 06/12/2020     Magnesium:    Lab Results   Component Value Date    MG 2.10 08/12/2021     Phosphorus:    Lab Results   Component Value Date    PHOS 4.2 08/14/2021     PT/INR:  No results found for: PTINR  U/A:    Lab Results   Component Value Date    LEUKOCYTESUR Negative 09/10/2021    NITRITE neg 04/10/2015    WBCUA 3 09/10/2021    RBCUA 1 09/10/2021    SPECGRAV 1.012 09/10/2021    UROBILINOGEN 0.2 09/10/2021    BILIRUBINUR Negative 09/10/2021    BILIRUBINUR neg 04/10/2015    BLOODU Negative 09/10/2021    GLUCOSEU Negative 09/10/2021    PROTEINU 100 09/10/2021     ABG:    Lab Results   Component Value Date    PHART 7.416 08/11/2021    CKQ5RML 50.3 08/11/2021    U5PVRNMV 95.1 08/11/2021    XTZ7NYW 32.3 08/11/2021    BEART 6.7 08/11/2021    PO2ART 74.6 08/11/2021    NKE3ZTQ 33.9 08/11/2021           Intake/Output Summary (Last 24 hours) at 9/11/2021 1342  Last data filed at 9/11/2021 1328  Gross per 24 hour   Intake 580 ml   Output 200 ml   Net 380 ml       Consults:  IP CONSULT TO HOSPITALIST  IP CONSULT TO CARDIOLOGY  IP CONSULT TO CARDIOLOGY  IP CONSULT TO [facility] [employer] Problems    Diagnosis Date Noted    Stage 3 chronic kidney disease ([facility] [N18.30]      Priority: High    DMII (diabetes mellitus, type 2) ([facility] [E11.9]      Priority: High    Morbid obesity due to excess calories ([facility] [E66.01]      Priority: High    Mixed hyperlipidemia [E78.2]      Priority: High    Demand ischemia ([facility] [I24.8]     COPD exacerbation ([facility] [J44.1] 09/09/2021    Abnormal EKG [R94.31] 09/09/2021    CAD (coronary artery disease) [I25.10] 09/09/2021    Acute on chronic respiratory failure with hypoxia and hypercapnia (HCC) [L72.55, J96.22]     Atrial fibrillation (HCC) [I48.91]     Chronic diastolic heart failure (HCC) [I50.32]     Essential hypertension [I10]     Elevated troponin [R77.8] 03/06/2018    Acute on chronic respiratory failure with hypoxia (HCC) [J96.21]        ASSESSMENT AND PLAN:    COPD (acute exacerbation) - Minimal improvement since admission. Continue nebulizer treatments, solumedrol and antibiotics as prescribed. The Solumedrol will be tapered as the patient improves. Patient will be monitored closely, and deep breathing and coughing will be encouraged while awake. - Will consult Pulmonary     Acute on chronic respiratory failure with hypoxia and hypercapnia (HCC) - as evidenced by + use of accessory muscles, air hunger and an oxygen saturation of less than 90% on his baseline 3 L nasal cannula  - due to above; provide supplemental oxygen as necessary to keep SaO2 92% or greater.     Elevated troponin, Abnormal EKG -patient has an elevated troponin at baseline. His troponin on admission was higher than normal.  In addition to this he has concerning EKG changes. Cardiology has been consulted. \"Demand ischemia. History is not consistent with acute coronary syndrome. Troponin chronically elevated for the last 3 years. There is no significant change in the minimal troponin elevation. Patient likely has underlying coronary artery disease but would not pursue stress testing or angiography with advanced age and comorbid conditions. Will discontinue IV heparin. Continue statin. \"     CAD (coronary artery disease) - as above. Continue beta blocker, statin and aspirin. Monitor on telemetry.      Generalized weakness/fatigue, fall at home - Will ask PT/OT to evaluate and treat patient, and if necessary to provide recommendations for post hospital therapy     Chronic Renal Failure stage III - Renal function is at/near baseline and is stable; Monitor renal function and avoid Nephrotoxic agents as able      Atrial fibrillation [facility]) -currently in first-degree AV block. Continue labetalol and monitor.         Diabetes mellitus II - Lantus, SSI and when able to eat, provide a carb control diet     Essential (primary) hypertension - continue home meds and monitor blood pressure     Hyperlipidemia - No current evidence of Rhabdomyolysis or other adverse effects. Continue statin therapy while in the hospital     Morbid obesity due to excess calories (Body mass index is 39.72 kg/m². ) - Complicating assessment and treatment. Placing patient at risk for multiple co-morbidities as well as early death and contributing to the patient's presentation.  on weight loss when appropriate. DVT Prophylaxis: Heparin  Diet: ADULT DIET; Regular; 5 carb choices (75 gm/meal); Low Sodium (2 gm); 1500 ml  Code Status: DNR-CCA    PT/OT Eval Status: In progress  PT score on the AM-PAC short mobility form (if applicable) - 97/01  OT score on the AM-PAC short mobility form (if applicable) - 51/16    Dispo - Anticipated discharge date 3+ days still.      Rickie Coronado MD, MD

## 2021-09-11 NOTE — CONSULTS
(congestive heart failure) (HCC)     CKD (chronic kidney disease) stage 3, GFR 30-59 ml/min ([facility]     COPD (chronic obstructive pulmonary disease) ([facility]     CVA, old, ataxia 2007    DM type 2 with diabetic peripheral neuropathy ([facility] 10/23/2014    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 2008    Fall 9/2015    Fatty liver     Hypertension     Mixed hyperlipidemia     Morbid obesity ([facility]     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 [facility]) 2008    Obesity, diabetes, and hypertension syndrome ([facility] August, 2015    Psoriasis     Risk for falls     Type 2 diabetes mellitus with microalbuminuria or microproteinuria     Vitamin D deficiency 2011       PAST SURGICAL HISTORY:  Past Surgical History:   Procedure Laterality Date    CATARACT REMOVAL Right 7/3/13    Sherryle Blue    TURP  8/14/12    Teaj       FAMILY HISTORY:  family history is not on file. SOCIAL HISTORY:   reports that he quit smoking about 14 years ago. His smoking use included cigarettes. He started smoking about 70 years ago. He has a 100.00 pack-year smoking history.  He has never used smokeless tobacco.    Scheduled Meds:   heparin (porcine)  5,000 Units SubCUTAneous 3 times per day    atorvastatin  20 mg Oral Daily    labetalol  100 mg Oral TID    isosorbide mononitrate  30 mg Oral Daily    hydrALAZINE  25 mg Oral 3 times per day    gabapentin  300 mg Oral QAM    And    gabapentin  600 mg Oral Nightly    furosemide  40 mg Oral BID    budesonide-formoterol  2 puff Inhalation BID    escitalopram  10 mg Oral Daily    doxazosin  8 mg Oral Nightly    aspirin  81 mg Oral Daily    ipratropium-albuterol  1 ampule Inhalation Q4H WA    methylPREDNISolone  40 mg IntraVENous Q6H    insulin lispro  0-12 Units SubCUTAneous TID WC    insulin lispro  0-6 Units SubCUTAneous Nightly    sodium chloride flush  10 mL IntraVENous 2 times per day       Continuous Infusions:   dextrose      sodium chloride         PRN Meds:  ipratropium-albuterol, glucose, dextrose, glucagon (rDNA), dextrose, sodium chloride flush, sodium chloride, ondansetron, polyethylene glycol, acetaminophen **OR** acetaminophen    ALLERGIES:  Patient has No Known Allergies. REVIEW OF SYSTEMS:  Constitutional: Negative for fever    HENT: Negative for sore throat  Eyes: Negative for redness   Respiratory: Negative for dyspnea, cough  Cardiovascular: Negative for chest pain  Gastrointestinal: Negative for vomiting, diarrhea    Genitourinary: Negative for hematuria   Musculoskeletal: Negative for arthralgias   Skin: Negative for rash  Neurological: Negative for syncope  Hematological: Negative for adenopathy  Psychiatric/Behavorial: Negative for anxiety    Objective:   PHYSICAL EXAM:  Blood pressure 129/74, pulse 69, temperature 97.9 °F (36.6 °C), temperature source Oral, resp. rate 18, height 5' 9\" (1.753 m), weight 268 lb 11.9 oz (121.9 kg), SpO2 95 %.'  Gen: No distress. Eyes: PERRL. No sclera icterus. No conjunctival injection. ENT: No discharge. Pharynx clear. External appearance of ears and nose normal.  Neck: Trachea midline. No obvious mass. Resp: No accessory muscle use. No crackles. No wheezes. No rhonchi. CV: Regular rate. Regular rhythm. No murmur or rub. No edema. GI: Non-tender. Non-distended. No hernia. Skin: Warm, dry, normal texture and turgor. No nodule on exposed extremities. Lymph: No cervical LAD. No supraclavicular LAD. M/S: No cyanosis. No clubbing. No joint deformity. Neuro: Moves all four extremities. Psych: Oriented x 3. No anxiety. Awake. Alert. Intact judgement and insight.       Data Reviewed:   LABS:  CBC:   Recent Labs     09/09/21  1502 09/10/21  0626 09/11/21  0603   WBC 4.3 4.9 7.7   HGB 11.2* 10.7* 10.8*   HCT 34.0* 32.5* 32.5*   MCV 85.6 85.8 85.3    439 459*     BMP:   Recent Labs     09/09/21  1502 09/10/21  0626 09/11/21  0603    141 137   K 4.2 4.4 4.4   CL 95* 96* 93*   CO2 38* 32 30   BUN 17 24* 41*   CREATININE 1.8* 1. 9* 2.0*     LIVER PROFILE: No results for input(s): AST, ALT, LIPASE, BILIDIR, BILITOT, ALKPHOS in the last 72 hours. Invalid input(s): AMYLASE,  ALB  PT/INR: No results for input(s): PROTIME, INR in the last 72 hours. APTT:   Recent Labs     09/09/21  1913 09/10/21  0124 09/10/21  0955   APTT 37.3 39.6* 55.5*     UA:  Recent Labs     09/10/21  0635   COLORU YELLOW   PHUR 5.5   WBCUA 3   RBCUA 1   CLARITYU Clear   SPECGRAV 1.012   LEUKOCYTESUR Negative   UROBILINOGEN 0.2   BILIRUBINUR Negative   BLOODU Negative   GLUCOSEU Negative     No results for input(s): PHART, QLZ5FNT, PO2ART in the last 72 hours. Vent Information  Skin Assessment: Clean, dry, & intact  SpO2: 95 %    Radiology Review:  Pertinent images / reports were reviewed as a part of this visit. CT Chest w/ contrast: No results found for this or any previous visit. CT Chest w/o contrast: Results for orders placed during the hospital encounter of 08/07/21    CT CHEST WO CONTRAST    Narrative  EXAMINATION:  CT OF THE CHEST WITHOUT CONTRAST 8/10/2021 5:59 pm    TECHNIQUE:  CT of the chest was performed without the administration of intravenous  contrast. Multiplanar reformatted images are provided for review. Dose  modulation, iterative reconstruction, and/or weight based adjustment of the  mA/kV was utilized to reduce the radiation dose to as low as reasonably  achievable. COMPARISON:  None. HISTORY:  ORDERING SYSTEM PROVIDED HISTORY: LLL effusion and/or infiltrate  TECHNOLOGIST PROVIDED HISTORY:  Reason for exam:->LLL effusion and/or infiltrate  Reason for Exam: LLL effusion and/or infiltrate  Acuity: Acute  Type of Exam: Initial    FINDINGS:  Mediastinum: There is mild cardiomegaly, with a trace pericardial effusion. Old granulomatous disease is noted. Lungs/pleura: There are small, dependently layered bilateral pleural  effusions. Left basilar pleural calcifications are present.   There is  dependent atelectasis in the lower lobes.  Subsegmental atelectasis is noted  in the right middle lobe. Emphysema and mild peripheral fibrosis is noted. Upper Abdomen: The adrenal glands are unremarkable. Cholecystectomy clips  are present. Soft Tissues/Bones: No acute osseous abnormality is appreciated. There are  old left-sided rib fractures. The chest wall is unremarkable. Impression  Small bilateral pleural effusions, associated with dependent atelectasis. Superimposed pneumonia is less likely. Subsegmental atelectasis in the right middle lobe. Emphysema and mild peripheral fibrosis. Cardiomegaly and trace pericardial effusion. CTPA: No results found for this or any previous visit. CXR PA/LAT: Results for orders placed during the hospital encounter of 08/07/21    XR CHEST (2 VW)    Narrative  EXAMINATION:  TWO XRAY VIEWS OF THE CHEST    8/8/2021 10:05 am    COMPARISON:  08/07/2021    HISTORY:  ORDERING SYSTEM PROVIDED HISTORY: resp failure  TECHNOLOGIST PROVIDED HISTORY:  Reason for exam:->resp failure  Reason for Exam: sob resp failure  Acuity: Acute  Type of Exam: Initial    FINDINGS:  The cardiac silhouette is enlarged. Stable interstitial changes throughout  the lungs with bilateral pleural effusions and bibasilar volume loss, left  greater than right. Findings are most consistent with CHF. Aortic vascular  calcification. Impression  Stable findings most consistent with CHF. Interstitial edema with bilateral  pleural effusions and left basilar volume loss.       CXR portable: Results for orders placed during the hospital encounter of 09/09/21    XR CHEST PORTABLE    Narrative  EXAMINATION:  ONE XRAY VIEW OF THE CHEST    9/9/2021 2:48 pm    COMPARISON:  Chest radiograph 08/08/2021    HISTORY:  ORDERING SYSTEM PROVIDED HISTORY: SOB  TECHNOLOGIST PROVIDED HISTORY:  Reason for exam:->SOB  Reason for Exam: SOB  Acuity: Acute  Type of Exam: Initial    FINDINGS:  There are bilateral pulmonary interstitial opacities extending to the  periphery with small curvilinear blunting of the left costophrenic angle. There is trace blunting of the right costophrenic angle. Cardiomediastinal  silhouette is prominent but unchanged with calcification in the aortic arch. Impression  Pulmonary interstitial edema with left greater than right bilateral pleural  fluid.

## 2021-09-12 NOTE — PROGRESS NOTES
Patient daughter requests that social work call patients wife or go to see in ICU for discharge planning as they will need assistance, most likely requiring skilled stay. Will pass on in report and give update to charge RN to pass this along for tomorrow.   Electronically signed by Zo Soriano RN on 9/12/2021 at 3:17 PM

## 2021-09-12 NOTE — PLAN OF CARE
7975 by Verona Adamson RN  Outcome: Ongoing     Problem: Pain:  Goal: Pain level will decrease  Description: Pain level will decrease  9/12/2021 1326 by Hillary Salinas RN  Outcome: Ongoing  9/12/2021 0108 by Verona Adamson RN  Outcome: Ongoing  Goal: Control of acute pain  Description: Control of acute pain  9/12/2021 1326 by Hillary Salinas RN  Outcome: Ongoing  9/12/2021 0108 by Verona Adamson RN  Outcome: Ongoing  Goal: Control of chronic pain  Description: Control of chronic pain  9/12/2021 1326 by Hillary Salinas RN  Outcome: Ongoing  9/12/2021 0108 by Verona Adamson RN  Outcome: Ongoing

## 2021-09-12 NOTE — RT PROTOCOL NOTE
RT Nebulizer Bronchodilator Protocol Note    There is a bronchodilator order in the chart from a provider indicating to follow the RT Bronchodilator Protocol and there is an Initiate RT Bronchodilator Protocol order as well (see protocol at bottom of note). The findings from the last RT Protocol Assessment were as follows:  Smoking: >15 Pack years  Surgical Status: No surgery  Xray: Multiple lobe infiltrates/atelectasis/pleural effusion/edema  Respiratory Pattern: Dyspnea with exertion  Mental Status: Alert and Oriented  Breath Sounds: Diminished and/or crackles  Cough: Strong, spontaneous, non-productive  Activity Level: Walking unassisted  Oxygen Requirement: 41% or 6LNC - 60%  Indication for Bronchodilator Therapy: Decreased or absent breath sounds  Bronchodilator Assessment Score: 8    Aerosolized bronchodilator medication orders have been revised according to the RT Bronchodilator Protocol. RT Bronchodilator Protocol:    Respiratory Therapist to perform RT Therapy Protocol Assessment then follow the protocol. No Indications - adjust the frequency to every 6 hours PRN wheezing or bronchospasm, if no treatments needed after 48 hours then discontinue using Per Protocol order mode. If indication present, adjust the RT bronchodilator orders based on the Bronchodilator Assessment Score as follows:    0-6 - enter or revise RT Bronchodilator order to Albuterol Nebulizer order with frequency of every 2 hours PRN for wheezing or increased work of breathing using Per Protocol order mode. Repeat RT Therapy Protocol Assessment as needed. 7-10 - discontinue any other Inpatient aerosolized bronchodilator medication orders and enter or revise two Albuterol Nebulizer orders, one with BID frequency and one with frequency of every 2 hours PRN wheezing or increased work of breathing using Per Protocol order mode. Repeat RT Therapy Protocol Assessment with second treatment then BID and as needed.       11-13 - discontinue any other Inpatient aerosolized bronchodilator medication orders and enter DuoNeb Nebulizer order with QID frequency and an Albuterol Nebulizer order with frequency of every 2 hours PRN wheezing or increased work of breathing using Per Protocol order mode. Repeat RT Therapy Protocol Assessment with second treatment then QID and as needed. Greater than 13 - discontinue any other Inpatient aerosolized bronchodilator medication orders and enter a DuoNeb Nebulizer order with every 4 hours frequency and an Albuterol Nebulizer order with frequency of every 2 hours PRN wheezing or increased work of breathing using Per Protocol order mode. Repeat RT Therapy Protocol Assessment with second treatment then every 4 hours and as needed. RT to enter RT Home Evaluation for COPD & MDI Assessment order using Per Protocol order mode.     Electronically signed by Tiffanie Marcelino RCP on 9/12/2021 at 4:52 PM

## 2021-09-12 NOTE — PROGRESS NOTES
Pulmonary Progress Note    Date of Admission: 9/9/2021   LOS: 3 days       CC:  hypoxemia    Subjective: Worsening hypoxemia over night with chest X-ray with pulmonary edema, worsening diuresis increased     ROS:   No nausea  No Vomiting  No chest pain       Assessment:     Copd  Chronic hypoxemia 3 L NC (increased to 4-5 Sept '21)   chf , diastolic dysfunction   Pleural plaques   ROSENDO - not using cpap   CKD stage 3b  Hx pulmonary nodule   Hx dm   Hx CVA   HTN    Plan:        Hospital Day: 3     Pleural effusion and pulmonary edema  * would likely benefit from volume reduction but concerned secondary to CKD   * regiment increased over night      COPD  * no wheezing   * baseline o2 @ 4-5 L   * prednisone , will d/c prednisone since this can encourage fluid retention. No wheezing. More likely fluid overload. * home Breo and Spiriva   * continue Symbicort   *   Duoneb's    Q8, no wheezing.         CHF   * chronic lower extremities and weight  * with continued shortness of breath, challenging dry weight may be correct but concerning given CKD         ROSENDO  * does not use  * clearly contributing to dyspnea with laying down         obesity  * Body mass index is 39.69 kg/m². * would benefit from weight loss but has not followed this advise       Data:        PHYSICAL EXAM:   Blood pressure (!) 150/96, pulse 72, temperature 97.3 °F (36.3 °C), temperature source Oral, resp. rate 16, height 5' 9\" (1.753 m), weight 269 lb 6.4 oz (122.2 kg), SpO2 94 %.'  Body mass index is 39.78 kg/m². Gen: No distress. ENT:   Resp: No accessory muscle use. No crackles. No wheezes. No rhonchi. CV: Regular rate. Regular rhythm. No murmur or rub. + edema. Skin: Warm, dry, normal texture and turgor. No nodule on exposed extremities. M/S: No cyanosis. No clubbing. No joint deformity. Psych: Oriented x 3. No anxiety. Awake. Alert. Intact judgement and insight. Good Mood / Affect.   Memory appears in tact Medications:    Scheduled Meds:   metOLazone  5 mg Oral Daily    heparin (porcine)  5,000 Units SubCUTAneous 3 times per day    predniSONE  40 mg Oral Daily    ipratropium-albuterol  1 ampule Inhalation Q8H    atorvastatin  20 mg Oral Daily    labetalol  100 mg Oral TID    isosorbide mononitrate  30 mg Oral Daily    hydrALAZINE  25 mg Oral 3 times per day    gabapentin  300 mg Oral QAM    And    gabapentin  600 mg Oral Nightly    furosemide  40 mg Oral BID    budesonide-formoterol  2 puff Inhalation BID    escitalopram  10 mg Oral Daily    doxazosin  8 mg Oral Nightly    aspirin  81 mg Oral Daily    insulin lispro  0-12 Units SubCUTAneous TID WC    insulin lispro  0-6 Units SubCUTAneous Nightly    sodium chloride flush  10 mL IntraVENous 2 times per day       Continuous Infusions:   dextrose      sodium chloride         PRN Meds:  melatonin, ipratropium-albuterol, glucose, dextrose, glucagon (rDNA), dextrose, sodium chloride flush, sodium chloride, ondansetron, polyethylene glycol, acetaminophen **OR** acetaminophen    Labs reviewed:  CBC:   Recent Labs     09/10/21  0626 09/11/21  0603 09/12/21  0629   WBC 4.9 7.7 8.6   HGB 10.7* 10.8* 11.0*   HCT 32.5* 32.5* 33.2*   MCV 85.8 85.3 84.6    459* 484*     BMP:   Recent Labs     09/10/21  0626 09/11/21  0603 09/12/21  0629    137 138   K 4.4 4.4 4.1   CL 96* 93* 94*   CO2 32 30 32   BUN 24* 41* 50*   CREATININE 1.9* 2.0* 2.0*     LIVER PROFILE: No results for input(s): AST, ALT, LIPASE, BILIDIR, BILITOT, ALKPHOS in the last 72 hours. Invalid input(s): AMYLASE,  ALB  PT/INR: No results for input(s): PROTIME, INR in the last 72 hours.   APTT:   Recent Labs     09/09/21  1913 09/10/21  0124 09/10/21  0955   APTT 37.3 39.6* 55.5*     UA:  Recent Labs     09/10/21  0635   COLORU YELLOW   PHUR 5.5   WBCUA 3   RBCUA 1   CLARITYU Clear   SPECGRAV 1.012   LEUKOCYTESUR Negative   UROBILINOGEN 0.2   BILIRUBINUR Negative   BLOODU Negative fibrosis. Cardiomegaly and trace pericardial effusion. CTPA: No results found for this or any previous visit. CXR PA/LAT: Results for orders placed during the hospital encounter of 08/07/21    XR CHEST (2 VW)    Narrative  EXAMINATION:  TWO XRAY VIEWS OF THE CHEST    8/8/2021 10:05 am    COMPARISON:  08/07/2021    HISTORY:  ORDERING SYSTEM PROVIDED HISTORY: resp failure  TECHNOLOGIST PROVIDED HISTORY:  Reason for exam:->resp failure  Reason for Exam: sob resp failure  Acuity: Acute  Type of Exam: Initial    FINDINGS:  The cardiac silhouette is enlarged. Stable interstitial changes throughout  the lungs with bilateral pleural effusions and bibasilar volume loss, left  greater than right. Findings are most consistent with CHF. Aortic vascular  calcification. Impression  Stable findings most consistent with CHF. Interstitial edema with bilateral  pleural effusions and left basilar volume loss. CXR portable: Results for orders placed during the hospital encounter of 09/09/21    XR CHEST PORTABLE    Narrative  EXAMINATION:  ONE XRAY VIEW OF THE CHEST    9/12/2021 5:46 am    COMPARISON:  09/09/2021    HISTORY:  ORDERING SYSTEM PROVIDED HISTORY: SOB  TECHNOLOGIST PROVIDED HISTORY:  Reason for exam:->SOB  Reason for Exam: Shortness of Breath  Relevant Medical/Surgical History: hx copd, chf    FINDINGS:  The cardiac silhouette is enlarged. Pulmonary vessels are congested. There  are increased interstitial opacities. There are hazy opacities in the lung  bases, increased on the right in the interval.    Impression  Vascular congestion. Airspace disease in the lung bases, along with suspected bilateral pleural  effusions. There is mild progression in airspace disease in the right base. Pulmonary edema with atelectasis, versus pneumonia. This note was transcribed using [employer]. Please disregard any translational errors.       Karuna Rodriguez [facility]  [phone]

## 2021-09-12 NOTE — PROGRESS NOTES
Called by nursing overnight, patient still notably hypoxic and requiring 15 L nonrebreather oxygen, gave dose of Bumex 1 mg as well as Zaroxolyn 5 mg, patient still notably hypoxic. Chest x-ray obtained and notably showed worsening pulmonary edema compared to previous x-ray, decide to start patient on BiPAP therapy for positive pressure ventilation to see if it would help diuresis. Patient might benefit from repeat CAT scan to evaluate for fluid overload. May even benefit probably from possible thoracentesis versus dialysis if not improving with diuretics.

## 2021-09-12 NOTE — PLAN OF CARE
Problem: Falls - Risk of:  Goal: Will remain free from falls  Description: Will remain free from falls  9/12/2021 0108 by Ziggy Pennington RN  Outcome: Ongoing  9/11/2021 1500 by Cornelius Peterson RN  Outcome: Ongoing  Goal: Absence of physical injury  Description: Absence of physical injury  9/12/2021 0108 by Ziggy Pennington RN  Outcome: Ongoing  9/11/2021 1500 by Cornelius Peterson RN  Outcome: Ongoing     Problem: Skin Integrity:  Goal: Will show no infection signs and symptoms  Description: Will show no infection signs and symptoms  9/12/2021 0108 by Ziggy Pennington RN  Outcome: Ongoing  9/11/2021 1500 by Cornelius Peterson RN  Outcome: Ongoing  Goal: Absence of new skin breakdown  Description: Absence of new skin breakdown  9/12/2021 0108 by Ziggy Pennington RN  Outcome: Ongoing  9/11/2021 1500 by Cornelius Peterson RN  Outcome: Ongoing     Problem: OXYGENATION/RESPIRATORY FUNCTION  Goal: Patient will maintain patent airway  9/12/2021 0108 by Ziggy Pennington RN  Outcome: Ongoing  9/11/2021 1500 by Cornelius Peterson RN  Outcome: Ongoing  Goal: Patient will achieve/maintain normal respiratory rate/effort  Description: Respiratory rate and effort will be within normal limits for the patient  9/12/2021 0108 by Ziggy Pennington RN  Outcome: Ongoing  9/11/2021 1500 by Cornelius Peterson RN  Outcome: Ongoing     Problem: HEMODYNAMIC STATUS  Goal: Patient has stable vital signs and fluid balance  9/12/2021 0108 by Ziggy Pennington RN  Outcome: Ongoing  9/11/2021 1500 by Cornelius Peterson RN  Outcome: Ongoing     Problem: FLUID AND ELECTROLYTE IMBALANCE  Goal: Fluid and electrolyte balance are achieved/maintained  9/12/2021 0108 by Ziggy Pennington RN  Outcome: Ongoing  9/11/2021 1500 by Cornelius Peterson RN  Outcome: Ongoing     Problem: ACTIVITY INTOLERANCE/IMPAIRED MOBILITY  Goal: Mobility/activity is maintained at optimum level for patient  9/12/2021 0108 by Ziggy Pennington RN  Outcome: Ongoing  9/11/2021 1500 by Rosa Bear RN  Outcome: Ongoing     Problem: Pain:  Description: Pain management should include both nonpharmacologic and pharmacologic interventions.   Goal: Pain level will decrease  Description: Pain level will decrease  Outcome: Ongoing  Goal: Control of acute pain  Description: Control of acute pain  Outcome: Ongoing  Goal: Control of chronic pain  Description: Control of chronic pain  Outcome: Ongoing

## 2021-09-12 NOTE — FLOWSHEET NOTE
Pt maintaining O2 sats in low 90's and remains on 15 l HF O2. Per Dr Platt Dural order perfect serve sent regarding pt response to bumex and duoneb. Orders received.

## 2021-09-13 NOTE — PROGRESS NOTES
Occupational Therapy  Facility/Department: [address] PROGRESSIVE CARE  Daily Treatment Note  Should patient be discharged prior to another treatment session, this note shall serve as the discharge summary. NAME: Jocelyn Leslie  : 1935  MRN: [id number]    Date of Service: 2021    Discharge Recommendations:  Continue to assess pending progress, 3-5 sessions per week, Patient would benefit from continued therapy after discharge       Assessment   Performance deficits / Impairments: Decreased functional mobility ; Decreased strength;Decreased endurance;Decreased ADL status; Decreased balance;Decreased high-level IADLs  Assessment: Seen in room. O2 dropped with amb earlier (on 92% after HEP) and reviewed pursed lip breathing and need to work on taking deep breaths. Pt completed HEP with good effort. Cont OT POC, 3-5x week  OT Education: OT Role;Plan of Care;Transfer Training;Home Exercise Program;Family Education  Patient Education: pursed lip breathing  REQUIRES OT FOLLOW UP: Yes  Activity Tolerance  Activity Tolerance: Patient limited by fatigue  Safety Devices  Safety Devices in place: Yes  Type of devices: Chair alarm in place;Call light within reach; Left in chair;Gait belt         Patient Diagnosis(es): The primary encounter diagnosis was Elevated troponin. Diagnoses of Acute electrocardiogram changes, Shortness of breath, and Increased oxygen demand were also pertinent to this visit.       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 has a past surgical history that includes TURP (8/14/12) and Cataract removal (Right, 7/3/13). Restrictions  Restrictions/Precautions  Restrictions/Precautions: Fall Risk  Position Activity Restriction  Other position/activity restrictions: 6L 02 per NC; external male catheter  Subjective   General  Chart Reviewed: Yes  Patient assessed for rehabilitation services?: Yes  Additional Pertinent Hx: per CNP note, Patria Juárez is a 81 y.o. 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 Denies chest pain, nausea, vomiting, presyncope, fever, chills, sweats, changes militates, hemoptysis, leg/calf pain or swelling, abdominal pain, diarrhea. Patient was discharged from [facility] after he was therefore rehab secondary to deconditioning for 2.5 weeks. He wears 3 L of O2 per nasal cannula at baseline but today has required 5 L per nasal cannula to maintain his oxygen saturation in the 90-92% range as he was 88% on his baseline 3 L. Family / Caregiver Present: Yes (dtr)  Referring Practitioner: Jian Echevarria MD  Subjective  Subjective: Pt seen in room, agreed to OT but requested to stay up in [address]  Comments: okay for therapy per RN. Orientation  Orientation  Overall Orientation Status: Within Functional Limits  Objective                                                                Type of ROM/Therapeutic Exercise  Type of ROM/Therapeutic Exercise: AROM  Comment: Pt completed yellow t-band HEP 10 reps x 4 reps.   Difficulty with Left shoulder flex.  Completed AAROM with L shoulder, able to resist with shoulder extension and shoulder abd. x 5                    Plan   Plan  Times per week: 3-5x  Times per day: Daily  Current Treatment Recommendations: Strengthening, Functional Mobility Training, Gait Training, Endurance Training, Balance Training, Self-Care / ADL, Safety Education & Training, Patient/Caregiver Education & Training       OutComes Score    Patria Juárez scored a 16/24 on the AM-PAC ADL Inpatient form. Current research shows that an AM-PAC score of 17 or less is typically not associated with a discharge to the patient's home setting. Based on the patient's AM-PAC score and their current ADL deficits, it is recommended that the patient have 3-5 sessions per week of Occupational Therapy at d/c to increase the patient's independence. Please see assessment section for further patient specific details. If patient discharges prior to next session this note will serve as a discharge summary. Please see below for the latest assessment towards goals.                                                   AM-PAC Score        AM-[facility] Inpatient Daily Activity Raw Score: 16 (09/13/21 1409)  -PAC Inpatient ADL T-Scale Score : 35.96 (09/13/21 1409)  ADL Inpatient CMS 0-100% Score: 53.32 (09/13/21 1409)  ADL Inpatient CMS G-Code Modifier : CK (09/13/21 1409)    Goals  Short term goals  Time Frame for Short term goals: prior to D/C  Short term goal 1: complete functional mobility and transfers with supervision  Short term goal 2: complete bathing and dressing with Min A  Short term goal 3: complete toileting with supervision  Short term goal 4: complete grooming with setup  Long term goals  Time Frame for Long term goals : STG=LTG  Patient Goals   Patient goals : no stated goals       Therapy Time   Individual Concurrent Group Co-treatment   Time In 1340         Time Out 1405         Minutes 25         Timed Code Treatment Minutes: 25 Minutes Art Brood, OT

## 2021-09-13 NOTE — PROGRESS NOTES
Pulmonary Progress Note     Patient's name:  Ellena Paget  Medical Record Number: [id number]  Patient's account/billing number: [de-identified]  Patient's YOB: 1935  Age: 81 y.o. Date of Admission: 9/9/2021  2:28 PM  Date of Consult: 9/13/2021      Primary Care Physician: David Magallanes MD      Code Status: DNR-CCA    Chief complaint: acute on chronic hypoxic and hypercapnic respiratory failure     Assessment and Plan     1. Acute on chronic respiratory failure with hypoxia and hypercapnia   2. Bilateral atelectasis and pleural effusions  3. COPD  4. Acute on chronic diastolic CHF      Plan:  Aggressive IS  Wean Fio2 as tolerated to keep sta > 88%  He would benefit from NIPPV long term, to decrease rate of readmission. Steroid with gradual taper  Diuresis  Bronchodilators            Overnight:  Wore NIPPV overnight  C/o sob and LE swelling     REVIEW OF SYSTEMS:  Review of Systems -   General ROS: negative  Psychological ROS: negative  Ophthalmic ROS: negative  ENT ROS: negative  Allergy and Immunology ROS: negative  Hematological and Lymphatic ROS: negative  Endocrine ROS: negative  Breast ROS: negative  Respiratory ROS: sob, cough, wheezing  Cardiovascular ROS: sob with exertion, LE edema  Gastrointestinal ROS:negative  Genito-Urinary ROS: negative  Musculoskeletal ROS: negative  Neurological ROS: negative  Dermatological ROS: negative        Physical Exam:    Vitals: BP (!) 155/79   Pulse 69   Temp 97.3 °F (36.3 °C) (Axillary)   Resp 20   Ht 5' 9\" (1.753 m)   Wt 269 lb 10 oz (122.3 kg)   SpO2 92%   BMI 39.82 kg/m²     Last Body weight:   Wt Readings from Last 3 Encounters:   09/13/21 269 lb 10 oz (122.3 kg)   08/26/21 273 lb (123.8 kg)   08/15/21 267 lb 6.7 oz (121.3 kg)       Body Mass Index : Body mass index is 39.82 kg/m².       Intake and Output summary:     Intake/Output Summary (Last 24 hours) at 9/13/2021 0899  Last data filed at 9/13/2021 0554  Gross per 24 hour   Intake 1020 ml   Output 2500 ml   Net -1480 ml       Physical Examination:     Gen:  No acute distress. mild  Eyes: PERRL. Anicteric sclera. No conjunctival injection. ENT: No discharge. Posterior oropharynx clear. External appearance of ears and nose normal.  Neck: Trachea midline. No mass   Resp:  diminished bilaterally with no wheezing, prolonged expiratory phase  CV: Regular rate. Regular rhythm. No murmur or rub. ++ edema, chronic stasis dermatitis   GI: Soft, Non-tender. Non-distended. +BS  Skin: Warm, dry, w/o erythema. Lymph: No cervical or supraclavicular LAD. M/S: No cyanosis. No clubbing. Neuro:  CN 2-12 tested, no focal neurologic deficit. Moves all extremities  Psych: Awake and alert, Oriented x 3. Judgement and insight appropriate. Mood stable. Laboratory findings:-    CBC:   Recent Labs     09/13/21  0734   WBC 6.3   HGB 11.6*   *     BMP:    Recent Labs     09/11/21  0603 09/11/21  0603 09/12/21  0629 09/12/21  0629 09/13/21  0734      < > 138   < > 139   K 4.4   < > 4.1   < > 3.8   CL 93*   < > 94*   < > 93*   CO2 30   < > 32   < > 38*   BUN 41*   < > 50*   < > 53*   CREATININE 2.0*  --  2.0*  --  1.9*   GLUCOSE 199*   < > 224*   < > 176*    < > = values in this interval not displayed. S. Calcium:  Recent Labs     09/13/21  0734   CALCIUM 8.9     S. Ionized Calcium:No results for input(s): IONCA in the last 72 hours. S. Magnesium:No results for input(s): MG in the last 72 hours. S. Phosphorus:No results for input(s): PHOS in the last 72 hours. S. Glucose:  Recent Labs     09/12/21  1701 09/12/21 2006 09/13/21  0729   POCGLU 230* 245* 169*     Glycosylated hemoglobin A1C: No results for input(s): LABA1C in the last 72 hours. INR: No results for input(s): INR in the last 72 hours. Hepatic functions: No results for input(s): ALKPHOS, ALT, AST, PROT, BILITOT, BILIDIR, LABALBU in the last 72 hours.   Pancreatic functions:No results for input(s): Severa Pax in the last 72 hours. S. Lactic Acid: No results for input(s): LACTA in the last 72 hours. Cardiac enzymes:No results for input(s): CKTOTAL, CKMB, CKMBINDEX, TROPONINI in the last 72 hours. BNP:No results for input(s): BNP in the last 72 hours. Lipid profile: No results for input(s): CHOL, TRIG, HDL, LDLCALC in the last 72 hours. Invalid input(s): LDL  Blood Gases: No results found for: PH, PCO2, PO2, HCO3, O2SAT  Thyroid functions:   Lab Results   Component Value Date    TSH 1.63 05/14/2019          Radiology Review:  Pertinent images / reports were reviewed as a part of this visit. CT Chest w/ contrast: No results found for this or any previous visit. CT Chest w/o contrast: Results for orders placed during the hospital encounter of 08/07/21    CT CHEST WO CONTRAST    Narrative  EXAMINATION:  CT OF THE CHEST WITHOUT CONTRAST 8/10/2021 5:59 pm    TECHNIQUE:  CT of the chest was performed without the administration of intravenous  contrast. Multiplanar reformatted images are provided for review. Dose  modulation, iterative reconstruction, and/or weight based adjustment of the  mA/kV was utilized to reduce the radiation dose to as low as reasonably  achievable. COMPARISON:  None. HISTORY:  ORDERING SYSTEM PROVIDED HISTORY: LLL effusion and/or infiltrate  TECHNOLOGIST PROVIDED HISTORY:  Reason for exam:->LLL effusion and/or infiltrate  Reason for Exam: LLL effusion and/or infiltrate  Acuity: Acute  Type of Exam: Initial    FINDINGS:  Mediastinum: There is mild cardiomegaly, with a trace pericardial effusion. Old granulomatous disease is noted. Lungs/pleura: There are small, dependently layered bilateral pleural  effusions. Left basilar pleural calcifications are present. There is  dependent atelectasis in the lower lobes. Subsegmental atelectasis is noted  in the right middle lobe. Emphysema and mild peripheral fibrosis is noted. Upper Abdomen: The adrenal glands are unremarkable.   Cholecystectomy clips  are present. Soft Tissues/Bones: No acute osseous abnormality is appreciated. There are  old left-sided rib fractures. The chest wall is unremarkable. Impression  Small bilateral pleural effusions, associated with dependent atelectasis. Superimposed pneumonia is less likely. Subsegmental atelectasis in the right middle lobe. Emphysema and mild peripheral fibrosis. Cardiomegaly and trace pericardial effusion. CTPA: No results found for this or any previous visit. CXR PA/LAT: Results for orders placed during the hospital encounter of 08/07/21    XR CHEST (2 VW)    Narrative  EXAMINATION:  TWO XRAY VIEWS OF THE CHEST    8/8/2021 10:05 am    COMPARISON:  08/07/2021    HISTORY:  ORDERING SYSTEM PROVIDED HISTORY: resp failure  TECHNOLOGIST PROVIDED HISTORY:  Reason for exam:->resp failure  Reason for Exam: sob resp failure  Acuity: Acute  Type of Exam: Initial    FINDINGS:  The cardiac silhouette is enlarged. Stable interstitial changes throughout  the lungs with bilateral pleural effusions and bibasilar volume loss, left  greater than right. Findings are most consistent with CHF. Aortic vascular  calcification. Impression  Stable findings most consistent with CHF. Interstitial edema with bilateral  pleural effusions and left basilar volume loss. CXR portable: Results for orders placed during the hospital encounter of 09/09/21    XR CHEST PORTABLE    Narrative  EXAMINATION:  ONE XRAY VIEW OF THE CHEST    9/12/2021 5:46 am    COMPARISON:  09/09/2021    HISTORY:  ORDERING SYSTEM PROVIDED HISTORY: SOB  TECHNOLOGIST PROVIDED HISTORY:  Reason for exam:->SOB  Reason for Exam: Shortness of Breath  Relevant Medical/Surgical History: hx copd, chf    FINDINGS:  The cardiac silhouette is enlarged. Pulmonary vessels are congested. There  are increased interstitial opacities.   There are hazy opacities in the lung  bases, increased on the right in the interval.    Impression  Vascular congestion. Airspace disease in the lung bases, along with suspected bilateral pleural  effusions. There is mild progression in airspace disease in the right base. Pulmonary edema with atelectasis, versus pneumonia.                      Rachel Pena MD, M.D.            9/13/2021, 8:49 AM

## 2021-09-13 NOTE — CARE COORDINATION
Spoke to wife Jose Price. Wife is in the ICU right now & tells me she is getting transferred to diff floor soon. Wife hasn't been able to care for patient at home. Wife states patient needs rehab and possible LTC until patient & wife both improve. SNF list given, await choices. Wife doesn't think she wants [facility] but will review list.   Electronically signed by Renata Salamanca RN Case Management [phone] on 9/13/2021 at 10:42 AM     The Plan for Transition of Care is related to the following treatment goals: strengthening    The patient's wife was provided with a choice of provider and agrees   with the discharge plan. [x] Yes [] No    Freedom of choice list was provided with basic dialogue that supports the patient's individualized plan of care/goals, treatment preferences and shares the quality data associated with the providers.  [x] Yes [] No

## 2021-09-13 NOTE — RT PROTOCOL NOTE
RT Inhaler-Nebulizer Bronchodilator Protocol Note    There is a bronchodilator order in the chart from a provider indicating to follow the RT Bronchodilator Protocol and there is an Initiate RT Bronchodilator Protocol order as well (see protocol at bottom of note). The findings from the last RT Protocol Assessment were as follows:  Smoking: >15 Pack years  Surgical Status: No surgery  Xray: Multiple lobe infiltrates/atelectasis/pleural effusion/edema  Respiratory Pattern: Dyspnea with exertion  Mental Status: Alert and Oriented  Breath Sounds: Diminished and/or crackles  Cough: Strong, spontaneous, non-productive  Activity Level: Walking unassisted  Oxygen Requirement: 41% or 6LNC - 60%  Indication for Bronchodilator Therapy: Decreased or absent breath sounds  Bronchodilator Assessment Score: 8    Aerosolized bronchodilator medication orders have been revised according to the RT Bronchodilator Protocol. RT Inhaler-Nebulizer Bronchodilator Protocol:    Respiratory Therapist to perform RT Therapy Protocol Assessment then follow the protocol. No Indications - adjust the frequency to every 6 hours PRN wheezing or bronchospasm, if no treatments needed after 48 hours then discontinue using Per Protocol order mode. If indication present, adjust the RT bronchodilator orders based on the Bronchodilator Assessment Score as follows:    0-6 - enter or revise RT bronchodilator order to Albuterol Inhaler order with frequency of every 2 hours PRN for wheezing or increased work of breathing using Per Protocol order mode. If Albuterol Inhaler not tolerated or not effective, then discontinue the Albuterol Inhaler order and enter Albuterol Nebulizer order with same frequency and PRN reasons. Repeat RT Therapy Protocol Assessment as needed.     7-10 - discontinue any other Inpatient aerosolized bronchodilator medication orders and enter or revise two Albuterol Inhaler orders, one with BID frequency and one with frequency of every 2 hours PRN wheezing or increased work of breathing using Per Protocol order mode. Repeat RT Therapy Protocol Assessment with second treatment then BID and as needed. If Albuterol Inhaler not tolerated or not effective, then discontinue the Albuterol Inhaler orders and enter two Albuterol Nebulizer orders with same frequencies and PRN reasons. 11-13 - discontinue any other Inpatient aerosolized bronchodilator medication orders and enter DuoNeb Nebulizer orders QID frequency and an Albuterol Nebulizer order every 2 hours PRN wheezing or increased work of breathing using Per Protocol order mode. Repeat RT Therapy Protocol Assessment with second treatment then QID and as needed. Greater than 13 - discontinue any other Inpatient bronchodilator aerosolized medication orders and enter DuoNeb Nebulizer order every 4 hours frequency and Albuterol Nebulizer every 2 hours PRN wheezing or increased work of breathing using Per Protocol order mode. Repeat RT Therapy Protocol Assessment with second treatment then every 4 hours and as needed. RT to enter RT Home Evaluation for COPD & MDI Assessment order using Per Protocol order mode.     Electronically signed by Ulysses Avena, RCP on 9/13/2021 at 7:54 PM

## 2021-09-13 NOTE — PLAN OF CARE
Problem: Falls - Risk of:  Goal: Will remain free from falls  Description: Will remain free from falls  9/13/2021 1147 by Susy Solis RN  Outcome: Ongoing  9/12/2021 2330 by Daniel Archibald RN  Outcome: Ongoing  Goal: Absence of physical injury  Description: Absence of physical injury  9/13/2021 1147 by Susy Solis RN  Outcome: Ongoing  9/12/2021 2330 by Daniel Archibald RN  Outcome: Ongoing     Problem: Skin Integrity:  Goal: Will show no infection signs and symptoms  Description: Will show no infection signs and symptoms  9/13/2021 1147 by Susy Solis RN  Outcome: Ongoing  9/12/2021 2330 by Daniel Archibald RN  Outcome: Ongoing  Goal: Absence of new skin breakdown  Description: Absence of new skin breakdown  9/13/2021 1147 by Susy Solis RN  Outcome: Ongoing  9/12/2021 2330 by Daniel Archibald RN  Outcome: Ongoing     Problem: OXYGENATION/RESPIRATORY FUNCTION  Goal: Patient will maintain patent airway  9/13/2021 1147 by Susy Solis RN  Outcome: Ongoing  9/12/2021 2330 by Daniel Archibald RN  Outcome: Ongoing  Goal: Patient will achieve/maintain normal respiratory rate/effort  Description: Respiratory rate and effort will be within normal limits for the patient  9/13/2021 1147 by Susy Solis RN  Outcome: Ongoing  9/12/2021 2330 by Daniel Archibald RN  Outcome: Ongoing

## 2021-09-13 NOTE — PROGRESS NOTES
Hospitalist   Progress Note    Patient Name: Ermelinda Barrera  PCP: Hao Fernando MD  Date of Admission: 9/9/2021    Chief Complaint on Admission: Worsening shortness of breath and dizziness  Chief diagnosis after evaluation: COPD exacerbation with acute on chronic respiratory failure with hypoxia and hypercapnia    Brief Synopsis: Patient 81 y.o. man with a history of hypertension, hyperlipidemia, diabetes mellitus type II, coronary artery disease, severe COPD with chronic respiratory with a baseline O2 requirement of 3 liters O2 via nasal canula, A fib, CKD3 and morbid obesity  who was admitted on 9/9/2021 for evaluation and treatment of a COPD exacerbation with acute on chronic respiratory failure with hypoxia and hypercapnia    Pt Seen/Examined and Chart Reviewed. Events over the past 24 hours: Patient had rapidly increasing oxygen demand overnight. His chest x-ray revealed pulmonary edema. Diuretic medications were increased    Subjective: Pt has no new complaints this afternoon. He states that he is breathing easily. His oxygen demand spiked overnight, but has since improved    Objective: Allergies  Patient has no known allergies.     Medications    Scheduled Meds:   metOLazone  5 mg Oral Daily    ipratropium-albuterol  1 ampule Inhalation BID    heparin (porcine)  5,000 Units SubCUTAneous 3 times per day    predniSONE  40 mg Oral Daily    atorvastatin  20 mg Oral Daily    labetalol  100 mg Oral TID    isosorbide mononitrate  30 mg Oral Daily    hydrALAZINE  25 mg Oral 3 times per day    gabapentin  300 mg Oral QAM    And    gabapentin  600 mg Oral Nightly    furosemide  40 mg Oral BID    budesonide-formoterol  2 puff Inhalation BID    escitalopram  10 mg Oral Daily    doxazosin  8 mg Oral Nightly    aspirin  81 mg Oral Daily    insulin lispro  0-12 Units SubCUTAneous TID     insulin lispro  0-6 Units SubCUTAneous Nightly    sodium chloride flush  10 mL IntraVENous 2 times per day 09/12/2021    CO2 32 09/12/2021    BUN 50 09/12/2021    CREATININE 2.0 09/12/2021    GLUCOSE 224 09/12/2021    CALCIUM 8.7 09/12/2021    GFRAA 39 09/12/2021    GFRAA >60 04/15/2013    LABGLOM 32 09/12/2021     LFT:   Lab Results   Component Value Date    ALKPHOS 60 08/07/2021    ALT 7 08/07/2021    AST 10 08/07/2021    PROT 6.8 08/07/2021    PROT 7.1 01/15/2013    BILITOT 0.4 08/07/2021    BILIDIR <0.2 06/12/2020    IBILI see below 06/12/2020     Magnesium:    Lab Results   Component Value Date    MG 2.10 08/12/2021     Phosphorus:    Lab Results   Component Value Date    PHOS 4.2 08/14/2021     PT/INR:  No results found for: PTINR  U/A:    Lab Results   Component Value Date    LEUKOCYTESUR Negative 09/10/2021    NITRITE neg 04/10/2015    WBCUA 3 09/10/2021    RBCUA 1 09/10/2021    SPECGRAV 1.012 09/10/2021    UROBILINOGEN 0.2 09/10/2021    BILIRUBINUR Negative 09/10/2021    BILIRUBINUR neg 04/10/2015    BLOODU Negative 09/10/2021    GLUCOSEU Negative 09/10/2021    PROTEINU 100 09/10/2021     ABG:    Lab Results   Component Value Date    PHART 7.416 08/11/2021    AEE8AAJ 50.3 08/11/2021    Q1UXRGXT 95.1 08/11/2021    EMW9EDD 32.3 08/11/2021    BEART 6.7 08/11/2021    PO2ART 74.6 08/11/2021    YIV6HJR 33.9 08/11/2021           Intake/Output Summary (Last 24 hours) at 9/13/2021 0331  Last data filed at 9/12/2021 1721  Gross per 24 hour   Intake 840 ml   Output 1200 ml   Net -360 ml       Consults:  IP CONSULT TO HOSPITALIST  IP CONSULT TO CARDIOLOGY  IP CONSULT TO CARDIOLOGY  IP CONSULT TO PALLIATIVE CARE  IP CONSULT TO PULMONOLOGY      Active Hospital Problems    Diagnosis Date Noted    Stage 3 chronic kidney disease ([facility] [N18.30]      Priority: High    DMII (diabetes mellitus, type 2) ([facility] [E11.9]      Priority: High    Morbid obesity due to excess calories ([facility] [E66.01]      Priority: High    Mixed hyperlipidemia [E78.2]      Priority: High    Demand ischemia ([facility] [I24.8]     COPD exacerbation ([facility] [J44.1] 09/09/2021    Abnormal EKG [R94.31] 09/09/2021    CAD (coronary artery disease) [I25.10] 09/09/2021    Acute on chronic respiratory failure with hypoxia and hypercapnia (HCC) [J96.21, J96.22]     Atrial fibrillation (HCC) [I48.91]     Chronic diastolic heart failure (HCC) [I50.32]     Essential hypertension [I10]     Elevated troponin [R77.8] 03/06/2018    Acute on chronic respiratory failure with hypoxia (HCC) [J96.21]        ASSESSMENT AND PLAN:    COPD (acute exacerbation) - No wheezing. More likely fluid overload  - Pulmonary has DC'd Prednisone  - Home Breo and Spiriva   - Continue Symbicort   - Duoneb's  - Pulmonary is following    CHF - Acute on chronic diastolic dysfunction. A 2D Echocardiogram on 08/09/2021 shows an EF of 55-60%  and grade I diastolic dysfunction. Diuresing with IV Lasix. Monitor strict I&Os and daily weights. A low sodium fluid restricted diet has been ordered. Pt on ACEI, as EF is <40%. Cardiology has been consulted. The CHF nurse has been consulted to discuss disease management     Possible acute on chronic diastolic (congestive) heart failure ([facility] [address] - A 2D Echocardiogram on 10/19/2019 shows an EF of 50%. Continue oral Lasix and Zaroxolyn  - Monitor strict I&Os and daily weights. - A low sodium fluid restricted diet has been ordered    Acute on chronic respiratory failure with hypoxia and hypercapnia (HCC)   - POA as evidenced by + use of accessory muscles, air hunger and an oxygen saturation of less than 90% on his baseline 3 L nasal cannula  - Baseline o2 @ 4-5 L   - Acute aspect due to CHF  - Provide supplemental oxygen as necessary to keep SaO2 92% or greater.     Elevated troponin, Abnormal EKG - patient has an elevated troponin at baseline.    - Per Cardiology,   \"Demand ischemia. History is not consistent with acute coronary syndrome. Troponin chronically elevated for the last 3 years. There is no significant change in the minimal troponin elevation.   Patient likely has underlying coronary artery disease but would not pursue stress testing or angiography with advanced age and comorbid conditions. Will discontinue IV heparin. Continue statin. \"     CAD (coronary artery disease) - as above. Continue beta blocker, statin and aspirin. Monitor on telemetry. Generalized weakness/fatigue, fall at home - PT/OT is evaluating and treating patient, . They state the patient would benefit from continuing therapy after discharge, 24-hour supervision versus SNF     Chronic Renal Failure stage III - Renal function is at/near baseline and is stable; Monitor renal function and avoid Nephrotoxic agents as able      Atrial fibrillation (HCC) - Continue labetalol and monitor. Diabetes mellitus II - Lantus, SSI and a carb control diet     Essential (primary) hypertension - continue home meds and monitor blood pressure     Hyperlipidemia - No current evidence of Rhabdomyolysis or other adverse effects. Continue statin therapy while in the hospital     Morbid obesity due to excess calories (Body mass index is 39.72 kg/m². ) - Complicating assessment and treatment. Placing patient at risk for multiple co-morbidities as well as early death and contributing to the patient's presentation.  on weight loss when appropriate. DVT Prophylaxis: Heparin  Diet: ADULT DIET; Regular; 5 carb choices (75 gm/meal);  Low Sodium (2 gm); 1500 ml  Code Status: DNR-CCA    PT/OT Eval Status: Is suggested patient would benefit from continued therapy following discharge,  24-hour supervision versus SNF  PT score on the AM-PAC short mobility form (if applicable) - 82/95  OT score on the AM-PAC short mobility form (if applicable) - 71/28    Dispo - Anticipated discharge date 2-3+ days still     Leslie Rojas MD, MD

## 2021-09-13 NOTE — CARE COORDINATION
Spoke with the patient's wife and daughter in the wife's hospital room regarding D/C planning. They report they would like to try [facility] again. They are also interested in possible long term care options/assisted living/private pay at home. Provided a private pay list and discussed possible assisted living options as well. Referral sent to [facility] for skilled admission.   Yared Santiago RN, BSN, Case Management  Phone: [phone]  Electronically signed by Yared Santiago RN on 9/13/2021 at 1:15 PM

## 2021-09-13 NOTE — PLAN OF CARE
Problem: Falls - Risk of:  Goal: Will remain free from falls  Description: Will remain free from falls  9/12/2021 2330 by Becky Garcia RN  Outcome: Ongoing  9/12/2021 1326 by Helena Medina RN  Outcome: Ongoing  Goal: Absence of physical injury  Description: Absence of physical injury  9/12/2021 2330 by Becky Garcia RN  Outcome: Ongoing  9/12/2021 1326 by Helena Medina RN  Outcome: Ongoing     Problem: Skin Integrity:  Goal: Will show no infection signs and symptoms  Description: Will show no infection signs and symptoms  9/12/2021 2330 by Becky Garcia RN  Outcome: Ongoing  9/12/2021 1326 by Helena Medina RN  Outcome: Ongoing  Goal: Absence of new skin breakdown  Description: Absence of new skin breakdown  9/12/2021 2330 by Becky Garcia RN  Outcome: Ongoing  9/12/2021 1326 by Helena Medina RN  Outcome: Ongoing     Problem: OXYGENATION/RESPIRATORY FUNCTION  Goal: Patient will maintain patent airway  9/12/2021 2330 by Becky Garcia RN  Outcome: Ongoing  9/12/2021 1326 by Helena Medina RN  Outcome: Ongoing  Goal: Patient will achieve/maintain normal respiratory rate/effort  Description: Respiratory rate and effort will be within normal limits for the patient  9/12/2021 2330 by Becky Garcia RN  Outcome: Ongoing  9/12/2021 1326 by Helena Medina RN  Outcome: Ongoing     Problem: HEMODYNAMIC STATUS  Goal: Patient has stable vital signs and fluid balance  9/12/2021 2330 by Becky Garcia RN  Outcome: Ongoing  9/12/2021 1326 by Helena Medina RN  Outcome: Ongoing     Problem: FLUID AND ELECTROLYTE IMBALANCE  Goal: Fluid and electrolyte balance are achieved/maintained  9/12/2021 2330 by Becky Garcia RN  Outcome: Ongoing  9/12/2021 1326 by Helena Medina RN  Outcome: Ongoing     Problem: ACTIVITY INTOLERANCE/IMPAIRED MOBILITY  Goal: Mobility/activity is maintained at optimum level for patient  9/12/2021 2330 by Becky Garcia RN  Outcome: Ongoing  9/12/2021 1326 by Joaquin Means RN  Outcome: Ongoing     Problem: Pain:  Description: Pain management should include both nonpharmacologic and pharmacologic interventions.   Goal: Pain level will decrease  Description: Pain level will decrease  9/12/2021 2330 by Pato Goel RN  Outcome: Ongoing  9/12/2021 1326 by Joaquin Means RN  Outcome: Ongoing  Goal: Control of acute pain  Description: Control of acute pain  9/12/2021 2330 by Pato Goel RN  Outcome: Ongoing  9/12/2021 1326 by Joaquin Means RN  Outcome: Ongoing  Goal: Control of chronic pain  Description: Control of chronic pain  9/12/2021 2330 by Pato Goel RN  Outcome: Ongoing  9/12/2021 1326 by Joaquin Means RN  Outcome: Ongoing

## 2021-09-13 NOTE — PROGRESS NOTES
Hospitalist   Progress Note    Patient Name: Zeferino Donis  PCP: Margaret Brewer MD  Date of Admission: 9/9/2021    Chief Complaint on Admission: Worsening shortness of breath and dizziness  Chief diagnosis after evaluation: COPD exacerbation with acute on chronic respiratory failure with hypoxia and hypercapnia    Brief Synopsis: Patient 81 y.o. man with a history of hypertension, hyperlipidemia, diabetes mellitus type II, coronary artery disease, severe COPD with chronic respiratory with a baseline O2 requirement of 3 liters O2 via nasal canula, A fib, CKD3 and morbid obesity  who was admitted on 9/9/2021 for evaluation and treatment of a COPD exacerbation with acute on chronic respiratory failure with hypoxia and hypercapnia    Pt Seen/Examined and Chart Reviewed. Events over the past 24 hours: no acute events. Subjective: he is down to 6L nasal cannula. Good uop. Objective: Allergies  Patient has no known allergies.     Medications    Scheduled Meds:   metOLazone  5 mg Oral Daily    ipratropium-albuterol  1 ampule Inhalation BID    heparin (porcine)  5,000 Units SubCUTAneous 3 times per day    predniSONE  40 mg Oral Daily    atorvastatin  20 mg Oral Daily    labetalol  100 mg Oral TID    isosorbide mononitrate  30 mg Oral Daily    hydrALAZINE  25 mg Oral 3 times per day    gabapentin  300 mg Oral QAM    And    gabapentin  600 mg Oral Nightly    furosemide  40 mg Oral BID    budesonide-formoterol  2 puff Inhalation BID    escitalopram  10 mg Oral Daily    doxazosin  8 mg Oral Nightly    aspirin  81 mg Oral Daily    insulin lispro  0-12 Units SubCUTAneous TID WC    insulin lispro  0-6 Units SubCUTAneous Nightly    sodium chloride flush  10 mL IntraVENous 2 times per day     Infusions:   dextrose      sodium chloride       PRN Meds:  melatonin, ipratropium-albuterol, glucose, dextrose, glucagon (rDNA), dextrose, sodium chloride flush, sodium chloride, ondansetron, polyethylene glycol, acetaminophen **OR** acetaminophen    Physical    VITALS:  BP (!) 155/79   Pulse 69   Temp 97.3 °F (36.3 °C) (Axillary)   Resp 20   Ht 5' 9\" (1.753 m)   Wt 269 lb 10 oz (122.3 kg)   SpO2 92%   BMI 39.82 kg/m²   CONSTITUTIONAL:  Morbidly Obese 81y.o. year-old male who is again sitting in a chair, nad  EYES:  Lids and lashes normal, PERRL, EOMI, sclera clear, conjunctiva normal  ENT:  NC/AT, MMM    NECK:  Supple, symmetrical, trachea midline, no adenopathy  LUNGS: No increased work of breathing, fair air exchange, no crackles  CARDIOVASCULAR:  Regular rate and rhythm, normal S1 and S2, no S3 or S4, and no significant murmurs, rubs or gallops noted. ABDOMEN:  Normal active bowel sounds, soft, non-tender, non-distended, no masses palpated, no organomegally  MUSCULOSKELETAL:  Full range of motion noted. + 1 pitting edema    MENTAL STATUS: Awake, alert, oriented to name, place and time. NEUROLOGIC:  Cranial nerves II-XII are grossly intact. SKIN:  normal skin color, texture, turgor for age.     Data    CBC with Differential:    Lab Results   Component Value Date    WBC 6.3 09/13/2021    HGB 11.6 09/13/2021    HCT 34.8 09/13/2021     09/13/2021    MCV 85.0 09/13/2021    RDW 15.4 09/13/2021    LYMPHOPCT 10.5 09/13/2021    MONOPCT 8.6 09/13/2021    EOSPCT 2.0 12/02/2011    BASOPCT 0.0 09/13/2021    MONOSABS 0.5 09/13/2021    LYMPHSABS 0.7 09/13/2021    EOSABS 0.0 09/13/2021    BASOSABS 0.0 09/13/2021    DIFFTYPE Auto-K 09/28/2012     BMP:    Lab Results   Component Value Date     09/13/2021    K 3.8 09/13/2021    CL 93 09/13/2021    CO2 38 09/13/2021    BUN 53 09/13/2021    CREATININE 1.9 09/13/2021    GLUCOSE 176 09/13/2021    CALCIUM 8.9 09/13/2021    GFRAA 41 09/13/2021    GFRAA >60 04/15/2013    LABGLOM 34 09/13/2021     LFT:   Lab Results   Component Value Date    ALKPHOS 60 08/07/2021    ALT 7 08/07/2021    AST 10 08/07/2021    PROT 6.8 08/07/2021    PROT 7.1 01/15/2013    BILITOT 0.4 08/07/2021    BILIDIR <0.2 06/12/2020    IBILI see below 06/12/2020     Magnesium:    Lab Results   Component Value Date    MG 2.10 08/12/2021     Phosphorus:    Lab Results   Component Value Date    PHOS 4.2 08/14/2021     PT/INR:  No results found for: PTINR  U/A:    Lab Results   Component Value Date    LEUKOCYTESUR Negative 09/10/2021    NITRITE neg 04/10/2015    WBCUA 3 09/10/2021    RBCUA 1 09/10/2021    SPECGRAV 1.012 09/10/2021    UROBILINOGEN 0.2 09/10/2021    BILIRUBINUR Negative 09/10/2021    BILIRUBINUR neg 04/10/2015    BLOODU Negative 09/10/2021    GLUCOSEU Negative 09/10/2021    PROTEINU 100 09/10/2021     ABG:    Lab Results   Component Value Date    PHART 7.416 08/11/2021    JPS0IGJ 50.3 08/11/2021    A1XYTJRD 95.1 08/11/2021    LGS2ATS 32.3 08/11/2021    BEART 6.7 08/11/2021    PO2ART 74.6 08/11/2021    UFS4DBU 33.9 08/11/2021           Intake/Output Summary (Last 24 hours) at 9/13/2021 1059  Last data filed at 9/13/2021 0750  Gross per 24 hour   Intake 1440 ml   Output 3600 ml   Net -2160 ml       Consults:  IP CONSULT TO HOSPITALIST  IP CONSULT TO CARDIOLOGY  IP CONSULT TO CARDIOLOGY  IP CONSULT TO PALLIATIVE CARE  IP CONSULT TO PULMONOLOGY      Active Hospital Problems    Diagnosis Date Noted    Stage 3 chronic kidney disease ([facility] [N18.30]      Priority: High    DMII (diabetes mellitus, type 2) ([facility] [E11.9]      Priority: High    Morbid obesity due to excess calories ([facility] [E66.01]      Priority: High    Mixed hyperlipidemia [E78.2]      Priority: High    Demand ischemia (HCC) [I24.8]     COPD exacerbation ([facility] [J44.1] 09/09/2021    Abnormal EKG [R94.31] 09/09/2021    CAD (coronary artery disease) [I25.10] 09/09/2021    Acute on chronic respiratory failure with hypoxia and hypercapnia (HCC) [J96.21, J96.22]     Atrial fibrillation (HCC) [I48.91]     Chronic diastolic heart failure (HCC) [I50.32]     Essential hypertension [I10]     Elevated troponin [R77.8] 03/06/2018    Acute on chronic respiratory failure with hypoxia (HCC) [J96.21]        ASSESSMENT AND PLAN:    Acute on chronic respiratory failure with hypoxia and hypercapnia (HCC)  CHF - Acute on chronic diastolic dysfunction. Copd  - More likely fluid overload. He needs diuresis, but this is limited due to ckd. He now appears to have a contraction alkalosis. Will give diamox today  - Baseline o2 @ 4-5 L   - Acute aspect due to CHF  - Provide supplemental oxygen as necessary to keep SaO2 88% or greater.  - Pulmonary has DC'd Prednisone as it can worsen edema   - Home Breo and Spiriva   - Continue Symbicort   - Duoneb's  - echo 08/09/2021 shows an EF of 55-60%  and grade I diastolic dysfunction  - Monitor strict I&Os and daily weights. - Cardiology has been consulted. - Pulmonary is following  - CHF nurse has been consulted to discuss disease management   - Continue oral Lasix and Zaroxolyn     Elevated troponin, Abnormal EKG - patient has an elevated troponin at baseline.    - Per Cardiology,   \"Demand ischemia. History is not consistent with acute coronary syndrome. Troponin chronically elevated for the last 3 years. There is no significant change in the minimal troponin elevation. Patient likely has underlying coronary artery disease but would not pursue stress testing or angiography with advanced age and comorbid conditions. Will discontinue IV heparin. Continue statin. \"     CAD (coronary artery disease) - as above. Continue beta blocker, statin and aspirin. Monitor on telemetry. Generalized weakness/fatigue, fall at home - PT/OT is evaluating and treating patient, . They state the patient would benefit from continuing therapy after discharge, 24-hour supervision versus SNF     Chronic Renal Failure stage III - Renal function is at/near baseline and is stable; Monitor renal function and avoid Nephrotoxic agents as able      Atrial fibrillation (HCC) - Continue labetalol and monitor.       Diabetes mellitus II - Lantus, SSI and a carb control diet     Essential (primary) hypertension - continue home meds and monitor blood pressure     Hyperlipidemia - No current evidence of Rhabdomyolysis or other adverse effects. Continue statin therapy while in the hospital     Morbid obesity due to excess calories (Body mass index is 39.72 kg/m². ) - Complicating assessment and treatment. Placing patient at risk for multiple co-morbidities as well as early death and contributing to the patient's presentation.  on weight loss when appropriate. DVT Prophylaxis: Heparin  Diet: ADULT DIET; Regular; 5 carb choices (75 gm/meal);  Low Sodium (2 gm); 1500 ml  Code Status: DNR-CCA    PT/OT Eval Status: Is suggested patient would benefit from continued therapy following discharge,  24-hour supervision versus SNF  PT score on the AM-PAC short mobility form (if applicable) - 79/03  OT score on the AM-PAC short mobility form (if applicable) - 27/18    Dispo - Anticipated discharge date 2-3+ days still     Danielle Gomez DO, MD

## 2021-09-13 NOTE — PROGRESS NOTES
Patient limited by endurance;Treatment limited secondary to medical complications (free text); Patient limited by pain  Activity Tolerance: limited by 02 desat and ROSALES     Patient Diagnosis(es): The primary encounter diagnosis was Elevated troponin. Diagnoses of Acute electrocardiogram changes, Shortness of breath, and Increased oxygen demand were also pertinent to this visit. 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 has a past surgical history that includes TURP (8/14/12) and Cataract removal (Right, 7/3/13). Restrictions  Restrictions/Precautions  Restrictions/Precautions: Fall Risk  Position Activity Restriction  Other position/activity restrictions: 6L 02 per NC; external male catheter     Social/Functional History  Lives With: Spouse  Type of Home:  (Condo)  Home Layout: One level  Home Access: Stairs to enter without rails  Entrance Stairs - Number of Steps: 2  Bathroom Shower/Tub: Tub/Shower unit, Walk-in shower  Bathroom Toilet: Handicap height  Bathroom Equipment: Grab bars around toilet  Home Equipment: [employer], Electric scooter, 4 wheeled walker  ADL Assistance: Needs assistance (Wife assists with sponge bathing.   Pt is independent with toileting, and dressing)  Homemaking Assistance: Needs assistance (Wife cooks and cleans)  Ambulation Assistance: Independent (Uses 4WW at all times)  Transfer Assistance: Independent  Active : No  Patient's  Info: wife, Chaim Grissom drives patient  Additional Comments: pt had recent SNF stay and was only home for 2 days prior to this hospital admission; wife would like pt to d/c to assisted living; pt reports one [facility] fall at [facility]    Subjective   General  Chart Reviewed: Yes  Additional Pertinent Hx: Per Jorden Resendez MD \"Pt is an 81y.o. year-old male with a history of hypertension, hyperlipidemia, diabetes mellitus type II, coronary artery disease, severe COPD with chronic respiratory with a baseline O2 requirement of 3 liters O2 via nasal canula, A fib, CKD3 and morbid obesity. He presents to the emergency room for evaluation following a 1 to 2-day history of increasing shortness of breath and increased oxygen requirement. He is normally on 3 L of oxygen via nasal cannula. Today his oxygen saturation began to drift lower into the 80s and he had to increase his oxygen to 5 L/min via nasal cannula. At baseline he has elevated troponin levels. On evaluation today his troponin level is higher than normal.  On evaluation he was found to have a COPD exacerbation. Cardiology was consulted and asked that the patient be started on a heparin drip. He will be admitted for further evaluation and treatment. Associated signs and symptoms do not include typical chest pain, diaphoresis, edema, orthopnea, paroxysmal nocturnal dyspnea, fever or chills. \"  Response To Previous Treatment: Patient with no complaints from previous session. Family / Caregiver Present: No  Referring Practitioner: Jorden Resendez MD  Subjective  Subjective: Pt pleasant and agreeable to PT treat. Supine in bed upon arrival.  Reports 5/10 pain in nayeli feet.                  Objective   Bed mobility  Supine to Sit: Stand by assistance;Contact guard assistance (HOB partially elevated)  Sit to Supine: Unable to assess (up in chair at end of session)     Transfers  Sit to Stand: Contact guard assistance  Stand to sit: Contact guard assistance  Comment: intermittent cues for hand placement     Ambulation  Ambulation?: Yes  More Ambulation?: No  Ambulation 1  Surface: level tile  Device: Rolling Walker  Other Apparatus: O2 (therapist managed 02 line)  Assistance: Contact guard assistance  Quality of Gait: slow elliott, mild ROSALES, no LOB, distance limited by nayeli foot pain  Gait Deviations: Decreased step length;Decreased step height;Slow Elliott  Distance: approx 15'  Comments: on 6L 02 pt desat to 85% after ambulation and up to 91% after approx 90 sec with cues for pursed lip breathing  Stairs/Curb  Stairs?: No     Balance  Posture: Fair  Sitting - Static: Good  Sitting - Dynamic: Good;-  Standing - Static: Fair  Standing - Dynamic: Fair     Exercises  Hip Flexion: x15 alternating marches  Knee Long Arc Quad: x15 alternating  Ankle Pumps: x15 heel raises/toe raises  Comments: ther ex seated in recliner chair, mild ROSALES         Other Activities: Other (see comment)  Comment: pt set up with breakfast tray at end of session           AM-PAC Score  AM-PAC Inpatient Mobility Raw Score : 17 (09/13/21 0838)  AM-PAC Inpatient T-Scale Score : 42.13 (09/13/21 5463)  Mobility Inpatient CMS 0-100% Score: 50.57 (09/13/21 6468)  Mobility Inpatient CMS G-Code Modifier : CK (09/13/21 7739)          Goals  Short term goals  Time Frame for Short term goals: upon d/c (goals ongoing as of 9/13)  Short term goal 1: bed mobility SBA  Short term goal 2: transfers SBA  Short term goal 3: ambulate 48' with RW and SBA  Long term goals  Time Frame for Long term goals : LTG=STG  Patient Goals   Patient goals : \"to go to assisted living\"    Plan    Plan  Times per week: 3-5/wk  Current Treatment Recommendations: Strengthening, ROM, Balance Training, Functional Mobility Training, Endurance Training, Home Exercise Program, Safety Education & Training, Patient/Caregiver Education & Training, Equipment Evaluation, Education, & procurement, Transfer Training, Gait Training  Safety Devices  Type of devices:  All fall risk precautions in place, Call light within reach, Chair alarm in place, Gait belt, Left in chair, Nurse notified (RN James Edwards notified)  Restraints  Initially in place: No     Therapy Time   Individual Concurrent Group Co-treatment   Time In Zakiya Pacheco 172         Minutes 25         Timed Code Treatment Minutes: 25 Minutes         Electronically signed by Flip York [address] [place] [address] on 9/13/2021 at 8:43 AM

## 2021-09-14 NOTE — CARE COORDINATION
Received message that [facility] has bed available for patient at [place]. Informed pt who is aware and agreeable to [facility]. Will need [facility] pre cert. [facility] is not needed due to waiver.     Electronically signed by SHIRIN Blair on 9/14/2021 at 12:14 PM

## 2021-09-14 NOTE — PROGRESS NOTES
Occupational Therapy  Facility/Department: [address] PROGRESSIVE CARE  Daily Treatment Note and Tentative D/C    NAME: Howie Rey  : 1935  MRN: [id number]    Date of Service: 2021    Discharge Recommendations: Howie Rey scored a 16/24 on the AM-PAC ADL Inpatient form. Current research shows that an AM-PAC score of 17 or less is typically not associated with a discharge to the patient's home setting. Based on the patient's AM-PAC score and their current ADL deficits, it is recommended that the patient have 3-5 sessions per week of Occupational Therapy at d/c to increase the patient's independence. Please see assessment section for further patient specific details. If patient discharges prior to next session this note will serve as a discharge summary. Please see below for the latest assessment towards goals. Continue to assess pending progress, 3-5 sessions per week, Patient would benefit from continued therapy after discharge  OT Equipment Recommendations  Equipment Needed: No    Assessment   Performance deficits / Impairments: Decreased functional mobility ; Decreased strength;Decreased endurance;Decreased ADL status; Decreased balance;Decreased high-level IADLs  Assessment: Pt continues to be limited due to decreased overall strength and endurance with activities and mobility. Pt completes functional mobility and transfers with CGA and RW. Pt reports minimal light headedness or dizziness. Pt tolerates B UE exercises atlhough limited due to L shoulder limitations. Continue with POC. Prognosis: [address] / Modification: RW  OT Education: OT Role;Plan of Care;Transfer Training;Home Exercise Program  REQUIRES OT FOLLOW UP: Yes  Activity Tolerance  Activity Tolerance: Patient limited by fatigue;Patient Tolerated treatment well  Safety Devices  Safety Devices in place: Yes  Type of devices: Chair alarm in place;Call light within reach; Left in chair;Gait belt         Patient Diagnosis(es): The primary encounter diagnosis was Elevated troponin. Diagnoses of Acute electrocardiogram changes, Shortness of breath, and Increased oxygen demand were also pertinent to this visit. 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 has a past surgical history that includes TURP (8/14/12) and Cataract removal (Right, 7/3/13). Restrictions  Restrictions/Precautions  Restrictions/Precautions: Fall Risk  Position Activity Restriction  Other position/activity restrictions: 4L 02 per NC; external male catheter  Subjective   General  Chart Reviewed: Yes  Patient assessed for rehabilitation services?: Yes  Additional Pertinent Hx: per CNP note, Jacques Jolly is a 81 y.o. 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 Denies chest pain, nausea, vomiting, presyncope, fever, chills, sweats, changes militates, hemoptysis, leg/calf pain or swelling, abdominal pain, diarrhea. Patient was [facility] from [facility] after he was therefore rehab secondary to deconditioning for 2.5 weeks.   He wears 3 L of O2 per nasal cannula at baseline but today has required 5 L per nasal cannula to maintain his oxygen saturation in the 90-92% range as he was 88% on his baseline 3 L. Family / Caregiver Present: No  Referring Practitioner: Aneesh Parra MD  Subjective  Subjective: Pt agreeable to OT treatment. Pt on 4L O2. Pt reports no pain. General Comment  Comments: okay for therapy per RN. Orientation  Orientation  Overall Orientation Status: Within Functional Limits  Objective    ADL  Additional Comments: Pt declines need for ADLs at this time        Balance  Sitting Balance: Supervision  Standing Balance: Contact guard assistance (RW)  Functional Mobility  Functional - Mobility Device: Rolling Walker  Activity: Other (~40ft)  Assist Level: Contact guard assistance  Functional Mobility Comments: no overt LOB; pt reports mild dizziness; SpO2 >90% throughout  Wheelchair Bed Transfers  Wheelchair/Bed - Technique: Ambulating (RW)  Equipment Used: Other (chair)  Level of Asssistance: Contact guard assistance  Bed mobility  Supine to Sit: Unable to assess  Sit to Supine: Unable to assess  Scooting: Unable to assess  Transfers  Sit to stand: Contact guard assistance  Stand to sit: Contact guard assistance  Transfer Comments: to/from RW                       Cognition  Overall Cognitive Status: Exceptions  Arousal/Alertness: Appropriate responses to stimuli  Following Commands:  Follows one step commands with increased time  Attention Span: Appears intact                    Type of ROM/Therapeutic Exercise  Type of ROM/Therapeutic Exercise: AROM  Comment: seated in chair  Exercises  Shoulder Flexion: x10 reps (R UE only)  Shoulder Extension: x10 reps  Elbow Flexion: x10 reps  Elbow Extension: x10 reps  Grasp/Release: x10 reps           Plan   Plan  Times per week: 3-5x  Times per day: Daily  Current Treatment Recommendations: Strengthening, Functional Mobility Training, Gait Training, Endurance Training, Balance Training, Self-Care / ADL, Safety Education & Training, Patient/Caregiver Education & Training    AM-PAC Score        AM-PAC Inpatient Daily Activity Raw Score: 16 (09/14/21 1151)  AM-PAC Inpatient ADL T-Scale Score : 35.96 (09/14/21 1151)  ADL Inpatient CMS 0-100% Score: 53.32 (09/14/21 1151)  ADL Inpatient CMS G-Code Modifier : CK (09/14/21 1151)    Goals  Short term goals  Time Frame for Short term goals: prior to D/C; ongoing 9/14  Short term goal 1: complete functional mobility and transfers with supervision  Short term goal 2: complete bathing and dressing with Min A  Short term goal 3: complete toileting with supervision  Short term goal 4: complete grooming with setup  Long term goals  Time Frame for Long term goals : STG=LTG  Patient Goals   Patient goals : no stated goals       Therapy Time   Individual Concurrent Group Co-treatment   Time In 1120         Time Out 1144         Minutes 24         Timed Code Treatment Minutes: 24 Minutes       JARET Taveras/L

## 2021-09-14 NOTE — PROGRESS NOTES
Hospitalist   Progress Note    Patient Name: eBbe Ball  PCP: Kyaw Waggoner MD  Date of Admission: 9/9/2021    Chief Complaint on Admission: Worsening shortness of breath and dizziness  Chief diagnosis after evaluation: COPD exacerbation with acute on chronic respiratory failure with hypoxia and hypercapnia    Brief Synopsis: Patient 81 y.o. man with a history of hypertension, hyperlipidemia, diabetes mellitus type II, coronary artery disease, severe COPD with chronic respiratory with a baseline O2 requirement of 3 liters O2 via nasal canula, A fib, CKD3 and morbid obesity  who was admitted on 9/9/2021 for evaluation and treatment of a COPD exacerbation with acute on chronic respiratory failure with hypoxia and hypercapnia    Pt Seen/Examined and Chart Reviewed. Events over the past 24 hours: no acute events. Subjective: he is down to 4L nasal cannula. Good uop. Pt reports feeling a little better today. Objective: Allergies  Patient has no known allergies.     Medications    Scheduled Meds:   sodium chloride (Inhalant)  4 mL Nebulization BID    metOLazone  5 mg Oral Daily    ipratropium-albuterol  1 ampule Inhalation BID    heparin (porcine)  5,000 Units SubCUTAneous 3 times per day    predniSONE  40 mg Oral Daily    atorvastatin  20 mg Oral Daily    labetalol  100 mg Oral TID    isosorbide mononitrate  30 mg Oral Daily    hydrALAZINE  25 mg Oral 3 times per day    gabapentin  300 mg Oral QAM    And    gabapentin  600 mg Oral Nightly    [Held by provider] furosemide  40 mg Oral BID    budesonide-formoterol  2 puff Inhalation BID    escitalopram  10 mg Oral Daily    doxazosin  8 mg Oral Nightly    aspirin  81 mg Oral Daily    insulin lispro  0-12 Units SubCUTAneous TID WC    insulin lispro  0-6 Units SubCUTAneous Nightly    sodium chloride flush  10 mL IntraVENous 2 times per day     Infusions:   dextrose      sodium chloride       PRN Meds:  melatonin, ipratropium-albuterol, glucose, dextrose, glucagon (rDNA), dextrose, sodium chloride flush, sodium chloride, ondansetron, polyethylene glycol, acetaminophen **OR** acetaminophen    Physical    VITALS:  BP (!) 145/79   Pulse 66   Temp 96.7 °F (35.9 °C) (Axillary)   Resp 20   Ht 5' 9\" (1.753 m)   Wt 264 lb 8.8 oz (120 kg)   SpO2 96%   BMI 39.07 kg/m²   CONSTITUTIONAL:  Morbidly Obese 81y.o. year-old male who is again sitting in a chair, nad  EYES:  Lids and lashes normal, PERRL, EOMI, sclera clear, conjunctiva normal  ENT:  NC/AT, MMM    NECK:  Supple, symmetrical, trachea midline  LUNGS: No increased work of breathing, fair air exchange, no crackles  CARDIOVASCULAR:  Regular rate and rhythm, normal S1 and S2, no S3 or S4, and no significant murmurs, rubs or gallops noted. ABDOMEN:  Normal active bowel sounds, soft, non-tender, non-distended, no masses palpated, no organomegally  MUSCULOSKELETAL:  Full range of motion noted. Trace bl pitting edema in le  MENTAL STATUS: Awake, alert, oriented to name, place and time. NEUROLOGIC:  Cranial nerves II-XII are grossly intact. SKIN:  normal skin color, texture, turgor for age.     Data    CBC with Differential:    Lab Results   Component Value Date    WBC 7.3 09/14/2021    HGB 11.5 09/14/2021    HCT 35.1 09/14/2021     09/14/2021    MCV 85.6 09/14/2021    RDW 15.3 09/14/2021    LYMPHOPCT 11.2 09/14/2021    MONOPCT 8.3 09/14/2021    EOSPCT 2.0 12/02/2011    BASOPCT 0.1 09/14/2021    MONOSABS 0.6 09/14/2021    LYMPHSABS 0.8 09/14/2021    EOSABS 0.0 09/14/2021    BASOSABS 0.0 09/14/2021    DIFFTYPE Auto-K 09/28/2012     BMP:    Lab Results   Component Value Date     09/14/2021    K 3.8 09/14/2021    CL 91 09/14/2021    CO2 36 09/14/2021    BUN 59 09/14/2021    CREATININE 1.8 09/14/2021    GLUCOSE 162 09/14/2021    CALCIUM 8.6 09/14/2021    GFRAA 44 09/14/2021    GFRAA >60 04/15/2013    LABGLOM 36 09/14/2021     LFT:   Lab Results   Component Value Date    ALKPHOS 60 08/07/2021    ALT 7 08/07/2021    AST 10 08/07/2021    PROT 6.8 08/07/2021    PROT 7.1 01/15/2013    BILITOT 0.4 08/07/2021    BILIDIR <0.2 06/12/2020    IBILI see below 06/12/2020     Magnesium:    Lab Results   Component Value Date    MG 2.10 08/12/2021     Phosphorus:    Lab Results   Component Value Date    PHOS 4.2 08/14/2021     PT/INR:  No results found for: PTINR  U/A:    Lab Results   Component Value Date    LEUKOCYTESUR Negative 09/10/2021    NITRITE neg 04/10/2015    WBCUA 3 09/10/2021    RBCUA 1 09/10/2021    SPECGRAV 1.012 09/10/2021    UROBILINOGEN 0.2 09/10/2021    BILIRUBINUR Negative 09/10/2021    BILIRUBINUR neg 04/10/2015    BLOODU Negative 09/10/2021    GLUCOSEU Negative 09/10/2021    PROTEINU 100 09/10/2021     ABG:    Lab Results   Component Value Date    PHART 7.416 08/11/2021    HLF4XWT 50.3 08/11/2021    U7IQZMUR 95.1 08/11/2021    JQN8JCP 32.3 08/11/2021    BEART 6.7 08/11/2021    PO2ART 74.6 08/11/2021    ERM2WBP 33.9 08/11/2021           Intake/Output Summary (Last 24 hours) at 9/14/2021 1004  Last data filed at 9/14/2021 0600  Gross per 24 hour   Intake 780 ml   Output 2200 ml   Net -1420 ml       Consults:  IP CONSULT TO HOSPITALIST  IP CONSULT TO CARDIOLOGY  IP CONSULT TO CARDIOLOGY  IP CONSULT TO PALLIATIVE CARE  IP CONSULT TO PULMONOLOGY      Active Hospital Problems    Diagnosis Date Noted    Stage 3 chronic kidney disease ([facility] [N18.30]      Priority: High    DMII (diabetes mellitus, type 2) ([facility] [E11.9]      Priority: High    Morbid obesity due to excess calories ([facility] [E66.01]      Priority: High    Mixed hyperlipidemia [E78.2]      Priority: High    Demand ischemia ([facility] [I24.8]     COPD exacerbation ([address] [J44.1] 09/09/2021    Abnormal EKG [R94.31] 09/09/2021    CAD (coronary artery disease) [I25.10] 09/09/2021    Acute on chronic respiratory failure with hypoxia and hypercapnia (HCC) [J96.21, J96.22]     Atrial fibrillation (HCC) [I48.91]     Chronic diastolic heart failure (HCC) [I50.32]     Essential hypertension [I10]     Elevated troponin [R77.8] 03/06/2018    Acute on chronic respiratory failure with hypoxia (HCC) [J96.21]        ASSESSMENT AND PLAN:    Acute on chronic respiratory failure with hypoxia and hypercapnia (HCC)  CHF - Acute on chronic diastolic dysfunction. Copd  - due to fluid overload? diuesis is limited due to ckd. He now appears to have a contraction alkalosis. diamox given with some improvement, will also maintain K at 4.0  - Baseline o2 @ 4-5 L   - Provide supplemental oxygen as necessary to keep SaO2 88% or greater.  - Pulmonary has DC'd Prednisone as it can worsen edema   - Home Breo and Spiriva   - Continue Symbicort   - Duoneb's  - echo 08/09/2021 shows an EF of 55-60%  and grade I diastolic dysfunction  - on his home doses of oral Lasix   - stop Zaroxolyn  - Monitor strict I&Os: - 1.9L x 24 hrs; -3.9 L total  - daily weights: wt from last 3 encounters 265, 267, 273; today 264 lbs  - Cardiology has been consulted, does not feel to have an acute chf exacerbation at this time  - Pulmonary is following  - CHF nurse has been consulted to discuss disease management      Elevated troponin, Abnormal EKG - patient has an elevated troponin at baseline.    - Per Cardiology,   \"Demand ischemia. History is not consistent with acute coronary syndrome. Troponin chronically elevated for the last 3 years. There is no significant change in the minimal troponin elevation. Patient likely has underlying coronary artery disease but would not pursue stress testing or angiography with advanced age and comorbid conditions. Will discontinue IV heparin. Continue statin. \"     CAD (coronary artery disease) - as above. Continue beta blocker, statin and aspirin. Monitor on telemetry. Generalized weakness/fatigue, fall at home - PT/OT is evaluating and treating patient, .   They state the patient would benefit from continuing therapy after discharge, 24-hour

## 2021-09-14 NOTE — PLAN OF CARE
Problem: Falls - Risk of:  Goal: Will remain free from falls  Description: Will remain free from falls  9/13/2021 2305 by Brisa Lewis RN  Outcome: Ongoing     Problem: OXYGENATION/RESPIRATORY FUNCTION  Goal: Patient will maintain patent airway  9/13/2021 2305 by Brisa Lewis RN  Outcome: Ongoing     Problem: FLUID AND ELECTROLYTE IMBALANCE  Goal: Fluid and electrolyte balance are achieved/maintained  9/13/2021 2305 by Brisa Lewis RN  Outcome: Ongoing     Problem: ACTIVITY INTOLERANCE/IMPAIRED MOBILITY  Goal: Mobility/activity is maintained at optimum level for patient  9/13/2021 2305 by Brisa Lewis RN  Outcome: Ongoing     Problem: Pain:  Goal: Control of chronic pain  Description: Control of chronic pain  9/13/2021 2305 by Brisa Lewis RN  Outcome: Ongoing

## 2021-09-14 NOTE — PLAN OF CARE
Problem: Falls - Risk of:  Goal: Will remain free from falls  Description: Will remain free from falls  9/14/2021 1209 by Alice Ferguson RN  Outcome: Ongoing  9/13/2021 2305 by Atlee Hamman, RN  Outcome: Ongoing  Goal: Absence of physical injury  Description: Absence of physical injury  9/14/2021 1209 by Alice Ferguson RN  Outcome: Ongoing  9/13/2021 2305 by Atlee Hamman, RN  Outcome: Ongoing     Problem: Skin Integrity:  Goal: Will show no infection signs and symptoms  Description: Will show no infection signs and symptoms  9/14/2021 1209 by Alice Ferguson RN  Outcome: Ongoing  9/13/2021 2305 by Atlee Hamman, RN  Outcome: Ongoing  Goal: Absence of new skin breakdown  Description: Absence of new skin breakdown  9/14/2021 1209 by Alice Ferguson RN  Outcome: Ongoing  9/13/2021 2305 by Atlee Hamman, RN  Outcome: Ongoing

## 2021-09-14 NOTE — PROGRESS NOTES
filed at 9/14/2021 0600  Gross per 24 hour   Intake 780 ml   Output 2200 ml   Net -1420 ml       Physical Examination:     Gen:  No acute distress. mild  Eyes: PERRL. Anicteric sclera. No conjunctival injection. ENT: No discharge. Posterior oropharynx clear. External appearance of ears and nose normal.  Neck: Trachea midline. No mass   Resp:  diminished bilaterally with no wheezing, prolonged expiratory phase  CV: Regular rate. Regular rhythm. No murmur or rub. ++ edema, chronic stasis dermatitis   GI: Soft, Non-tender. Non-distended. +BS  Skin: Warm, dry, w/o erythema. Lymph: No cervical or supraclavicular LAD. M/S: No cyanosis. No clubbing. Neuro:  CN 2-12 tested, no focal neurologic deficit. Moves all extremities  Psych: Awake and alert, Oriented x 3. Judgement and insight appropriate. Mood stable. Laboratory findings:-    CBC:   Recent Labs     09/14/21 0448   WBC 7.3   HGB 11.5*   *     BMP:    Recent Labs     09/12/21  0629 09/12/21  0629 09/13/21  0734 09/13/21  0734 09/14/21  0448      < > 139   < > 139   K 4.1   < > 3.8   < > 3.8   CL 94*   < > 93*   < > 91*   CO2 32   < > 38*   < > 36*   BUN 50*   < > 53*   < > 59*   CREATININE 2.0*  --  1.9*  --  1.8*   GLUCOSE 224*   < > 176*   < > 162*    < > = values in this interval not displayed. S. Calcium:  Recent Labs     09/14/21  0448   CALCIUM 8.6     S. Ionized Calcium:No results for input(s): IONCA in the last 72 hours. S. Magnesium:No results for input(s): MG in the last 72 hours. S. Phosphorus:No results for input(s): PHOS in the last 72 hours. S. Glucose:  Recent Labs     09/13/21  1645 09/13/21  2034 09/14/21  0801   POCGLU 207* 202* 143*     Glycosylated hemoglobin A1C: No results for input(s): LABA1C in the last 72 hours. INR: No results for input(s): INR in the last 72 hours. Hepatic functions: No results for input(s): ALKPHOS, ALT, AST, PROT, BILITOT, BILIDIR, LABALBU in the last 72 hours.   Pancreatic functions:No results for input(s): LACTA, AMYLASE in the last 72 hours. S. Lactic Acid: No results for input(s): LACTA in the last 72 hours. Cardiac enzymes:No results for input(s): CKTOTAL, CKMB, CKMBINDEX, TROPONINI in the last 72 hours. BNP:No results for input(s): BNP in the last 72 hours. Lipid profile: No results for input(s): CHOL, TRIG, HDL, LDLCALC in the last 72 hours. Invalid input(s): LDL  Blood Gases: No results found for: PH, PCO2, PO2, HCO3, O2SAT  Thyroid functions:   Lab Results   Component Value Date    TSH 1.63 05/14/2019          Radiology Review:  Pertinent images / reports were reviewed as a part of this visit. CT Chest w/ contrast: No results found for this or any previous visit. CT Chest w/o contrast: Results for orders placed during the hospital encounter of 08/07/21    CT CHEST WO CONTRAST    Narrative  EXAMINATION:  CT OF THE CHEST WITHOUT CONTRAST 8/10/2021 5:59 pm    TECHNIQUE:  CT of the chest was performed without the administration of intravenous  contrast. Multiplanar reformatted images are provided for review. Dose  modulation, iterative reconstruction, and/or weight based adjustment of the  mA/kV was utilized to reduce the radiation dose to as low as reasonably  achievable. COMPARISON:  None. HISTORY:  ORDERING SYSTEM PROVIDED HISTORY: LLL effusion and/or infiltrate  TECHNOLOGIST PROVIDED HISTORY:  Reason for exam:->LLL effusion and/or infiltrate  Reason for Exam: LLL effusion and/or infiltrate  Acuity: Acute  Type of Exam: Initial    FINDINGS:  Mediastinum: There is mild cardiomegaly, with a trace pericardial effusion. Old granulomatous disease is noted. Lungs/pleura: There are small, dependently layered bilateral pleural  effusions. Left basilar pleural calcifications are present. There is  dependent atelectasis in the lower lobes. Subsegmental atelectasis is noted  in the right middle lobe. Emphysema and mild peripheral fibrosis is noted.     Upper Abdomen: The adrenal glands are unremarkable. Cholecystectomy clips  are present. Soft Tissues/Bones: No acute osseous abnormality is appreciated. There are  old left-sided rib fractures. The chest wall is unremarkable. Impression  Small bilateral pleural effusions, associated with dependent atelectasis. Superimposed pneumonia is less likely. Subsegmental atelectasis in the right middle lobe. Emphysema and mild peripheral fibrosis. Cardiomegaly and trace pericardial effusion. CTPA: No results found for this or any previous visit. CXR PA/LAT: Results for orders placed during the hospital encounter of 08/07/21    XR CHEST (2 VW)    Narrative  EXAMINATION:  TWO XRAY VIEWS OF THE CHEST    8/8/2021 10:05 am    COMPARISON:  08/07/2021    HISTORY:  ORDERING SYSTEM PROVIDED HISTORY: resp failure  TECHNOLOGIST PROVIDED HISTORY:  Reason for exam:->resp failure  Reason for Exam: sob resp failure  Acuity: Acute  Type of Exam: Initial    FINDINGS:  The cardiac silhouette is enlarged. Stable interstitial changes throughout  the lungs with bilateral pleural effusions and bibasilar volume loss, left  greater than right. Findings are most consistent with CHF. Aortic vascular  calcification. Impression  Stable findings most consistent with CHF. Interstitial edema with bilateral  pleural effusions and left basilar volume loss. CXR portable: Results for orders placed during the hospital encounter of 09/09/21    XR CHEST PORTABLE    Narrative  EXAMINATION:  ONE XRAY VIEW OF THE CHEST    9/12/2021 5:46 am    COMPARISON:  09/09/2021    HISTORY:  ORDERING SYSTEM PROVIDED HISTORY: SOB  TECHNOLOGIST PROVIDED HISTORY:  Reason for exam:->SOB  Reason for Exam: Shortness of Breath  Relevant Medical/Surgical History: hx copd, chf    FINDINGS:  The cardiac silhouette is enlarged. Pulmonary vessels are congested. There  are increased interstitial opacities.   There are hazy opacities in the lung  bases,

## 2021-09-14 NOTE — PROGRESS NOTES
Physical Therapy  Facility/Department: [address] PROGRESSIVE CARE  Daily Treatment Note  NAME: Gloria Greco  : 1935  MRN: [id number]    Date of Service: 2021    Discharge Recommendations:  3-5 sessions per week, Patient would benefit from continued therapy after discharge   PT Equipment Recommendations  Equipment Needed: No  Other: defer to next level of care     Gloria Greco scored a 17/24 on the AM-PAC short mobility form. Current research shows that an AM-PAC score of 17 or less is typically not associated with a discharge to the patient's home setting. Based on the patient's AM-PAC score and their current functional mobility deficits, it is recommended that the patient have 3-5 sessions per week of Physical Therapy at d/c to increase the patient's independence. Please see assessment section for further patient specific details. If patient discharges prior to next session this note will serve as a discharge summary. Please see below for the latest assessment towards goals. Assessment   Body structures, Functions, Activity limitations: Decreased functional mobility ; Decreased endurance  Assessment: Pt is an 80 y/o male recently discharged from SNF who presented to the ED with COPD exacerbation. Pt lives with his spouse in a one level home with 2 MARCE. Reports being indep ambulating with RW PTA but had a recent fall at SNF. Today, he required CGA-[facility] for transfers and CGA for short distance ambulation with RW. Ambulation limited by dizziness this date and pt did have 02 desat to 85%. Recommend continued skilled inpatient PT at a moderate intensity upon d/c to improve functional mobility and activity tolerance. Will continue to follow.   Treatment Diagnosis: difficulty walking  Prognosis: Fair  PT Education: Goals;PT Role;Plan of Care;Transfer Training;General Safety;Home Exercise Program  REQUIRES PT FOLLOW UP: Yes  Activity Tolerance  Activity Tolerance: Patient limited by endurance;Treatment limited secondary to medical complications (free text); Patient limited by pain  Activity Tolerance: limited by 02 desat, ROSALES and dizziness     Patient Diagnosis(es): The primary encounter diagnosis was Elevated troponin. Diagnoses of Acute electrocardiogram changes, Shortness of breath, and Increased oxygen demand were also pertinent to this visit. 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 has a past surgical history that includes TURP (8/14/12) and Cataract removal (Right, 7/3/13). Restrictions  Restrictions/Precautions  Restrictions/Precautions: Fall Risk  Position Activity Restriction  Other position/activity restrictions: 6L 02 per NC; external male catheter     Social/Functional History  Lives With: Spouse  Type of Home:  (Condo)  Home Layout: One level  Home Access: Stairs to enter without rails  Entrance Stairs - Number of Steps: 2  Bathroom Shower/Tub: Tub/Shower unit, Walk-in shower  Bathroom Toilet: Handicap height  Bathroom Equipment: Grab bars around toilet  Home Equipment: [employer], Electric scooter, 4 wheeled walker  ADL Assistance: Needs assistance (Wife assists with sponge bathing.   Pt is independent with toileting, and dressing)  Homemaking Assistance: Needs assistance (Wife cooks and cleans)  Ambulation Assistance: Independent (Uses 4WW at all times)  Transfer Assistance: Independent  Active : No  Patient's  Info: wife, Flaquita Venegas drives patient  Additional Comments: pt had recent SNF stay and was only home for 2 days prior to this hospital admission; wife would like pt to d/c to assisted living; pt reports one recent fall at [facility]    Subjective   General  Chart Reviewed: Yes  Additional Pertinent Hx: Per Bridger James MD \"Pt is an 81y.o. year-old male with a history of hypertension, hyperlipidemia, diabetes mellitus type II, coronary artery disease, severe COPD with chronic respiratory with a baseline O2 requirement of 3 liters O2 via nasal canula, A fib, CKD3 and morbid obesity. He presents to the emergency room for evaluation following a 1 to 2-day history of increasing shortness of breath and increased oxygen requirement. He is normally on 3 L of oxygen via nasal cannula. Today his oxygen saturation began to drift lower into the 80s and he had to increase his oxygen to 5 L/min via nasal cannula. At baseline he has elevated troponin levels. On evaluation today his troponin level is higher than normal.  On evaluation he was found to have a COPD exacerbation. Cardiology was consulted and asked that the patient be started on a heparin drip. He will be admitted for further evaluation and treatment. Associated signs and symptoms do not include typical chest pain, diaphoresis, edema, orthopnea, paroxysmal nocturnal dyspnea, fever or chills. \"  Response To Previous Treatment: Patient with no complaints from previous session. Family / Caregiver Present: No  Referring Practitioner: Bridger James MD  Subjective  Subjective: Pt supine in bed upon arrival.  PCA in the room cleaning up pt after external male catheter leaked. Agreeable to PT treatment. Reports continued nayeli foot pain.                  Objective   Bed mobility  Supine to Sit: Minimal assistance (assist at trunk, flat bed)  Sit to Supine: Unable to assess (up in chair at end of session)     Transfers  Sit to Stand: Minimal Assistance  Stand to sit: Contact guard assistance  Comment: cues to line up properly to the chair prior to stand to sit     Ambulation  Ambulation?: Yes  More Ambulation?: No  Ambulation 1  Surface: level tile  Device: Rolling Walker  Other Apparatus: O2 (therapist managed line)  Assistance: Contact guard assistance  Quality of Gait: slow elliott, mild ROSALES, no LOB, distance limited by dizziness  Gait Deviations: Decreased step length;Decreased step height;Slow Elliott  Distance: approx 39' with multiple turns  Comments: on 6L 02 pt desat to 85% briefly after ambulation and up to 92% after approx 90 sec with cues for pursed lip breathing  Stairs/Curb  Stairs?: No     Balance  Posture: Fair  Sitting - Static: Good  Sitting - Dynamic: Good;-  Standing - Static: Fair  Standing - Dynamic: Fair     Exercises  Ankle Pumps: x10 nayeli  Comments: ther ex seated in recliner chair, encouraged to complete LE ROM exercises throughout the day         Other Activities: Other (see comment)  Comment: pt assisted with changing wet gown; pt set up with breakfast tray at end of session            AM-PAC Score  AM-PAC Inpatient Mobility Raw Score : 17 (09/14/21 0855)  AM-PAC Inpatient T-Scale Score : 42.13 (09/14/21 0855)  Mobility Inpatient CMS 0-100% Score: 50.57 (09/14/21 0855)  Mobility Inpatient CMS G-Code Modifier : CK (09/14/21 0855)          Goals  Short term goals  Time Frame for Short term goals: upon d/c (goals ongoing as of 9/14)  Short term goal 1: bed mobility SBA  Short term goal 2: transfers SBA  Short term goal 3: ambulate 48' with RW and SBA  Long term goals  Time Frame for Long term goals : LTG=STG  Patient Goals   Patient goals : \"to go to assisted living\"    Plan    Plan  Times per week: 3-5/wk  Current Treatment Recommendations: Strengthening, ROM, Balance Training, Functional Mobility Training, Endurance Training, Home Exercise Program, Safety Education & Training, Patient/Caregiver Education & Training, Equipment Evaluation, Education, & procurement, Transfer Training, Gait Training  Safety Devices  Type of devices:  All fall risk precautions in place, Call light within reach, Chair alarm in place, Gait belt, Left in chair, Nurse notified (RN Halina Dee notified)  Restraints  Initially in place: No     Therapy Time   Individual Concurrent Group Co-treatment   Time In [facility]         Minutes 25         Timed Code Treatment Minutes: 25 Minutes         Electronically signed by Daniela Rebolledo on 9/14/2021 at 8:58 AM

## 2021-09-15 NOTE — CARE COORDINATION
Chuy received referral from RN-CM for Trilogy NIV. Per RN-CM, patient plans to go to SNF for short-term skilled stay. Chuy can deliver the ordered NIV when patient is ready to return to home. [facility] Liaison sent the referral to the [facility] NIV Team for review. Chuy will continue to follow.     Thank you for the referral.  Electronically signed by Bridgett Zeng on 9/15/2021 at 12:46 PM Cell ph# [phone]

## 2021-09-15 NOTE — PROGRESS NOTES
RN received call from [facility] stating O2 sats were in the 70's. Upon arrival to room, pt was noted to have removed O2 and was sleeping heavily with shallow breathing. Sats were 68% at this time. O2 reapplied, pt awakened and encouraged to deep breathe. Sats increased to 92%.

## 2021-09-15 NOTE — PROGRESS NOTES
Occupational Therapy  Facility/Department: [address] PROGRESSIVE CARE  Daily Treatment Note and Tentative D/C    NAME: Mac Mendoza  : 1935  MRN: [id number]    Date of Service: 9/15/2021    Discharge Recommendations: Mac Mendoza scored a 16/24 on the AM-PAC ADL Inpatient form. Current research shows that an AM-PAC score of 17 or less is typically not associated with a discharge to the patient's home setting. Based on the patient's AM-PAC score and their current ADL deficits, it is recommended that the patient have 3-5 sessions per week of Occupational Therapy at d/c to increase the patient's independence. Please see assessment section for further patient specific details. If patient discharges prior to next session this note will serve as a discharge summary. Please see below for the latest assessment towards goals. Continue to assess pending progress, 3-5 sessions per week, Patient would benefit from continued therapy after discharge  OT Equipment Recommendations  Equipment Needed: No    Assessment   Performance deficits / Impairments: Decreased functional mobility ; Decreased strength;Decreased endurance;Decreased ADL status; Decreased balance;Decreased high-level IADLs  Assessment: Pt tolerated session well. Pt completes functional mobility and transfers with CGA and use of RW. Pt able to ambulate ~30ft in room with no overt LOB. Pt reports being light headed/dizzy although not causing LOB. Pt able to complete sit/stand with CGA x5 reps from chair. Pt reports increased SOB although pt's SpO2 >90% throughout session. Continue with POC. Prognosis: [address] / Modification: RW  OT Education: OT Role;Plan of Care;Transfer Training  REQUIRES OT FOLLOW UP: Yes  Activity Tolerance  Activity Tolerance: Patient limited by fatigue;Patient Tolerated treatment well  Safety Devices  Safety Devices in place: Yes  Type of devices: Chair alarm in place;Call light within reach; Left in chair;Gait deconditioning for 2.5 weeks. He wears 3 L of O2 per nasal cannula at baseline but today has required 5 L per nasal cannula to maintain his oxygen saturation in the 90-92% range as he was 88% on his baseline 3 L. Family / Caregiver Present: No  Referring Practitioner: Teresita Michel MD  Subjective  Subjective: Pt agreeable to OT treatment. Pt on 6L O2. Pt reports no pain. General Comment  Comments: okay for therapy per RN. Orientation  Orientation  Overall Orientation Status: Within Functional Limits  Objective    ADL  Toileting: Dependent/Total (external cath)  Additional Comments: Pt declines need for ADLs at this time        Balance  Sitting Balance: Supervision  Standing Balance: Contact guard assistance (RW)  Functional Mobility  Functional - Mobility Device: Rolling Walker  Activity: Other (~30ft)  Assist Level: Contact guard assistance  Functional Mobility Comments: no overt LOB; pt reports mild dizziness; SpO2 >90% throughout  Wheelchair Bed Transfers  Wheelchair/Bed - Technique: Ambulating (RW)  Equipment Used: Other (chair)  Level of Asssistance: Contact guard assistance  Wheelchair Transfers Comments: pt completed x5 reps from chair; min cues for hand placement  Bed mobility  Supine to Sit: Unable to assess  Sit to Supine: Unable to assess  Scooting: Unable to assess  Comment: up in chair prior to and after session  Transfers  Sit to stand: Contact guard assistance  Stand to sit: Contact guard assistance  Transfer Comments: to/from RW           Cognition  Overall Cognitive Status: Exceptions  Arousal/Alertness: Appropriate responses to stimuli  Following Commands:  Follows one step commands with increased time  Attention Span: Appears intact              Plan   Plan  Times per week: 3-5x  Times per day: Daily  Current Treatment Recommendations: Strengthening, Functional Mobility Training, Gait Training, Endurance Training, Balance Training, Self-Care / ADL, Safety Education & Training, Patient/Caregiver Education & Training    AM-PAC Score        AM-PAC Inpatient Daily Activity Raw Score: 16 (09/15/21 1336)  AM-PAC Inpatient ADL T-Scale Score : 35.96 (09/15/21 1336)  ADL Inpatient CMS 0-100% Score: 53.32 (09/15/21 1336)  ADL Inpatient CMS G-Code Modifier : CK (09/15/21 1336)    Goals  Short term goals  Time Frame for Short term goals: prior to D/C; ongoing 9/15  Short term goal 1: complete functional mobility and transfers with supervision  Short term goal 2: complete bathing and dressing with Min A  Short term goal 3: complete toileting with supervision  Short term goal 4: complete grooming with setup  Long term goals  Time Frame for Long term goals : STG=LTG  Patient Goals   Patient goals : no stated goals       Therapy Time   Individual Concurrent Group Co-treatment   Time In 9524         Time Out 1332         Minutes 27         Timed Code Treatment Minutes: 27 Minutes       Tiara Lewis OTR/L

## 2021-09-15 NOTE — PROGRESS NOTES
Hospitalist   Progress Note    Patient Name: Shelia Her  PCP: Parth Tom MD  Date of Admission: 9/9/2021    Chief Complaint on Admission: Worsening shortness of breath and dizziness  Chief diagnosis after evaluation: COPD exacerbation with acute on chronic respiratory failure with hypoxia and hypercapnia    Brief Synopsis: Patient 81 y.o. man with a history of hypertension, hyperlipidemia, diabetes mellitus type II, coronary artery disease, severe COPD with chronic respiratory with a baseline O2 requirement of 3 liters O2 via nasal canula, A fib, CKD3 and morbid obesity  who was admitted on 9/9/2021 for evaluation and treatment of a COPD exacerbation with acute on chronic respiratory failure with hypoxia and hypercapnia    Pt Seen/Examined and Chart Reviewed. Subjective: Pt feels ok this morning. His O2 requiring is up to 5-6 L today though. He denies any new complaints or issues    Objective  Allergies  Patient has no known allergies.     Medications    Scheduled Meds:   sodium chloride (Inhalant)  4 mL Nebulization BID    ipratropium-albuterol  1 ampule Inhalation BID    heparin (porcine)  5,000 Units SubCUTAneous 3 times per day    predniSONE  40 mg Oral Daily    atorvastatin  20 mg Oral Daily    labetalol  100 mg Oral TID    isosorbide mononitrate  30 mg Oral Daily    hydrALAZINE  25 mg Oral 3 times per day    gabapentin  300 mg Oral QAM    And    gabapentin  600 mg Oral Nightly    [Held by provider] furosemide  40 mg Oral BID    budesonide-formoterol  2 puff Inhalation BID    escitalopram  10 mg Oral Daily    doxazosin  8 mg Oral Nightly    aspirin  81 mg Oral Daily    insulin lispro  0-12 Units SubCUTAneous TID WC    insulin lispro  0-6 Units SubCUTAneous Nightly    sodium chloride flush  10 mL IntraVENous 2 times per day     Infusions:   dextrose      sodium chloride       PRN Meds:  melatonin, ipratropium-albuterol, glucose, dextrose, glucagon (rDNA), dextrose, sodium chloride flush, sodium chloride, ondansetron, polyethylene glycol, acetaminophen **OR** acetaminophen    Physical    VITALS:  /87   Pulse 65   Temp 97.9 °F (36.6 °C) (Axillary)   Resp 20   Ht 5' 9\" (1.753 m)   Wt 264 lb 5.3 oz (119.9 kg)   SpO2 98%   BMI 39.03 kg/m²   CONSTITUTIONAL:  Morbidly Obese 81y.o. year-old male who is again sitting in a chair, nad  EYES:  Lids and lashes normal, PERRL, EOMI, sclera clear, conjunctiva normal  ENT:  NC/AT, MMM    NECK:  Supple, symmetrical, trachea midline  LUNGS: No increased work of breathing, fair air exchange, no crackles  CARDIOVASCULAR:  Regular rate and rhythm, normal S1 and S2, no S3 or S4, and no significant murmurs, rubs or gallops noted. ABDOMEN:  Normal active bowel sounds, soft, non-tender, non-distended, no masses palpated, no organomegally  MUSCULOSKELETAL:  Full range of motion noted. Trace bl pitting edema in le  MENTAL STATUS: Awake, alert, oriented to name, place and time. NEUROLOGIC:  Cranial nerves II-XII are grossly intact. SKIN:  normal skin color, texture, turgor for age.     Data    CBC with Differential:    Lab Results   Component Value Date    WBC 7.6 09/15/2021    HGB 12.2 09/15/2021    HCT 37.8 09/15/2021     09/15/2021    MCV 85.9 09/15/2021    RDW 15.3 09/15/2021    LYMPHOPCT 10.7 09/15/2021    MONOPCT 6.1 09/15/2021    EOSPCT 2.0 12/02/2011    BASOPCT 0.0 09/15/2021    MONOSABS 0.5 09/15/2021    LYMPHSABS 0.8 09/15/2021    EOSABS 0.0 09/15/2021    BASOSABS 0.0 09/15/2021    DIFFTYPE Auto-K 09/28/2012     BMP:    Lab Results   Component Value Date     09/15/2021    K 3.8 09/15/2021    CL 92 09/15/2021    CO2 35 09/15/2021    BUN 60 09/15/2021    CREATININE 1.7 09/15/2021    GLUCOSE 153 09/15/2021    CALCIUM 9.0 09/15/2021    GFRAA 47 09/15/2021    GFRAA >60 04/15/2013    LABGLOM 38 09/15/2021     LFT:   Lab Results   Component Value Date    ALKPHOS 60 08/07/2021    ALT 7 08/07/2021    AST 10 08/07/2021    PROT 6.8 08/07/2021    PROT 7.1 01/15/2013    BILITOT 0.4 08/07/2021    BILIDIR <0.2 06/12/2020    IBILI see below 06/12/2020     Magnesium:    Lab Results   Component Value Date    MG 2.10 08/12/2021     Phosphorus:    Lab Results   Component Value Date    PHOS 4.2 08/14/2021     PT/INR:  No results found for: PTINR  U/A:    Lab Results   Component Value Date    LEUKOCYTESUR Negative 09/10/2021    NITRITE neg 04/10/2015    WBCUA 3 09/10/2021    RBCUA 1 09/10/2021    SPECGRAV 1.012 09/10/2021    UROBILINOGEN 0.2 09/10/2021    BILIRUBINUR Negative 09/10/2021    BILIRUBINUR neg 04/10/2015    BLOODU Negative 09/10/2021    GLUCOSEU Negative 09/10/2021    PROTEINU 100 09/10/2021     ABG:    Lab Results   Component Value Date    PHART 7.416 08/11/2021    AOX2XTX 50.3 08/11/2021    W3ZLCQUO 95.1 08/11/2021    MOF4IFO 32.3 08/11/2021    BEART 6.7 08/11/2021    PO2ART 74.6 08/11/2021    UDD7IZC 33.9 08/11/2021           Intake/Output Summary (Last 24 hours) at 9/15/2021 1810  Last data filed at 9/15/2021 1701  Gross per 24 hour   Intake 1020 ml   Output 3450 ml   Net -2430 ml       Consults:  IP CONSULT TO HOSPITALIST  IP CONSULT TO CARDIOLOGY  IP CONSULT TO CARDIOLOGY  IP CONSULT TO PALLIATIVE CARE  IP CONSULT TO PULMONOLOGY  IP CONSULT TO SOCIAL WORK      Active Hospital Problems    Diagnosis Date Noted    Stage 3 chronic kidney disease ([facility] [N18.30]      Priority: High    DMII (diabetes mellitus, type 2) ([facility] [E11.9]      Priority: High    Morbid obesity due to excess calories ([facility] [E66.01]      Priority: High    Mixed hyperlipidemia [E78.2]      Priority: High    Demand ischemia ([facility] [I24.8]     COPD exacerbation ([facility] [J44.1] 09/09/2021    Abnormal EKG [R94.31] 09/09/2021    CAD (coronary artery disease) [I25.10] 09/09/2021    Acute on chronic respiratory failure with hypoxia and hypercapnia (HCC) [J96.21, J96.22]     Atrial fibrillation ([facility]) [I48.91]     Chronic diastolic heart failure ([facility] [I50.32]     Essential hypertension [I10]     Elevated troponin [R77.8] 03/06/2018    Acute on chronic respiratory failure with hypoxia (HCC) [J96.21]        ASSESSMENT AND PLAN:    Acute on chronic respiratory failure with hypoxia and hypercapnia (HCC)  CHF - Acute on chronic diastolic dysfunction. Copd  - Baseline o2 @ 4-5 L   - Provide supplemental oxygen as necessary to keep SaO2 88% or greater.  - Pulmonary has DC'd Prednisone as it can worsen edema   - Home Breo and Spiriva   - Continue Symbicort   - Duoneb's  - echo 08/09/2021 shows an EF of 55-60%  and grade I diastolic dysfunction  - on his home doses of oral Lasix   - stop Zaroxolyn  - Monitor strict I&Os: - 1.9L x 24 hrs; -3.9 L total  - daily weights: wt from last 3 encounters 265, 267, 273; today 264 lbs  - Cardiology has been consulted, does not feel to have an acute chf exacerbation at this time  - Pulmonary is following  - CHF nurse has been consulted to discuss disease management      Elevated troponin, Abnormal EKG - patient has an elevated troponin at baseline.    - Per Cardiology,   \"Demand ischemia. History is not consistent with acute coronary syndrome. Troponin chronically elevated for the last 3 years. There is no significant change in the minimal troponin elevation. Patient likely has underlying coronary artery disease but would not pursue stress testing or angiography with advanced age and comorbid conditions. Will discontinue IV heparin. Continue statin. \"     CAD (coronary artery disease)   - as above. Continue beta blocker, statin and aspirin. Monitor on telemetry. Generalized weakness/fatigue, fall at home   - PT/OT is evaluating and treating patient, .   They state the patient would benefit from continuing therapy after discharge, 24-hour supervision versus SNF     Chronic Renal Failure stage III - Renal function is at/near baseline and is stable; Monitor renal function and avoid Nephrotoxic agents as able      Atrial fibrillation ([facility] - Continue labetalol and monitor. Diabetes mellitus II   - Lantus, SSI and a carb control diet     Essential (primary) hypertension   - continue home meds and monitor blood pressure     Hyperlipidemia -   Continue statin therapy while in the hospital     Morbid obesity due to excess calories (Body mass index is 39.72 kg/m². )   - Complicating assessment and treatment. Placing patient at risk for multiple co-morbidities as well as early death and contributing to the patient's presentation.  on weight loss when appropriate. DVT Prophylaxis: Heparin  Diet: ADULT DIET; Regular; 5 carb choices (75 gm/meal);  Low Sodium (2 gm); 1500 ml  Code Status: DNR-CCA    PT/OT Eval Status: Is suggested patient would benefit from continued therapy following discharge,  24-hour supervision versus SNF  PT score on the AM-PAC short mobility form (if applicable) - 55/84  OT score on the AM-PAC short mobility form (if applicable) - 91/01    Dispo -dc likely jeremias Roger MD, MD

## 2021-09-15 NOTE — PLAN OF CARE
Problem: Falls - Risk of:  Goal: Will remain free from falls  Description: Will remain free from falls  9/14/2021 2308 by Karl Mendoza RN  Outcome: Ongoing     Problem: Skin Integrity:  Goal: Will show no infection signs and symptoms  Description: Will show no infection signs and symptoms  9/14/2021 2308 by Karl Mendoza RN  Outcome: Ongoing     Problem: OXYGENATION/RESPIRATORY FUNCTION  Goal: Patient will achieve/maintain normal respiratory rate/effort  Description: Respiratory rate and effort will be within normal limits for the patient  9/14/2021 2308 by Karl Mendoza RN  Outcome: Ongoing     Problem: FLUID AND ELECTROLYTE IMBALANCE  Goal: Fluid and electrolyte balance are achieved/maintained  9/14/2021 2308 by Karl Mendoza RN  Outcome: Ongoing     Problem: ACTIVITY INTOLERANCE/IMPAIRED MOBILITY  Goal: Mobility/activity is maintained at optimum level for patient  9/14/2021 2308 by Karl Mendoza RN  Outcome: Ongoing     Problem: Pain:  Goal: Control of chronic pain  Description: Control of chronic pain  9/14/2021 2308 by Karl Mendoza RN  Outcome: Ongoing

## 2021-09-15 NOTE — PROGRESS NOTES
Physical Therapy  Facility/Department: [address] PROGRESSIVE CARE  Daily Treatment Note  NAME: Ani Monet  : 1935  MRN: [id number]    Date of Service: 9/15/2021    Discharge Recommendations:  3-5 sessions per week, Patient would benefit from continued therapy after discharge   PT Equipment Recommendations  Equipment Needed: No  Other: defer to next level of care     Ani Monet scored a 17/24 on the AM-PAC short mobility form. Current research shows that an AM-PAC score of 17 or less is typically not associated with a discharge to the patient's home setting. Based on the patient's AM-PAC score and their current functional mobility deficits, it is recommended that the patient have 3-5 sessions per week of Physical Therapy at d/c to increase the patient's independence. Please see assessment section for further patient specific details. If patient discharges prior to next session this note will serve as a discharge summary. Please see below for the latest assessment towards goals. Assessment   Body structures, Functions, Activity limitations: Decreased functional mobility ; Decreased endurance  Assessment: Pt is an 80 y/o male recently discharged from SNF who presented to the ED with COPD exacerbation. Pt lives with his spouse in a one level home with 2 MARCE. Reports being indep ambulating with RW PTA but had a recent fall at SNF. Today, he required CGA for transfers and CGA ambulating approx 50' with RW. Ambulation continues to be limited by dizziness. Recommend continued skilled inpatient PT at a moderate intensity upon d/c to improve functional mobility and activity tolerance. Will continue to follow.   Treatment Diagnosis: difficulty walking  Prognosis: Fair  PT Education: Goals;PT Role;Plan of Care;General Safety;Home Exercise Program  Patient Education: incentive spirometer  REQUIRES PT FOLLOW UP: Yes  Activity Tolerance  Activity Tolerance: Patient limited by endurance;Treatment drives patient  Additional Comments: pt had recent SNF stay and was only home for 2 days prior to this hospital admission; wife would like pt to d/c to assisted living; pt reports one recent fall at [facility]    Subjective   General  Chart Reviewed: Yes  Additional Pertinent Hx: Per Zoe Curry MD \"Pt is an 81y.o. year-old male with a history of hypertension, hyperlipidemia, diabetes mellitus type II, coronary artery disease, severe COPD with chronic respiratory with a baseline O2 requirement of 3 liters O2 via nasal canula, A fib, CKD3 and morbid obesity. He presents to the emergency room for evaluation following a 1 to 2-day history of increasing shortness of breath and increased oxygen requirement. He is normally on 3 L of oxygen via nasal cannula. Today his oxygen saturation began to drift lower into the 80s and he had to increase his oxygen to 5 L/min via nasal cannula. At baseline he has elevated troponin levels. On evaluation today his troponin level is higher than normal.  On evaluation he was found to have a COPD exacerbation. Cardiology was consulted and asked that the patient be started on a heparin drip. He will be admitted for further evaluation and treatment. Associated signs and symptoms do not include typical chest pain, diaphoresis, edema, orthopnea, paroxysmal nocturnal dyspnea, fever or chills. \"  Response To Previous Treatment: Patient with no complaints from previous session. Family / Caregiver Present: No  Referring Practitioner: Zoe Curry MD  Subjective  Subjective: Pt supine in bed upon arrival.  Student nurse in the room examining pt. Agreeable to PT treatment and denies pain.                 Objective   Bed mobility  Supine to Sit: Stand by assistance;Contact guard assistance (HOB elevated; used rail)  Sit to Supine: Unable to assess (up in chair at end of session)     Transfers  Sit to Stand: Contact guard assistance  Stand to sit: Contact guard assistance     Ambulation  Ambulation?: Yes  More Ambulation?: No  Ambulation 1  Surface: level tile  Device: Rolling Walker  Other Apparatus: O2 (therapist managed line)  Assistance: Contact guard assistance  Quality of Gait: slow elliott, mild ROSALES, no LOB, distance limited by mild dizziness  Gait Deviations: Decreased step length;Decreased step height;Slow Elliott  Distance: approx 48' with multiple turns  Comments: on 6L 02 pt desat to 89% briefly after ambulation and up to 94% after approx 2 min  Stairs/Curb  Stairs?: No     Balance  Posture: Fair  Sitting - Static: Good  Sitting - Dynamic: Good;-  Standing - Static: Fair  Standing - Dynamic: Fair     Exercises  Hip Flexion: x15 alternating marches  Hip Abduction: x15 nayeli no resistance  Knee Long Arc Quad: x15 alternating  Ankle Pumps: x15 heel raises/toe raises  Comments: ther ex seated in recliner chair  Other exercises  Other exercises?: Yes  Other exercises 1: x5 reps incentive spirometer with cues for technique         Other Activities: Other (see comment)  Comment: PT performed pericare due to external male catheter leaking; CGA for static standing balance at RW           AM-PAC Score  AM-PAC Inpatient Mobility Raw Score : 17 (09/15/21 0952)  AM-PAC Inpatient T-Scale Score : 42.13 (09/15/21 0952)  Mobility Inpatient CMS 0-100% Score: 50.57 (09/15/21 0952)  Mobility Inpatient CMS G-Code Modifier : CK (09/15/21 1950)          Goals  Short term goals  Time Frame for Short term goals: upon d/c (goals ongoing as of 9/15)  Short term goal 1: bed mobility SBA  Short term goal 2: transfers SBA  Short term goal 3: ambulate 48' with RW and SBA  Long term goals  Time Frame for Long term goals : LTG=STG  Patient Goals   Patient goals : \"to go to assisted living\"    Plan    Plan  Times per week: 3-5/wk  Current Treatment Recommendations: Strengthening, ROM, Balance Training, Functional Mobility Training, Endurance Training, Home Exercise Program, Safety Education & Training, Patient/Caregiver Education & Training, Equipment Evaluation, Education, & procurement, Transfer Training, Gait Training  Safety Devices  Type of devices:  All fall risk precautions in place, Call light within reach, Chair alarm in place, Gait belt, Left in chair, Nurse notified (RN Lia notified)  Restraints  Initially in place: No     Therapy Time   Individual Concurrent Group Co-treatment   Time In 0927         Time Out 0953         Minutes 26         Timed Code Treatment Minutes: 26 Minutes         Electronically signed by Roena Gosselin, [address] on 9/15/2021 at 9:54 AM

## 2021-09-15 NOTE — CARE COORDINATION
Consult noted for Trilogy (or NIV) for home. Referral made to Michelle Necessary with [facility]. Call out to [facility] to see if they can do a NIV overnight at facility. Electronically signed by Renata Salamanca RN Case Management [phone] on 9/15/2021 at 10:55 AM     Received a call back from Rocío at [facility]. Facility has CPAP or BIPAP but no NIVs. If BIPAP needed at night facility would need to know exact settings.   Electronically signed by Renata Salamanca RN Case management [phone] on 9/15/2021 at 11:33 AM

## 2021-09-15 NOTE — PROGRESS NOTES
Pts a&o x4 up in the chair. Tolerating am food well, left PIV shows no redness, swelling or drainage. No complaints of n/v. Pt states no current pain. Chair alarm on, chair locked, call light within reach. All current needs are met, will continue to monitor.      Electronically signed by Uriel You on 9/15/2021 at 10:35 AM

## 2021-09-15 NOTE — PROGRESS NOTES
Pulmonary Progress Note     Patient's name:  Gloria Greco  Medical Record Number: [id number]  Patient's account/billing number: [de-identified]  Patient's YOB: 1935  Age: 81 y.o. Date of Admission: 9/9/2021  2:28 PM  Date of Consult: 9/15/2021      Primary Care Physician: Marino Corley MD      Code Status: DNR-CCA    Chief complaint: acute on chronic hypoxic and hypercapnic respiratory failure     Assessment and Plan     1. Acute on chronic respiratory failure with hypoxia and hypercapnia   2. Bilateral atelectasis and pleural effusions  3. COPD with exacerbation   4. Acute on chronic diastolic CHF      Plan:  Encouraged to use IS,  Wean Fio2 as tolerated to keep sta > 88%  Non invasive ventilator for continuous use at night and intermittent during the day is essential in treating his chronic hypercapnic respiratory failure, core pulmonale, reducing readmission rate, and mortality, Bipap tried and was not sufficient, social service consulted   Bronchodilators            Overnight:  Wore NIPPV overnight    REVIEW OF SYSTEMS:  Review of Systems -   General ROS: negative  Psychological ROS: negative  Ophthalmic ROS: negative  ENT ROS: negative  Allergy and Immunology ROS: negative  Hematological and Lymphatic ROS: negative  Endocrine ROS: negative  Breast ROS: negative  Respiratory ROS: sob, cough, wheezing  Cardiovascular ROS: sob with exertion, LE edema  Gastrointestinal ROS:negative  Genito-Urinary ROS: negative  Musculoskeletal ROS: negative  Neurological ROS: negative  Dermatological ROS: negative        Physical Exam:    Vitals: BP (!) 144/63   Pulse 75   Temp 98.5 °F (36.9 °C) (Oral)   Resp 18   Ht 5' 9\" (1.753 m)   Wt 264 lb 5.3 oz (119.9 kg)   SpO2 91%   BMI 39.03 kg/m²     Last Body weight:   Wt Readings from Last 3 Encounters:   09/15/21 264 lb 5.3 oz (119.9 kg)   08/26/21 273 lb (123.8 kg)   08/15/21 267 lb 6.7 oz (121.3 kg)       Body Mass Index :  Body mass index is 39.03 kg/m². Intake and Output summary:     Intake/Output Summary (Last 24 hours) at 9/15/2021 0955  Last data filed at 9/15/2021 0925  Gross per 24 hour   Intake 1620 ml   Output 2500 ml   Net -880 ml       Physical Examination:     Gen:  No acute distress. mild  Eyes: PERRL. Anicteric sclera. No conjunctival injection. ENT: No discharge. Posterior oropharynx clear. External appearance of ears and nose normal.  Neck: Trachea midline. No mass   Resp:  diminished bilaterally with no wheezing, prolonged expiratory phase  CV: Regular rate. Regular rhythm. No murmur or rub. ++ edema, chronic stasis dermatitis   GI: Soft, Non-tender. Non-distended. +BS  Skin: Warm, dry, w/o erythema. Lymph: No cervical or supraclavicular LAD. M/S: No cyanosis. No clubbing. Neuro:  CN 2-12 tested, no focal neurologic deficit. Moves all extremities  Psych: Awake and alert, Oriented x 3. Judgement and insight appropriate. Mood stable. Laboratory findings:-    CBC:   Recent Labs     09/15/21  0528   WBC 7.6   HGB 12.2*   *     BMP:    Recent Labs     09/13/21  0734 09/13/21  0734 09/14/21  0448 09/14/21  0448 09/15/21  0528      < > 139   < > 138   K 3.8   < > 3.8   < > 3.8   CL 93*   < > 91*   < > 92*   CO2 38*   < > 36*   < > 35*   BUN 53*   < > 59*   < > 60*   CREATININE 1.9*  --  1.8*  --  1.7*   GLUCOSE 176*   < > 162*   < > 153*    < > = values in this interval not displayed. S. Calcium:  Recent Labs     09/15/21  0528   CALCIUM 9.0     S. Ionized Calcium:No results for input(s): IONCA in the last 72 hours. S. Magnesium:No results for input(s): MG in the last 72 hours. S. Phosphorus:No results for input(s): PHOS in the last 72 hours. S. Glucose:  Recent Labs     09/14/21  1602 09/14/21  2040 09/15/21  0753   POCGLU 248* 200* 149*     Glycosylated hemoglobin A1C: No results for input(s): LABA1C in the last 72 hours. INR: No results for input(s): INR in the last 72 hours.   Hepatic functions: No results for input(s): ALKPHOS, ALT, AST, PROT, BILITOT, BILIDIR, LABALBU in the last 72 hours. Pancreatic functions:No results for input(s): LACTA, AMYLASE in the last 72 hours. S. Lactic Acid: No results for input(s): LACTA in the last 72 hours. Cardiac enzymes:No results for input(s): CKTOTAL, CKMB, CKMBINDEX, TROPONINI in the last 72 hours. BNP:No results for input(s): BNP in the last 72 hours. Lipid profile: No results for input(s): CHOL, TRIG, HDL, LDLCALC in the last 72 hours. Invalid input(s): LDL  Blood Gases: No results found for: PH, PCO2, PO2, HCO3, O2SAT  Thyroid functions:   Lab Results   Component Value Date    TSH 1.63 05/14/2019          Radiology Review:  Pertinent images / reports were reviewed as a part of this visit. CT Chest w/ contrast: No results found for this or any previous visit. CT Chest w/o contrast: Results for orders placed during the hospital encounter of 08/07/21    CT CHEST WO CONTRAST    Narrative  EXAMINATION:  CT OF THE CHEST WITHOUT CONTRAST 8/10/2021 5:59 pm    TECHNIQUE:  CT of the chest was performed without the administration of intravenous  contrast. Multiplanar reformatted images are provided for review. Dose  modulation, iterative reconstruction, and/or weight based adjustment of the  mA/kV was utilized to reduce the radiation dose to as low as reasonably  achievable. COMPARISON:  None. HISTORY:  ORDERING SYSTEM PROVIDED HISTORY: LLL effusion and/or infiltrate  TECHNOLOGIST PROVIDED HISTORY:  Reason for exam:->LLL effusion and/or infiltrate  Reason for Exam: LLL effusion and/or infiltrate  Acuity: Acute  Type of Exam: Initial    FINDINGS:  Mediastinum: There is mild cardiomegaly, with a trace pericardial effusion. Old granulomatous disease is noted. Lungs/pleura: There are small, dependently layered bilateral pleural  effusions. Left basilar pleural calcifications are present. There is  dependent atelectasis in the lower lobes.   Subsegmental atelectasis is noted  in the right middle lobe. Emphysema and mild peripheral fibrosis is noted. Upper Abdomen: The adrenal glands are unremarkable. Cholecystectomy clips  are present. Soft Tissues/Bones: No acute osseous abnormality is appreciated. There are  old left-sided rib fractures. The chest wall is unremarkable. Impression  Small bilateral pleural effusions, associated with dependent atelectasis. Superimposed pneumonia is less likely. Subsegmental atelectasis in the right middle lobe. Emphysema and mild peripheral fibrosis. Cardiomegaly and trace pericardial effusion. CTPA: No results found for this or any previous visit. CXR PA/LAT: Results for orders placed during the hospital encounter of 08/07/21    XR CHEST (2 VW)    Narrative  EXAMINATION:  TWO XRAY VIEWS OF THE CHEST    8/8/2021 10:05 am    COMPARISON:  08/07/2021    HISTORY:  ORDERING SYSTEM PROVIDED HISTORY: resp failure  TECHNOLOGIST PROVIDED HISTORY:  Reason for exam:->resp failure  Reason for Exam: sob resp failure  Acuity: Acute  Type of Exam: Initial    FINDINGS:  The cardiac silhouette is enlarged. Stable interstitial changes throughout  the lungs with bilateral pleural effusions and bibasilar volume loss, left  greater than right. Findings are most consistent with CHF. Aortic vascular  calcification. Impression  Stable findings most consistent with CHF. Interstitial edema with bilateral  pleural effusions and left basilar volume loss. CXR portable: Results for orders placed during the hospital encounter of 09/09/21    XR CHEST PORTABLE    Narrative  EXAMINATION:  ONE XRAY VIEW OF THE CHEST    9/12/2021 5:46 am    COMPARISON:  09/09/2021    HISTORY:  ORDERING SYSTEM PROVIDED HISTORY: SOB  TECHNOLOGIST PROVIDED HISTORY:  Reason for exam:->SOB  Reason for Exam: Shortness of Breath  Relevant Medical/Surgical History: hx copd, chf    FINDINGS:  The cardiac silhouette is enlarged.   Pulmonary vessels are

## 2021-09-15 NOTE — PLAN OF CARE
Problem: Falls - Risk of:  Goal: Will remain free from falls  Description: Will remain free from falls  9/15/2021 0931 by Chau Plascencia RN  Outcome: Ongoing  Note: Fall precautions in place. Bed locked and in lowest position. Alarm on. Call light within reach.    9/14/2021 2308 by Carlotta De Oliveira RN  Outcome: Ongoing  Goal: Absence of physical injury  Description: Absence of physical injury  9/15/2021 0931 by Chau Plascencia RN  Outcome: Ongoing  9/14/2021 2308 by Carlotta De Oliveira RN  Outcome: Ongoing     Problem: Skin Integrity:  Goal: Will show no infection signs and symptoms  Description: Will show no infection signs and symptoms  9/15/2021 0931 by Chau Plascencia RN  Outcome: Ongoing  9/14/2021 2308 by Carlotta De Oliveira RN  Outcome: Ongoing  Goal: Absence of new skin breakdown  Description: Absence of new skin breakdown  9/15/2021 0931 by Chau Plascencia RN  Outcome: Ongoing  9/14/2021 2308 by Carlotta De Oliveira RN  Outcome: Ongoing     Problem: OXYGENATION/RESPIRATORY FUNCTION  Goal: Patient will maintain patent airway  9/15/2021 0931 by Chau Plascencia RN  Outcome: Ongoing  9/14/2021 2308 by Carlotta De Oliveira RN  Outcome: Ongoing  Goal: Patient will achieve/maintain normal respiratory rate/effort  Description: Respiratory rate and effort will be within normal limits for the patient  9/15/2021 0931 by Chau Plascencia RN  Outcome: Ongoing  9/14/2021 2308 by Carlotta De Oliveira RN  Outcome: Ongoing     Problem: HEMODYNAMIC STATUS  Goal: Patient has stable vital signs and fluid balance  9/15/2021 0931 by Chau Plascencia RN  Outcome: Ongoing  9/14/2021 2308 by Carlotta De Oliveira RN  Outcome: Ongoing     Problem: FLUID AND ELECTROLYTE IMBALANCE  Goal: Fluid and electrolyte balance are achieved/maintained  9/15/2021 0931 by Chau Plascencia RN  Outcome: Ongoing  9/14/2021 2308 by Carlotta De Oliveira RN  Outcome: Ongoing     Problem: ACTIVITY INTOLERANCE/IMPAIRED MOBILITY  Goal: Mobility/activity is maintained at optimum level

## 2021-09-16 NOTE — PROGRESS NOTES
Hospitalist   Progress Note    Patient Name: Christy Hernandes  PCP: Calvin Hernandez MD  Date of Admission: 9/9/2021    Chief Complaint on Admission: Worsening shortness of breath and dizziness  Chief diagnosis after evaluation: COPD exacerbation with acute on chronic respiratory failure with hypoxia and hypercapnia    Brief Synopsis: Patient 81 y.o. man with a history of hypertension, hyperlipidemia, diabetes mellitus type II, coronary artery disease, severe COPD with chronic respiratory with a baseline O2 requirement of 3 liters O2 via nasal canula, A fib, CKD3 and morbid obesity  who was admitted on 9/9/2021 for evaluation and treatment of a COPD exacerbation with acute on chronic respiratory failure with hypoxia and hypercapnia    Pt Seen/Examined and Chart Reviewed. Subjective: No acute events reported overnight. Patient doing well this morning. On 3 L of oxygen at the time assessment    Objective  Allergies  Patient has no known allergies.     Medications    Scheduled Meds:   [START ON 9/17/2021] predniSONE  20 mg Oral Daily    Followed by   Jac Schwartz ON 9/20/2021] predniSONE  15 mg Oral Daily    Followed by   Jac Schwartz ON 9/23/2021] predniSONE  10 mg Oral Daily    Followed by   Jac Schwartz ON 9/26/2021] predniSONE  5 mg Oral Daily    ipratropium-albuterol  1 ampule Inhalation BID    heparin (porcine)  5,000 Units SubCUTAneous 3 times per day    atorvastatin  20 mg Oral Daily    labetalol  100 mg Oral TID    isosorbide mononitrate  30 mg Oral Daily    hydrALAZINE  25 mg Oral 3 times per day    gabapentin  300 mg Oral QAM    And    gabapentin  600 mg Oral Nightly    [Held by provider] furosemide  40 mg Oral BID    budesonide-formoterol  2 puff Inhalation BID    escitalopram  10 mg Oral Daily    doxazosin  8 mg Oral Nightly    aspirin  81 mg Oral Daily    insulin lispro  0-12 Units SubCUTAneous TID     insulin lispro  0-6 Units SubCUTAneous Nightly    sodium chloride flush  10 mL IntraVENous 2 times per day     Infusions:   dextrose      sodium chloride       PRN Meds:  melatonin, ipratropium-albuterol, glucose, dextrose, glucagon (rDNA), dextrose, sodium chloride flush, sodium chloride, ondansetron, polyethylene glycol, acetaminophen **OR** acetaminophen    Physical    VITALS:  BP (!) 143/75   Pulse 64   Temp 97.7 °F (36.5 °C) (Oral)   Resp 20   Ht 5' 9\" (1.753 m)   Wt 259 lb 0.7 oz (117.5 kg)   SpO2 91%   BMI 38.25 kg/m²   CONSTITUTIONAL:  Morbidly Obese 81y.o. year-old male who is again sitting in a chair, nad  EYES:  Lids and lashes normal, PERRL, EOMI, sclera clear, conjunctiva normal  ENT:  NC/AT, MMM    NECK:  Supple, symmetrical, trachea midline  LUNGS: No increased work of breathing, fair air exchange, no crackles  CARDIOVASCULAR:  Regular rate and rhythm, normal S1 and S2, no S3 or S4, and no significant murmurs, rubs or gallops noted. ABDOMEN:  Normal active bowel sounds, soft, non-tender, non-distended, no masses palpated, no organomegally  MUSCULOSKELETAL:  Full range of motion noted. Trace bl pitting edema in le  MENTAL STATUS: Awake, alert, oriented to name, place and time. NEUROLOGIC:  Cranial nerves II-XII are grossly intact. SKIN:  normal skin color, texture, turgor for age.     Data    CBC with Differential:    Lab Results   Component Value Date    WBC 9.0 09/16/2021    HGB 12.4 09/16/2021    HCT 37.6 09/16/2021     09/16/2021    MCV 85.0 09/16/2021    RDW 15.3 09/16/2021    LYMPHOPCT 9.3 09/16/2021    MONOPCT 6.1 09/16/2021    EOSPCT 2.0 12/02/2011    BASOPCT 0.1 09/16/2021    MONOSABS 0.6 09/16/2021    LYMPHSABS 0.8 09/16/2021    EOSABS 0.0 09/16/2021    BASOSABS 0.0 09/16/2021    DIFFTYPE Auto-K 09/28/2012     BMP:    Lab Results   Component Value Date     09/16/2021    K 3.6 09/16/2021    CL 93 09/16/2021    CO2 28 09/16/2021    BUN 68 09/16/2021    CREATININE 1.9 09/16/2021    GLUCOSE 188 09/16/2021    CALCIUM 8.8 09/16/2021    GFRAA 41 09/16/2021 GFRAA >60 04/15/2013    LABGLOM 34 09/16/2021     LFT:   Lab Results   Component Value Date    ALKPHOS 60 08/07/2021    ALT 7 08/07/2021    AST 10 08/07/2021    PROT 6.8 08/07/2021    PROT 7.1 01/15/2013    BILITOT 0.4 08/07/2021    BILIDIR <0.2 06/12/2020    IBILI see below 06/12/2020     Magnesium:    Lab Results   Component Value Date    MG 2.10 08/12/2021     Phosphorus:    Lab Results   Component Value Date    PHOS 4.2 08/14/2021     PT/INR:  No results found for: PTINR  U/A:    Lab Results   Component Value Date    LEUKOCYTESUR Negative 09/10/2021    NITRITE neg 04/10/2015    WBCUA 3 09/10/2021    RBCUA 1 09/10/2021    SPECGRAV 1.012 09/10/2021    UROBILINOGEN 0.2 09/10/2021    BILIRUBINUR Negative 09/10/2021    BILIRUBINUR neg 04/10/2015    BLOODU Negative 09/10/2021    GLUCOSEU Negative 09/10/2021    PROTEINU 100 09/10/2021     ABG:    Lab Results   Component Value Date    PHART 7.416 08/11/2021    HXD5PJO 50.3 08/11/2021    H0OZCFXY 95.1 08/11/2021    DIS3LXE 32.3 08/11/2021    BEART 6.7 08/11/2021    PO2ART 74.6 08/11/2021    CDR8MER 33.9 08/11/2021           Intake/Output Summary (Last 24 hours) at 9/16/2021 1442  Last data filed at 9/16/2021 1255  Gross per 24 hour   Intake 840 ml   Output 2150 ml   Net -1310 ml       Consults:  IP CONSULT TO HOSPITALIST  IP CONSULT TO CARDIOLOGY  IP CONSULT TO CARDIOLOGY  IP CONSULT TO PALLIATIVE CARE  IP CONSULT TO PULMONOLOGY  IP CONSULT TO Anali Freedman    Diagnosis Date Noted    Stage 3 chronic kidney disease [facility] [N18.30]      Priority: High    DMII (diabetes mellitus, type 2) ([facility] [E11.9]      Priority: High    Morbid obesity due to excess calories ([facility] [E66.01]      Priority: High    Mixed hyperlipidemia [E78.2]      Priority: High    Demand ischemia ([facility] [I24.8]     COPD exacerbation ([facility] [J44.1] 09/09/2021    Abnormal EKG [R94.31] 09/09/2021    CAD (coronary artery disease) [I25.10] 09/09/2021    Acute on chronic respiratory failure with hypoxia and hypercapnia (HCC) [J96.21, J96.22]     Atrial fibrillation (HCC) [I48.91]     Chronic diastolic heart failure (HCC) [I50.32]     Essential hypertension [I10]     Elevated troponin [R77.8] 03/06/2018    Acute on chronic respiratory failure with hypoxia (HCC) [J96.21]        ASSESSMENT AND PLAN:    Acute on chronic respiratory failure with hypoxia and hypercapnia (HCC)  CHF - Acute on chronic diastolic dysfunction. Copd  - Baseline o2 @ 4-5 L   - Provide supplemental oxygen as necessary to keep SaO2 88% or greater.  - Pulmonary has DC'd Prednisone as it can worsen edema   - Home Breo and Spiriva   - Continue Symbicort   - Duoneb's  - echo 08/09/2021 shows an EF of 55-60%  and grade I diastolic dysfunction  - on his home doses of oral Lasix   - stop Zaroxolyn  - Monitor strict I&Os: - 1.9L x 24 hrs; -3.9 L total  - daily weights: wt from last 3 encounters 265, 267, 273; today 264 lbs  - Cardiology has been consulted, does not feel to have an acute chf exacerbation at this time  - Pulmonary is following  - CHF nurse has been consulted to discuss disease management      Elevated troponin, Abnormal EKG - patient has an elevated troponin at baseline.    - Per Cardiology,   \"Demand ischemia. History is not consistent with acute coronary syndrome. Troponin chronically elevated for the last 3 years. There is no significant change in the minimal troponin elevation. Patient likely has underlying coronary artery disease but would not pursue stress testing or angiography with advanced age and comorbid conditions. Will discontinue IV heparin. Continue statin. \"     CAD (coronary artery disease)   - as above. Continue beta blocker, statin and aspirin. Monitor on telemetry. Generalized weakness/fatigue, fall at home   - PT/OT is evaluating and treating patient, .   They state the patient would benefit from continuing therapy after discharge, 24-hour supervision versus SNF     Chronic

## 2021-09-16 NOTE — PROGRESS NOTES
Pt discharged to [facility] [address] via [address] transport. AVS sent with transport. IV and telemetry removed prior to discharge.

## 2021-09-16 NOTE — CARE COORDINATION
CASE MANAGEMENT DISCHARGE SUMMARY:  Met with patient & wife (down on 3rd floor). Both agreeable to discharge to [facility]. Spoke to [facility] from [facility], can accept today, Covid PCR test pending, no pre cert needed, able to pull orders via epic. RN aware of DC plan. All aware of timing.     DISCHARGE DATE: 9/16/2021    DISCHARGED TO: [facility]                REPORT NUMBER: [phone]               FAX NUMBER: [phone]    TRANSPORTATION: [facility]  ([phone])             TIME: 6:15pm     INSURANCE PRECERT OBTAINED: waived    HENS/PASAAR COMPLETED: yes    Electronically signed by Sofia Milton RN Case Management [phone] on 9/16/2021 at 1:16 PM

## 2021-09-16 NOTE — PROGRESS NOTES
Physician Progress Note      [place]  CSN #:                  [id number]  :                       1935  ADMIT DATE:       2021 2:28 PM  [address] [place] DATE:  RESPONDING  PROVIDER #:        Flor Fernandez MD          QUERY TEXT:    Dear Harjit Alejo,  Pt admitted with COPD exac, Acute on chronic resp failure with hypoxia,   hypercapnia . Pt noted to have Acute on chronic diastolic CHF. If possible,   please document in progress notes and discharge summary the present on   admission status of CHF:    The medical record reflects the following:  Risk Factors: Hx COPD, chronic diastolic CHF, CKD, HTN, chronic resp failure   on O2 @ home  Clinical Indicators:  ED for SOB, 88% O2 sat on 3L/min NC home dose, per ED   \"+ diffuse crackles and wheezes. Poor air exchange. Not speaking   comfortably in full sentences\"CT chest-Small bilateral pleural effusions,   CXR-Pulmonary interstitial edema with left greater than right, Pro-GBC=4725,   9/10 Cardiology notes \"Chronic diastolic heart failure. Patient does not   appear grossly volume overloaded and would continue home oral diuretics\",    Pulm consult notes \"-Pleural effusion and pulmonary edema. ..would likely   benefit from volume reduction but concerned secondary to CK  Treatment: Bumex, Zarolyn, Diamox, I and O, CHF nurse, daily weights  Thank you,  Aye Watson RN, CDS  [email or web address]  Options provided:  -- Yes, Acute on chronic diastolic CHF was present at the time of the order to   admit to the hospital  -- No, Acute on chronic diastolic CHF was not present on admission and   developed during the inpatient stay  -- Other - I will add my own diagnosis  -- Disagree - Not applicable / Not valid  -- Disagree - Clinically unable to determine / Unknown  -- Refer to Clinical Documentation Reviewer    PROVIDER RESPONSE TEXT:    Yes, Acute on chronic diastolic CHF was present at the time of the order to   admit to the [facility].    Query created by:  [facility] on 9/14/2021 4:14 PM      Electronically signed by:  Jena Pierce MD 9/16/2021 1:12 PM

## 2021-09-16 NOTE — CARE COORDINATION
[employer] rep met with patient to advise that [facility] received a referral for a home NIV for the patient. Verified patient's home address and phone number. Patient was given written instructions to please call [facility] for equipment setup when he is being discharged from [place] to home. The patient named his wife and son Genia Stinson as [facility], and asked that his wife, who is admitted on the 3rd floor receive the same information. [employer] rep met with patient's spouse, reviewed the referral for the NIV, and provided the same written instructions with [facility]'s phone# that were provided to the patient. Notified the referring RN-CM.     Thank you for the referral.  Electronically signed by Clint Munoz on 9/16/2021 at 11:12 AM Cell ph# [phone]

## 2021-09-16 NOTE — PROGRESS NOTES
Pulmonary Progress Note     Patient's name:  Bubba Bahena  Medical Record Number: [id number]  Patient's account/billing number: [de-identified]  Patient's YOB: 1935  Age: 81 y.o. Date of Admission: 9/9/2021  2:28 PM  Date of Consult: 9/16/2021      Primary Care Physician: Imelda Naylor MD      Code Status: DNR-CCA    Chief complaint: acute on chronic hypoxic and hypercapnic respiratory failure     Assessment and Plan     1. Acute on chronic respiratory failure with hypoxia and hypercapnia   2. Bilateral atelectasis and pleural effusions  3. COPD with exacerbation   4. Acute on chronic diastolic CHF      Plan:  OK to use bipap @ SNF until he goes home, will follow up as out patient and arrange for home NIPPV once gets discharged from SNF  O2 to keep sat 88-92%  Wean steroid gradually   Bronchodilators            Overnight:  Wore NIPPV overnight    REVIEW OF SYSTEMS:  Review of Systems -   General ROS: negative  Psychological ROS: negative  Ophthalmic ROS: negative  ENT ROS: negative  Allergy and Immunology ROS: negative  Hematological and Lymphatic ROS: negative  Endocrine ROS: negative  Breast ROS: negative  Respiratory ROS: sob, cough, wheezing  Cardiovascular ROS: sob with exertion, LE edema  Gastrointestinal ROS:negative  Genito-Urinary ROS: negative  Musculoskeletal ROS: negative  Neurological ROS: negative  Dermatological ROS: negative        Physical Exam:    Vitals: /62   Pulse 67   Temp 97.7 °F (36.5 °C) (Oral)   Resp 16   Ht 5' 9\" (1.753 m)   Wt 259 lb 0.7 oz (117.5 kg)   SpO2 93%   BMI 38.25 kg/m²     Last Body weight:   Wt Readings from Last 3 Encounters:   09/16/21 259 lb 0.7 oz (117.5 kg)   08/26/21 273 lb (123.8 kg)   08/15/21 267 lb 6.7 oz (121.3 kg)       Body Mass Index : Body mass index is 38.25 kg/m².       Intake and Output summary:     Intake/Output Summary (Last 24 hours) at 9/16/2021 0933  Last data filed at 9/16/2021 0919  Gross per 24 hour   Intake 960 ml Output 2150 ml   Net -1190 ml       Physical Examination:     Gen:  No acute distress. mild  Eyes: PERRL. Anicteric sclera. No conjunctival injection. ENT: No discharge. Posterior oropharynx clear. External appearance of ears and nose normal.  Neck: Trachea midline. No mass   Resp:  diminished bilaterally with no wheezing, prolonged expiratory phase  CV: Regular rate. Regular rhythm. No murmur or rub. ++ edema, chronic stasis dermatitis   GI: Soft, Non-tender. Non-distended. +BS  Skin: Warm, dry, w/o erythema. Lymph: No cervical or supraclavicular LAD. M/S: No cyanosis. No clubbing. Neuro:  CN 2-12 tested, no focal neurologic deficit. Moves all extremities  Psych: Awake and alert, Oriented x 3. Judgement and insight appropriate. Mood stable. Laboratory findings:-    CBC:   Recent Labs     09/16/21  0610   WBC 9.0   HGB 12.4*   *     BMP:    Recent Labs     09/14/21  0448 09/14/21  0448 09/15/21  0528 09/15/21  0528 09/16/21  0610      < > 138   < > 137   K 3.8   < > 3.8   < > 3.6   CL 91*   < > 92*   < > 93*   CO2 36*   < > 35*   < > 28   BUN 59*   < > 60*   < > 68*   CREATININE 1.8*  --  1.7*  --  1.9*   GLUCOSE 162*   < > 153*   < > 188*    < > = values in this interval not displayed. S. Calcium:  Recent Labs     09/16/21  0610   CALCIUM 8.8     S. Ionized Calcium:No results for input(s): IONCA in the last 72 hours. S. Magnesium:No results for input(s): MG in the last 72 hours. S. Phosphorus:No results for input(s): PHOS in the last 72 hours. S. Glucose:  Recent Labs     09/15/21  1633 09/15/21  2004 09/16/21  0848   POCGLU 225* 218* 136*     Glycosylated hemoglobin A1C: No results for input(s): LABA1C in the last 72 hours. INR: No results for input(s): INR in the last 72 hours. Hepatic functions: No results for input(s): ALKPHOS, ALT, AST, PROT, BILITOT, BILIDIR, LABALBU in the last 72 hours.   Pancreatic functions:No results for input(s): LACTA, AMYLASE in the last 72 hours.  S. Lactic Acid: No results for input(s): LACTA in the last 72 hours. Cardiac enzymes:No results for input(s): CKTOTAL, CKMB, CKMBINDEX, TROPONINI in the last 72 hours. BNP:No results for input(s): BNP in the last 72 hours. Lipid profile: No results for input(s): CHOL, TRIG, HDL, LDLCALC in the last 72 hours. Invalid input(s): LDL  Blood Gases: No results found for: PH, PCO2, PO2, HCO3, O2SAT  Thyroid functions:   Lab Results   Component Value Date    TSH 1.63 05/14/2019          Radiology Review:  Pertinent images / reports were reviewed as a part of this visit. CT Chest w/ contrast: No results found for this or any previous visit. CT Chest w/o contrast: Results for orders placed during the hospital encounter of 08/07/21    CT CHEST WO CONTRAST    Narrative  EXAMINATION:  CT OF THE CHEST WITHOUT CONTRAST 8/10/2021 5:59 pm    TECHNIQUE:  CT of the chest was performed without the administration of intravenous  contrast. Multiplanar reformatted images are provided for review. Dose  modulation, iterative reconstruction, and/or weight based adjustment of the  mA/kV was utilized to reduce the radiation dose to as low as reasonably  achievable. COMPARISON:  None. HISTORY:  ORDERING SYSTEM PROVIDED HISTORY: LLL effusion and/or infiltrate  TECHNOLOGIST PROVIDED HISTORY:  Reason for exam:->LLL effusion and/or infiltrate  Reason for Exam: LLL effusion and/or infiltrate  Acuity: Acute  Type of Exam: Initial    FINDINGS:  Mediastinum: There is mild cardiomegaly, with a trace pericardial effusion. Old granulomatous disease is noted. Lungs/pleura: There are small, dependently layered bilateral pleural  effusions. Left basilar pleural calcifications are present. There is  dependent atelectasis in the lower lobes. Subsegmental atelectasis is noted  in the right middle lobe. Emphysema and mild peripheral fibrosis is noted. Upper Abdomen: The adrenal glands are unremarkable.   Cholecystectomy clips  are present. Soft Tissues/Bones: No acute osseous abnormality is appreciated. There are  old left-sided rib fractures. The chest wall is unremarkable. Impression  Small bilateral pleural effusions, associated with dependent atelectasis. Superimposed pneumonia is less likely. Subsegmental atelectasis in the right middle lobe. Emphysema and mild peripheral fibrosis. Cardiomegaly and trace pericardial effusion. CTPA: No results found for this or any previous visit. CXR PA/LAT: Results for orders placed during the hospital encounter of 08/07/21    XR CHEST (2 VW)    Narrative  EXAMINATION:  TWO XRAY VIEWS OF THE CHEST    8/8/2021 10:05 am    COMPARISON:  08/07/2021    HISTORY:  ORDERING SYSTEM PROVIDED HISTORY: resp failure  TECHNOLOGIST PROVIDED HISTORY:  Reason for exam:->resp failure  Reason for Exam: sob resp failure  Acuity: Acute  Type of Exam: Initial    FINDINGS:  The cardiac silhouette is enlarged. Stable interstitial changes throughout  the lungs with bilateral pleural effusions and bibasilar volume loss, left  greater than right. Findings are most consistent with CHF. Aortic vascular  calcification. Impression  Stable findings most consistent with CHF. Interstitial edema with bilateral  pleural effusions and left basilar volume loss. CXR portable: Results for orders placed during the hospital encounter of 09/09/21    XR CHEST PORTABLE    Narrative  EXAMINATION:  ONE XRAY VIEW OF THE CHEST    9/12/2021 5:46 am    COMPARISON:  09/09/2021    HISTORY:  ORDERING SYSTEM PROVIDED HISTORY: SOB  TECHNOLOGIST PROVIDED HISTORY:  Reason for exam:->SOB  Reason for Exam: Shortness of Breath  Relevant Medical/Surgical History: hx copd, chf    FINDINGS:  The cardiac silhouette is enlarged. Pulmonary vessels are congested. There  are increased interstitial opacities.   There are hazy opacities in the lung  bases, increased on the right in the interval.    Impression  Vascular congestion. Airspace disease in the lung bases, along with suspected bilateral pleural  effusions. There is mild progression in airspace disease in the right base. Pulmonary edema with atelectasis, versus pneumonia.                      Thien Horn MD, M.D.            9/16/2021, 9:33 AM

## 2021-09-16 NOTE — PLAN OF CARE
Problem: Falls - Risk of:  Goal: Will remain free from falls  Description: Will remain free from falls  9/16/2021 1036 by Sheldon Maddox RN  Outcome: Ongoing  Note: Fall precautions in place. Bed locked and in lowest position. Alarm on. Call light within reach.    9/16/2021 0103 by Maria C Adames RN  Outcome: Ongoing  Goal: Absence of physical injury  Description: Absence of physical injury  9/16/2021 1036 by Sheldon Maddox RN  Outcome: Ongoing  9/16/2021 0103 by Maria C Adames RN  Outcome: Ongoing     Problem: Skin Integrity:  Goal: Will show no infection signs and symptoms  Description: Will show no infection signs and symptoms  9/16/2021 1036 by Sheldon Maddox RN  Outcome: Ongoing  9/16/2021 0103 by Maria C Adames RN  Outcome: Ongoing  Goal: Absence of new skin breakdown  Description: Absence of new skin breakdown  9/16/2021 1036 by Sheldon Maddox RN  Outcome: Ongoing  9/16/2021 0103 by Maria C Adames RN  Outcome: Ongoing     Problem: OXYGENATION/RESPIRATORY FUNCTION  Goal: Patient will maintain patent airway  9/16/2021 1036 by Sheldon Maddox RN  Outcome: Ongoing  9/16/2021 0103 by Maria C Adames RN  Outcome: Ongoing  Goal: Patient will achieve/maintain normal respiratory rate/effort  Description: Respiratory rate and effort will be within normal limits for the patient  9/16/2021 1036 by Sheldon Maddox RN  Outcome: Ongoing  9/16/2021 0103 by Maria C Adames RN  Outcome: Ongoing     Problem: HEMODYNAMIC STATUS  Goal: Patient has stable vital signs and fluid balance  9/16/2021 1036 by Sheldon Maddox RN  Outcome: Ongoing  9/16/2021 0103 by Maria C Adames RN  Outcome: Ongoing     Problem: FLUID AND ELECTROLYTE IMBALANCE  Goal: Fluid and electrolyte balance are achieved/maintained  9/16/2021 1036 by Sheldon Maddox RN  Outcome: Ongoing  9/16/2021 0103 by Maria C Adames RN  Outcome: Ongoing     Problem: ACTIVITY INTOLERANCE/IMPAIRED MOBILITY  Goal: Mobility/activity is maintained at optimum level for patient  9/16/2021 1036 by Bao Song RN  Outcome: Ongoing  9/16/2021 0103 by Colt Edmonds RN  Outcome: Ongoing     Problem: Pain:  Goal: Pain level will decrease  Description: Pain level will decrease  9/16/2021 1036 by Bao Song RN  Outcome: Ongoing  9/16/2021 0103 by Colt Edmonds RN  Outcome: Ongoing  Goal: Control of acute pain  Description: Control of acute pain  9/16/2021 1036 by Bao Song RN  Outcome: Ongoing  9/16/2021 0103 by Colt Edmonds RN  Outcome: Ongoing  Goal: Control of chronic pain  Description: Control of chronic pain  9/16/2021 1036 by Bao Song RN  Outcome: Ongoing  9/16/2021 0103 by Colt Edmonds RN  Outcome: Ongoing

## 2021-09-16 NOTE — PROGRESS NOTES
Occupational Therapy  Facility/Department: [address] PROGRESSIVE CARE  Daily Treatment Note  Should patient be discharged prior to another treatment session, this note shall serve as the discharge summary. NAME: Betina Alvarez  : 1935  MRN: [id number]    Date of Service: 2021    Discharge Recommendations:  Continue to assess pending progress, 3-5 sessions per week, Patient would benefit from continued therapy after discharge  OT Equipment Recommendations  Other: TBD    Assessment   Performance deficits / Impairments: Decreased functional mobility ; Decreased strength;Decreased endurance;Decreased ADL status; Decreased balance;Decreased high-level IADLs  Assessment: Tolerated session well. [facility] complete. Endurance is limited and O2 sat drops. Would benefit from cont OT, 3-5x week  Prognosis: [address] / Modification: RW  OT Education: OT Role;Plan of Care;Transfer Training;ADL Adaptive Strategies; Energy Conservation  REQUIRES OT FOLLOW UP: Yes  Activity Tolerance  Activity Tolerance: Patient limited by fatigue;Patient Tolerated treatment well  Activity Tolerance: O2 sat drops mildly  Safety Devices  Safety Devices in place: Yes  Type of devices: Chair alarm in place;Call light within reach; Left in chair;Gait belt         Patient Diagnosis(es): The primary encounter diagnosis was Elevated troponin. Diagnoses of Acute electrocardiogram changes, Shortness of breath, and Increased oxygen demand were also pertinent to this visit.       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 has a past surgical history that includes TURP (8/14/12) and Cataract removal (Right, 7/3/13). Restrictions  Restrictions/Precautions  Restrictions/Precautions: Fall Risk  Position Activity Restriction  Other position/activity restrictions: 6L 02 per NC; external male catheter  Subjective   General  Chart Reviewed: Yes  Patient assessed for rehabilitation services?: Yes  Additional Pertinent Hx: per CNP note, Jose Alonzo is a 81 y.o. 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 Denies chest pain, nausea, vomiting, presyncope, fever, chills, sweats, changes militates, hemoptysis, leg/calf pain or swelling, abdominal pain, diarrhea. Patient was discharged from [facility] after he was therefore rehab secondary to deconditioning for 2.5 weeks. He wears 3 L of O2 per nasal cannula at baseline but today has required 5 L per nasal cannula to maintain his oxygen saturation in the 90-92% range as he was 88% on his baseline 3 L. Family / Caregiver Present: No  Referring Practitioner: Jonah Alcantara MD  Subjective  Subjective: Pt agreeable to OT treatment. Pt on 6L O2. No complaints except dizziness after getting up (O2 sat at 87% after transfer/activity)  General Comment  Comments: okay for therapy per RN. Orientation     Objective    ADL  LE Dressing: Minimal assistance; Moderate assistance (OT assisted to start pants over feet and CG/Ledy for balance while standing to pull up)  Toileting: Dependent/Total (still with external cath)        Balance  Sitting Balance: Supervision  Standing Balance: Contact guard assistance (CG/Min A dynamic)  Functional Mobility  Functional - Mobility Device: Rolling Walker  Activity: Other  Assist Level: Contact guard assistance  Functional Mobility Comments: Cues for walker safety; complained of dizziness, O2 sat dropped to 87%, returned to 90% with pursed lip breathing quickly  Bed mobility  Supine to Sit: Minimal assistance;Contact guard assistance (CG/Min A with bed rails)  Transfers  Sit to stand: Contact guard assistance  Stand to sit: Contact guard assistance                                                                 Plan   Plan  Times per week: 3-5x  Times per day: Daily  Current Treatment Recommendations: Strengthening, Functional Mobility Training, Gait Training, Endurance Training, Balance Training, Self-Care / ADL, Safety Education & Training, Patient/Caregiver Education & Training       OutComes Score         Nayan Lamar scored a 16/24 on the -[facility] ADL Inpatient form. Current research shows that an -PAC score of 17 or less is typically not associated with a discharge to the patient's home setting. Based on the patient's AM-PAC score and their current ADL deficits, it is recommended that the patient have 3-5 sessions per week of Occupational Therapy at d/c to increase the patient's independence. Please see assessment section for further patient specific details. If patient discharges prior to next session this note will serve as a discharge summary. Please see below for the latest assessment towards goals.                                             AM-PAC Score        AM-[facility] Inpatient Daily Activity Raw Score: 16 (09/13/21 1409)  AM-PAC Inpatient ADL T-Scale Score : 35.96 (09/13/21 1409)  ADL Inpatient CMS 0-100% Score: 53.32 (09/13/21 1409)  ADL Inpatient CMS G-Code Modifier : CK (09/13/21 1409)    Goals  Short term goals  Time Frame for Short term goals: prior to D/C; ongoing 9/15  Short term goal 1: complete functional mobility and transfers with supervision  Short term goal 2: complete bathing and dressing with Min A  Short term goal 3: complete toileting with supervision  Short term goal 4: complete grooming with setup  Long term goals  Time Frame for Long term goals : STG=LTG  Patient Goals   Patient goals : no stated goals       Therapy Time   Individual Concurrent Group Co-treatment   Time In 0805         Time Out 0830         Minutes 25         Timed Code Treatment Minutes: [facility],

## 2021-09-16 NOTE — PLAN OF CARE
Problem: Falls - Risk of:  Goal: Will remain free from falls  Description: Will remain free from falls  Outcome: Ongoing     Problem: OXYGENATION/RESPIRATORY FUNCTION  Goal: Patient will achieve/maintain normal respiratory rate/effort  Description: Respiratory rate and effort will be within normal limits for the patient  Outcome: Ongoing     Problem: ACTIVITY INTOLERANCE/IMPAIRED MOBILITY  Goal: Mobility/activity is maintained at optimum level for patient  Outcome: Ongoing     Problem: Pain:  Goal: Control of chronic pain  Description: Control of chronic pain  Outcome: Ongoing

## 2021-09-17 NOTE — DISCHARGE SUMMARY
Hospitalist Discharge Summary    Patient ID:  Ellena Paget  [id number]  81 y.o.  1935    Admit date: 9/9/2021    Discharge date: 9/16/2021    Disposition: SNF    Admission Diagnoses:   Patient Active Problem List   Diagnosis    COPD, severe ([address]    Psoriasis    Mixed hyperlipidemia    Vitamin D deficiency    Urinary frequency    Fatty liver    DM type 2 with diabetic peripheral neuropathy ([address]    Stage 3 chronic kidney disease ([address]    CVA, old, ataxia    Atherosclerosis of aorta ([address]    DMII (diabetes mellitus, type 2) ([address]    Morbid obesity due to excess calories ([address]    Hypoxemia    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    Acute on chronic respiratory failure with hypoxia (HCC)    Elevated troponin    SOB (shortness of breath)    Chronic respiratory failure with hypoxia and hypercapnia (HCC)    Orthopnea    Abnormal CT of the chest    Chest pain    ROSENDO (obstructive sleep apnea)    Chronic vasomotor rhinitis    Shortness of breath    Bilateral pleural effusion    Diastolic dysfunction    Elevated brain natriuretic peptide (BNP) level    Essential hypertension    Chronic diastolic heart failure (HCC)    Atrial fibrillation (HCC)    Chronic respiratory failure with hypoxia (HCC)    Sinus tachycardia    Multifocal pneumonia    Pulmonary nodule seen on imaging study    Major depressive disorder, recurrent, mild    Acute diastolic (congestive) heart failure (HCC)    Acute on chronic respiratory failure with hypoxia and hypercapnia (HCC)    COPD exacerbation (HCC)    Abnormal EKG    CAD (coronary artery disease)    Demand ischemia (HCC)       Discharge Diagnoses: Principal Problem:    COPD exacerbation (HCC)  Active Problems:    Mixed hyperlipidemia    Stage 3 chronic kidney disease (HCC)    DMII (diabetes mellitus, type 2) ([address]    Morbid obesity due to excess calories ([address]    Acute on chronic respiratory failure with hypoxia ([address] Elevated troponin    Essential hypertension    Chronic diastolic heart failure (HCC)    Atrial fibrillation (HCC)    Acute on chronic respiratory failure with hypoxia and hypercapnia (HCC)    Abnormal EKG    CAD (coronary artery disease)    Demand ischemia (HCC)  Resolved Problems:    * No resolved hospital problems. *      Code Status:  Prior    Condition:  Stable    Discharge Diet: Diet:  No diet orders on file    PCP to do list: Follow-up for improvement of symptoms    Hospital Course: As per HPI:81y.o. year-old male with a history of hypertension, hyperlipidemia, diabetes mellitus type II, coronary artery disease, severe COPD with chronic respiratory with a baseline O2 requirement of 3 liters O2 via nasal canula, A fib, CKD3 and morbid obesity. He presents to the emergency room for evaluation following a 1 to 2-day history of increasing shortness of breath and increased oxygen requirement. He is normally on 3 L of oxygen via nasal cannula. Today his oxygen saturation began to drift lower into the 80s and he had to increase his oxygen to 5 L/min via nasal cannula. At baseline he has elevated troponin levels. On evaluation today his troponin level is higher than normal.  On evaluation he was found to have a COPD exacerbation. Cardiology was consulted and asked that the patient be started on a heparin drip. He will be admitted for further evaluation and treatment. Associated signs and symptoms do not include typical chest pain, diaphoresis, edema, orthopnea, paroxysmal nocturnal dyspnea, fever or chills. Following problems were addressed during his stay    Acute on chronic respiratory failure with hypoxia and hypercapnia (HCC)  CHF - Acute on chronic diastolic dysfunction. Copd  - Baseline o2 @ 4-5 L   - Provide supplemental oxygen as necessary to keep SaO2 88% or greater.   - off prednisone  - Home Breo and Spiriva   - Continue Symbicort   - Duoneb's  - echo 08/09/2021 shows an EF of 55-60%  and grade I diastolic dysfunction  - on his home doses of oral Lasix   - Monitor strict I&Os: - 1.9L x 24 hrs; -3.9 L total  - daily weights: wt from last 3 encounters 265, 267, 273; today 264 lbs  - Cardiology has been consulted, does not feel to have an acute chf exacerbation at this time  - Pulmonary is following  - CHF nurse has been consulted to discuss disease management      Elevated troponin, Abnormal EKG - patient has an elevated troponin at baseline.    - Per Cardiology,   \"Demand ischemia. History is not consistent with acute coronary syndrome.  Troponin chronically elevated for the last 3 years. Carmina Luis Angelbronson is no significant change in the minimal troponin elevation.  Patient likely has underlying coronary artery disease but would not pursue stress testing or angiography with advanced age and comorbid conditions. Will discontinue IV heparin. Continue statin. \"     CAD (coronary artery disease)   - as above.  Continue beta blocker, statin and aspirin. Monitor on telemetry.      Generalized weakness/fatigue, fall at home   - PT/OT . Dc to snf     Chronic Renal Failure stage III  - stable     Atrial fibrillation (HCC)   - Continue labetalol and monitor.      Diabetes mellitus II   - Lantus, SSI and a carb control diet     Essential (primary) hypertension   - continue home meds and monitor blood pressure     Hyperlipidemia -   Continue statin therapy while in the hospital     Morbid obesity due to excess calories (Body mass index is 39.72 kg/m². )   - Complicating assessment and treatment. Placing patient at risk for multiple co-morbidities as well as early death and contributing to the patient's presentation.  on weight loss when appropriate.     Discharge Medications:   Discharge Medication List as of 9/16/2021  4:19 PM      START taking these medications    Details   ! ! predniSONE (DELTASONE) 20 MG tablet Take 1 tablet by mouth daily for 3 doses, Disp-3 tablet, R-0NO PRINT      !! predniSONE (DELTASONE) 5 MG tablet Take 3 tablets by mouth daily for 3 doses, Disp-9 tablet, R-0NO PRINT      !! predniSONE (DELTASONE) 10 MG tablet Take 1 tablet by mouth daily for 3 doses, Disp-3 tablet, R-0NO PRINT      !! predniSONE (DELTASONE) 5 MG tablet Take 1 tablet by mouth daily for 3 doses, Disp-3 tablet, R-0NO PRINT       ! ! - Potential duplicate medications found. Please discuss with provider.         Discharge Medication List as of 9/16/2021  4:19 PM        Discharge Medication List as of 9/16/2021  4:19 PM      CONTINUE these medications which have NOT CHANGED    Details   acetaminophen (TYLENOL) 325 MG tablet Take 650 mg by mouth every 4 hours as needed for PainHistorical Med      hydrALAZINE (APRESOLINE) 25 MG tablet Take 1 tablet by mouth every 8 hours, Disp-90 tablet, R-0NO PRINT      labetalol (NORMODYNE) 100 MG tablet Take 1 tablet by mouth 3 times daily, Disp-60 tablet, R-0NO PRINT      furosemide (LASIX) 40 MG tablet Take 1 tablet by mouth 2 times daily, Disp-60 tablet, R-0NO PRINT      doxazosin (CARDURA) 8 MG tablet TAKE 1 TABLET BY MOUTH EVERY NIGHT AT BEDTIME FOR BLOOD PRESSURE, Disp-90 tablet, R-1Normal      escitalopram (LEXAPRO) 10 MG tablet Take 1 tablet by mouth daily, Disp-90 tablet, R-3Normal      simvastatin (ZOCOR) 40 MG tablet TAKE 1 TABLET BY MOUTH EVERY EVENING, Disp-90 tablet, R-3Normal      glipiZIDE (GLUCOTROL) 10 MG tablet TAKE 1 TABLET BY MOUTH DAILY WITH BREAKFAST, Disp-90 tablet, R-3Normal      gabapentin (NEURONTIN) 300 MG capsule TAKE 1 CAPSULE BY MOUTH THREE TIMES DAILY, Disp-270 capsule, R-1Normal      isosorbide mononitrate (IMDUR) 30 MG extended release tablet TAKE 1 TABLET BY MOUTH TWICE DAILY FOR BLOOD PRESSURE, Disp-180 tablet, R-3Normal      Fluticasone furoate-vilanterol (BREO ELLIPTA) 200-25 MCG/INH AEPB inhaler Inhale 1 puff into the lungs daily, Disp-1 each, R-11Normal      tiotropium (SPIRIVA HANDIHALER) 18 MCG inhalation capsule INHALE THE CONTENTS OF 1 CAPSULE VIA INHALATION DEVICE DAILY FOR BREATHING, Disp-90 capsule, R-3Normal      VENTOLIN  (90 Base) MCG/ACT inhaler INHALE 2 PUFFS INTO THE LUNGS EVERY 6 HOURS AS NEEDED FOR WHEEZING, Disp-1 Inhaler, R-5Normal      Nebulizers (VIOS LC SPRINT) MISC R-0, Historical Med      Compression Stockings MISC Starting Tue 11/27/2018, Disp-1 each, R-0, Mpxdv29-11 mmHg knee high      Respiratory Therapy Supplies (NEBULIZER/TUBING/MOUTHPIECE) KIT DAILY PRN Starting Mon 11/19/2018, Disp-1 kit, R-0, Normal      albuterol (PROVENTIL) (2.5 MG/3ML) 0.083% nebulizer solution Take 3 mLs by nebulization every 6 hours as needed for Wheezing, Disp-120 vial, R-3Normal      nitroGLYCERIN (NITROSTAT) 0.4 MG SL tablet Place 1 tablet under the tongue every 5 minutes as needed for Chest pain (chest pain), Disp-25 tablet, R-3Normal      Spacer/Aero-Holding Chambers KOSTA DAILY PRN Starting 9/19/2016, Until Discontinued, Disp-1 Device, R-0, Normal      Cholecalciferol (VITAMIN D-3) 5000 UNITS TABS Take 5,000 Units by mouth daily Historical Med      aspirin 81 MG EC tablet Take 81 mg by mouth daily Historical Med           Discharge Medication List as of 9/16/2021  4:19 PM              Procedures: none    Assessment on Discharge: Stable, improved     Discharge ROS:  A complete review of systems was asked and negative except for none    Discharge Exam:  /77   Pulse 64   Temp 98.1 °F (36.7 °C) (Oral)   Resp 18   Ht 5' 9\" (1.753 m)   Wt 259 lb 0.7 oz (117.5 kg)   SpO2 93%   BMI 38.25 kg/m²     Gen: NAD  HEENT: NC/AT, moist mucous membranes, no oropharyngeal erythema or exudate  Neck: supple, trachea midline, no anterior cervical or SC LAD  Heart:  Normal s1/s2, RRR, no murmurs, gallops, or rubs.  no leg edema  Lungs:  CTA bilaterally, no wheeze,no rales or rhonchi, no use of accessory muscles  Abd: bowel sounds present, soft, nontender, nondistended, no masses  Extrem:  No clubbing, cyanosis,  no edema  Skin: no lesion or masses  Psych:  A & O x3  Neuro: grossly intact, moves all four extremities    Pertinent Studies During Hospital Stay:  Radiology:  XR CHEST PORTABLE    Result Date: 9/12/2021  EXAMINATION: ONE XRAY VIEW OF THE CHEST 9/12/2021 5:46 am COMPARISON: 09/09/2021 HISTORY: ORDERING SYSTEM PROVIDED HISTORY: SOB TECHNOLOGIST PROVIDED HISTORY: Reason for exam:->SOB Reason for Exam: Shortness of Breath Relevant Medical/Surgical History: hx copd, chf FINDINGS: The cardiac silhouette is enlarged. Pulmonary vessels are congested. There are increased interstitial opacities. There are hazy opacities in the lung bases, increased on the right in the interval.     Vascular congestion. Airspace disease in the lung bases, along with suspected bilateral pleural effusions. There is mild progression in airspace disease in the right base. Pulmonary edema with atelectasis, versus pneumonia. XR CHEST PORTABLE    Result Date: 9/9/2021  EXAMINATION: ONE XRAY VIEW OF THE CHEST 9/9/2021 2:48 pm COMPARISON: Chest radiograph 08/08/2021 HISTORY: ORDERING SYSTEM PROVIDED HISTORY: SOB TECHNOLOGIST PROVIDED HISTORY: Reason for exam:->SOB Reason for Exam: SOB Acuity: Acute Type of Exam: Initial FINDINGS: There are bilateral pulmonary interstitial opacities extending to the periphery with small curvilinear blunting of the left costophrenic angle. There is trace blunting of the right costophrenic angle. Cardiomediastinal silhouette is prominent but unchanged with calcification in the aortic arch. Pulmonary interstitial edema with left greater than right bilateral pleural fluid.            Last Labs on Discharge:     Recent Results (from the past 24 hour(s))   POCT Glucose    Collection Time: 09/16/21  4:40 PM   Result Value Ref Range    POC Glucose 257 (H) 70 - 99 mg/dl    Performed on ACCU-CHEK          Follow up: with Imelda Naylor MD    Note that over 30 minutes was spent in preparing discharge papers, discussing discharge with patient, medication review, etc.    Thank you Carlos Hamlin Francina Dandy, MD for the opportunity to be involved in this patient's care. If you have any questions or concerns please feel free to contact me at [phone].     Electronically signed by Moreno Felix MD on 9/17/2021 at 1:20 PM

## 2021-09-27 NOTE — TELEPHONE ENCOUNTER
----- Message from Yury Rich sent at 9/27/2021  5:14 PM EDT -----  Subject: Message to Provider    QUESTIONS  Information for Provider? Krystal Townsend faxed over patients   medication list and wanted it confirmed by Dr Damon Speaker as soon as possible. Please reach out as soon as possible [phone]  ---------------------------------------------------------------------------  --------------  CALL BACK INFO  What is the best way for the office to contact you? Do not leave any   message, patient will call back for answer  Preferred Call Back Phone Number? [phone]  ---------------------------------------------------------------------------  --------------  SCRIPT ANSWERS  Relationship to Patient? Third Party  Representative Name?  Krystal Townsend

## 2021-09-28 NOTE — TELEPHONE ENCOUNTER
MONA FROM [facility] @  [phone]    HE JUST MOVED IN [facility] - THEY NEED ORDERS FOR     NURSING, PT & OT  -  EVAL & TREAT

## 2021-09-28 NOTE — TELEPHONE ENCOUNTER
DARIANA @  [phone]    SHE FAXED THE FORM TO VERIFY MEDICATIONS - AND WILL FAX IT AGAIN    PLEASE CALL ASAP

## 2021-09-28 NOTE — TELEPHONE ENCOUNTER
----- Message from Vonda Clay sent at 9/28/2021  9:20 AM EDT -----  Subject: Message to Provider    QUESTIONS  Information for Provider? Patient was admitted to facility and they need   to verify all of his medication. They send over a PO form yesterday and   the doctor needs to verify and sign it as well. They need it done as soon   as possible or patient cannot receive medication  ---------------------------------------------------------------------------  --------------  CALL BACK INFO  What is the best way for the office to contact you? Do not leave any   message, patient will call back for answer  Preferred Call Back Phone Number? [phone]  ---------------------------------------------------------------------------  --------------  SCRIPT ANSWERS  Relationship to Patient? Third Party  Representative Name?  Jerry at [employer]

## 2021-09-29 NOTE — TELEPHONE ENCOUNTER
[phone] SPOKE TO TY  [address] JUST NEEDS VERIFICATION ON THE LASIX DIRECTIONS. HIS HOSPITAL DISCHARGE FORMS READ: LASIX 20 MON, WED, FRI AND LASIX 40 MG ON TUES, THURS, SAT, AND SUN     DR GHOSH PLEASE VERIFY THIS MEDICATION AND THEY SAID THAT DR Erik Galeas CAN SIGN FORM THIS AFTERNOON DUE TO THE PATIENT NOT HAVING ANY MEDICATION SINCE Monday MORNING.

## 2021-09-29 NOTE — TELEPHONE ENCOUNTER
TY calling again -this is urgent Patient has not had any medication since Monday     TY @  [phone]

## 2021-10-06 NOTE — TELEPHONE ENCOUNTER
----- Message from Hubert Marie sent at 10/6/2021 12:45 PM EDT -----  Subject: Message to Provider    QUESTIONS  Information for Provider? Chaparro Fallon from [address] [place] [address] is calling to   get a order to extend skilled nursing and physical therapy. Please advise.     ---------------------------------------------------------------------------  --------------  CALL BACK INFO  What is the best way for the office to contact you? Do not leave any   message, patient will call back for answer, OK to respond with electronic   message via [employer] portal (only for patients who have registered [employer]   account)  Preferred Call Back Phone Number? [phone]  ---------------------------------------------------------------------------  --------------  SCRIPT ANSWERS  Relationship to Patient? Third Party  Representative Name?  Chaparro Fallon

## 2021-10-06 NOTE — TELEPHONE ENCOUNTER
Patient wife is calling because her  is at [facility] now, was release from the hospital 2 -3 weeks ago. They had put him on the medication AYDRALAZINE 25 MG - 1 TABLET BY MOUTH EVERY 8 HOURS. Should he still be taking this medication? If so they need a RX sent over to  [facility] - phone no. [phone]. He was at [facility] [place] prior to going to [facility]. Please give her a call back.

## 2021-10-06 NOTE — TELEPHONE ENCOUNTER
Please let me know how his blood pressures have been running to decide if needs to stay at this present medication / dose.

## 2021-10-07 NOTE — TELEPHONE ENCOUNTER
Adalberto Jacobs from [address] called:    1. She did a re certification today- needs verbal orders for PT & nursing. 2. Medication - stop LASIX 40 MG DAILY AND hydralazine 25 mg tablet    3. What is his oxygen dosage? He was taking any where from 3 to 5 liter. Please give her a call back.

## 2021-10-07 NOTE — TELEPHONE ENCOUNTER
CALLED AND SPOKE TO JAN  [facility] S ADVISED ON DR. Rubén Alanis NOTE. SHE IS ASKING FOR REFILL OF LASIX BE SENT TO PHARMACY. CAN CLOSE MESSAGE AFTER SENDING SCRIPT.  SC

## 2021-10-07 NOTE — TELEPHONE ENCOUNTER
Spoke w/ Janice Luciano - cont lasix/ hydralazine w/ bp monitoring and try to cut lasix dose back when swelling better.   Compression/ elevation/ low salt stressed  Ok recert

## 2021-10-09 PROBLEM — R77.8 ELEVATED TROPONIN: Status: RESOLVED | Noted: 2018-03-06 | Resolved: 2021-01-01

## 2021-10-09 PROBLEM — R79.89 ELEVATED TROPONIN: Status: RESOLVED | Noted: 2018-03-06 | Resolved: 2021-01-01

## 2021-10-09 NOTE — PROGRESS NOTES
Patient's wife Mohamud Cline called during on-call hours with concerns that patient is in need of hospice, he is no longer getting out of bed or eating and is in a lot of pain. Returned call but no one answered, left voicemail for Mohamud Cline. Spoke with Dr. Marvin Schulte, unsure that we can get referral and evaluation started since this is a weekend, relayed that to the wife via the voicemail. Instructed to try calling the office Monday morning and we  can facilitate hospice referral.  Expressed condolences.

## 2021-10-11 NOTE — TELEPHONE ENCOUNTER
Patients wife called and said she talked to vee and was told to call this morning to get a referral for hospice for liz.

## 2021-10-11 NOTE — TELEPHONE ENCOUNTER
Vitals - Temp 101.3, pulse 69 , Respiratory - 16, /70 and oxygen 89 on 5 liters    Not doing well    She is having a  coming out to see him.      JUST FYI

## 2021-10-11 NOTE — TELEPHONE ENCOUNTER
CALLED AND SPOKE TO PATIENT WIFE. SHE SAID A  CAME OUT FROM [facility]  AND THEY ARE HAVING SOMEONE FROM [facility] OUT TO SEE HER TODAY AND WILL TAKE CARE OF THIS. SHE SAID IF THEY DO NOT, SHE WILL CALL US BACK.  SC

## 2021-10-25 ENCOUNTER — TELEPHONE (OUTPATIENT)
Dept: PULMONOLOGY | Age: 86
End: 2021-10-25

## 2022-04-11 NOTE — TELEPHONE ENCOUNTER
CALLED AND ADVISED. Elisha Mcqueen Detail Level: Detailed Discharge Destination: home Discharge Under Care Of (Will Render 'self' If Left Blank): daughter Follow-Up: 14 days

## 2022-08-25 NOTE — TELEPHONE ENCOUNTER
Why am I getting this message? It looks like he's in ER. Detail Level: Detailed Render Post-Care Instructions In Note?: no Duration Of Freeze Thaw-Cycle (Seconds): 0 Post-Care Instructions: I reviewed with the patient in detail post-care instructions. Patient is to wear sunprotection, and avoid picking at any of the treated lesions. Pt may apply Vaseline to crusted or scabbing areas. Show Applicator Variable?: Yes Consent: The patient's consent was obtained including but not limited to risks of crusting, scabbing, blistering, scarring, darker or lighter pigmentary change, recurrence, incomplete removal and infection.